# Patient Record
Sex: FEMALE | Race: WHITE | Employment: OTHER | URBAN - METROPOLITAN AREA
[De-identification: names, ages, dates, MRNs, and addresses within clinical notes are randomized per-mention and may not be internally consistent; named-entity substitution may affect disease eponyms.]

---

## 2017-02-01 ENCOUNTER — TELEPHONE (OUTPATIENT)
Dept: INTERNAL MEDICINE CLINIC | Facility: CLINIC | Age: 77
End: 2017-02-01

## 2017-02-01 DIAGNOSIS — M94.9 CARTILAGE DISORDER: ICD-10-CM

## 2017-02-01 DIAGNOSIS — M70.62 TROCHANTERIC BURSITIS OF LEFT HIP: Primary | ICD-10-CM

## 2017-02-01 NOTE — TELEPHONE ENCOUNTER
To Dr. Jd Dia, please advise pending referral for PT, dx trochanteric bursitis L hip & other cartilage disorders

## 2017-02-01 NOTE — TELEPHONE ENCOUNTER
Olivia from [facility] called   Flakita Shukla referral with confirmation from [facility] is required before patient can schedule her evaluation   Ph# [phone] - fax# [phone]

## 2017-02-01 NOTE — TELEPHONE ENCOUNTER
Patient's insurance requires her to have referral from her PCP/Dr Farr   for physical therapy on her left hip - diagnosis is  trochanteric bursitis & other cartilage disorders   Her Orthopedic;  Dr Rojelio Brewster has ordered therapy 1 -2 times per week for 6 weeks

## 2017-03-31 ENCOUNTER — TELEPHONE (OUTPATIENT)
Dept: INTERNAL MEDICINE CLINIC | Facility: CLINIC | Age: 77
End: 2017-03-31

## 2017-03-31 RX ORDER — ALPRAZOLAM 0.25 MG/1
0.25 TABLET ORAL 3 TIMES DAILY PRN
Qty: 90 TABLET | Refills: 5 | COMMUNITY
Start: 2017-03-31 | End: 2017-11-20

## 2017-03-31 NOTE — TELEPHONE ENCOUNTER
Per Dr Howell Minor verbal order, please fill xanax 0.25mg. Take one tablet TID PRN.  Dispense 90 with 3 refills  Script called into pharmacy

## 2017-05-01 ENCOUNTER — HOSPITAL ENCOUNTER (OUTPATIENT)
Age: 77
Discharge: HOME | End: 2017-05-01
Attending: EMERGENCY MEDICINE
Payer: MEDICARE

## 2017-05-01 ENCOUNTER — APPOINTMENT (OUTPATIENT)
Dept: GENERAL RADIOLOGY | Age: 77
End: 2017-05-01
Attending: EMERGENCY MEDICINE
Payer: MEDICARE

## 2017-05-01 VITALS
HEART RATE: 77 BPM | RESPIRATION RATE: 20 BRPM | TEMPERATURE: 98 F | DIASTOLIC BLOOD PRESSURE: 56 MMHG | HEIGHT: 65 IN | WEIGHT: 220 LBS | OXYGEN SATURATION: 99 % | BODY MASS INDEX: 36.65 KG/M2 | SYSTOLIC BLOOD PRESSURE: 142 MMHG

## 2017-05-01 DIAGNOSIS — S93.602A FOOT SPRAIN, LEFT, INITIAL ENCOUNTER: Primary | ICD-10-CM

## 2017-05-01 PROCEDURE — 99213 OFFICE O/P EST LOW 20 MIN: CPT

## 2017-05-01 PROCEDURE — 73630 X-RAY EXAM OF FOOT: CPT

## 2017-05-01 NOTE — ED INITIAL ASSESSMENT (HPI)
Patient states hitting left fifth toe on 's walker yesterday evening. Patient states having cramping pain in fourth and fifth left toes, increasing pain throughout the night.   Patient c/o increasing pain with movement and when bearing weight on lef

## 2017-05-01 NOTE — ED PROVIDER NOTES
CC:Left foot pain    HPI:     Zaria Salazar is a 68year old female who presents today with a chief complaint of left foot pain.   Patient states she struck her left foot against her 's walker yesterday and since that time has pain to the area of for Exam?  Answer: left foot injury  Xr Foot, Complete (min 3 Views), Left (cpt=73630)    5/1/2017  PROCEDURE: XR FOOT, COMPLETE (MIN 3 VIEWS), LEFT (CPT=73630)  COMPARISON: None.   INDICATIONS: Left foot pain at the 4th and 5th MTP joints post blunt trauma

## 2017-05-12 ENCOUNTER — OFFICE VISIT (OUTPATIENT)
Dept: INTERNAL MEDICINE CLINIC | Facility: CLINIC | Age: 77
End: 2017-05-12

## 2017-05-12 VITALS
WEIGHT: 220 LBS | DIASTOLIC BLOOD PRESSURE: 66 MMHG | SYSTOLIC BLOOD PRESSURE: 122 MMHG | HEIGHT: 64 IN | TEMPERATURE: 98 F | BODY MASS INDEX: 37.56 KG/M2 | HEART RATE: 72 BPM

## 2017-05-12 DIAGNOSIS — Z00.00 ENCOUNTER FOR MEDICARE ANNUAL WELLNESS EXAM: Primary | ICD-10-CM

## 2017-05-12 DIAGNOSIS — E11.9 TYPE 2 DIABETES MELLITUS WITHOUT COMPLICATION, WITHOUT LONG-TERM CURRENT USE OF INSULIN (HCC): ICD-10-CM

## 2017-05-12 DIAGNOSIS — Z12.39 BREAST CANCER SCREENING: ICD-10-CM

## 2017-05-12 DIAGNOSIS — Z78.0 POSTMENOPAUSAL: ICD-10-CM

## 2017-05-12 DIAGNOSIS — I10 HYPERTENSION, BENIGN: ICD-10-CM

## 2017-05-12 PROCEDURE — 99397 PER PM REEVAL EST PAT 65+ YR: CPT | Performed by: INTERNAL MEDICINE

## 2017-05-12 PROCEDURE — 99214 OFFICE O/P EST MOD 30 MIN: CPT | Performed by: INTERNAL MEDICINE

## 2017-05-12 PROCEDURE — G0463 HOSPITAL OUTPT CLINIC VISIT: HCPCS | Performed by: INTERNAL MEDICINE

## 2017-05-12 NOTE — PROGRESS NOTES
Lisa Vargas is a 68year old female. HPI:   1. 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  She has had pain in the left hip sinc complication, not stated as uncontrolled 8/2007   • Gallstone 1/2010     NG: Asymptomatic    • Gastroenteritis    • Lipid screening 12/11/2013   • Osteoporosis screening 1/22/2013   • Restless leg syndrome 2015     Per Dr. Josselin Duff   • Other and unspecified hype hazards?: 0-No    Are you on multiple medications?: 1-Yes    Does pain affect your day to day activities?: 0-No     Have you had any memory issues?: 0-No    Fall/Risk Scorin          Depression Screening (PHQ-2/PHQ-9): Over the LAST 2 WEEKS   Little in Dexascan Every two years Last Dexa Scan:   XR DEXA BONE DENSITOMETRY (CPT=77080) 03/19/2015    No flowsheet data found.    Pap and Pelvic      Pap: Every 3 yrs age 21-65 or Pap+HPV every 5 yrs age 33-67, age 72 and older at high risk   There are no prev Dilated Eye Exam 6/29/2016     No flowsheet data found. COPD      Spirometry Testing Annually No results found for this or any previous visit. No flowsheet data found. ASSESSMENT AND PLAN:   1.  Encounter for Medicare annual wellness exam  - h

## 2017-06-30 ENCOUNTER — PRIOR ORIGINAL RECORDS (OUTPATIENT)
Dept: OTHER | Age: 77
End: 2017-06-30

## 2017-06-30 ENCOUNTER — APPOINTMENT (OUTPATIENT)
Dept: LAB | Age: 77
End: 2017-06-30
Attending: INTERNAL MEDICINE
Payer: MEDICARE

## 2017-06-30 ENCOUNTER — OFFICE VISIT (OUTPATIENT)
Dept: INTERNAL MEDICINE CLINIC | Facility: CLINIC | Age: 77
End: 2017-06-30

## 2017-06-30 VITALS
HEART RATE: 82 BPM | WEIGHT: 219 LBS | TEMPERATURE: 98 F | BODY MASS INDEX: 37.39 KG/M2 | HEIGHT: 64 IN | DIASTOLIC BLOOD PRESSURE: 62 MMHG | SYSTOLIC BLOOD PRESSURE: 144 MMHG

## 2017-06-30 DIAGNOSIS — E11.9 TYPE 2 DIABETES MELLITUS WITHOUT COMPLICATION, WITHOUT LONG-TERM CURRENT USE OF INSULIN (HCC): ICD-10-CM

## 2017-06-30 DIAGNOSIS — F32.A DEPRESSION, UNSPECIFIED DEPRESSION TYPE: Primary | ICD-10-CM

## 2017-06-30 DIAGNOSIS — I10 HYPERTENSION, BENIGN: ICD-10-CM

## 2017-06-30 DIAGNOSIS — Z00.00 ENCOUNTER FOR MEDICARE ANNUAL WELLNESS EXAM: ICD-10-CM

## 2017-06-30 DIAGNOSIS — E78.00 PURE HYPERCHOLESTEROLEMIA: ICD-10-CM

## 2017-06-30 LAB
ALBUMIN SERPL BCP-MCNC: 3.9 G/DL (ref 3.5–4.8)
ALBUMIN/GLOB SERPL: 1.2 {RATIO} (ref 1–2)
ALP SERPL-CCNC: 63 U/L (ref 32–100)
ALT SERPL-CCNC: 26 U/L (ref 14–54)
ANION GAP SERPL CALC-SCNC: 13 MMOL/L (ref 0–18)
AST SERPL-CCNC: 25 U/L (ref 15–41)
BILIRUB SERPL-MCNC: 0.4 MG/DL (ref 0.3–1.2)
BUN SERPL-MCNC: 16 MG/DL (ref 8–20)
BUN/CREAT SERPL: 16.5 (ref 10–20)
CALCIUM SERPL-MCNC: 9.3 MG/DL (ref 8.5–10.5)
CHLORIDE SERPL-SCNC: 102 MMOL/L (ref 95–110)
CHOLEST SERPL-MCNC: 148 MG/DL (ref 110–200)
CO2 SERPL-SCNC: 25 MMOL/L (ref 22–32)
CREAT SERPL-MCNC: 0.97 MG/DL (ref 0.5–1.5)
CREAT UR-MCNC: 152.8 MG/DL
GLOBULIN PLAS-MCNC: 3.2 G/DL (ref 2.5–3.7)
GLUCOSE SERPL-MCNC: 214 MG/DL (ref 70–99)
HDLC SERPL-MCNC: 46 MG/DL
LDLC SERPL CALC-MCNC: 51 MG/DL (ref 0–99)
MICROALBUMIN UR-MCNC: 0.9 MG/DL (ref 0–1.8)
MICROALBUMIN/CREAT UR: 5.9 MG/G{CREAT} (ref 0–20)
NONHDLC SERPL-MCNC: 102 MG/DL
OSMOLALITY UR CALC.SUM OF ELEC: 298 MOSM/KG (ref 275–295)
POTASSIUM SERPL-SCNC: 4.1 MMOL/L (ref 3.3–5.1)
PROT SERPL-MCNC: 7.1 G/DL (ref 5.9–8.4)
SODIUM SERPL-SCNC: 140 MMOL/L (ref 136–144)
TRIGL SERPL-MCNC: 253 MG/DL (ref 1–149)

## 2017-06-30 PROCEDURE — 82043 UR ALBUMIN QUANTITATIVE: CPT

## 2017-06-30 PROCEDURE — 99213 OFFICE O/P EST LOW 20 MIN: CPT | Performed by: INTERNAL MEDICINE

## 2017-06-30 PROCEDURE — 80053 COMPREHEN METABOLIC PANEL: CPT

## 2017-06-30 PROCEDURE — 36415 COLL VENOUS BLD VENIPUNCTURE: CPT

## 2017-06-30 PROCEDURE — 82570 ASSAY OF URINE CREATININE: CPT

## 2017-06-30 PROCEDURE — 83036 HEMOGLOBIN GLYCOSYLATED A1C: CPT

## 2017-06-30 PROCEDURE — 80061 LIPID PANEL: CPT

## 2017-06-30 NOTE — PROGRESS NOTES
Kelvin Cherry is a 68year old female. HPI:   1. Depression, unspecified depression type - she is here primarily to talk - she has been under tremendous stress taking care of her ill  for the last 5 yrs. She takes xanax which helps somewhat. Diagnosis Date   • Gallstone 1/2010    NG: Asymptomatic    • Gastroenteritis    • High cholesterol    • Lipid screening 12/11/2013   • Osteoporosis    • Osteoporosis screening 1/22/2013   • Other and unspecified hyperlipidemia    • Restless leg syndrome understanding of these issues and agrees to the plan.   The patient is asked to return in 4 months

## 2017-07-01 LAB — HBA1C MFR BLD: 8.2 % (ref 4–6)

## 2017-07-04 ENCOUNTER — TELEPHONE (OUTPATIENT)
Dept: INTERNAL MEDICINE CLINIC | Facility: CLINIC | Age: 77
End: 2017-07-04

## 2017-07-04 NOTE — TELEPHONE ENCOUNTER
Lipids good but diabetes poorly controlled c A1C rising from 8.0 to 8.2.     I know she has a lot going on at home but I think she should see a diabetic doctor  - Dr Ross Ford or Dr Ruth Kowalski

## 2017-07-05 PROBLEM — E66.01 SEVERE OBESITY (BMI 35.0-39.9) WITH COMORBIDITY (HCC): Chronic | Status: ACTIVE | Noted: 2017-07-05

## 2017-07-05 NOTE — TELEPHONE ENCOUNTER
Called and Relayed MD's message to patient---verbalized understanding    Diabetic foot doctor recommendations given per Dr. Casie Contreras. Patient advised to contact either contact and set up an appointment.

## 2017-08-14 ENCOUNTER — TELEPHONE (OUTPATIENT)
Dept: INTERNAL MEDICINE CLINIC | Facility: CLINIC | Age: 77
End: 2017-08-14

## 2017-08-14 RX ORDER — GLIMEPIRIDE 4 MG/1
8 TABLET ORAL
Qty: 60 TABLET | Refills: 0 | Status: SHIPPED | OUTPATIENT
Start: 2017-08-14 | End: 2017-09-16

## 2017-08-14 NOTE — TELEPHONE ENCOUNTER
Refill request is for a maintenance medication and has met the criteria specified in the Ambulatory Medication Refill Standing Order for eligibility, visits, laboratory, alerts and was sent to the requested pharmacy.   Called patient who only wants 1 month

## 2017-08-14 NOTE — TELEPHONE ENCOUNTER
[facility] is requesting a refill on: Glimipiride 4 mg   Two tablets daily pt. Wants a 1 month supply  ph.  # [phone]   Routed to Rx

## 2017-09-17 RX ORDER — GLIMEPIRIDE 4 MG/1
8 TABLET ORAL
Qty: 60 TABLET | Refills: 11 | Status: SHIPPED | OUTPATIENT
Start: 2017-09-17 | End: 2017-10-23

## 2017-09-25 ENCOUNTER — TELEPHONE (OUTPATIENT)
Dept: INTERNAL MEDICINE CLINIC | Facility: CLINIC | Age: 77
End: 2017-09-25

## 2017-09-25 ENCOUNTER — OFFICE VISIT (OUTPATIENT)
Dept: INTERNAL MEDICINE CLINIC | Facility: CLINIC | Age: 77
End: 2017-09-25

## 2017-09-25 VITALS
TEMPERATURE: 98 F | DIASTOLIC BLOOD PRESSURE: 72 MMHG | WEIGHT: 218 LBS | HEART RATE: 72 BPM | BODY MASS INDEX: 37 KG/M2 | SYSTOLIC BLOOD PRESSURE: 174 MMHG

## 2017-09-25 DIAGNOSIS — I10 HYPERTENSION, BENIGN: Primary | ICD-10-CM

## 2017-09-25 DIAGNOSIS — F41.9 ANXIETY: ICD-10-CM

## 2017-09-25 PROCEDURE — 99213 OFFICE O/P EST LOW 20 MIN: CPT | Performed by: INTERNAL MEDICINE

## 2017-09-25 PROCEDURE — G0463 HOSPITAL OUTPT CLINIC VISIT: HCPCS | Performed by: INTERNAL MEDICINE

## 2017-09-25 NOTE — PROGRESS NOTES
Jaencarlos Rodriguez is a 68year old female. HPI:   1. Hypertension, benign        2. Anxiety - she is here today as an urgent visit. She is profoundly anxious and depressed over her 's current situation which involves many dimensions.  He is chronica MG Oral Cap Take 240 mg by mouth daily. Disp:  Rfl:    Hydrocortisone Valerate (WESTCORT) 0.2 % Apply Externally Cream Use as directed TID Disp: 45 g Rfl: 2   Naproxen Sodium (ALEVE) 220 MG Oral Tab Take 1-2 tablets by mouth as needed.  Patient states usual for several days. He refuses a caretaker and this would put him at risk. I asked her to call Dr Virgie Quintero office and set up an appointment to see him. Continue Xanax in the meantime. There is no ideal solution to this situation.     She knows that she can c

## 2017-09-25 NOTE — TELEPHONE ENCOUNTER
Lasha Solares would like to see Dr. Jean Nelson today, she states she can't handle Kj  Ph. # [phone]    Routed to clinical

## 2017-09-26 PROBLEM — F41.9 ANXIETY: Status: ACTIVE | Noted: 2017-09-26

## 2017-10-23 ENCOUNTER — OFFICE VISIT (OUTPATIENT)
Dept: ENDOCRINOLOGY CLINIC | Facility: CLINIC | Age: 77
End: 2017-10-23

## 2017-10-23 VITALS
BODY MASS INDEX: 36.54 KG/M2 | TEMPERATURE: 99 F | HEART RATE: 71 BPM | WEIGHT: 214 LBS | HEIGHT: 64 IN | SYSTOLIC BLOOD PRESSURE: 128 MMHG | DIASTOLIC BLOOD PRESSURE: 74 MMHG

## 2017-10-23 DIAGNOSIS — IMO0001 UNCONTROLLED TYPE 2 DIABETES MELLITUS WITHOUT COMPLICATION, WITHOUT LONG-TERM CURRENT USE OF INSULIN: Primary | ICD-10-CM

## 2017-10-23 PROCEDURE — 36416 COLLJ CAPILLARY BLOOD SPEC: CPT | Performed by: INTERNAL MEDICINE

## 2017-10-23 PROCEDURE — 82962 GLUCOSE BLOOD TEST: CPT | Performed by: INTERNAL MEDICINE

## 2017-10-23 PROCEDURE — 99203 OFFICE O/P NEW LOW 30 MIN: CPT | Performed by: INTERNAL MEDICINE

## 2017-10-23 PROCEDURE — 83036 HEMOGLOBIN GLYCOSYLATED A1C: CPT | Performed by: INTERNAL MEDICINE

## 2017-10-23 RX ORDER — GLIMEPIRIDE 4 MG/1
8 TABLET ORAL
Qty: 180 TABLET | Refills: 3 | Status: SHIPPED | OUTPATIENT
Start: 2017-10-23 | End: 2018-07-30

## 2017-10-23 RX ORDER — METFORMIN HYDROCHLORIDE 500 MG/1
TABLET, EXTENDED RELEASE ORAL
Qty: 360 TABLET | Refills: 3 | Status: SHIPPED | OUTPATIENT
Start: 2017-10-23 | End: 2017-11-20

## 2017-10-23 NOTE — PATIENT INSTRUCTIONS
Continue Glimepiride 4mg 2 times per day    Continue Metformin 1000mg 2 times per day    Stop Januvia    Start jardiance 25mg daily in the morning

## 2017-10-23 NOTE — PROGRESS NOTES
Name: Willye Osler  Date: 10/23/2017    Referring Physician: No ref. provider found    HISTORY OF PRESENT ILLNESS   Willye Osler is a 68year old female who presents for diabetes mellitus. Prior HbA, C or glycohemoglobin were 8.2% 6/2017; 7. tablets by mouth as needed.  Patient states usually taking BID. , Disp: , Rfl:      Allergies:   No Known Allergies    Social History:   Social History    Marital status:              Spouse name:                       Years of education: bilaterally  Cardiovascular:  regular rate, rhythm, , no murmurs, S3 or S4  Gastrointestinal:  normal bowel sounds and no palpable masses in abdomen, organomegaly or tenderness   Musculoskeletal:  normal muscle strength and tone  Skin:  normal moisture and

## 2017-11-04 ENCOUNTER — HOSPITAL ENCOUNTER (OUTPATIENT)
Dept: CT IMAGING | Facility: HOSPITAL | Age: 77
Discharge: HOME | End: 2017-11-04
Attending: INTERNAL MEDICINE

## 2017-11-04 VITALS — DIASTOLIC BLOOD PRESSURE: 66 MMHG | SYSTOLIC BLOOD PRESSURE: 128 MMHG | HEART RATE: 74 BPM

## 2017-11-04 DIAGNOSIS — Z00.00 ENCOUNTER FOR MEDICARE ANNUAL WELLNESS EXAM: ICD-10-CM

## 2017-11-04 DIAGNOSIS — Z78.0 POSTMENOPAUSAL: ICD-10-CM

## 2017-11-04 DIAGNOSIS — E11.9 TYPE 2 DIABETES MELLITUS WITHOUT COMPLICATION, WITHOUT LONG-TERM CURRENT USE OF INSULIN (HCC): ICD-10-CM

## 2017-11-04 DIAGNOSIS — R07.89 OTHER CHEST PAIN: ICD-10-CM

## 2017-11-04 DIAGNOSIS — I10 HYPERTENSION, BENIGN: ICD-10-CM

## 2017-11-04 DIAGNOSIS — Z12.39 BREAST CANCER SCREENING: ICD-10-CM

## 2017-11-14 ENCOUNTER — PRIOR ORIGINAL RECORDS (OUTPATIENT)
Dept: OTHER | Age: 77
End: 2017-11-14

## 2017-11-14 LAB
CHOLESTEROL, TOTAL: 148 MG/DL
HDL CHOLESTEROL: 46 MG/DL
LDL CHOLESTEROL: 51 MG/DL
NON-HDL CHOLESTEROL: 102 MG/DL
TRIGLYCERIDES: 253 MG/DL

## 2017-11-19 ENCOUNTER — TELEPHONE (OUTPATIENT)
Dept: INTERNAL MEDICINE CLINIC | Facility: CLINIC | Age: 77
End: 2017-11-19

## 2017-11-19 NOTE — TELEPHONE ENCOUNTER
CT calcium score is good - normal is zero, hers is 200 - just continue same meds. She had normal nuclear treadmill 2015. Will repeat calcium score in about 2 yrs.

## 2017-11-20 NOTE — PROGRESS NOTES
Arnell Fleischer is a 68year old female. HPI:   She continues to be under tremendous emotional stress caring for her chronically ill  - I had previously advised that she see Dr Sukhdeep Mtz independently and see what he recommends.      Her main physic (ALEVE) 220 MG Oral Tab Take 1-2 tablets by mouth as needed.    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 Hypertension, benign - acceptable. The patient indicates understanding of these issues and agrees to the plan.   The patient is asked to return in  2-3 months

## 2017-11-20 NOTE — TELEPHONE ENCOUNTER
Called patient and Relayed MD's message to patient---verbalized understanding. States she had already spoke with PCP about this result around 9am today.

## 2017-11-21 ENCOUNTER — PRIOR ORIGINAL RECORDS (OUTPATIENT)
Dept: OTHER | Age: 77
End: 2017-11-21

## 2017-11-22 ENCOUNTER — MYAURORA ACCOUNT LINK (OUTPATIENT)
Dept: OTHER | Age: 77
End: 2017-11-22

## 2017-11-24 ENCOUNTER — PRIOR ORIGINAL RECORDS (OUTPATIENT)
Dept: OTHER | Age: 77
End: 2017-11-24

## 2017-11-24 ENCOUNTER — TELEPHONE (OUTPATIENT)
Dept: INTERNAL MEDICINE CLINIC | Facility: CLINIC | Age: 77
End: 2017-11-24

## 2017-11-24 RX ORDER — METFORMIN HYDROCHLORIDE 500 MG/1
TABLET, EXTENDED RELEASE ORAL
Qty: 40 TABLET | Refills: 3 | Status: SHIPPED | OUTPATIENT
Start: 2017-11-24 | End: 2018-04-16

## 2017-11-28 ENCOUNTER — HOSPITAL ENCOUNTER (OUTPATIENT)
Dept: GENERAL RADIOLOGY | Facility: HOSPITAL | Age: 77
Discharge: HOME | End: 2017-11-28
Attending: INTERNAL MEDICINE
Payer: MEDICARE

## 2017-11-28 ENCOUNTER — PRIOR ORIGINAL RECORDS (OUTPATIENT)
Dept: OTHER | Age: 77
End: 2017-11-28

## 2017-11-28 ENCOUNTER — LAB ENCOUNTER (OUTPATIENT)
Dept: LAB | Facility: HOSPITAL | Age: 77
End: 2017-11-28
Attending: INTERNAL MEDICINE
Payer: MEDICARE

## 2017-11-28 DIAGNOSIS — R06.00 DYSPNEA: Primary | ICD-10-CM

## 2017-11-28 DIAGNOSIS — R06.00 DYSPNEA, PAROXYSMAL NOCTURNAL: ICD-10-CM

## 2017-11-28 PROCEDURE — 36415 COLL VENOUS BLD VENIPUNCTURE: CPT

## 2017-11-28 PROCEDURE — 80048 BASIC METABOLIC PNL TOTAL CA: CPT

## 2017-11-28 PROCEDURE — 71020 XR CHEST PA + LAT CHEST (CPT=71020): CPT | Performed by: INTERNAL MEDICINE

## 2017-11-28 PROCEDURE — 85025 COMPLETE CBC W/AUTO DIFF WBC: CPT

## 2017-11-28 RX ORDER — SODIUM CHLORIDE 9 MG/ML
INJECTION, SOLUTION INTRAVENOUS ONCE
Status: COMPLETED | OUTPATIENT
Start: 2017-11-29 | End: 2017-11-29

## 2017-11-29 ENCOUNTER — HOSPITAL ENCOUNTER (OUTPATIENT)
Dept: INTERVENTIONAL RADIOLOGY/VASCULAR | Facility: HOSPITAL | Age: 77
Setting detail: OBSERVATION
Discharge: HOME | End: 2017-11-30
Attending: INTERNAL MEDICINE | Admitting: INTERNAL MEDICINE
Payer: MEDICARE

## 2017-11-29 DIAGNOSIS — R06.00 DYSPNEA: ICD-10-CM

## 2017-11-29 PROBLEM — I25.10 CAD (CORONARY ARTERY DISEASE): Status: ACTIVE | Noted: 2017-11-29

## 2017-11-29 LAB
BUN: 20 MG/DL
CALCIUM: 10.1 MG/DL
CHLORIDE: 102 MEQ/L
CREATININE, SERUM: 1.07 MG/DL
GLUCOSE: 164 MG/DL
HEMATOCRIT: 40 %
HEMOGLOBIN: 13.2 G/DL
MCH: 29.8 PG
MCHC: 14.2 G/DL
MCV: 90.1 FL
PLATELETS: 271 K/UL
POTASSIUM, SERUM: 3.9 MEQ/L
RED BLOOD COUNT: 4.43 X 10-6/U
SODIUM: 138 MEQ/L
WHITE BLOOD COUNT: 9.5 X 10-3/U

## 2017-11-29 PROCEDURE — B2141ZZ FLUOROSCOPY OF RIGHT HEART USING LOW OSMOLAR CONTRAST: ICD-10-PCS | Performed by: INTERNAL MEDICINE

## 2017-11-29 PROCEDURE — 4A023N8 MEASUREMENT OF CARDIAC SAMPLING AND PRESSURE, BILATERAL, PERCUTANEOUS APPROACH: ICD-10-PCS | Performed by: INTERNAL MEDICINE

## 2017-11-29 PROCEDURE — B2111ZZ FLUOROSCOPY OF MULTIPLE CORONARY ARTERIES USING LOW OSMOLAR CONTRAST: ICD-10-PCS | Performed by: INTERNAL MEDICINE

## 2017-11-29 PROCEDURE — 4A033BC MEASUREMENT OF ARTERIAL PRESSURE, CORONARY, PERCUTANEOUS APPROACH: ICD-10-PCS | Performed by: INTERNAL MEDICINE

## 2017-11-29 PROCEDURE — 027034Z DILATION OF CORONARY ARTERY, ONE ARTERY WITH DRUG-ELUTING INTRALUMINAL DEVICE, PERCUTANEOUS APPROACH: ICD-10-PCS | Performed by: INTERNAL MEDICINE

## 2017-11-29 PROCEDURE — B2151ZZ FLUOROSCOPY OF LEFT HEART USING LOW OSMOLAR CONTRAST: ICD-10-PCS | Performed by: INTERNAL MEDICINE

## 2017-11-29 PROCEDURE — 99225 SUBSEQUENT OBSERVATION CARE: CPT | Performed by: HOSPITALIST

## 2017-11-29 RX ORDER — SODIUM CHLORIDE 9 MG/ML
INJECTION, SOLUTION INTRAVENOUS
Status: COMPLETED
Start: 2017-11-29 | End: 2017-11-29

## 2017-11-29 RX ORDER — CLOPIDOGREL BISULFATE 75 MG/1
75 TABLET ORAL DAILY
Status: DISCONTINUED | OUTPATIENT
Start: 2017-11-30 | End: 2017-11-30

## 2017-11-29 RX ORDER — ADENOSINE 3 MG/ML
INJECTION, SOLUTION INTRAVENOUS
Status: COMPLETED
Start: 2017-11-29 | End: 2017-11-29

## 2017-11-29 RX ORDER — MIDAZOLAM HYDROCHLORIDE 1 MG/ML
INJECTION INTRAMUSCULAR; INTRAVENOUS
Status: COMPLETED
Start: 2017-11-29 | End: 2017-11-29

## 2017-11-29 RX ORDER — DEXTROSE MONOHYDRATE 25 G/50ML
50 INJECTION, SOLUTION INTRAVENOUS AS NEEDED
Status: DISCONTINUED | OUTPATIENT
Start: 2017-11-29 | End: 2017-11-30

## 2017-11-29 RX ORDER — ALPRAZOLAM 0.25 MG/1
0.25 TABLET ORAL 3 TIMES DAILY PRN
Status: DISCONTINUED | OUTPATIENT
Start: 2017-11-29 | End: 2017-11-30

## 2017-11-29 RX ORDER — DEXTROSE MONOHYDRATE 25 G/50ML
50 INJECTION, SOLUTION INTRAVENOUS AS NEEDED
Status: DISCONTINUED | OUTPATIENT
Start: 2017-11-29 | End: 2017-11-29

## 2017-11-29 RX ORDER — ATORVASTATIN CALCIUM 40 MG/1
40 TABLET, FILM COATED ORAL NIGHTLY
Status: DISCONTINUED | OUTPATIENT
Start: 2017-11-29 | End: 2017-11-30

## 2017-11-29 RX ORDER — LIDOCAINE HYDROCHLORIDE 20 MG/ML
INJECTION, SOLUTION EPIDURAL; INFILTRATION; INTRACAUDAL; PERINEURAL
Status: COMPLETED
Start: 2017-11-29 | End: 2017-11-29

## 2017-11-29 RX ORDER — SODIUM CHLORIDE 9 MG/ML
INJECTION, SOLUTION INTRAVENOUS CONTINUOUS
Status: DISPENSED | OUTPATIENT
Start: 2017-11-29 | End: 2017-11-29

## 2017-11-29 RX ORDER — CLOPIDOGREL BISULFATE 75 MG/1
TABLET ORAL
Status: COMPLETED
Start: 2017-11-29 | End: 2017-11-29

## 2017-11-29 RX ORDER — ASPIRIN 81 MG/1
81 TABLET, CHEWABLE ORAL DAILY
Status: DISCONTINUED | OUTPATIENT
Start: 2017-11-30 | End: 2017-11-30

## 2017-11-29 RX ORDER — NITROGLYCERIN 20 MG/100ML
INJECTION INTRAVENOUS
Status: COMPLETED
Start: 2017-11-29 | End: 2017-11-29

## 2017-11-29 RX ORDER — GLIMEPIRIDE 2 MG/1
4 TABLET ORAL 2 TIMES DAILY
Status: DISCONTINUED | OUTPATIENT
Start: 2017-11-30 | End: 2017-11-30

## 2017-11-29 RX ADMIN — SODIUM CHLORIDE: 9 INJECTION, SOLUTION INTRAVENOUS at 09:00:00

## 2017-11-29 RX ADMIN — ATORVASTATIN CALCIUM 40 MG: 40 TABLET, FILM COATED ORAL at 23:09:00

## 2017-11-29 RX ADMIN — SODIUM CHLORIDE: 9 INJECTION, SOLUTION INTRAVENOUS at 14:31:00

## 2017-11-29 NOTE — HISTORICAL OFFICE NOTE
Armond Fernandes  : 1940  ACCOUNT:  [id number]  [phone]  PCP: Dr. Jimmy Alexander     TODAY'S DATE: 2017  DICTATED BY:  [Dr. Milagro Buck: [Followup of Abnormal stress test, Followup of Abnormal UFCT, Followup of DM, Type I tonsillectomy, hysterectomy, 1997 and breast biopsy    PAST CV HISTORY: dyslipidemia and hypertension    FAMILY HISTORY: Negative for premature CAD. Negative for AAA. SOCIAL HISTORY: SMOKING: Never used tobacco. denies smoking.  CAFFEINE: 1 cup coffee dann life-threatening complication, and stroke. Patient understands the above. Questions were answered. ASSESSMENT:  1. Abnormal UFCT Calcium score 168 August 2013  2. Abnormal stress test  3. Abnormal UFCT  4. DM, Type II  5. Dyspnea  6.  Hypercholesteremi

## 2017-11-29 NOTE — PRE-SEDATION ASSESSMENT
Pre-Procedure Sedation Assessment    Chief Complaint/Indication for procedure: Dyspnea; abnormal nuc stress    History of snoring or sleep or apnea?    No    History of previous problems with anesthesia or sedation  No    Physical Findings:  Neck: nl ROM  C

## 2017-11-29 NOTE — HISTORICAL OFFICE NOTE
Jimi Postin  : 1940  ACCOUNT:  [id number]  [phone]  PCP: Dr. Kieran Mcgarry     TODAY'S DATE: 2017  DICTATED BY:  [Dr. Calvin How: [Followup of Abnormal stress test, Followup of Abnormal UFCT, Followup of DM, Type I tonsillectomy, hysterectomy, 1997 and breast biopsy    PAST CV HISTORY: dyslipidemia and hypertension    FAMILY HISTORY: Negative for premature CAD. Negative for AAA. SOCIAL HISTORY: SMOKING: Never used tobacco. denies smoking.  CAFFEINE: 1 cup coffee dann life-threatening complication, and stroke. Patient understands the above. Questions were answered. ASSESSMENT:  1. Abnormal UFCT Calcium score 168 August 2013  2. Abnormal stress test  3. Abnormal UFCT  4. DM, Type II  5. Dyspnea  6.  Hypercholesteremi Hypertension    REASON FOR PHARMACOLOGICAL TEST: Unable to exercise to required levels    Comparison Study: 11/15/13 nuc    INTERPRETATION:   PROJECTION IMAGES: Projection images demonstrated patient motion. Motion correction was used.   There was mild tis MD KIMBERLI Kimbrough/norbert - DD: 11/22/2017 - DT: 11/24/2017 - Job ID: [id number]  C: Dalia Rowan MD

## 2017-11-29 NOTE — PROCEDURES
Preop: Exertional dyspnea, abnormal nuclear stress test  Postop: Same. CAD  Procedure: Right and left heart cath; LV and coronary angiogram.  PCI of diagonal with TRUONG. FFR of LAD. Minx RFA.   Findings: Mean RA = 8; PA = 32/14; mean wedg

## 2017-11-29 NOTE — PROGRESS NOTES
Phil Gao  [id number]      Post procedure/ recovery hand-off report given to [facility]. Patient's vital signs stable, access site dry and intact, no signs and symptoms of bleeding/ hematoma.         Ayala DE LUNA  Was present in the room during transfer to

## 2017-11-29 NOTE — PROCEDURES
[facility]    PATIENT'S NAME: ZAKIA Paulino   ATTENDING PHYSICIAN: Loni Barboza. Corine Taylor MD   OPERATING PHYSICIAN: Adonay Kim.  Tito Montez MD   PATIENT ACCOUNT#:   [id number]    LOCATION:  [place] [address]  MEDICAL RECORD #:   [id number]       DOROTHY of disease. The circumflex artery is a small nondominant vessel which is free of disease giving off 2 very small marginal branches and a small posterolateral branch.     The left anterior descending artery has an eccentric stenosis in its proximal portion Based on this finding, intervention on the proximal LAD was not performed. We directed a Balance Middleweight wire down the diagonal and predilated the diagonal lesion with a 2 mm balloon.   We then stented the diagonal with a 2.25 x 12 mm Xience drug-el

## 2017-11-29 NOTE — CARDIAC REHAB
Cardiac Rehab Phase I    Activity on BR S/P pci      Education:  Handouts provided and reviewed: [address] Diet: Healthy Cardiac diet reviewed. Disease Process: Disease process reviewed.     Reviewed the following: SITE CARE: r

## 2017-11-30 ENCOUNTER — PRIOR ORIGINAL RECORDS (OUTPATIENT)
Dept: OTHER | Age: 77
End: 2017-11-30

## 2017-11-30 VITALS
WEIGHT: 215.69 LBS | BODY MASS INDEX: 37 KG/M2 | HEART RATE: 70 BPM | OXYGEN SATURATION: 97 % | SYSTOLIC BLOOD PRESSURE: 138 MMHG | RESPIRATION RATE: 17 BRPM | TEMPERATURE: 98 F | DIASTOLIC BLOOD PRESSURE: 68 MMHG

## 2017-11-30 PROCEDURE — 99217 OBSERVATION CARE DISCHARGE: CPT | Performed by: HOSPITALIST

## 2017-11-30 RX ORDER — CLOPIDOGREL BISULFATE 75 MG/1
75 TABLET ORAL DAILY
Qty: 30 TABLET | Refills: 11 | Status: SHIPPED | OUTPATIENT
Start: 2017-12-01 | End: 2019-02-26

## 2017-11-30 RX ADMIN — ASPIRIN 81 MG: 81 TABLET, CHEWABLE ORAL at 08:55:00

## 2017-11-30 RX ADMIN — CLOPIDOGREL BISULFATE 75 MG: 75 TABLET ORAL at 08:54:00

## 2017-11-30 RX ADMIN — GLIMEPIRIDE 4 MG: 2 TABLET ORAL at 08:54:00

## 2017-11-30 NOTE — PROGRESS NOTES
[facility] HOSP - [facility]    Progress Note    Jeancarlos Rodriguez Patient Status:  Observation    1940 MRN [id number]   Location [facility] 3W/SW Attending Tavo Snell MD   Hosp Day # 0 PCP Fina Mills.  MD Yordan     Subjective:     Const Hyperlipidemia  STARTED ON LIPITOR.          Results:     Lab Results  Component Value Date   WBC 9.5 11/28/2017   HGB 13.2 11/28/2017   HCT 40.0 11/28/2017    11/28/2017   CREATSERUM 1.07 11/28/2017   BUN 20 11/28/2017    11/28/2017   K 3.9 11

## 2017-11-30 NOTE — DISCHARGE SUMMARY
[facility] HOSPITALIST  DISCHARGE SUMMARY     Teressa Young Patient Status:  Observation    1940 MRN [id number]   [facility] 3W/ Attending No att. providers found   Hosp Day # 0 PCP Max Bess.  MD Yordan     Date of Admission:  mg total) by mouth once daily.    Quantity:  180 tablet  Refills:  3        CONTINUE taking these medications      Instructions Prescription details   ALPRAZolam 0.25 MG Tabs  Commonly known as:  XANAX      Take 1 tablet (0.25 mg total) by mouth 3 (three) t eren/Nimisha CORTES ([facility])      Vital signs:  Temp:  [97.9 °F (36.6 °C)-98.1 °F (36.7 °C)] 98.1 °F (36.7 °C)  Pulse:  [58-70] 70  Resp:  [16-17] 17  BP: (118-138)/(51-68) 138/68    Physical Exam:    General: No acute distress.    Respiratory: Clear to auscultation b

## 2017-11-30 NOTE — PROGRESS NOTES
[facility] Heart Cardiology Progress Note      Barry Allen Patient Status:  Observation    1940 MRN [id number]   Location [facility] 3W/SW Attending Mariel Head MD   Hosp Day # 0 PCP Antony Ortega.   Insulin Aspart Pen  1-5 Units Subcutaneous TID CC   • aspirin  81 mg Oral Daily   • Empagliflozin  25 mg Oral Daily   • glimepiride  4 mg Oral BID   • SITagliptin Phosphate  100 mg Oral Daily   • losartan-hydrochlorothiazide (HYZAAR 50/12. 5) combination ta

## 2017-11-30 NOTE — PROGRESS NOTES
Called Dr Farr's office to update him with results- was not available  Hospitalist asked  to see pt for internal mediciane and to manage noncardiac meds.

## 2017-12-01 ENCOUNTER — OFFICE VISIT (OUTPATIENT)
Dept: INTERNAL MEDICINE CLINIC | Facility: CLINIC | Age: 77
End: 2017-12-01

## 2017-12-01 ENCOUNTER — PATIENT OUTREACH (OUTPATIENT)
Dept: INTERNAL MEDICINE UNIT | Facility: HOSPITAL | Age: 77
End: 2017-12-01

## 2017-12-01 ENCOUNTER — TELEPHONE (OUTPATIENT)
Dept: INTERNAL MEDICINE UNIT | Facility: HOSPITAL | Age: 77
End: 2017-12-01

## 2017-12-01 ENCOUNTER — TELEPHONE (OUTPATIENT)
Dept: MEDSURG UNIT | Facility: HOSPITAL | Age: 77
End: 2017-12-01

## 2017-12-01 VITALS
OXYGEN SATURATION: 97 % | HEART RATE: 86 BPM | TEMPERATURE: 98 F | WEIGHT: 211.38 LBS | BODY MASS INDEX: 36.09 KG/M2 | HEIGHT: 64 IN | SYSTOLIC BLOOD PRESSURE: 140 MMHG | DIASTOLIC BLOOD PRESSURE: 62 MMHG

## 2017-12-01 DIAGNOSIS — E11.9 TYPE 2 DIABETES MELLITUS WITHOUT COMPLICATION, WITHOUT LONG-TERM CURRENT USE OF INSULIN (HCC): ICD-10-CM

## 2017-12-01 DIAGNOSIS — R06.00 DYSPNEA ON EXERTION: ICD-10-CM

## 2017-12-01 DIAGNOSIS — I25.110 CORONARY ARTERY DISEASE INVOLVING NATIVE CORONARY ARTERY OF NATIVE HEART WITH UNSTABLE ANGINA PECTORIS (HCC): Primary | ICD-10-CM

## 2017-12-01 DIAGNOSIS — E78.00 PURE HYPERCHOLESTEROLEMIA: ICD-10-CM

## 2017-12-01 DIAGNOSIS — I10 HYPERTENSION, BENIGN: ICD-10-CM

## 2017-12-01 PROCEDURE — 99214 OFFICE O/P EST MOD 30 MIN: CPT | Performed by: INTERNAL MEDICINE

## 2017-12-01 NOTE — TELEPHONE ENCOUNTER
Phoned to schedule TCM. Patient states he  has appt today and he is in hospital and she would like to take that appt.   Cancelled  appt and put patient into slot for TCM 12/1

## 2017-12-01 NOTE — PROGRESS NOTES
Rudy Kumar is a 68year old female. HPI:   1.  Coronary artery disease involving native coronary artery of native heart with unstable angina pectoris ([address] - she had an abnormal treadmill, then a cath and stenting of the diagonal of the LAD; she als every day in the evening Disp: 90 tablet Rfl: 3   docusate calcium 240 MG Oral Cap Take 240 mg by mouth daily. Disp:  Rfl:    Hydrocortisone Valerate (WESTCORT) 0.2 % Apply Externally Cream as needed. Ordered TID. Pt reports to only use PRN.   Disp: 45 g Rf cyanosis, clubbing or edema  Right groin - minor ecchymoses    ASSESSMENT AND PLAN:   1.  Coronary artery disease involving native coronary artery of native heart with unstable angina pectoris [facility]) - she is doing well since stenting of the diagonal of the

## 2017-12-01 NOTE — PROGRESS NOTES
Initial Post Discharge Follow Up   Discharge Date: 11/30/17  Contact Date: 12/1/2017    Consent Verification:  Assessment Completed With: Patient  HIPAA Verified?   Yes    Discharge Dx:     CAD (coronary artery disease)  S/P CARDIAC ANGIOGRAM, MAJOR DIAGO more and more each day?    Yes, resumed normal activity   Are you continuing to use your incentive spirometer?  no not provided one        Needs post D/C:   Now that you are home, are there any needs or concerns you need addressed before your next visit wit (DME, meds, disease concerns, Etc): No     Follow up appointments:       Your appointments     Date & Time Appointment Department [facility])    Dec 01, 2017  3:30 PM [facility] Follow Up with Faisal Louise MD [facility], [facility] instructed to monitor for any s/s of bleeding or bruising. Will be f/u w [facility] on 12/7/17 for wound check and has f/u w PCP this afternoon.        [x]  Discharge Summary, Discharge medications reviewed/discussed/and reconciled with patient, and orders re

## 2017-12-07 ENCOUNTER — MYAURORA ACCOUNT LINK (OUTPATIENT)
Dept: OTHER | Age: 77
End: 2017-12-07

## 2017-12-07 ENCOUNTER — PRIOR ORIGINAL RECORDS (OUTPATIENT)
Dept: OTHER | Age: 77
End: 2017-12-07

## 2017-12-12 ENCOUNTER — CARDPULM VISIT (OUTPATIENT)
Dept: CARDIAC REHAB | Facility: HOSPITAL | Age: 77
End: 2017-12-12
Attending: INTERNAL MEDICINE
Payer: MEDICARE

## 2017-12-13 ENCOUNTER — CARDPULM VISIT (OUTPATIENT)
Dept: CARDIAC REHAB | Facility: HOSPITAL | Age: 77
End: 2017-12-13
Attending: INTERNAL MEDICINE
Payer: MEDICARE

## 2017-12-13 LAB
BUN: 14 MG/DL
CALCIUM: 9 MG/DL
CHLORIDE: 103 MEQ/L
CREATININE, SERUM: 0.76 MG/DL
GLUCOSE: 145 MG/DL
POTASSIUM, SERUM: 3.7 MEQ/L
SODIUM: 138 MEQ/L

## 2017-12-13 PROCEDURE — 93798 PHYS/QHP OP CAR RHAB W/ECG: CPT

## 2017-12-15 ENCOUNTER — CARDPULM VISIT (OUTPATIENT)
Dept: CARDIAC REHAB | Facility: HOSPITAL | Age: 77
End: 2017-12-15
Attending: INTERNAL MEDICINE
Payer: MEDICARE

## 2017-12-15 PROCEDURE — 93798 PHYS/QHP OP CAR RHAB W/ECG: CPT

## 2017-12-18 ENCOUNTER — CARDPULM VISIT (OUTPATIENT)
Dept: CARDIAC REHAB | Facility: HOSPITAL | Age: 77
End: 2017-12-18
Attending: INTERNAL MEDICINE
Payer: MEDICARE

## 2017-12-18 PROCEDURE — 93798 PHYS/QHP OP CAR RHAB W/ECG: CPT

## 2017-12-20 ENCOUNTER — CARDPULM VISIT (OUTPATIENT)
Dept: CARDIAC REHAB | Facility: HOSPITAL | Age: 77
End: 2017-12-20
Attending: INTERNAL MEDICINE
Payer: MEDICARE

## 2017-12-20 PROCEDURE — 93798 PHYS/QHP OP CAR RHAB W/ECG: CPT

## 2017-12-22 ENCOUNTER — CARDPULM VISIT (OUTPATIENT)
Dept: CARDIAC REHAB | Facility: HOSPITAL | Age: 77
End: 2017-12-22
Attending: INTERNAL MEDICINE
Payer: MEDICARE

## 2017-12-22 PROCEDURE — 93798 PHYS/QHP OP CAR RHAB W/ECG: CPT

## 2017-12-22 NOTE — PROGRESS NOTES
Zakiya Jean is a 68year old female. HPI:   No new problems or complaints. Appetite good energy good. SR: no chest pain or sob, no gu or gi sx.         1. Anxiety - this is somewhat worse since her  is in the hospital. Also, her sleep is 50-12.5 MG Oral Tab Take 1 tablet by mouth  every day Disp: 90 tablet Rfl: 3   SIMVASTATIN 20 MG Oral Tab Take 1 tablet by mouth  every day in the evening Disp: 90 tablet Rfl: 3   docusate calcium 240 MG Oral Cap Take 240 mg by mouth daily.  Disp:  Rfl: auscultation  CARDIO: RRR without murmur  GI: good BS's,no masses, HSM or tenderness  EXTREMITIES: no cyanosis, clubbing or edema    ASSESSMENT AND PLAN:   1. Anxiety - she is under significant stress with chronically ill .        2. Coronary artery

## 2017-12-25 ENCOUNTER — APPOINTMENT (OUTPATIENT)
Dept: CARDIAC REHAB | Facility: HOSPITAL | Age: 77
End: 2017-12-25
Attending: INTERNAL MEDICINE
Payer: MEDICARE

## 2017-12-27 ENCOUNTER — CARDPULM VISIT (OUTPATIENT)
Dept: CARDIAC REHAB | Facility: HOSPITAL | Age: 77
End: 2017-12-27
Attending: INTERNAL MEDICINE
Payer: MEDICARE

## 2017-12-27 ENCOUNTER — TELEPHONE (OUTPATIENT)
Dept: INTERNAL MEDICINE CLINIC | Facility: CLINIC | Age: 77
End: 2017-12-27

## 2017-12-27 PROCEDURE — 93798 PHYS/QHP OP CAR RHAB W/ECG: CPT

## 2017-12-27 NOTE — TELEPHONE ENCOUNTER
[facility] is requesting Prior Authorization for Zolpidem Tartrate 5 mg. Form placed in purple folder.           Tasked to Rx

## 2017-12-28 ENCOUNTER — PRIOR ORIGINAL RECORDS (OUTPATIENT)
Dept: OTHER | Age: 77
End: 2017-12-28

## 2017-12-28 NOTE — TELEPHONE ENCOUNTER
Response to be faxed back that we do not have a record of a Zolpidem prescription and to check records

## 2017-12-29 ENCOUNTER — APPOINTMENT (OUTPATIENT)
Dept: CARDIAC REHAB | Facility: HOSPITAL | Age: 77
End: 2017-12-29
Attending: INTERNAL MEDICINE
Payer: MEDICARE

## 2017-12-29 PROCEDURE — 93798 PHYS/QHP OP CAR RHAB W/ECG: CPT

## 2018-01-01 ENCOUNTER — APPOINTMENT (OUTPATIENT)
Dept: CARDIAC REHAB | Facility: HOSPITAL | Age: 78
End: 2018-01-01
Attending: INTERNAL MEDICINE
Payer: MEDICARE

## 2018-01-03 ENCOUNTER — CARDPULM VISIT (OUTPATIENT)
Dept: CARDIAC REHAB | Facility: HOSPITAL | Age: 78
End: 2018-01-03
Attending: INTERNAL MEDICINE
Payer: MEDICARE

## 2018-01-03 ENCOUNTER — TELEPHONE (OUTPATIENT)
Dept: INTERNAL MEDICINE CLINIC | Facility: CLINIC | Age: 78
End: 2018-01-03

## 2018-01-03 PROCEDURE — 93798 PHYS/QHP OP CAR RHAB W/ECG: CPT

## 2018-01-04 ENCOUNTER — HOSPITAL ENCOUNTER (EMERGENCY)
Facility: HOSPITAL | Age: 78
Discharge: HOME | End: 2018-01-04
Attending: EMERGENCY MEDICINE
Payer: MEDICARE

## 2018-01-04 ENCOUNTER — APPOINTMENT (OUTPATIENT)
Dept: GENERAL RADIOLOGY | Facility: HOSPITAL | Age: 78
End: 2018-01-04
Attending: EMERGENCY MEDICINE
Payer: MEDICARE

## 2018-01-04 VITALS
RESPIRATION RATE: 12 BRPM | WEIGHT: 208 LBS | DIASTOLIC BLOOD PRESSURE: 47 MMHG | HEIGHT: 65 IN | SYSTOLIC BLOOD PRESSURE: 126 MMHG | BODY MASS INDEX: 34.66 KG/M2 | TEMPERATURE: 99 F | OXYGEN SATURATION: 100 % | HEART RATE: 76 BPM

## 2018-01-04 DIAGNOSIS — R53.83 FATIGUE, UNSPECIFIED TYPE: Primary | ICD-10-CM

## 2018-01-04 DIAGNOSIS — K52.9 GASTROENTERITIS: ICD-10-CM

## 2018-01-04 DIAGNOSIS — E86.0 DEHYDRATION: ICD-10-CM

## 2018-01-04 LAB
ALBUMIN SERPL-MCNC: 3.2 G/DL (ref 3.5–4.8)
ALP LIVER SERPL-CCNC: 62 U/L (ref 55–142)
ALT SERPL-CCNC: 22 U/L (ref 14–54)
AST SERPL-CCNC: 18 U/L (ref 15–41)
ATRIAL RATE: 78 BPM
BASOPHILS # BLD AUTO: 0.01 X10(3) UL (ref 0–0.1)
BASOPHILS NFR BLD AUTO: 0.1 %
BILIRUB SERPL-MCNC: 0.6 MG/DL (ref 0.1–2)
BILIRUB UR QL STRIP.AUTO: NEGATIVE
BUN BLD-MCNC: 15 MG/DL (ref 8–20)
CALCIUM BLD-MCNC: 8.8 MG/DL (ref 8.3–10.3)
CHLORIDE: 102 MMOL/L (ref 101–111)
CLARITY UR REFRACT.AUTO: CLEAR
CO2: 24 MMOL/L (ref 22–32)
COLOR UR AUTO: YELLOW
CREAT BLD-MCNC: 0.7 MG/DL (ref 0.55–1.02)
EOSINOPHIL # BLD AUTO: 0.06 X10(3) UL (ref 0–0.3)
EOSINOPHIL NFR BLD AUTO: 0.9 %
ERYTHROCYTE [DISTWIDTH] IN BLOOD BY AUTOMATED COUNT: 13.3 % (ref 11.5–16)
GLUCOSE BLD-MCNC: 124 MG/DL (ref 70–99)
GLUCOSE UR STRIP.AUTO-MCNC: >=500 MG/DL
HCT VFR BLD AUTO: 39.9 % (ref 34–50)
HGB BLD-MCNC: 13.2 G/DL (ref 12–16)
IMMATURE GRANULOCYTE COUNT: 0.03 X10(3) UL (ref 0–1)
IMMATURE GRANULOCYTE RATIO %: 0.4 %
LEUKOCYTE ESTERASE UR QL STRIP.AUTO: NEGATIVE
LYMPHOCYTES # BLD AUTO: 1.37 X10(3) UL (ref 0.9–4)
LYMPHOCYTES NFR BLD AUTO: 19.9 %
M PROTEIN MFR SERPL ELPH: 7.3 G/DL (ref 6.1–8.3)
MCH RBC QN AUTO: 29.9 PG (ref 27–33.2)
MCHC RBC AUTO-ENTMCNC: 33.1 G/DL (ref 31–37)
MCV RBC AUTO: 90.5 FL (ref 81–100)
MONOCYTES # BLD AUTO: 0.67 X10(3) UL (ref 0.1–0.6)
MONOCYTES NFR BLD AUTO: 9.7 %
NEUTROPHIL ABS PRELIM: 4.74 X10 (3) UL (ref 1.3–6.7)
NEUTROPHILS # BLD AUTO: 4.74 X10(3) UL (ref 1.3–6.7)
NEUTROPHILS NFR BLD AUTO: 69 %
NITRITE UR QL STRIP.AUTO: NEGATIVE
P AXIS: 34 DEGREES
P-R INTERVAL: 176 MS
PH UR STRIP.AUTO: 5 [PH] (ref 4.5–8)
PLATELET # BLD AUTO: 240 10(3)UL (ref 150–450)
POTASSIUM SERPL-SCNC: 3.3 MMOL/L (ref 3.6–5.1)
PROT UR STRIP.AUTO-MCNC: NEGATIVE MG/DL
Q-T INTERVAL: 378 MS
QRS DURATION: 84 MS
QTC CALCULATION (BEZET): 430 MS
R AXIS: -15 DEGREES
RBC # BLD AUTO: 4.41 X10(6)UL (ref 3.8–5.1)
RBC UR QL AUTO: NEGATIVE
RED CELL DISTRIBUTION WIDTH-SD: 44.5 FL (ref 35.1–46.3)
SODIUM SERPL-SCNC: 136 MMOL/L (ref 136–144)
SP GR UR STRIP.AUTO: 1.02 (ref 1–1.03)
T AXIS: 76 DEGREES
UROBILINOGEN UR STRIP.AUTO-MCNC: <2 MG/DL
VENTRICULAR RATE: 78 BPM
WBC # BLD AUTO: 6.9 X10(3) UL (ref 4–13)

## 2018-01-04 PROCEDURE — 51701 INSERT BLADDER CATHETER: CPT

## 2018-01-04 PROCEDURE — 81003 URINALYSIS AUTO W/O SCOPE: CPT | Performed by: EMERGENCY MEDICINE

## 2018-01-04 PROCEDURE — 85025 COMPLETE CBC W/AUTO DIFF WBC: CPT | Performed by: EMERGENCY MEDICINE

## 2018-01-04 PROCEDURE — 80053 COMPREHEN METABOLIC PANEL: CPT | Performed by: EMERGENCY MEDICINE

## 2018-01-04 PROCEDURE — 99285 EMERGENCY DEPT VISIT HI MDM: CPT

## 2018-01-04 PROCEDURE — 71045 X-RAY EXAM CHEST 1 VIEW: CPT | Performed by: EMERGENCY MEDICINE

## 2018-01-04 PROCEDURE — 96360 HYDRATION IV INFUSION INIT: CPT

## 2018-01-04 PROCEDURE — 93005 ELECTROCARDIOGRAM TRACING: CPT

## 2018-01-04 PROCEDURE — 93010 ELECTROCARDIOGRAM REPORT: CPT

## 2018-01-04 RX ORDER — POTASSIUM CHLORIDE 20 MEQ/1
40 TABLET, EXTENDED RELEASE ORAL ONCE
Status: COMPLETED | OUTPATIENT
Start: 2018-01-04 | End: 2018-01-04

## 2018-01-04 NOTE — ED PROVIDER NOTES
Patient Seen in: [facility] Emergency Department    History   Patient presents with:  Headache (neurologic)  Poor Feed Anorexia (constitutional)    Stated Complaint: headache/decreased appetite    HPI    Patient presents with headache and fatigue. CARPAL TUNNEL RELEASE Right  1997: HYSTERECTOMY  11/29/2017: OTHER SURGICAL HISTORY      Comment: stent placement  No date: TONSILLECTOMY        Smoking status: Never Smoker                                                              Smokeless tobacco: Ne components within normal limits   CBC WITH DIFFERENTIAL WITH PLATELET    Narrative: The following orders were created for panel order CBC WITH DIFFERENTIAL WITH PLATELET.   Procedure                               Abnormality         Status She was up and ambulatory and did not feel faint or dizzy. Her headache improved with the fluids as well and is very mild. I think she likely has a viral gastroenteritis. I think that dehydration was contributing to her fatigue and headache.   Her workup

## 2018-01-04 NOTE — ED NOTES
D/c instructions given to pt, no distress, declines wheelchair out of ED, steady gait, thanks staff for care.

## 2018-01-04 NOTE — ED NOTES
Pt ambulate to washroom, denies dizziness, light headedness, or weakness, pt sts HA remains, steady gait, MD notified.

## 2018-01-04 NOTE — ED INITIAL ASSESSMENT (HPI)
Pt arrived via ems, called for pt feeling well, headache and weakness. Sent home from cardiac rehab yesterday since she wasn't feeling well.

## 2018-01-05 ENCOUNTER — CARDPULM VISIT (OUTPATIENT)
Dept: CARDIAC REHAB | Facility: HOSPITAL | Age: 78
End: 2018-01-05
Attending: INTERNAL MEDICINE
Payer: MEDICARE

## 2018-01-05 PROCEDURE — 93798 PHYS/QHP OP CAR RHAB W/ECG: CPT

## 2018-01-08 ENCOUNTER — APPOINTMENT (OUTPATIENT)
Dept: CARDIAC REHAB | Facility: HOSPITAL | Age: 78
End: 2018-01-08
Attending: INTERNAL MEDICINE
Payer: MEDICARE

## 2018-01-08 PROCEDURE — 93798 PHYS/QHP OP CAR RHAB W/ECG: CPT

## 2018-01-10 ENCOUNTER — APPOINTMENT (OUTPATIENT)
Dept: CARDIAC REHAB | Facility: HOSPITAL | Age: 78
End: 2018-01-10
Attending: INTERNAL MEDICINE
Payer: MEDICARE

## 2018-01-10 PROCEDURE — 93798 PHYS/QHP OP CAR RHAB W/ECG: CPT

## 2018-01-12 ENCOUNTER — APPOINTMENT (OUTPATIENT)
Dept: LAB | Age: 78
End: 2018-01-12
Attending: INTERNAL MEDICINE
Payer: MEDICARE

## 2018-01-12 ENCOUNTER — CARDPULM VISIT (OUTPATIENT)
Dept: CARDIAC REHAB | Facility: HOSPITAL | Age: 78
End: 2018-01-12
Attending: INTERNAL MEDICINE
Payer: MEDICARE

## 2018-01-12 ENCOUNTER — OFFICE VISIT (OUTPATIENT)
Dept: INTERNAL MEDICINE CLINIC | Facility: CLINIC | Age: 78
End: 2018-01-12

## 2018-01-12 VITALS
BODY MASS INDEX: 36.09 KG/M2 | HEIGHT: 64 IN | DIASTOLIC BLOOD PRESSURE: 62 MMHG | OXYGEN SATURATION: 98 % | SYSTOLIC BLOOD PRESSURE: 146 MMHG | WEIGHT: 211.38 LBS | HEART RATE: 83 BPM | TEMPERATURE: 98 F

## 2018-01-12 DIAGNOSIS — R53.82 CHRONIC FATIGUE: Primary | ICD-10-CM

## 2018-01-12 DIAGNOSIS — I10 HYPERTENSION, BENIGN: ICD-10-CM

## 2018-01-12 DIAGNOSIS — E11.9 TYPE 2 DIABETES MELLITUS WITHOUT COMPLICATION, WITHOUT LONG-TERM CURRENT USE OF INSULIN (HCC): ICD-10-CM

## 2018-01-12 DIAGNOSIS — I25.110 CORONARY ARTERY DISEASE INVOLVING NATIVE CORONARY ARTERY OF NATIVE HEART WITH UNSTABLE ANGINA PECTORIS (HCC): ICD-10-CM

## 2018-01-12 PROBLEM — R53.83 OTHER FATIGUE: Status: ACTIVE | Noted: 2018-01-12

## 2018-01-12 LAB
CHOLEST SERPL-MCNC: 118 MG/DL (ref 110–200)
HDLC SERPL-MCNC: 38 MG/DL
LDLC SERPL CALC-MCNC: 35 MG/DL (ref 0–99)
NONHDLC SERPL-MCNC: 80 MG/DL
POTASSIUM SERPL-SCNC: 3.5 MMOL/L (ref 3.3–5.1)
TRIGL SERPL-MCNC: 225 MG/DL (ref 1–149)

## 2018-01-12 PROCEDURE — 93798 PHYS/QHP OP CAR RHAB W/ECG: CPT

## 2018-01-12 PROCEDURE — 36415 COLL VENOUS BLD VENIPUNCTURE: CPT

## 2018-01-12 PROCEDURE — 80061 LIPID PANEL: CPT

## 2018-01-12 PROCEDURE — 84132 ASSAY OF SERUM POTASSIUM: CPT

## 2018-01-12 PROCEDURE — 99214 OFFICE O/P EST MOD 30 MIN: CPT | Performed by: INTERNAL MEDICINE

## 2018-01-12 NOTE — PROGRESS NOTES
Willye Osler is a 68year old female. HPI:   She has continued to e under significant stress due to chronically ill . She is not sleeping at night and consequently is very tired during the day. She was denied Ambien by her insurance.      1. C Rfl: 3   SIMVASTATIN 20 MG Oral Tab Take 1 tablet by mouth  every day in the evening Disp: 90 tablet Rfl: 3   docusate calcium 240 MG Oral Cap Take 240 mg by mouth daily.  Disp:  Rfl:    Hydrocortisone Valerate (WESTCORT) 0.2 % Apply Externally Cream as nee bruits  LUNGS: clear to auscultation  CARDIO: RRR without murmur  GI: good BS's,no masses, HSM or tenderness  EXTREMITIES: no cyanosis, clubbing or edema    ASSESSMENT AND PLAN:   1.  Chronic fatigue  This is most likely due to sleep deprivation; trial Mak

## 2018-01-15 ENCOUNTER — CARDPULM VISIT (OUTPATIENT)
Dept: CARDIAC REHAB | Facility: HOSPITAL | Age: 78
End: 2018-01-15
Attending: INTERNAL MEDICINE
Payer: MEDICARE

## 2018-01-15 PROCEDURE — 93798 PHYS/QHP OP CAR RHAB W/ECG: CPT

## 2018-01-16 ENCOUNTER — PRIOR ORIGINAL RECORDS (OUTPATIENT)
Dept: OTHER | Age: 78
End: 2018-01-16

## 2018-01-17 ENCOUNTER — CARDPULM VISIT (OUTPATIENT)
Dept: CARDIAC REHAB | Facility: HOSPITAL | Age: 78
End: 2018-01-17
Attending: INTERNAL MEDICINE
Payer: MEDICARE

## 2018-01-17 PROCEDURE — 93798 PHYS/QHP OP CAR RHAB W/ECG: CPT

## 2018-01-19 ENCOUNTER — APPOINTMENT (OUTPATIENT)
Dept: CARDIAC REHAB | Facility: HOSPITAL | Age: 78
End: 2018-01-19
Attending: INTERNAL MEDICINE
Payer: MEDICARE

## 2018-01-19 PROCEDURE — 93798 PHYS/QHP OP CAR RHAB W/ECG: CPT

## 2018-01-21 ENCOUNTER — TELEPHONE (OUTPATIENT)
Dept: INTERNAL MEDICINE CLINIC | Facility: CLINIC | Age: 78
End: 2018-01-21

## 2018-01-22 ENCOUNTER — APPOINTMENT (OUTPATIENT)
Dept: CARDIAC REHAB | Facility: HOSPITAL | Age: 78
End: 2018-01-22
Attending: INTERNAL MEDICINE
Payer: MEDICARE

## 2018-01-22 PROCEDURE — 93798 PHYS/QHP OP CAR RHAB W/ECG: CPT

## 2018-01-22 NOTE — TELEPHONE ENCOUNTER
CHAD per HIPAA relaying Dr. Taylor Borne message. Informed patient to call back our office for any questions.

## 2018-01-24 ENCOUNTER — APPOINTMENT (OUTPATIENT)
Dept: CARDIAC REHAB | Facility: HOSPITAL | Age: 78
End: 2018-01-24
Attending: INTERNAL MEDICINE
Payer: MEDICARE

## 2018-01-25 ENCOUNTER — OFFICE VISIT (OUTPATIENT)
Dept: ENDOCRINOLOGY CLINIC | Facility: CLINIC | Age: 78
End: 2018-01-25

## 2018-01-25 VITALS
SYSTOLIC BLOOD PRESSURE: 113 MMHG | HEIGHT: 64 IN | BODY MASS INDEX: 35.17 KG/M2 | WEIGHT: 206 LBS | HEART RATE: 81 BPM | DIASTOLIC BLOOD PRESSURE: 68 MMHG

## 2018-01-25 DIAGNOSIS — E11.8 CONTROLLED TYPE 2 DIABETES MELLITUS WITH COMPLICATION, WITHOUT LONG-TERM CURRENT USE OF INSULIN (HCC): Primary | ICD-10-CM

## 2018-01-25 LAB
CARTRIDGE LOT#: ABNORMAL NUMERIC
GLUCOSE BLOOD: 121
HEMOGLOBIN A1C: 6.6 % (ref 4.3–5.6)
TEST STRIP LOT #: NORMAL NUMERIC

## 2018-01-25 PROCEDURE — 36416 COLLJ CAPILLARY BLOOD SPEC: CPT | Performed by: INTERNAL MEDICINE

## 2018-01-25 PROCEDURE — 82962 GLUCOSE BLOOD TEST: CPT | Performed by: INTERNAL MEDICINE

## 2018-01-25 PROCEDURE — 83036 HEMOGLOBIN GLYCOSYLATED A1C: CPT | Performed by: INTERNAL MEDICINE

## 2018-01-25 PROCEDURE — 99213 OFFICE O/P EST LOW 20 MIN: CPT | Performed by: INTERNAL MEDICINE

## 2018-01-25 RX ORDER — LANCETS 33 GAUGE
EACH MISCELLANEOUS
Qty: 200 EACH | Refills: 1 | Status: SHIPPED | OUTPATIENT
Start: 2018-01-25 | End: 2018-01-26

## 2018-01-25 RX ORDER — BLOOD SUGAR DIAGNOSTIC
STRIP MISCELLANEOUS
Qty: 200 STRIP | Refills: 1 | Status: SHIPPED | OUTPATIENT
Start: 2018-01-25 | End: 2018-01-26

## 2018-01-25 NOTE — PROGRESS NOTES
Name: Karissa Moore  Date: 1/25/2018    Referring Physician: No ref. provider found    HISTORY OF PRESENT ILLNESS   Karissa Moore is a 68year old female who presents for diabetes mellitus.   Her  has gotten significantly worst over the past Anxiety. , Disp: 90 tablet, Rfl: 5  •  glimepiride 4 MG Oral Tab, Take 2 tablets (8 mg total) by mouth once daily. , Disp: 180 tablet, Rfl: 3  •  SIMVASTATIN 20 MG Oral Tab, Take 1 tablet by mouth  every day in the evening, Disp: 90 tablet, Rfl: 3  •  Hydroc HYSTERECTOMY  11/29/2017: OTHER SURGICAL HISTORY      Comment: stent placement  No date: TONSILLECTOMY      PHYSICAL EXAM  /68 (BP Location: Left arm, Patient Position: Sitting, Cuff Size: adult)   Pulse 81   Ht 5' 4\" (1.626 m)   Wt 206 lb (93.4 kg)

## 2018-01-26 ENCOUNTER — APPOINTMENT (OUTPATIENT)
Dept: CARDIAC REHAB | Facility: HOSPITAL | Age: 78
End: 2018-01-26
Attending: INTERNAL MEDICINE
Payer: MEDICARE

## 2018-01-26 ENCOUNTER — TELEPHONE (OUTPATIENT)
Dept: ENDOCRINOLOGY CLINIC | Facility: CLINIC | Age: 78
End: 2018-01-26

## 2018-01-26 PROCEDURE — 93798 PHYS/QHP OP CAR RHAB W/ECG: CPT

## 2018-01-26 RX ORDER — BLOOD SUGAR DIAGNOSTIC
STRIP MISCELLANEOUS
Qty: 200 STRIP | Refills: 1 | Status: SHIPPED | OUTPATIENT
Start: 2018-01-26 | End: 2020-03-05

## 2018-01-26 RX ORDER — LANCETS 33 GAUGE
EACH MISCELLANEOUS
Qty: 200 EACH | Refills: 1 | Status: SHIPPED | OUTPATIENT
Start: 2018-01-26 | End: 2020-03-05

## 2018-01-26 NOTE — TELEPHONE ENCOUNTER
Pharm requesting dx code for test strips and lancets. Added and resent script as ordered per [facility].  Verified w/ pharm receipt

## 2018-01-26 NOTE — TELEPHONE ENCOUNTER
[facility]Pharm calling regarding diagnostic code for rx:Test Strips and rx:Landenise alexandra call at:[phone],thanks.     Current Outpatient Prescriptions:   •  ONETOUCH DELICA LANCETS 35G Does not apply Misc, Check 2 times per day, Disp: 200 each, Rfl: 1  •  Gluc

## 2018-01-29 ENCOUNTER — APPOINTMENT (OUTPATIENT)
Dept: CARDIAC REHAB | Facility: HOSPITAL | Age: 78
End: 2018-01-29
Attending: INTERNAL MEDICINE
Payer: MEDICARE

## 2018-01-30 ENCOUNTER — OFFICE VISIT (OUTPATIENT)
Dept: INTERNAL MEDICINE CLINIC | Facility: CLINIC | Age: 78
End: 2018-01-30

## 2018-01-30 VITALS
WEIGHT: 209 LBS | BODY MASS INDEX: 35.68 KG/M2 | TEMPERATURE: 98 F | HEART RATE: 73 BPM | OXYGEN SATURATION: 99 % | SYSTOLIC BLOOD PRESSURE: 138 MMHG | DIASTOLIC BLOOD PRESSURE: 78 MMHG | HEIGHT: 64 IN

## 2018-01-30 DIAGNOSIS — G47.09 OTHER INSOMNIA: ICD-10-CM

## 2018-01-30 DIAGNOSIS — E11.9 TYPE 2 DIABETES MELLITUS WITHOUT COMPLICATION, WITHOUT LONG-TERM CURRENT USE OF INSULIN (HCC): ICD-10-CM

## 2018-01-30 DIAGNOSIS — I10 HYPERTENSION, BENIGN: Primary | ICD-10-CM

## 2018-01-30 DIAGNOSIS — E78.00 PURE HYPERCHOLESTEROLEMIA: ICD-10-CM

## 2018-01-30 PROCEDURE — G0463 HOSPITAL OUTPT CLINIC VISIT: HCPCS | Performed by: INTERNAL MEDICINE

## 2018-01-30 PROCEDURE — 99214 OFFICE O/P EST MOD 30 MIN: CPT | Performed by: INTERNAL MEDICINE

## 2018-01-30 NOTE — PROGRESS NOTES
Davian Soler is a 68year old female. HPI:   She has been under continued pressure dealing with her own health issues and trying to care for her  who is in a NH.      She recently fell down about 5 stairs going down to her basement but did not Disp: 40 tablet Rfl: 3   aspirin 81 MG Oral Tab Take 81 mg by mouth daily. Disp:  Rfl:    ALPRAZolam (XANAX) 0.25 MG Oral Tab Take 1 tablet (0.25 mg total) by mouth 3 (three) times daily as needed for Anxiety.  Disp: 90 tablet Rfl: 5   glimepiride 4 MG Or °F (36.4 °C) (Oral)   Ht 5' 4\" (1.626 m)   Wt 209 lb (94.8 kg)   LMP  (LMP Unknown)   SpO2 99%   BMI 35.87 kg/m²   GENERAL: well developed, well nourished,in no apparent distress  SKIN: no rashes,no suspicious lesions  HEENT: atraumatic, normocephalic,ear

## 2018-01-31 ENCOUNTER — APPOINTMENT (OUTPATIENT)
Dept: CARDIAC REHAB | Facility: HOSPITAL | Age: 78
End: 2018-01-31
Attending: INTERNAL MEDICINE
Payer: MEDICARE

## 2018-01-31 PROCEDURE — 93798 PHYS/QHP OP CAR RHAB W/ECG: CPT

## 2018-02-02 ENCOUNTER — APPOINTMENT (OUTPATIENT)
Dept: CARDIAC REHAB | Facility: HOSPITAL | Age: 78
End: 2018-02-02
Attending: INTERNAL MEDICINE
Payer: MEDICARE

## 2018-02-04 RX ORDER — ALPRAZOLAM 0.25 MG/1
TABLET ORAL
Qty: 40 TABLET | OUTPATIENT
Start: 2018-02-04

## 2018-02-05 ENCOUNTER — APPOINTMENT (OUTPATIENT)
Dept: CARDIAC REHAB | Facility: HOSPITAL | Age: 78
End: 2018-02-05
Attending: INTERNAL MEDICINE
Payer: MEDICARE

## 2018-02-07 ENCOUNTER — CARDPULM VISIT (OUTPATIENT)
Dept: CARDIAC REHAB | Facility: HOSPITAL | Age: 78
End: 2018-02-07
Attending: INTERNAL MEDICINE
Payer: MEDICARE

## 2018-02-07 PROCEDURE — 93798 PHYS/QHP OP CAR RHAB W/ECG: CPT

## 2018-02-09 ENCOUNTER — APPOINTMENT (OUTPATIENT)
Dept: CARDIAC REHAB | Facility: HOSPITAL | Age: 78
End: 2018-02-09
Attending: INTERNAL MEDICINE
Payer: MEDICARE

## 2018-02-12 ENCOUNTER — APPOINTMENT (OUTPATIENT)
Dept: CARDIAC REHAB | Facility: HOSPITAL | Age: 78
End: 2018-02-12
Attending: INTERNAL MEDICINE
Payer: MEDICARE

## 2018-02-14 ENCOUNTER — APPOINTMENT (OUTPATIENT)
Dept: CARDIAC REHAB | Facility: HOSPITAL | Age: 78
End: 2018-02-14
Attending: INTERNAL MEDICINE
Payer: MEDICARE

## 2018-02-16 ENCOUNTER — APPOINTMENT (OUTPATIENT)
Dept: CARDIAC REHAB | Facility: HOSPITAL | Age: 78
End: 2018-02-16
Attending: INTERNAL MEDICINE
Payer: MEDICARE

## 2018-02-19 ENCOUNTER — APPOINTMENT (OUTPATIENT)
Dept: CARDIAC REHAB | Facility: HOSPITAL | Age: 78
End: 2018-02-19
Attending: INTERNAL MEDICINE
Payer: MEDICARE

## 2018-02-21 ENCOUNTER — APPOINTMENT (OUTPATIENT)
Dept: CARDIAC REHAB | Facility: HOSPITAL | Age: 78
End: 2018-02-21
Attending: INTERNAL MEDICINE
Payer: MEDICARE

## 2018-02-23 ENCOUNTER — APPOINTMENT (OUTPATIENT)
Dept: CARDIAC REHAB | Facility: HOSPITAL | Age: 78
End: 2018-02-23
Attending: INTERNAL MEDICINE
Payer: MEDICARE

## 2018-02-23 PROCEDURE — 93798 PHYS/QHP OP CAR RHAB W/ECG: CPT

## 2018-02-26 ENCOUNTER — APPOINTMENT (OUTPATIENT)
Dept: CARDIAC REHAB | Facility: HOSPITAL | Age: 78
End: 2018-02-26
Attending: INTERNAL MEDICINE
Payer: MEDICARE

## 2018-02-26 PROCEDURE — 93798 PHYS/QHP OP CAR RHAB W/ECG: CPT

## 2018-02-28 ENCOUNTER — HOSPITAL ENCOUNTER (OUTPATIENT)
Dept: BONE DENSITY | Age: 78
Discharge: HOME | End: 2018-02-28
Attending: INTERNAL MEDICINE
Payer: MEDICARE

## 2018-02-28 ENCOUNTER — HOSPITAL ENCOUNTER (OUTPATIENT)
Dept: MAMMOGRAPHY | Age: 78
Discharge: HOME | End: 2018-02-28
Attending: INTERNAL MEDICINE
Payer: MEDICARE

## 2018-02-28 ENCOUNTER — CARDPULM VISIT (OUTPATIENT)
Dept: CARDIAC REHAB | Facility: HOSPITAL | Age: 78
End: 2018-02-28
Attending: INTERNAL MEDICINE
Payer: MEDICARE

## 2018-02-28 ENCOUNTER — APPOINTMENT (OUTPATIENT)
Dept: MAMMOGRAPHY | Age: 78
End: 2018-02-28
Attending: INTERNAL MEDICINE
Payer: MEDICARE

## 2018-02-28 ENCOUNTER — HOSPITAL ENCOUNTER (OUTPATIENT)
Dept: BONE DENSITY | Age: 78
End: 2018-02-28
Attending: INTERNAL MEDICINE
Payer: MEDICARE

## 2018-02-28 DIAGNOSIS — Z78.0 POSTMENOPAUSAL: ICD-10-CM

## 2018-02-28 DIAGNOSIS — E11.9 TYPE 2 DIABETES MELLITUS WITHOUT COMPLICATION, WITHOUT LONG-TERM CURRENT USE OF INSULIN (HCC): ICD-10-CM

## 2018-02-28 DIAGNOSIS — Z12.39 BREAST CANCER SCREENING: ICD-10-CM

## 2018-02-28 DIAGNOSIS — Z00.00 ENCOUNTER FOR MEDICARE ANNUAL WELLNESS EXAM: ICD-10-CM

## 2018-02-28 DIAGNOSIS — I10 HYPERTENSION, BENIGN: ICD-10-CM

## 2018-02-28 PROCEDURE — 93798 PHYS/QHP OP CAR RHAB W/ECG: CPT

## 2018-02-28 PROCEDURE — 77067 SCR MAMMO BI INCL CAD: CPT | Performed by: INTERNAL MEDICINE

## 2018-02-28 PROCEDURE — 77080 DXA BONE DENSITY AXIAL: CPT | Performed by: INTERNAL MEDICINE

## 2018-03-02 ENCOUNTER — APPOINTMENT (OUTPATIENT)
Dept: CARDIAC REHAB | Facility: HOSPITAL | Age: 78
End: 2018-03-02
Attending: INTERNAL MEDICINE
Payer: MEDICARE

## 2018-03-02 PROCEDURE — 93798 PHYS/QHP OP CAR RHAB W/ECG: CPT

## 2018-03-04 ENCOUNTER — TELEPHONE (OUTPATIENT)
Dept: INTERNAL MEDICINE CLINIC | Facility: CLINIC | Age: 78
End: 2018-03-04

## 2018-03-05 ENCOUNTER — CARDPULM VISIT (OUTPATIENT)
Dept: CARDIAC REHAB | Facility: HOSPITAL | Age: 78
End: 2018-03-05
Attending: INTERNAL MEDICINE
Payer: MEDICARE

## 2018-03-05 PROCEDURE — 93798 PHYS/QHP OP CAR RHAB W/ECG: CPT

## 2018-03-07 ENCOUNTER — CARDPULM VISIT (OUTPATIENT)
Dept: CARDIAC REHAB | Facility: HOSPITAL | Age: 78
End: 2018-03-07
Attending: INTERNAL MEDICINE
Payer: MEDICARE

## 2018-03-07 PROCEDURE — 93798 PHYS/QHP OP CAR RHAB W/ECG: CPT

## 2018-03-09 ENCOUNTER — OFFICE VISIT (OUTPATIENT)
Dept: INTERNAL MEDICINE CLINIC | Facility: CLINIC | Age: 78
End: 2018-03-09

## 2018-03-09 ENCOUNTER — APPOINTMENT (OUTPATIENT)
Dept: CARDIAC REHAB | Facility: HOSPITAL | Age: 78
End: 2018-03-09
Attending: INTERNAL MEDICINE
Payer: MEDICARE

## 2018-03-09 VITALS
TEMPERATURE: 98 F | WEIGHT: 210 LBS | HEART RATE: 72 BPM | SYSTOLIC BLOOD PRESSURE: 124 MMHG | DIASTOLIC BLOOD PRESSURE: 66 MMHG | HEIGHT: 63.5 IN | BODY MASS INDEX: 36.75 KG/M2

## 2018-03-09 DIAGNOSIS — E11.9 TYPE 2 DIABETES MELLITUS WITHOUT COMPLICATION, WITHOUT LONG-TERM CURRENT USE OF INSULIN (HCC): ICD-10-CM

## 2018-03-09 DIAGNOSIS — I10 HYPERTENSION, BENIGN: ICD-10-CM

## 2018-03-09 DIAGNOSIS — E78.00 PURE HYPERCHOLESTEROLEMIA: ICD-10-CM

## 2018-03-09 DIAGNOSIS — Z00.00 WELLNESS EXAMINATION: Primary | ICD-10-CM

## 2018-03-09 PROCEDURE — 93798 PHYS/QHP OP CAR RHAB W/ECG: CPT

## 2018-03-09 PROCEDURE — 99397 PER PM REEVAL EST PAT 65+ YR: CPT | Performed by: INTERNAL MEDICINE

## 2018-03-09 RX ORDER — ZOLPIDEM TARTRATE 5 MG/1
5 TABLET ORAL
Refills: 5 | COMMUNITY
Start: 2018-02-04 | End: 2018-10-08

## 2018-03-09 NOTE — PROGRESS NOTES
Demetrio Collins is a 68year old female. HPI:   1. Wellness examination  She is here for annual wellness exam. She is feeling reasonably well -   about 1 mo ago. She is coping well. She has no unusual sx - no ED or UC visits since last visit. (0.25 mg total) by mouth 3 (three) times daily as needed for Anxiety. Disp: 90 tablet Rfl: 5   glimepiride 4 MG Oral Tab Take 2 tablets (8 mg total) by mouth once daily.  Disp: 180 tablet Rfl: 3   SIMVASTATIN 20 MG Oral Tab Take 1 tablet by mouth  every day nourished,in no apparent distress  SKIN: no rashes,no suspicious lesions  HEENT: atraumatic, normocephalic,ears and throat are clear  NECK: supple,no adenopathy,no bruits  LUNGS: clear to auscultation  CARDIO: RRR without murmur  GI: good BS's,no masses, H

## 2018-03-12 ENCOUNTER — CARDPULM VISIT (OUTPATIENT)
Dept: CARDIAC REHAB | Facility: HOSPITAL | Age: 78
End: 2018-03-12
Attending: INTERNAL MEDICINE
Payer: MEDICARE

## 2018-03-12 PROCEDURE — 93798 PHYS/QHP OP CAR RHAB W/ECG: CPT

## 2018-03-14 ENCOUNTER — CARDPULM VISIT (OUTPATIENT)
Dept: CARDIAC REHAB | Facility: HOSPITAL | Age: 78
End: 2018-03-14
Attending: INTERNAL MEDICINE
Payer: MEDICARE

## 2018-03-14 PROCEDURE — 93798 PHYS/QHP OP CAR RHAB W/ECG: CPT

## 2018-03-16 ENCOUNTER — APPOINTMENT (OUTPATIENT)
Dept: CARDIAC REHAB | Facility: HOSPITAL | Age: 78
End: 2018-03-16
Attending: INTERNAL MEDICINE
Payer: MEDICARE

## 2018-03-16 PROCEDURE — 93798 PHYS/QHP OP CAR RHAB W/ECG: CPT

## 2018-03-19 ENCOUNTER — CARDPULM VISIT (OUTPATIENT)
Dept: CARDIAC REHAB | Facility: HOSPITAL | Age: 78
End: 2018-03-19
Attending: INTERNAL MEDICINE
Payer: MEDICARE

## 2018-03-19 PROCEDURE — 93798 PHYS/QHP OP CAR RHAB W/ECG: CPT

## 2018-03-21 ENCOUNTER — APPOINTMENT (OUTPATIENT)
Dept: CARDIAC REHAB | Facility: HOSPITAL | Age: 78
End: 2018-03-21
Attending: INTERNAL MEDICINE
Payer: MEDICARE

## 2018-03-21 PROCEDURE — 93798 PHYS/QHP OP CAR RHAB W/ECG: CPT

## 2018-03-23 ENCOUNTER — APPOINTMENT (OUTPATIENT)
Dept: CARDIAC REHAB | Facility: HOSPITAL | Age: 78
End: 2018-03-23
Attending: INTERNAL MEDICINE
Payer: MEDICARE

## 2018-03-23 PROCEDURE — 93798 PHYS/QHP OP CAR RHAB W/ECG: CPT

## 2018-03-26 ENCOUNTER — APPOINTMENT (OUTPATIENT)
Dept: CARDIAC REHAB | Facility: HOSPITAL | Age: 78
End: 2018-03-26
Attending: INTERNAL MEDICINE
Payer: MEDICARE

## 2018-03-26 PROCEDURE — 93798 PHYS/QHP OP CAR RHAB W/ECG: CPT

## 2018-03-28 ENCOUNTER — HOSPITAL ENCOUNTER (OUTPATIENT)
Dept: CARDIOLOGY CLINIC | Facility: HOSPITAL | Age: 78
Discharge: HOME | End: 2018-03-28
Attending: INTERNAL MEDICINE

## 2018-03-28 ENCOUNTER — APPOINTMENT (OUTPATIENT)
Dept: CARDIAC REHAB | Facility: HOSPITAL | Age: 78
End: 2018-03-28
Attending: INTERNAL MEDICINE
Payer: MEDICARE

## 2018-03-28 DIAGNOSIS — Z13.9 SPECIAL SCREENING: ICD-10-CM

## 2018-03-28 PROCEDURE — 93798 PHYS/QHP OP CAR RHAB W/ECG: CPT

## 2018-03-29 PROBLEM — H04.123 DRY EYE SYNDROME OF BILATERAL LACRIMAL GLANDS: Status: ACTIVE | Noted: 2018-03-29

## 2018-03-29 PROBLEM — H25.13 NUCLEAR SCLEROTIC CATARACT OF BOTH EYES: Status: ACTIVE | Noted: 2018-03-29

## 2018-03-30 ENCOUNTER — CARDPULM VISIT (OUTPATIENT)
Dept: CARDIAC REHAB | Facility: HOSPITAL | Age: 78
End: 2018-03-30
Attending: INTERNAL MEDICINE
Payer: MEDICARE

## 2018-03-30 PROCEDURE — 93798 PHYS/QHP OP CAR RHAB W/ECG: CPT

## 2018-04-02 ENCOUNTER — APPOINTMENT (OUTPATIENT)
Dept: CARDIAC REHAB | Facility: HOSPITAL | Age: 78
End: 2018-04-02
Attending: INTERNAL MEDICINE
Payer: MEDICARE

## 2018-04-04 ENCOUNTER — APPOINTMENT (OUTPATIENT)
Dept: CARDIAC REHAB | Facility: HOSPITAL | Age: 78
End: 2018-04-04
Attending: INTERNAL MEDICINE
Payer: MEDICARE

## 2018-04-06 ENCOUNTER — APPOINTMENT (OUTPATIENT)
Dept: CARDIAC REHAB | Facility: HOSPITAL | Age: 78
End: 2018-04-06
Attending: INTERNAL MEDICINE
Payer: MEDICARE

## 2018-04-09 ENCOUNTER — APPOINTMENT (OUTPATIENT)
Dept: CARDIAC REHAB | Facility: HOSPITAL | Age: 78
End: 2018-04-09
Attending: INTERNAL MEDICINE
Payer: MEDICARE

## 2018-04-11 ENCOUNTER — APPOINTMENT (OUTPATIENT)
Dept: CARDIAC REHAB | Facility: HOSPITAL | Age: 78
End: 2018-04-11
Attending: INTERNAL MEDICINE
Payer: MEDICARE

## 2018-04-13 ENCOUNTER — OFFICE VISIT (OUTPATIENT)
Dept: INTERNAL MEDICINE CLINIC | Facility: CLINIC | Age: 78
End: 2018-04-13

## 2018-04-13 ENCOUNTER — APPOINTMENT (OUTPATIENT)
Dept: CARDIAC REHAB | Facility: HOSPITAL | Age: 78
End: 2018-04-13
Attending: INTERNAL MEDICINE
Payer: MEDICARE

## 2018-04-13 VITALS
HEART RATE: 77 BPM | TEMPERATURE: 98 F | OXYGEN SATURATION: 99 % | BODY MASS INDEX: 36.19 KG/M2 | HEIGHT: 64 IN | SYSTOLIC BLOOD PRESSURE: 120 MMHG | DIASTOLIC BLOOD PRESSURE: 64 MMHG | WEIGHT: 212 LBS

## 2018-04-13 DIAGNOSIS — E78.00 PURE HYPERCHOLESTEROLEMIA: Primary | ICD-10-CM

## 2018-04-13 DIAGNOSIS — I10 HYPERTENSION, BENIGN: ICD-10-CM

## 2018-04-13 DIAGNOSIS — I25.110 CORONARY ARTERY DISEASE INVOLVING NATIVE CORONARY ARTERY OF NATIVE HEART WITH UNSTABLE ANGINA PECTORIS (HCC): ICD-10-CM

## 2018-04-13 DIAGNOSIS — E11.9 TYPE 2 DIABETES MELLITUS WITHOUT COMPLICATION, WITHOUT LONG-TERM CURRENT USE OF INSULIN (HCC): ICD-10-CM

## 2018-04-13 PROCEDURE — 99214 OFFICE O/P EST MOD 30 MIN: CPT | Performed by: INTERNAL MEDICINE

## 2018-04-13 RX ORDER — KETOCONAZOLE 20 MG/G
1 CREAM TOPICAL 2 TIMES DAILY
Qty: 60 G | Refills: 1 | Status: SHIPPED | OUTPATIENT
Start: 2018-04-13 | End: 2019-09-03

## 2018-04-13 NOTE — PROGRESS NOTES
Guerrero Cristina is a 68year old female. HPI:   Doing well. Energy good, good appetite. SR: no chest pain or sob, no gu or gi sx. She has candida rash pannus of abdo and both groins. 1. Pure hypercholesterolemia  LDL 35    2.  Coronary key 3 (three) times daily as needed for Anxiety. Disp: 90 tablet Rfl: 5   glimepiride 4 MG Oral Tab Take 2 tablets (8 mg total) by mouth once daily.  Disp: 180 tablet Rfl: 3   SIMVASTATIN 20 MG Oral Tab Take 1 tablet by mouth  every day in the evening Disp: 90 apparent distress  SKIN: no rashes,no suspicious lesions  HEENT: atraumatic, normocephalic,ears and throat are clear  NECK: supple,no adenopathy,no bruits  LUNGS: clear to auscultation  CARDIO: RRR without murmur  GI: good BS's,no masses, HSM or tenderness

## 2018-04-16 ENCOUNTER — APPOINTMENT (OUTPATIENT)
Dept: CARDIAC REHAB | Facility: HOSPITAL | Age: 78
End: 2018-04-16
Attending: INTERNAL MEDICINE
Payer: MEDICARE

## 2018-04-18 ENCOUNTER — APPOINTMENT (OUTPATIENT)
Dept: CARDIAC REHAB | Facility: HOSPITAL | Age: 78
End: 2018-04-18
Attending: INTERNAL MEDICINE
Payer: MEDICARE

## 2018-04-18 RX ORDER — METFORMIN HYDROCHLORIDE 500 MG/1
TABLET, EXTENDED RELEASE ORAL
Qty: 360 TABLET | Refills: 3 | Status: SHIPPED | OUTPATIENT
Start: 2018-04-18 | End: 2018-07-30

## 2018-04-20 ENCOUNTER — APPOINTMENT (OUTPATIENT)
Dept: CARDIAC REHAB | Facility: HOSPITAL | Age: 78
End: 2018-04-20
Attending: INTERNAL MEDICINE
Payer: MEDICARE

## 2018-04-23 ENCOUNTER — APPOINTMENT (OUTPATIENT)
Dept: CARDIAC REHAB | Facility: HOSPITAL | Age: 78
End: 2018-04-23
Attending: INTERNAL MEDICINE
Payer: MEDICARE

## 2018-04-25 ENCOUNTER — APPOINTMENT (OUTPATIENT)
Dept: CARDIAC REHAB | Facility: HOSPITAL | Age: 78
End: 2018-04-25
Attending: INTERNAL MEDICINE
Payer: MEDICARE

## 2018-04-27 ENCOUNTER — APPOINTMENT (OUTPATIENT)
Dept: CARDIAC REHAB | Facility: HOSPITAL | Age: 78
End: 2018-04-27
Attending: INTERNAL MEDICINE
Payer: MEDICARE

## 2018-04-30 ENCOUNTER — APPOINTMENT (OUTPATIENT)
Dept: CARDIAC REHAB | Facility: HOSPITAL | Age: 78
End: 2018-04-30
Attending: INTERNAL MEDICINE
Payer: MEDICARE

## 2018-05-02 ENCOUNTER — APPOINTMENT (OUTPATIENT)
Dept: CARDIAC REHAB | Facility: HOSPITAL | Age: 78
End: 2018-05-02
Attending: INTERNAL MEDICINE
Payer: MEDICARE

## 2018-05-04 ENCOUNTER — APPOINTMENT (OUTPATIENT)
Dept: CARDIAC REHAB | Facility: HOSPITAL | Age: 78
End: 2018-05-04
Attending: INTERNAL MEDICINE
Payer: MEDICARE

## 2018-05-07 ENCOUNTER — APPOINTMENT (OUTPATIENT)
Dept: CARDIAC REHAB | Facility: HOSPITAL | Age: 78
End: 2018-05-07
Attending: INTERNAL MEDICINE
Payer: MEDICARE

## 2018-05-09 ENCOUNTER — APPOINTMENT (OUTPATIENT)
Dept: CARDIAC REHAB | Facility: HOSPITAL | Age: 78
End: 2018-05-09
Attending: INTERNAL MEDICINE
Payer: MEDICARE

## 2018-05-11 ENCOUNTER — APPOINTMENT (OUTPATIENT)
Dept: CARDIAC REHAB | Facility: HOSPITAL | Age: 78
End: 2018-05-11
Attending: INTERNAL MEDICINE
Payer: MEDICARE

## 2018-05-14 ENCOUNTER — APPOINTMENT (OUTPATIENT)
Dept: CARDIAC REHAB | Facility: HOSPITAL | Age: 78
End: 2018-05-14
Attending: INTERNAL MEDICINE
Payer: MEDICARE

## 2018-05-16 ENCOUNTER — APPOINTMENT (OUTPATIENT)
Dept: CARDIAC REHAB | Facility: HOSPITAL | Age: 78
End: 2018-05-16
Attending: INTERNAL MEDICINE
Payer: MEDICARE

## 2018-05-18 ENCOUNTER — APPOINTMENT (OUTPATIENT)
Dept: CARDIAC REHAB | Facility: HOSPITAL | Age: 78
End: 2018-05-18
Attending: INTERNAL MEDICINE
Payer: MEDICARE

## 2018-05-21 ENCOUNTER — APPOINTMENT (OUTPATIENT)
Dept: CARDIAC REHAB | Facility: HOSPITAL | Age: 78
End: 2018-05-21
Attending: INTERNAL MEDICINE
Payer: MEDICARE

## 2018-05-23 ENCOUNTER — APPOINTMENT (OUTPATIENT)
Dept: CARDIAC REHAB | Facility: HOSPITAL | Age: 78
End: 2018-05-23
Attending: INTERNAL MEDICINE
Payer: MEDICARE

## 2018-05-25 ENCOUNTER — APPOINTMENT (OUTPATIENT)
Dept: CARDIAC REHAB | Facility: HOSPITAL | Age: 78
End: 2018-05-25
Attending: INTERNAL MEDICINE
Payer: MEDICARE

## 2018-05-28 ENCOUNTER — APPOINTMENT (OUTPATIENT)
Dept: CARDIAC REHAB | Facility: HOSPITAL | Age: 78
End: 2018-05-28
Attending: INTERNAL MEDICINE
Payer: MEDICARE

## 2018-05-30 ENCOUNTER — APPOINTMENT (OUTPATIENT)
Dept: CARDIAC REHAB | Facility: HOSPITAL | Age: 78
End: 2018-05-30
Attending: INTERNAL MEDICINE
Payer: MEDICARE

## 2018-06-01 ENCOUNTER — PRIOR ORIGINAL RECORDS (OUTPATIENT)
Dept: OTHER | Age: 78
End: 2018-06-01

## 2018-06-01 ENCOUNTER — APPOINTMENT (OUTPATIENT)
Dept: CARDIAC REHAB | Facility: HOSPITAL | Age: 78
End: 2018-06-01
Attending: INTERNAL MEDICINE
Payer: MEDICARE

## 2018-06-01 ENCOUNTER — OFFICE VISIT (OUTPATIENT)
Dept: INTERNAL MEDICINE CLINIC | Facility: CLINIC | Age: 78
End: 2018-06-01

## 2018-06-01 ENCOUNTER — LAB ENCOUNTER (OUTPATIENT)
Dept: LAB | Age: 78
End: 2018-06-01
Attending: INTERNAL MEDICINE
Payer: MEDICARE

## 2018-06-01 VITALS
DIASTOLIC BLOOD PRESSURE: 64 MMHG | SYSTOLIC BLOOD PRESSURE: 130 MMHG | WEIGHT: 214 LBS | TEMPERATURE: 98 F | HEART RATE: 71 BPM | BODY MASS INDEX: 37.45 KG/M2 | OXYGEN SATURATION: 98 % | HEIGHT: 63.5 IN

## 2018-06-01 DIAGNOSIS — E78.00 PURE HYPERCHOLESTEROLEMIA: ICD-10-CM

## 2018-06-01 DIAGNOSIS — E66.01 SEVERE OBESITY (BMI 35.0-39.9) WITH COMORBIDITY (HCC): ICD-10-CM

## 2018-06-01 DIAGNOSIS — I10 HYPERTENSION, BENIGN: Primary | ICD-10-CM

## 2018-06-01 DIAGNOSIS — I10 HYPERTENSION, BENIGN: ICD-10-CM

## 2018-06-01 DIAGNOSIS — R19.7 DIARRHEA IN ADULT PATIENT: ICD-10-CM

## 2018-06-01 DIAGNOSIS — F32.A DEPRESSION, UNSPECIFIED DEPRESSION TYPE: ICD-10-CM

## 2018-06-01 DIAGNOSIS — I25.110 CORONARY ARTERY DISEASE INVOLVING NATIVE CORONARY ARTERY OF NATIVE HEART WITH UNSTABLE ANGINA PECTORIS (HCC): ICD-10-CM

## 2018-06-01 DIAGNOSIS — E11.9 TYPE 2 DIABETES MELLITUS WITHOUT COMPLICATION, WITHOUT LONG-TERM CURRENT USE OF INSULIN (HCC): ICD-10-CM

## 2018-06-01 DIAGNOSIS — F41.9 ANXIETY: ICD-10-CM

## 2018-06-01 DIAGNOSIS — R06.00 DYSPNEA ON EXERTION: ICD-10-CM

## 2018-06-01 PROCEDURE — 99214 OFFICE O/P EST MOD 30 MIN: CPT | Performed by: INTERNAL MEDICINE

## 2018-06-01 PROCEDURE — 84443 ASSAY THYROID STIM HORMONE: CPT

## 2018-06-01 PROCEDURE — 80053 COMPREHEN METABOLIC PANEL: CPT

## 2018-06-01 PROCEDURE — 85025 COMPLETE CBC W/AUTO DIFF WBC: CPT

## 2018-06-01 PROCEDURE — 83036 HEMOGLOBIN GLYCOSYLATED A1C: CPT

## 2018-06-01 PROCEDURE — 36415 COLL VENOUS BLD VENIPUNCTURE: CPT

## 2018-06-01 PROCEDURE — 83735 ASSAY OF MAGNESIUM: CPT

## 2018-06-01 PROCEDURE — 80061 LIPID PANEL: CPT

## 2018-06-01 NOTE — PROGRESS NOTES
Shahbaz Garcia is a 68year old female. HPI:   1. Hypertension, benign - at toal, she does not self test      2. Severe obesity (BMI 35.0-39. 9) with comorbidity ([address]  Exercising on Airdyne 5x perweek    3.  Diarrhea in adult patient  She is having l Anxiety. Disp: 90 tablet Rfl: 5   glimepiride 4 MG Oral Tab Take 2 tablets (8 mg total) by mouth once daily.  Disp: 180 tablet Rfl: 3   SIMVASTATIN 20 MG Oral Tab Take 1 tablet by mouth  every day in the evening Disp: 90 tablet Rfl: 3   docusate calcium 240 headaches    EXAM:   /64 (BP Location: Right arm, Patient Position: Sitting, Cuff Size: large)   Pulse 71   Temp 97.9 °F (36.6 °C) (Oral)   Ht 5' 3.5\" (1.613 m)   Wt 214 lb (97.1 kg)   LMP  (LMP Unknown)   SpO2 98%   BMI 37.31 kg/m²   GENERAL: well

## 2018-06-03 ENCOUNTER — TELEPHONE (OUTPATIENT)
Dept: INTERNAL MEDICINE CLINIC | Facility: CLINIC | Age: 78
End: 2018-06-03

## 2018-06-03 NOTE — TELEPHONE ENCOUNTER
Labs good but A1C up from 7.5 to 8.0    Blood count thyroid, magnesium, chemistries normal.     Same meds but discuss DM with Dr Campo Bolds

## 2018-06-04 ENCOUNTER — APPOINTMENT (OUTPATIENT)
Dept: CARDIAC REHAB | Facility: HOSPITAL | Age: 78
End: 2018-06-04
Attending: INTERNAL MEDICINE
Payer: MEDICARE

## 2018-06-12 ENCOUNTER — TELEPHONE (OUTPATIENT)
Dept: INTERNAL MEDICINE CLINIC | Facility: CLINIC | Age: 78
End: 2018-06-12

## 2018-06-12 NOTE — TELEPHONE ENCOUNTER
Spoke with pt who will f/u with insurance to check which docs are in network and give us a call back once she has this information.

## 2018-06-12 NOTE — TELEPHONE ENCOUNTER
Was able to reach pt and relay MD message below. Pt voiced understanding and stated she has a f/u appt schduled with Dr. Ynes King for 7/30.

## 2018-06-12 NOTE — TELEPHONE ENCOUNTER
Pt. Is calling to inform Dr. Vance Brown that [facility] doesn't have any opening for Dermatology until Oct. & dr. Jakob Colorado is out of network she would like the names of some other dermatologist that are in network ph.  # [phone]   Routed to clinical

## 2018-06-21 ENCOUNTER — PRIOR ORIGINAL RECORDS (OUTPATIENT)
Dept: OTHER | Age: 78
End: 2018-06-21

## 2018-06-21 LAB
ALBUMIN: 4 G/DL
ALKALINE PHOSPHATATE(ALK PHOS): 71 IU/L
ALT (SGPT): 24 U/L
AST (SGOT): 24 U/L
BILIRUBIN TOTAL: 0.6 MG/DL
BUN: 18 MG/DL
CALCIUM: 9.2 MG/DL
CHLORIDE: 101 MEQ/L
CHOLESTEROL, TOTAL: 208 MG/DL
CREATININE, SERUM: 0.91 MG/DL
GLOBULIN: 2.9 G/DL
GLUCOSE: 235 MG/DL
GLUCOSE: 235 MG/DL
HDL CHOLESTEROL: 49 MG/DL
HEMATOCRIT: 41.2 %
HEMOGLOBIN A1C: 8 %
HEMOGLOBIN: 13.7 G/DL
LDL CHOLESTEROL: 103 MG/DL
NON-HDL CHOLESTEROL: 159 MG/DL
PLATELETS: 285 K/UL
POTASSIUM, SERUM: 4.1 MEQ/L
PROTEIN, TOTAL: 6.9 G/DL
RED BLOOD COUNT: 4.56 X 10-6/U
SGOT (AST): 24 IU/L
SGPT (ALT): 24 IU/L
SODIUM: 137 MEQ/L
TRIGLYCERIDES: 282 MG/DL
WHITE BLOOD COUNT: 10.1 X 10-3/U

## 2018-06-27 ENCOUNTER — PRIOR ORIGINAL RECORDS (OUTPATIENT)
Dept: OTHER | Age: 78
End: 2018-06-27

## 2018-06-27 NOTE — TELEPHONE ENCOUNTER
Spoke with Jeniffer Books who stated she saw Dermatology / SOPHIA Herrera  / and her problems have resolved. [employer] Books stated Dr. Nargis Ross changed her from Simvastatin to Lipitor and she is noticing that she is not feeling motivated since.  She also st

## 2018-06-28 ENCOUNTER — LAB ENCOUNTER (OUTPATIENT)
Dept: LAB | Facility: HOSPITAL | Age: 78
End: 2018-06-28
Attending: INTERNAL MEDICINE
Payer: MEDICARE

## 2018-06-28 DIAGNOSIS — I25.10 CORONARY ATHEROSCLEROSIS OF NATIVE CORONARY ARTERY: Primary | ICD-10-CM

## 2018-06-28 DIAGNOSIS — E78.00 PURE HYPERCHOLESTEROLEMIA: ICD-10-CM

## 2018-06-28 PROCEDURE — 36415 COLL VENOUS BLD VENIPUNCTURE: CPT

## 2018-06-28 PROCEDURE — 80048 BASIC METABOLIC PNL TOTAL CA: CPT

## 2018-06-28 PROCEDURE — 82550 ASSAY OF CK (CPK): CPT

## 2018-06-29 ENCOUNTER — PRIOR ORIGINAL RECORDS (OUTPATIENT)
Dept: OTHER | Age: 78
End: 2018-06-29

## 2018-06-29 LAB
BUN: 24 MG/DL
CALCIUM: 9.3 MG/DL
CHLORIDE: 101 MEQ/L
CREATININE, SERUM: 0.84 MG/DL
GLUCOSE: 168 MG/DL
POTASSIUM, SERUM: 4 MEQ/L
SODIUM: 138 MEQ/L

## 2018-07-03 ENCOUNTER — TELEPHONE (OUTPATIENT)
Dept: INTERNAL MEDICINE CLINIC | Facility: CLINIC | Age: 78
End: 2018-07-03

## 2018-07-13 ENCOUNTER — OFFICE VISIT (OUTPATIENT)
Dept: INTERNAL MEDICINE CLINIC | Facility: CLINIC | Age: 78
End: 2018-07-13

## 2018-07-13 VITALS
BODY MASS INDEX: 36.88 KG/M2 | HEART RATE: 72 BPM | DIASTOLIC BLOOD PRESSURE: 62 MMHG | HEIGHT: 64 IN | WEIGHT: 216 LBS | SYSTOLIC BLOOD PRESSURE: 126 MMHG | TEMPERATURE: 98 F | OXYGEN SATURATION: 95 %

## 2018-07-13 DIAGNOSIS — I10 HYPERTENSION, BENIGN: ICD-10-CM

## 2018-07-13 DIAGNOSIS — I25.110 CORONARY ARTERY DISEASE INVOLVING NATIVE CORONARY ARTERY OF NATIVE HEART WITH UNSTABLE ANGINA PECTORIS (HCC): Primary | ICD-10-CM

## 2018-07-13 DIAGNOSIS — E78.00 PURE HYPERCHOLESTEROLEMIA: ICD-10-CM

## 2018-07-13 DIAGNOSIS — E11.9 TYPE 2 DIABETES MELLITUS WITHOUT COMPLICATION, WITHOUT LONG-TERM CURRENT USE OF INSULIN (HCC): ICD-10-CM

## 2018-07-13 PROCEDURE — 99214 OFFICE O/P EST MOD 30 MIN: CPT | Performed by: INTERNAL MEDICINE

## 2018-07-13 RX ORDER — ATORVASTATIN CALCIUM 40 MG/1
TABLET, FILM COATED ORAL
COMMUNITY
Start: 2018-06-21 | End: 2018-08-17

## 2018-07-13 NOTE — PROGRESS NOTES
Israel Bryson is a 68year old female.   HPI:   She is feeling well but could not rohan Lipitor 40 given by Dr Lee Bowling because of difficulty waking and her \"legs did not feel right\" - she stopped the Lipitor - she had been on simvastatin 20 mg which she t by mouth 3 (three) times daily as needed for Anxiety. Disp: 90 tablet Rfl: 5   glimepiride 4 MG Oral Tab Take 2 tablets (8 mg total) by mouth once daily. Disp: 180 tablet Rfl: 3   docusate calcium 240 MG Oral Cap Take 240 mg by mouth daily.  Disp:  Rfl: denies headaches    EXAM:   /62 (BP Location: Right arm, Patient Position: Sitting, Cuff Size: large)   Pulse 72   Temp 97.7 °F (36.5 °C) (Oral)   Ht 5' 4\" (1.626 m)   Wt 216 lb (98 kg)   LMP  (LMP Unknown)   SpO2 95%   BMI 37.08 kg/m²   GENERAL: we

## 2018-07-20 ENCOUNTER — PRIOR ORIGINAL RECORDS (OUTPATIENT)
Dept: OTHER | Age: 78
End: 2018-07-20

## 2018-07-30 ENCOUNTER — OFFICE VISIT (OUTPATIENT)
Dept: ENDOCRINOLOGY CLINIC | Facility: CLINIC | Age: 78
End: 2018-07-30
Payer: COMMERCIAL

## 2018-07-30 VITALS
WEIGHT: 214 LBS | SYSTOLIC BLOOD PRESSURE: 127 MMHG | HEIGHT: 64 IN | HEART RATE: 70 BPM | DIASTOLIC BLOOD PRESSURE: 64 MMHG | BODY MASS INDEX: 36.54 KG/M2

## 2018-07-30 DIAGNOSIS — E11.8 CONTROLLED TYPE 2 DIABETES MELLITUS WITH COMPLICATION, WITHOUT LONG-TERM CURRENT USE OF INSULIN (HCC): Primary | ICD-10-CM

## 2018-07-30 LAB
CARTRIDGE LOT#: ABNORMAL NUMERIC
GLUCOSE BLOOD: 172
HEMOGLOBIN A1C: 7.5 % (ref 4.3–5.6)
TEST STRIP LOT #: NORMAL NUMERIC

## 2018-07-30 PROCEDURE — 99213 OFFICE O/P EST LOW 20 MIN: CPT | Performed by: INTERNAL MEDICINE

## 2018-07-30 PROCEDURE — 36416 COLLJ CAPILLARY BLOOD SPEC: CPT | Performed by: INTERNAL MEDICINE

## 2018-07-30 PROCEDURE — 83036 HEMOGLOBIN GLYCOSYLATED A1C: CPT | Performed by: INTERNAL MEDICINE

## 2018-07-30 PROCEDURE — 82962 GLUCOSE BLOOD TEST: CPT | Performed by: INTERNAL MEDICINE

## 2018-07-30 RX ORDER — METFORMIN HYDROCHLORIDE 500 MG/1
TABLET, EXTENDED RELEASE ORAL
Qty: 360 TABLET | Refills: 3 | Status: SHIPPED | OUTPATIENT
Start: 2018-07-30 | End: 2019-08-07

## 2018-07-30 RX ORDER — GLIMEPIRIDE 4 MG/1
8 TABLET ORAL
Qty: 180 TABLET | Refills: 3 | Status: SHIPPED | OUTPATIENT
Start: 2018-07-30 | End: 2019-08-07

## 2018-07-30 NOTE — PROGRESS NOTES
Name: Kelvin Cherry  Date: 7/30/2018    Referring Physician: No ref. provider found    HISTORY OF PRESENT ILLNESS   Kelvin Cherry is a 68year old female who presents for diabetes mellitus. Since last visit her  passed away in 2/2018. Tab, Take 5 mg by mouth.  , Disp: , Rfl: 5  •  ONETOUCH DELICA LANCETS 67G Does not apply Misc, Check BG 2 times per day. DX: E11.8 without insulin use., Disp: 200 each, Rfl: 1  •  Glucose Blood (ONETOUCH VERIO) In Vitro Strip, Check BG 2 times per day.  DX • Edema of right lower eyelid    • Gallstone 1/2010    NG: Asymptomatic    • Gastroenteritis    • High cholesterol    • Lipid screening 12/11/2013   • Obesity    • Osteoporosis    • Osteoporosis screening 1/22/2013   • Other and unspecified hyperlipidemi significantly improved BG levels   -Discussed importance of glycemic control to prevent complications of diabetes  -Discussed complications of diabetes include retinopathy, neuropathy, nephropathy and cardiovascular disease  -Discussed importance of SBGM

## 2018-08-17 NOTE — PROGRESS NOTES
Diana Fernandez is a 68year old female. HPI:   She is now feeling more depressed since the death of her  about 6 months. She is not sleeping through the night, low ambition level, she still socializes with her friends.  She has occas diarrhea - p Solution Apply 1 Application topically as needed. Disp:  Rfl:    Losartan Potassium-HCTZ 50-12.5 MG Oral Tab Take 1 tablet by mouth once daily. Disp: 90 tablet Rfl: 3   Clopidogrel Bisulfate 75 MG Oral Tab Take 1 tablet (75 mg total) by mouth daily.  Disp: Ht 5' 4\" (1.626 m)   Wt 213 lb (96.6 kg)   LMP  (LMP Unknown)   BMI 36.56 kg/m²   GENERAL: well developed, well nourished,in no apparent distress  SKIN: no rashes,no suspicious lesions  HEENT: atraumatic, normocephalic,ears and throat are clear  NECK: sup

## 2018-09-06 PROBLEM — Z95.5 H/O HEART ARTERY STENT: Status: ACTIVE | Noted: 2018-09-06

## 2018-09-06 PROBLEM — Z79.02 PLATELET INHIBITION DUE TO PLAVIX: Status: ACTIVE | Noted: 2018-09-06

## 2018-09-18 ENCOUNTER — LAB ENCOUNTER (OUTPATIENT)
Dept: LAB | Age: 78
End: 2018-09-18
Attending: INTERNAL MEDICINE
Payer: MEDICARE

## 2018-09-18 ENCOUNTER — PRIOR ORIGINAL RECORDS (OUTPATIENT)
Dept: OTHER | Age: 78
End: 2018-09-18

## 2018-09-18 DIAGNOSIS — I25.10 CORONARY ATHEROSCLEROSIS OF NATIVE CORONARY ARTERY: Primary | ICD-10-CM

## 2018-09-18 LAB
ALT SERPL-CCNC: 25 U/L (ref 14–54)
AST SERPL-CCNC: 25 U/L (ref 15–41)
BNP SERPL-MCNC: 11 PG/ML (ref 0–100)
CHOLEST SERPL-MCNC: 211 MG/DL (ref 110–200)
CK SERPL-CCNC: 63 U/L (ref 38–234)
HDLC SERPL-MCNC: 42 MG/DL
LDLC SERPL CALC-MCNC: 124 MG/DL (ref 0–99)
NONHDLC SERPL-MCNC: 169 MG/DL
TRIGL SERPL-MCNC: 226 MG/DL (ref 1–149)

## 2018-09-18 PROCEDURE — 84460 ALANINE AMINO (ALT) (SGPT): CPT

## 2018-09-18 PROCEDURE — 82550 ASSAY OF CK (CPK): CPT

## 2018-09-18 PROCEDURE — 80061 LIPID PANEL: CPT

## 2018-09-18 PROCEDURE — 36415 COLL VENOUS BLD VENIPUNCTURE: CPT

## 2018-09-18 PROCEDURE — 84450 TRANSFERASE (AST) (SGOT): CPT

## 2018-09-18 PROCEDURE — 83880 ASSAY OF NATRIURETIC PEPTIDE: CPT

## 2018-09-19 LAB
ALT (SGPT): 25 U/L
AST (SGOT): 25 U/L
CHOLESTEROL, TOTAL: 211 MG/DL
CREATININE KINASE: 63 U/L
HDL CHOLESTEROL: 42 MG/DL
LDL CHOLESTEROL: 124 MG/DL
TRIGLYCERIDES: 226 MG/DL

## 2018-10-05 ENCOUNTER — TELEPHONE (OUTPATIENT)
Dept: INTERNAL MEDICINE CLINIC | Facility: CLINIC | Age: 78
End: 2018-10-05

## 2018-10-05 NOTE — TELEPHONE ENCOUNTER
Pt has an appointment on 10/23 but cannot wait to see Dr OROZCO that long.  Pt wondering if she can come in Monday, 10/8 or Tuesday, 10/9, because she has a type of rash all over her body, back, butt, legs, ear, etc. Pt been to the skin doctor but nothing is wor

## 2018-10-05 NOTE — TELEPHONE ENCOUNTER
Left message for patient notifying that Dr. Keyshawn Lazo is out of the office today and will need MD approval to add the patient to the schedule on Monday or Tuesday.  As such she will be called back on Monday to notify if she can be accomodated either of these da

## 2018-10-08 ENCOUNTER — OFFICE VISIT (OUTPATIENT)
Dept: INTERNAL MEDICINE CLINIC | Facility: CLINIC | Age: 78
End: 2018-10-08
Payer: COMMERCIAL

## 2018-10-08 VITALS
DIASTOLIC BLOOD PRESSURE: 68 MMHG | BODY MASS INDEX: 36.15 KG/M2 | WEIGHT: 217 LBS | HEART RATE: 72 BPM | TEMPERATURE: 98 F | OXYGEN SATURATION: 98 % | SYSTOLIC BLOOD PRESSURE: 128 MMHG | HEIGHT: 65 IN

## 2018-10-08 DIAGNOSIS — I10 HYPERTENSION, BENIGN: ICD-10-CM

## 2018-10-08 DIAGNOSIS — E11.9 TYPE 2 DIABETES MELLITUS WITHOUT COMPLICATION, WITHOUT LONG-TERM CURRENT USE OF INSULIN (HCC): ICD-10-CM

## 2018-10-08 DIAGNOSIS — R21 RASH: Primary | ICD-10-CM

## 2018-10-08 PROCEDURE — G0463 HOSPITAL OUTPT CLINIC VISIT: HCPCS | Performed by: INTERNAL MEDICINE

## 2018-10-08 PROCEDURE — 99213 OFFICE O/P EST LOW 20 MIN: CPT | Performed by: INTERNAL MEDICINE

## 2018-10-08 NOTE — PROGRESS NOTES
She has had a pruritic rash scalp and then diffusely over the entire body including buttocks. They start as little dots, sometimes red and then get to be size of dime or half dollar. She has seen derm at [facility] but no dx and treatment ineffective.      Nava Collins LANCETS 33G Does not apply Misc Check BG 2 times per day. DX: E11.8 without insulin use. Disp: 200 each Rfl: 1   Glucose Blood (ONETOUCH VERIO) In Vitro Strip Check BG 2 times per day. DX:E11.8 without insulin use.  Disp: 200 strip Rfl: 1   Fluocinonide 0.0 rashes  RESPIRATORY: denies shortness of breath with exertion  CARDIOVASCULAR: denies chest pain on exertion  GI: denies abdominal pain and denies heartburn  NEURO: denies headaches    EXAM:   /68 (BP Location: Left arm, Patient Position: Sitting, Cu

## 2018-10-10 ENCOUNTER — OFFICE VISIT (OUTPATIENT)
Dept: DERMATOLOGY CLINIC | Facility: CLINIC | Age: 78
End: 2018-10-10
Payer: COMMERCIAL

## 2018-10-10 DIAGNOSIS — L30.4 INTERTRIGO: ICD-10-CM

## 2018-10-10 DIAGNOSIS — L40.9 PSORIASIS: ICD-10-CM

## 2018-10-10 DIAGNOSIS — L40.9 SCALP PSORIASIS: Primary | ICD-10-CM

## 2018-10-10 PROCEDURE — 99203 OFFICE O/P NEW LOW 30 MIN: CPT | Performed by: DERMATOLOGY

## 2018-10-10 PROCEDURE — G0463 HOSPITAL OUTPT CLINIC VISIT: HCPCS | Performed by: DERMATOLOGY

## 2018-10-10 RX ORDER — CLOBETASOL PROPIONATE 0.05 G/100ML
SHAMPOO TOPICAL
Qty: 120 ML | Refills: 3 | COMMUNITY
Start: 2018-10-10 | End: 2018-10-10

## 2018-10-10 RX ORDER — CLOTRIMAZOLE AND BETAMETHASONE DIPROPIONATE 10; .64 MG/G; MG/G
CREAM TOPICAL
Qty: 45 G | Refills: 2 | Status: SHIPPED | OUTPATIENT
Start: 2018-10-10 | End: 2019-09-03

## 2018-10-10 RX ORDER — DESOXIMETASONE 2.5 MG/ML
1 SPRAY TOPICAL DAILY
Qty: 100 ML | Refills: 2 | COMMUNITY
Start: 2018-10-10 | End: 2018-10-10

## 2018-10-10 RX ORDER — DESOXIMETASONE 2.5 MG/ML
1 SPRAY TOPICAL DAILY
Qty: 100 ML | Refills: 2 | Status: SHIPPED | OUTPATIENT
Start: 2018-10-10 | End: 2020-03-03

## 2018-10-10 RX ORDER — CLOBETASOL PROPIONATE 0.05 G/100ML
SHAMPOO TOPICAL
Qty: 120 ML | Refills: 3 | Status: SHIPPED | OUTPATIENT
Start: 2018-10-10 | End: 2020-03-03

## 2018-10-15 ENCOUNTER — TELEPHONE (OUTPATIENT)
Dept: DERMATOLOGY CLINIC | Facility: CLINIC | Age: 78
End: 2018-10-15

## 2018-10-15 NOTE — TELEPHONE ENCOUNTER
Pt seen 10/10/18 - pt RXed topicort spray, clobetasol shampoo and clotrimazole-bet cream - please advise where she should apply them and what condition are we treating?

## 2018-10-16 NOTE — TELEPHONE ENCOUNTER
clobex shampoo goes on as a gel to dry scalp, leave on 5min and shampoo out in the shower ( she may add more shampoo to the rest of her scalp) may just use on the plaques on scalp. The topicort spray is super strong.   This should have a nozzle to spray on

## 2018-10-19 NOTE — TELEPHONE ENCOUNTER
Discussed with pt KMT reccs. Pt wrote down medication instructions and repeated back. Voiced understanding. No questions at this time.

## 2018-10-21 NOTE — PROGRESS NOTES
Ardyce Dance is a 68year old female. HPI:     CC:  Patient presents with:  Derm Problem: New pt presenting with scally red psoriasis like plaques to posterior scalp, abdomen, back, buttocks, and L hand. c/o itching.   Previously treid Amlactin lotion 1-0.05 % External Cream Use bid to affected areas of skin Disp: 45 g Rfl: 2   Desoximetasone (TOPICORT SPRAY) 0.25 % External Liquid Apply 1 Application topically daily.  Apply daily to affected areas Disp: 100 mL Rfl: 2   Clobetasol Propionate (CLOBEX) 0.0 cycloSPORINE (RESTASIS) 0.05 % Ophthalmic Emulsion Place 1 drop into both eyes 2 (two) times daily. Disp: 5.5 mL Rfl: 6   ketorolac tromethamine 0.5 % Ophthalmic Solution Place 1 drop into the right eye 4 (four) times daily.  Begin 3 days prior to surgery 11/29/2017    stent placement   • TONSILLECTOMY       Social History    Socioeconomic History      Marital status:        Spouse name: Not on file      Number of children: Not on file      Years of education: Not on file      Highest education level: above. Patient has been in their usual state of health. History, medications, allergies reviewed as noted. ROS:  Denies any other systemic complaints. No new or changeing lesions other than noted above.  No fevers, chills, night sweats, unusual benito Plaque-type. 20% body surface involvement. Will treat with medications and grid. Overall skincare, liberal use of emollients discussed. Consider more aggressive therapy if worsening. Patient will let us know how they are doing over the next several week

## 2018-10-23 ENCOUNTER — OFFICE VISIT (OUTPATIENT)
Dept: INTERNAL MEDICINE CLINIC | Facility: CLINIC | Age: 78
End: 2018-10-23
Payer: COMMERCIAL

## 2018-10-23 ENCOUNTER — LAB ENCOUNTER (OUTPATIENT)
Dept: LAB | Age: 78
End: 2018-10-23
Attending: INTERNAL MEDICINE
Payer: MEDICARE

## 2018-10-23 VITALS
HEART RATE: 73 BPM | BODY MASS INDEX: 35.49 KG/M2 | WEIGHT: 213 LBS | DIASTOLIC BLOOD PRESSURE: 72 MMHG | SYSTOLIC BLOOD PRESSURE: 122 MMHG | OXYGEN SATURATION: 97 % | HEIGHT: 65 IN | TEMPERATURE: 98 F

## 2018-10-23 DIAGNOSIS — Z01.818 PREOP EXAMINATION: ICD-10-CM

## 2018-10-23 DIAGNOSIS — I10 HYPERTENSION, BENIGN: ICD-10-CM

## 2018-10-23 DIAGNOSIS — Z01.818 PREOP EXAMINATION: Primary | ICD-10-CM

## 2018-10-23 DIAGNOSIS — Z95.5 H/O HEART ARTERY STENT: ICD-10-CM

## 2018-10-23 DIAGNOSIS — E11.9 TYPE 2 DIABETES MELLITUS WITHOUT COMPLICATION, WITHOUT LONG-TERM CURRENT USE OF INSULIN (HCC): ICD-10-CM

## 2018-10-23 PROBLEM — K59.1 FUNCTIONAL DIARRHEA: Status: ACTIVE | Noted: 2018-10-23

## 2018-10-23 PROCEDURE — 85025 COMPLETE CBC W/AUTO DIFF WBC: CPT

## 2018-10-23 PROCEDURE — 36415 COLL VENOUS BLD VENIPUNCTURE: CPT

## 2018-10-23 PROCEDURE — 36416 COLLJ CAPILLARY BLOOD SPEC: CPT | Performed by: INTERNAL MEDICINE

## 2018-10-23 PROCEDURE — 99212 OFFICE O/P EST SF 10 MIN: CPT | Performed by: INTERNAL MEDICINE

## 2018-10-23 PROCEDURE — 80048 BASIC METABOLIC PNL TOTAL CA: CPT

## 2018-10-23 PROCEDURE — 99214 OFFICE O/P EST MOD 30 MIN: CPT | Performed by: INTERNAL MEDICINE

## 2018-10-23 PROCEDURE — 82962 GLUCOSE BLOOD TEST: CPT | Performed by: INTERNAL MEDICINE

## 2018-10-23 NOTE — PROGRESS NOTES
Yunier Marion is a 68year old female.   HPI:   She is here for pre-op exam for cataract surgery to be done 11/14 by Dr Susan Goins at [facility].     She is feeling well otherwise other than a peculiar diarrhea which occurs once per month or even ever minutes, shampoo out 3 times weekly Disp: 120 mL Rfl: 3   triamcinolone acetonide 0.1 % External Cream Apply topically 2 (two) times daily as needed.  Disp: 60 g Rfl: 3   Polyethyl Glycol-Propyl Glycol (SYSTANE ULTRA) 0.4-0.3 % Ophthalmic Solution Place 1 d 12/11/2013   • Obesity    • Osteoporosis    • Osteoporosis screening 1/22/2013   • Other and unspecified hyperlipidemia    • Restless leg syndrome 2015    Per Dr. Artie Shipman   • Type II or unspecified type diabetes mellitus without mention of complication, not sta plan.  The patient is asked to return in 4 months.

## 2018-10-25 ENCOUNTER — PRIOR ORIGINAL RECORDS (OUTPATIENT)
Dept: OTHER | Age: 78
End: 2018-10-25

## 2018-11-03 ENCOUNTER — TELEPHONE (OUTPATIENT)
Dept: INTERNAL MEDICINE CLINIC | Facility: CLINIC | Age: 78
End: 2018-11-03

## 2018-11-05 ENCOUNTER — TELEPHONE (OUTPATIENT)
Dept: INTERNAL MEDICINE CLINIC | Facility: CLINIC | Age: 78
End: 2018-11-05

## 2018-11-05 NOTE — TELEPHONE ENCOUNTER
We received a fax from [facility] Ophthamology [facility] stating Ankit Pandey is scheduled for Cataract Surgery of the RT Eye Under [facility] Anesthesia at [facility] on 11/14/18 with Dr. Christal Platt and possible LT Eye Cataract Surgery on 12/12/

## 2018-11-09 RX ORDER — METFORMIN HYDROCHLORIDE 500 MG/1
TABLET, EXTENDED RELEASE ORAL
Qty: 360 TABLET | Refills: 1 | Status: SHIPPED | OUTPATIENT
Start: 2018-11-09 | End: 2019-02-18

## 2018-11-09 RX ORDER — GLIMEPIRIDE 4 MG/1
TABLET ORAL
Qty: 180 TABLET | Refills: 1 | Status: SHIPPED | OUTPATIENT
Start: 2018-11-09 | End: 2019-02-18

## 2018-11-12 PROCEDURE — 88305 TISSUE EXAM BY PATHOLOGIST: CPT | Performed by: INTERNAL MEDICINE

## 2018-11-13 NOTE — TELEPHONE ENCOUNTER
Victor Manuel [facility] ophthalmology called  Needs hard copy of pt's EKG sent to her Attn: @ fax# [phone]

## 2018-11-16 RX ORDER — EMPAGLIFLOZIN 25 MG/1
TABLET, FILM COATED ORAL
Qty: 30 TABLET | Refills: 3 | Status: SHIPPED | OUTPATIENT
Start: 2018-11-16 | End: 2019-02-18

## 2018-12-07 PROBLEM — H25.12 NUCLEAR SCLEROTIC CATARACT, LEFT: Status: ACTIVE | Noted: 2018-12-07

## 2018-12-11 ENCOUNTER — HOSPITAL ENCOUNTER (OUTPATIENT)
Dept: GENERAL RADIOLOGY | Age: 78
Discharge: HOME | End: 2018-12-11
Attending: INTERNAL MEDICINE
Payer: MEDICARE

## 2018-12-11 ENCOUNTER — OFFICE VISIT (OUTPATIENT)
Dept: INTERNAL MEDICINE CLINIC | Facility: CLINIC | Age: 78
End: 2018-12-11
Payer: COMMERCIAL

## 2018-12-11 VITALS
HEIGHT: 65 IN | BODY MASS INDEX: 34.99 KG/M2 | WEIGHT: 210 LBS | SYSTOLIC BLOOD PRESSURE: 132 MMHG | TEMPERATURE: 98 F | HEART RATE: 75 BPM | OXYGEN SATURATION: 98 % | DIASTOLIC BLOOD PRESSURE: 76 MMHG | RESPIRATION RATE: 20 BRPM

## 2018-12-11 DIAGNOSIS — I10 HYPERTENSION, BENIGN: ICD-10-CM

## 2018-12-11 DIAGNOSIS — I25.110 CORONARY ARTERY DISEASE INVOLVING NATIVE CORONARY ARTERY OF NATIVE HEART WITH UNSTABLE ANGINA PECTORIS (HCC): Primary | ICD-10-CM

## 2018-12-11 DIAGNOSIS — M25.549 PAIN IN MULTIPLE FINGER JOINTS: ICD-10-CM

## 2018-12-11 DIAGNOSIS — E11.9 TYPE 2 DIABETES MELLITUS WITHOUT COMPLICATION, WITHOUT LONG-TERM CURRENT USE OF INSULIN (HCC): ICD-10-CM

## 2018-12-11 PROBLEM — M79.644 PAIN OF FINGER OF RIGHT HAND: Status: ACTIVE | Noted: 2018-12-11

## 2018-12-11 PROCEDURE — 73130 X-RAY EXAM OF HAND: CPT | Performed by: INTERNAL MEDICINE

## 2018-12-11 PROCEDURE — 99214 OFFICE O/P EST MOD 30 MIN: CPT | Performed by: INTERNAL MEDICINE

## 2018-12-11 PROCEDURE — G0463 HOSPITAL OUTPT CLINIC VISIT: HCPCS | Performed by: INTERNAL MEDICINE

## 2018-12-11 NOTE — PROGRESS NOTES
Damaso Garland is a 66year old female. HPI:   She has had variable pain in the fingers of the right hand - now mostly in the right 5 th finger -  She was told to have psoriatic arthritis by Dr Gavino Maldonado but I doubt.      Still having difficulty sleeping dexamethasone 0.1 % Ophthalmic Suspension Place 1 drop into the right eye every 2 (two) hours. Beginning after surgery (Patient taking differently: Place 1 drop into the right eye 4 (four) times daily.  Beginning after surgery ) Disp: 1 Bottle Rfl: 1   cl Oral Tab Take 1 tablet (0.25 mg total) by mouth 3 (three) times daily as needed for Anxiety.  Disp: 90 tablet Rfl: 5   METFORMIN HCL  MG Oral Tablet 24 Hr TAKE 2 TABLETS BY MOUTH TWO TIMES EVERY DAY Disp: 360 tablet Rfl: 1   simvastatin 20 MG Oral Tab cyanosis, clubbing or edema    ASSESSMENT AND PLAN:   1. Coronary artery disease involving native coronary artery of native heart with unstable angina pectoris ([address]  Stable and she is following with Dr Isaac Lundberg    2.  Hypertension, benign  At goal, same meds

## 2018-12-17 ENCOUNTER — TELEPHONE (OUTPATIENT)
Dept: INTERNAL MEDICINE CLINIC | Facility: CLINIC | Age: 78
End: 2018-12-17

## 2018-12-17 NOTE — TELEPHONE ENCOUNTER
[facility] [place] requesting NEW RX for:  Alternative for Losartan, \"RX expires before insurance will cover it\"  Form placed in purple folder  Tasked to [employer]

## 2018-12-18 RX ORDER — LOSARTAN POTASSIUM AND HYDROCHLOROTHIAZIDE 12.5; 5 MG/1; MG/1
1 TABLET ORAL
Qty: 90 TABLET | Refills: 3 | Status: SHIPPED | OUTPATIENT
Start: 2018-12-18 | End: 2019-09-03 | Stop reason: ALTCHOICE

## 2018-12-18 NOTE — TELEPHONE ENCOUNTER
This is refill request not a change request  Refill request is for a maintenance medication and has met the criteria specified in the Ambulatory Medication Refill Standing Order for eligibility, visits, laboratory, alerts and was sent to the requested phar

## 2018-12-19 PROBLEM — Z96.1 BILATERAL PSEUDOPHAKIA: Status: ACTIVE | Noted: 2018-12-19

## 2018-12-20 ENCOUNTER — TELEPHONE (OUTPATIENT)
Dept: INTERNAL MEDICINE CLINIC | Facility: CLINIC | Age: 78
End: 2018-12-20

## 2018-12-21 ENCOUNTER — LAB ENCOUNTER (OUTPATIENT)
Dept: LAB | Facility: HOSPITAL | Age: 78
End: 2018-12-21
Attending: INTERNAL MEDICINE
Payer: MEDICARE

## 2018-12-21 ENCOUNTER — PRIOR ORIGINAL RECORDS (OUTPATIENT)
Dept: OTHER | Age: 78
End: 2018-12-21

## 2018-12-21 DIAGNOSIS — E11.9 TYPE 2 DIABETES MELLITUS WITHOUT COMPLICATION, WITHOUT LONG-TERM CURRENT USE OF INSULIN (HCC): ICD-10-CM

## 2018-12-21 DIAGNOSIS — I10 HYPERTENSION, BENIGN: ICD-10-CM

## 2018-12-21 DIAGNOSIS — E78.5 HYPERLIPIDEMIA, UNSPECIFIED HYPERLIPIDEMIA TYPE: ICD-10-CM

## 2018-12-21 LAB
CHOLEST SERPL-MCNC: 134 MG/DL
CHOLEST/HDLC SERPL: 47 {RATIO}
HDLC SERPL-MCNC: NORMAL MG/DL
LDLC SERPL CALC-MCNC: 52 MG/DL
LENGTH OF FAST TIME PATIENT: NORMAL H
NONHDLC SERPL-MCNC: 87 MG/DL
TRIGL SERPL-MCNC: 174 MG/DL
VLDLC SERPL CALC-MCNC: NORMAL MG/DL

## 2018-12-21 PROCEDURE — 85025 COMPLETE CBC W/AUTO DIFF WBC: CPT

## 2018-12-21 PROCEDURE — 80061 LIPID PANEL: CPT

## 2018-12-21 PROCEDURE — 84443 ASSAY THYROID STIM HORMONE: CPT

## 2018-12-21 PROCEDURE — 83036 HEMOGLOBIN GLYCOSYLATED A1C: CPT

## 2018-12-21 PROCEDURE — 36415 COLL VENOUS BLD VENIPUNCTURE: CPT

## 2018-12-21 PROCEDURE — 80053 COMPREHEN METABOLIC PANEL: CPT

## 2018-12-24 LAB
ALBUMIN: 3.9 G/DL
ALT (SGPT): 21 U/L
AST (SGOT): 24 U/L
BILIRUBIN TOTAL: 0.7 MG/DL
BUN: 19 MG/DL
CALCIUM: 9.4 MG/DL
CHLORIDE: 104 MEQ/L
CHOLESTEROL, TOTAL: 134 MG/DL
CREATININE, SERUM: 0.8 MG/DL
GLOBULIN: 3.2 G/DL
GLUCOSE: 171 MG/DL
GLUCOSE: 171 MG/DL
HDL CHOLESTEROL: 47 MG/DL
LDL CHOLESTEROL: 52 MG/DL
POTASSIUM, SERUM: 3.8 MEQ/L
PROTEIN, TOTAL: 7.1 G/DL
SGOT (AST): 24 IU/L
SGPT (ALT): 21 IU/L
SODIUM: 139 MEQ/L
TRIGLYCERIDES: 174 MG/DL

## 2018-12-31 RX ORDER — EMPAGLIFLOZIN 25 MG/1
TABLET, FILM COATED ORAL
Qty: 90 TABLET | Refills: 1 | Status: SHIPPED | OUTPATIENT
Start: 2018-12-31 | End: 2019-07-29

## 2019-01-02 ENCOUNTER — OFFICE VISIT (OUTPATIENT)
Dept: RHEUMATOLOGY | Facility: CLINIC | Age: 79
End: 2019-01-02
Payer: COMMERCIAL

## 2019-01-02 VITALS
WEIGHT: 214 LBS | HEART RATE: 72 BPM | BODY MASS INDEX: 35.65 KG/M2 | HEIGHT: 65 IN | SYSTOLIC BLOOD PRESSURE: 128 MMHG | DIASTOLIC BLOOD PRESSURE: 74 MMHG

## 2019-01-02 DIAGNOSIS — L40.9 PSORIASIS: ICD-10-CM

## 2019-01-02 DIAGNOSIS — M15.9 PRIMARY OSTEOARTHRITIS INVOLVING MULTIPLE JOINTS: Primary | ICD-10-CM

## 2019-01-02 PROBLEM — M15.0 PRIMARY OSTEOARTHRITIS INVOLVING MULTIPLE JOINTS: Status: ACTIVE | Noted: 2019-01-02

## 2019-01-02 PROCEDURE — 99204 OFFICE O/P NEW MOD 45 MIN: CPT | Performed by: INTERNAL MEDICINE

## 2019-01-02 PROCEDURE — 99212 OFFICE O/P EST SF 10 MIN: CPT | Performed by: INTERNAL MEDICINE

## 2019-01-02 RX ORDER — SIMVASTATIN 40 MG
TABLET ORAL
Refills: 1 | COMMUNITY
Start: 2018-09-20 | End: 2020-04-13

## 2019-01-02 NOTE — PROGRESS NOTES
Dear Cyndi Mayes:    I saw Mj Socks in consultation this afternoon at your request for evaluation of possible psoriatic arthritis.     As you know, she is a delightful 66-year-old woman who 5 years ago developed osteoarthritis in her right index finger DIP j as needed for anxiety, one baby aspirin daily,, topical steroids, cyclosporine eyedrops surrounding her eye surgeries, empagliflozin, glimepiride, losartan, hydrochlorothiazide, metformin, simvastatin. Family history:  Her mother had lupus years ago.   Feliz Foster psoriasis. 2.  I reassured her that she does not have psoriatic arthritis. She has osteoarthritis. She was given Up-To-Date articles on osteoarthritis. We discussed this at length. She will remain as active and strong as possible.   There are not med

## 2019-01-04 ENCOUNTER — TELEPHONE (OUTPATIENT)
Dept: INTERNAL MEDICINE CLINIC | Facility: CLINIC | Age: 79
End: 2019-01-04

## 2019-01-21 ENCOUNTER — PRIOR ORIGINAL RECORDS (OUTPATIENT)
Dept: OTHER | Age: 79
End: 2019-01-21

## 2019-01-22 ENCOUNTER — PRIOR ORIGINAL RECORDS (OUTPATIENT)
Dept: OTHER | Age: 79
End: 2019-01-22

## 2019-01-22 ENCOUNTER — MYAURORA ACCOUNT LINK (OUTPATIENT)
Dept: OTHER | Age: 79
End: 2019-01-22

## 2019-01-22 NOTE — PROGRESS NOTES
HPI:    Patient ID: Teressa Young is a 68year old female. HPI    Review of Systems         Current Outpatient Prescriptions:  ALPRAZolam (XANAX) 0.25 MG Oral Tab Take 1 tablet (0.25 mg total) by mouth 3 (three) times daily as needed for Anxiety.  [facility] Progress Note - Pulmonary   Jorge Tierney 80 y o  female MRN: [id number]  Unit/Bed#: [address] [place] [address] Encounter: [id number]    Assessment:  Acute exacerbation of COPD - improving  Acute bronchitis  Centrilobular emphysema  Right upper lobe mass seen on CT scan 12/20/16 has decreased in size from 3 cm to 1 cm and likely was an inflammatory lesion  Very small bilateral pleural effusions possibly due to diastolic cardiac dysfunction  Hx of ischemic stroke with expressive aphasia and right hemiplegia  Acute hypoxemic respiratory failure  PAF    Plan:  Agree with decrease of Methylpredisolone to 40 mg IV every 12 hours and neb tx with Duoneb QID  2 l/m nc  Continue IV azithromycin 5 days  Today is #3  I reviewed present CT of chest compared to 1 done in 2016    Subjective:   Patient without any shortness of breath at rest    Objective:     Vitals: Blood pressure 128/93, pulse 83, temperature 97 5 °F (36 4 °C), temperature source Tympanic, resp  rate 17, height 5' 4" (1 626 m), weight 46 2 kg (101 lb 13 6 oz), SpO2 95 %, not currently breastfeeding  ,Body mass index is 17 48 kg/m²  Intake/Output Summary (Last 24 hours) at 01/21/19 2100  Last data filed at 01/21/19 1916   Gross per 24 hour   Intake                0 ml   Output              800 ml   Net             -800 ml       Physical Exam: Physical Exam   Constitutional: She is oriented to person, place, and time  She appears well-developed  No distress  Thin, on 2 l/m - 95%     HENT:   Head: Normocephalic  Nose: Nose normal    Mouth/Throat: Oropharynx is clear and moist  No oropharyngeal exudate  Eyes: Pupils are equal, round, and reactive to light  Conjunctivae are normal    Neck: Neck supple  No JVD present  No tracheal deviation present  Cardiovascular: Normal rate, regular rhythm and normal heart sounds  Pulmonary/Chest: Effort normal    Lung sounds decreased with few expiratory wheezes at the bases   Abdominal: Soft  She exhibits no distension  There is no tenderness  There is no guarding  Musculoskeletal:   Trace edema   Lymphadenopathy:     She has no cervical adenopathy  Neurological: She is alert and oriented to person, place, and time  Skin: Skin is warm and dry  No rash noted  Psychiatric: She has a normal mood and affect  Her behavior is normal  Thought content normal         Labs: I have personally reviewed pertinent lab results  , ABG: Lab Results   Component Value Date    PHART 7 398 01/19/2019    QTB8VPI 37 8 01/19/2019    PO2ART 52 8 (LL) 01/19/2019    BGV6CRV 22 8 01/19/2019    BEART -1 7 01/19/2019    SOURCE Radial, Left 01/19/2019   , BNP: No results found for: BNP, CBC: Lab Results   Component Value Date    WBC 17 70 (H) 01/21/2019    HGB 12 8 01/21/2019    HCT 40 9 01/21/2019     (H) 01/21/2019     01/21/2019    MCH 31 4 01/21/2019    MCHC 31 3 (L) 01/21/2019    RDW 12 2 01/21/2019    MPV 9 8 01/21/2019    NRBC 0 01/21/2019   , CMP: Lab Results   Component Value Date    K 3 5 01/21/2019     01/21/2019    CO2 30 01/21/2019    BUN 26 (H) 01/21/2019    CREATININE 0 73 01/21/2019    CALCIUM 9 1 01/21/2019    AST 19 01/19/2019    ALT 23 01/19/2019    ALKPHOS 78 01/19/2019    EGFR 76 01/21/2019   , PT/INR:   Lab Results   Component Value Date    INR 0 97 01/19/2019   , Troponin: No results found for: TROPONIN    Imaging and other studies: I have personally reviewed pertinent reports     and I have personally reviewed pertinent films in PACS

## 2019-01-28 ENCOUNTER — PRIOR ORIGINAL RECORDS (OUTPATIENT)
Dept: OTHER | Age: 79
End: 2019-01-28

## 2019-02-15 PROBLEM — H43.392 VITREOUS SYNERESIS OF LEFT EYE: Status: ACTIVE | Noted: 2019-02-15

## 2019-02-15 PROBLEM — H43.812 PVD (POSTERIOR VITREOUS DETACHMENT), LEFT EYE: Status: ACTIVE | Noted: 2019-02-15

## 2019-02-18 ENCOUNTER — OFFICE VISIT (OUTPATIENT)
Dept: ENDOCRINOLOGY CLINIC | Facility: CLINIC | Age: 79
End: 2019-02-18
Payer: COMMERCIAL

## 2019-02-18 VITALS
SYSTOLIC BLOOD PRESSURE: 135 MMHG | DIASTOLIC BLOOD PRESSURE: 59 MMHG | RESPIRATION RATE: 16 BRPM | HEART RATE: 71 BPM | HEIGHT: 65 IN | BODY MASS INDEX: 35.32 KG/M2 | WEIGHT: 212 LBS

## 2019-02-18 DIAGNOSIS — I10 HYPERTENSION, BENIGN: ICD-10-CM

## 2019-02-18 DIAGNOSIS — E11.9 TYPE 2 DIABETES MELLITUS WITHOUT COMPLICATION, WITHOUT LONG-TERM CURRENT USE OF INSULIN (HCC): Primary | ICD-10-CM

## 2019-02-18 DIAGNOSIS — E11.8 CONTROLLED TYPE 2 DIABETES MELLITUS WITH COMPLICATION, WITHOUT LONG-TERM CURRENT USE OF INSULIN (HCC): ICD-10-CM

## 2019-02-18 DIAGNOSIS — E78.5 HYPERLIPIDEMIA, UNSPECIFIED HYPERLIPIDEMIA TYPE: ICD-10-CM

## 2019-02-18 DIAGNOSIS — E66.01 SEVERE OBESITY (BMI 35.0-39.9) WITH COMORBIDITY (HCC): ICD-10-CM

## 2019-02-18 LAB
CARTRIDGE EXPIRATION DATE: ABNORMAL DATE
CARTRIDGE LOT#: ABNORMAL NUMERIC
GLUCOSE BLOOD: 177
HEMOGLOBIN A1C: 7.5 % (ref 4.3–5.6)
TEST STRIP EXPIRATION DATE: NORMAL DATE
TEST STRIP LOT #: NORMAL NUMERIC

## 2019-02-18 PROCEDURE — 99214 OFFICE O/P EST MOD 30 MIN: CPT | Performed by: NURSE PRACTITIONER

## 2019-02-18 PROCEDURE — 36416 COLLJ CAPILLARY BLOOD SPEC: CPT | Performed by: NURSE PRACTITIONER

## 2019-02-18 PROCEDURE — G0463 HOSPITAL OUTPT CLINIC VISIT: HCPCS | Performed by: NURSE PRACTITIONER

## 2019-02-18 PROCEDURE — 83036 HEMOGLOBIN GLYCOSYLATED A1C: CPT | Performed by: NURSE PRACTITIONER

## 2019-02-18 PROCEDURE — 82962 GLUCOSE BLOOD TEST: CPT | Performed by: NURSE PRACTITIONER

## 2019-02-18 RX ORDER — METFORMIN HYDROCHLORIDE 500 MG/1
1000 TABLET, EXTENDED RELEASE ORAL 2 TIMES DAILY WITH MEALS
Qty: 360 TABLET | Refills: 1 | Status: SHIPPED | OUTPATIENT
Start: 2019-02-18 | End: 2019-03-20

## 2019-02-18 RX ORDER — GLIMEPIRIDE 4 MG/1
8 TABLET ORAL
Qty: 180 TABLET | Refills: 1 | Status: SHIPPED | OUTPATIENT
Start: 2019-02-18 | End: 2020-03-05

## 2019-02-18 NOTE — PATIENT INSTRUCTIONS
Continue Diabetic medications    Glimepiride 4mg  Twice a day   Metformin 1000mg Twice a day   Jardiance 25mg  daily     Follow up in 6 months/ labs prior to clinic visit

## 2019-02-18 NOTE — PROGRESS NOTES
Name: Guerrero Cristina  Date: 2/18/2019    Referring Physician: No ref. provider found    HISTORY OF PRESENT ILLNESS   Guerrero Cristina is a 66year old female who presents for diabetes mellitus. Her  passed away in 2/2018.       Recent medical hi Current Outpatient Medications:   •  glimepiride 4 MG Oral Tab, Take 2 tablets (8 mg total) by mouth once daily. , Disp: 180 tablet, Rfl: 1  •  MetFORMIN HCl  MG Oral Tablet 24 Hr, Take 2 tablets (1,000 mg total) by mouth 2 (two) times daily with Apply 1 Application topically 2 (two) times daily. , Disp: 60 g, Rfl: 1  •  ONETOUCH DELICA LANCETS 24X Does not apply Misc, Check BG 2 times per day.  DX: E11.8 without insulin use., Disp: 200 each, Rfl: 1  •  Glucose Blood (ONETOUCH VERIO) In Vitro Strip, Laterality Date   • BREAST BIOPSY Right 2013   • CARPAL TUNNEL RELEASE Right    • CATARACT EXTRACTION Left 12/12/2018    SN6AT3 21.00 D LENSX WITH ORA   • CATARACT EXTRACTION W/  INTRAOCULAR LENS IMPLANT Right 11/14/2018    Lensx Toric IOL    model:SN6AT6 disease  -Discussed importance of SBGM / encourage patient to check blood sugar at sushant   -Discussed importance of low CHO diet, recommend 45gm per meal or 135gm per day  -Continue Metformin and Glimepiride - review MOA/  risks/ benefit/ side effects  -Con

## 2019-02-26 ENCOUNTER — APPOINTMENT (OUTPATIENT)
Dept: LAB | Age: 79
End: 2019-02-26
Attending: INTERNAL MEDICINE
Payer: MEDICARE

## 2019-02-26 ENCOUNTER — OFFICE VISIT (OUTPATIENT)
Dept: INTERNAL MEDICINE CLINIC | Facility: CLINIC | Age: 79
End: 2019-02-26
Payer: COMMERCIAL

## 2019-02-26 VITALS
DIASTOLIC BLOOD PRESSURE: 66 MMHG | HEART RATE: 76 BPM | OXYGEN SATURATION: 98 % | HEIGHT: 65 IN | TEMPERATURE: 98 F | WEIGHT: 212 LBS | SYSTOLIC BLOOD PRESSURE: 140 MMHG | BODY MASS INDEX: 35.32 KG/M2

## 2019-02-26 DIAGNOSIS — M15.9 PRIMARY OSTEOARTHRITIS INVOLVING MULTIPLE JOINTS: ICD-10-CM

## 2019-02-26 DIAGNOSIS — Z95.5 H/O HEART ARTERY STENT: ICD-10-CM

## 2019-02-26 DIAGNOSIS — F32.A DEPRESSION, UNSPECIFIED DEPRESSION TYPE: ICD-10-CM

## 2019-02-26 DIAGNOSIS — Z12.39 BREAST CANCER SCREENING: ICD-10-CM

## 2019-02-26 DIAGNOSIS — E78.5 HYPERLIPIDEMIA, UNSPECIFIED HYPERLIPIDEMIA TYPE: ICD-10-CM

## 2019-02-26 DIAGNOSIS — E53.8 VITAMIN B 12 DEFICIENCY: ICD-10-CM

## 2019-02-26 DIAGNOSIS — I25.110 CORONARY ARTERY DISEASE INVOLVING NATIVE CORONARY ARTERY OF NATIVE HEART WITH UNSTABLE ANGINA PECTORIS (HCC): ICD-10-CM

## 2019-02-26 DIAGNOSIS — Z00.00 ENCOUNTER FOR MEDICARE ANNUAL WELLNESS EXAM: Primary | ICD-10-CM

## 2019-02-26 DIAGNOSIS — E11.9 TYPE 2 DIABETES MELLITUS WITHOUT COMPLICATION, WITHOUT LONG-TERM CURRENT USE OF INSULIN (HCC): ICD-10-CM

## 2019-02-26 DIAGNOSIS — I10 HYPERTENSION, BENIGN: ICD-10-CM

## 2019-02-26 LAB — VIT B12 SERPL-MCNC: 480 PG/ML (ref 193–986)

## 2019-02-26 PROCEDURE — G0439 PPPS, SUBSEQ VISIT: HCPCS | Performed by: INTERNAL MEDICINE

## 2019-02-26 PROCEDURE — 99397 PER PM REEVAL EST PAT 65+ YR: CPT | Performed by: INTERNAL MEDICINE

## 2019-02-26 PROCEDURE — 36415 COLL VENOUS BLD VENIPUNCTURE: CPT

## 2019-02-26 PROCEDURE — 82607 VITAMIN B-12: CPT

## 2019-02-26 PROCEDURE — 96160 PT-FOCUSED HLTH RISK ASSMT: CPT | Performed by: INTERNAL MEDICINE

## 2019-02-26 NOTE — PROGRESS NOTES
Vinayak Wallace is a 66year old female. HPI:   She is here for annual [facility] wellness exam.     She is stable and doing well and no ED or UC visits. Her cardiac status is stable and Dr Alexandria Elkins has stopped Plavix. She has no sx.  She had single cardia stent times daily. Disp: 5.5 mL Rfl: 6   clotrimazole-betamethasone 1-0.05 % External Cream Use bid to affected areas of skin Disp: 45 g Rfl: 2   Desoximetasone (TOPICORT SPRAY) 0.25 % External Liquid Apply 1 Application topically daily.  Apply daily to affected leg syndrome 2015    Per Dr. Arya Poe   • Type II or unspecified type diabetes mellitus without mention of complication, not stated as uncontrolled 8/2007   • Unspecified essential hypertension       Social History:  Social History    Tobacco Use      Smoking st help    Toileting: Able without help    Dressing: Able without help    Eating: Able without help    Driving: Able without help    Preparing your meals: Able without help    Managing money/bills: Able without help    Taking medications as prescribed: Able w place of Sikh:  No   Many people I talk to seem to mumble (or don't speak clearly):  No People get annoyed because I misunderstand what they say:  No   I misunderstand what others are saying and make inappropriate responses:  No I avoid social activitie flowsheet data found.     Glaucoma Screening      Ophthalmology Visit Annually Yes, recent bilateral cataract procedures    Bone Density Screening      Dexascan Every two years Last Dexa Scan:   XR DEXA BONE DENSITOMETRY (CPT=77080) 03/01/2018    No flowshe (%)   Date Value   02/18/2019 7.5 (A)    No flowsheet data found. Creat/alb ratio  Annually      LDL  Annually LDL Cholesterol (mg/dL)   Date Value   12/21/2018 52   06/07/2016 55    No flowsheet data found.      Dilated Eye exam  Annually Data entered o

## 2019-02-28 VITALS
RESPIRATION RATE: 18 BRPM | DIASTOLIC BLOOD PRESSURE: 56 MMHG | SYSTOLIC BLOOD PRESSURE: 128 MMHG | BODY MASS INDEX: 35.16 KG/M2 | HEIGHT: 65 IN | HEART RATE: 77 BPM | WEIGHT: 211 LBS

## 2019-02-28 VITALS
BODY MASS INDEX: 34.99 KG/M2 | HEIGHT: 65 IN | RESPIRATION RATE: 18 BRPM | HEART RATE: 77 BPM | SYSTOLIC BLOOD PRESSURE: 128 MMHG | WEIGHT: 210 LBS | DIASTOLIC BLOOD PRESSURE: 56 MMHG

## 2019-02-28 VITALS
RESPIRATION RATE: 18 BRPM | DIASTOLIC BLOOD PRESSURE: 62 MMHG | BODY MASS INDEX: 35.99 KG/M2 | WEIGHT: 216 LBS | HEIGHT: 65 IN | HEART RATE: 77 BPM | SYSTOLIC BLOOD PRESSURE: 122 MMHG

## 2019-02-28 VITALS
OXYGEN SATURATION: 97 % | WEIGHT: 213 LBS | HEIGHT: 65 IN | DIASTOLIC BLOOD PRESSURE: 60 MMHG | BODY MASS INDEX: 35.49 KG/M2 | SYSTOLIC BLOOD PRESSURE: 120 MMHG | HEART RATE: 75 BPM

## 2019-02-28 VITALS
WEIGHT: 215 LBS | RESPIRATION RATE: 18 BRPM | SYSTOLIC BLOOD PRESSURE: 136 MMHG | BODY MASS INDEX: 35.82 KG/M2 | DIASTOLIC BLOOD PRESSURE: 68 MMHG | HEART RATE: 66 BPM | HEIGHT: 65 IN

## 2019-02-28 VITALS
HEIGHT: 65 IN | WEIGHT: 213 LBS | DIASTOLIC BLOOD PRESSURE: 64 MMHG | BODY MASS INDEX: 35.49 KG/M2 | HEART RATE: 77 BPM | SYSTOLIC BLOOD PRESSURE: 114 MMHG

## 2019-02-28 VITALS
SYSTOLIC BLOOD PRESSURE: 118 MMHG | OXYGEN SATURATION: 97 % | BODY MASS INDEX: 35.16 KG/M2 | WEIGHT: 211 LBS | HEART RATE: 70 BPM | HEIGHT: 65 IN | DIASTOLIC BLOOD PRESSURE: 68 MMHG

## 2019-03-04 ENCOUNTER — TELEPHONE (OUTPATIENT)
Dept: INTERNAL MEDICINE CLINIC | Facility: CLINIC | Age: 79
End: 2019-03-04

## 2019-03-04 NOTE — TELEPHONE ENCOUNTER
A1C 7.5 - not bad. B12 normal at 480 -does she take B 12?  Probably not the cause of her foot pain / tingling; .if persists see podiatrist - Dr Sow Devoid

## 2019-03-04 NOTE — TELEPHONE ENCOUNTER
Spoke to pt and advised on message below, provided pt with office # for Dr. Alexis Dao; pt verbalized understanding. FYI to Dr. Dick Horton-----  Pt states she has not started taking vitamin b12 yet as she was on vacation.

## 2019-04-02 RX ORDER — GLIMEPIRIDE 4 MG/1
4 TABLET ORAL 2 TIMES DAILY
COMMUNITY
Start: 2018-06-21

## 2019-04-02 RX ORDER — SIMVASTATIN 40 MG
TABLET ORAL
Qty: 90 TABLET | Refills: 1 | Status: SHIPPED | OUTPATIENT
Start: 2019-04-02 | End: 2019-06-17 | Stop reason: SDUPTHER

## 2019-04-02 RX ORDER — CLOPIDOGREL BISULFATE 75 MG/1
75 TABLET ORAL DAILY
COMMUNITY
Start: 2018-12-17 | End: 2019-06-17

## 2019-04-02 RX ORDER — ASPIRIN 81 MG/1
81 TABLET ORAL DAILY
COMMUNITY
Start: 2014-10-30

## 2019-04-02 RX ORDER — SIMVASTATIN 40 MG
40 TABLET ORAL NIGHTLY
COMMUNITY
Start: 2018-09-20 | End: 2019-06-11 | Stop reason: SDUPTHER

## 2019-04-08 RX ORDER — METFORMIN HYDROCHLORIDE 500 MG/1
TABLET, EXTENDED RELEASE ORAL
COMMUNITY

## 2019-04-08 RX ORDER — LOSARTAN POTASSIUM AND HYDROCHLOROTHIAZIDE 12.5; 5 MG/1; MG/1
TABLET ORAL
COMMUNITY
End: 2019-09-23

## 2019-04-08 RX ORDER — ALPRAZOLAM 0.25 MG/1
TABLET ORAL
COMMUNITY

## 2019-04-29 ENCOUNTER — HOSPITAL ENCOUNTER (OUTPATIENT)
Dept: MAMMOGRAPHY | Age: 79
Discharge: HOME | End: 2019-04-29
Attending: INTERNAL MEDICINE
Payer: MEDICARE

## 2019-04-29 DIAGNOSIS — Z12.39 BREAST CANCER SCREENING: ICD-10-CM

## 2019-04-29 PROCEDURE — 77067 SCR MAMMO BI INCL CAD: CPT | Performed by: INTERNAL MEDICINE

## 2019-04-29 PROCEDURE — 77063 BREAST TOMOSYNTHESIS BI: CPT | Performed by: INTERNAL MEDICINE

## 2019-06-08 PROBLEM — E66.01 SEVERE OBESITY (BMI 35.0-39.9) WITH COMORBIDITY (HCC): Chronic | Status: RESOLVED | Noted: 2017-07-05 | Resolved: 2019-06-08

## 2019-06-11 PROBLEM — I10 HTN (HYPERTENSION): Status: ACTIVE | Noted: 2019-06-11

## 2019-06-11 PROBLEM — E78.00 PURE HYPERCHOLESTEROLEMIA: Status: ACTIVE | Noted: 2019-06-11

## 2019-06-11 PROBLEM — I25.10 CORONARY ARTERY DISEASE INVOLVING NATIVE CORONARY ARTERY: Status: ACTIVE | Noted: 2019-06-11

## 2019-06-11 SDOH — HEALTH STABILITY: MENTAL HEALTH: HOW OFTEN DO YOU HAVE A DRINK CONTAINING ALCOHOL?: NEVER

## 2019-06-17 ENCOUNTER — TELEPHONE (OUTPATIENT)
Dept: CARDIOLOGY | Age: 79
End: 2019-06-17

## 2019-06-17 ENCOUNTER — OFFICE VISIT (OUTPATIENT)
Dept: CARDIOLOGY | Age: 79
End: 2019-06-17

## 2019-06-17 VITALS
DIASTOLIC BLOOD PRESSURE: 78 MMHG | BODY MASS INDEX: 34.66 KG/M2 | HEART RATE: 71 BPM | WEIGHT: 208 LBS | HEIGHT: 65 IN | SYSTOLIC BLOOD PRESSURE: 132 MMHG | OXYGEN SATURATION: 98 %

## 2019-06-17 DIAGNOSIS — E78.00 PURE HYPERCHOLESTEROLEMIA: ICD-10-CM

## 2019-06-17 DIAGNOSIS — I25.10 CORONARY ARTERY DISEASE INVOLVING NATIVE HEART WITHOUT ANGINA PECTORIS, UNSPECIFIED VESSEL OR LESION TYPE: ICD-10-CM

## 2019-06-17 DIAGNOSIS — I77.9 CAROTID ARTERY DISEASE, UNSPECIFIED LATERALITY, UNSPECIFIED TYPE (CMD): ICD-10-CM

## 2019-06-17 DIAGNOSIS — I10 HYPERTENSION, UNSPECIFIED TYPE: ICD-10-CM

## 2019-06-17 DIAGNOSIS — I25.10 CORONARY ARTERY DISEASE INVOLVING NATIVE CORONARY ARTERY OF NATIVE HEART WITHOUT ANGINA PECTORIS: ICD-10-CM

## 2019-06-17 DIAGNOSIS — I25.10 CORONARY ARTERY DISEASE INVOLVING NATIVE HEART WITHOUT ANGINA PECTORIS, UNSPECIFIED VESSEL OR LESION TYPE: Primary | ICD-10-CM

## 2019-06-17 DIAGNOSIS — Z95.5 S/P DRUG ELUTING CORONARY STENT PLACEMENT: ICD-10-CM

## 2019-06-17 DIAGNOSIS — R94.31 ABNORMAL EKG: ICD-10-CM

## 2019-06-17 PROCEDURE — 93000 ELECTROCARDIOGRAM COMPLETE: CPT | Performed by: INTERNAL MEDICINE

## 2019-06-17 PROCEDURE — 3078F DIAST BP <80 MM HG: CPT | Performed by: INTERNAL MEDICINE

## 2019-06-17 PROCEDURE — 99214 OFFICE O/P EST MOD 30 MIN: CPT | Performed by: INTERNAL MEDICINE

## 2019-06-17 PROCEDURE — 3075F SYST BP GE 130 - 139MM HG: CPT | Performed by: INTERNAL MEDICINE

## 2019-06-17 RX ORDER — SIMVASTATIN 40 MG
40 TABLET ORAL NIGHTLY
Qty: 30 TABLET | Refills: 3 | Status: SHIPPED | OUTPATIENT
Start: 2019-06-17 | End: 2020-06-26 | Stop reason: ALTCHOICE

## 2019-06-17 ASSESSMENT — PATIENT HEALTH QUESTIONNAIRE - PHQ9
SUM OF ALL RESPONSES TO PHQ9 QUESTIONS 1 AND 2: 0
2. FEELING DOWN, DEPRESSED OR HOPELESS: NOT AT ALL
1. LITTLE INTEREST OR PLEASURE IN DOING THINGS: NOT AT ALL
SUM OF ALL RESPONSES TO PHQ9 QUESTIONS 1 AND 2: 0

## 2019-06-19 ENCOUNTER — HOSPITAL ENCOUNTER (OUTPATIENT)
Dept: CV DIAGNOSTICS | Facility: HOSPITAL | Age: 79
Discharge: HOME | End: 2019-06-19
Attending: INTERNAL MEDICINE
Payer: MEDICARE

## 2019-06-19 ENCOUNTER — TELEPHONE (OUTPATIENT)
Dept: CARDIOLOGY | Age: 79
End: 2019-06-19

## 2019-06-19 ENCOUNTER — CLINICAL ABSTRACT (OUTPATIENT)
Dept: CARDIOLOGY | Age: 79
End: 2019-06-19

## 2019-06-19 ENCOUNTER — HOSPITAL ENCOUNTER (OUTPATIENT)
Dept: NUCLEAR MEDICINE | Facility: HOSPITAL | Age: 79
Discharge: HOME | End: 2019-06-19
Attending: INTERNAL MEDICINE
Payer: MEDICARE

## 2019-06-19 DIAGNOSIS — I77.9 CAROTID ARTERY DISEASE (HCC): ICD-10-CM

## 2019-06-19 DIAGNOSIS — I25.10 CAD (CORONARY ARTERY DISEASE): ICD-10-CM

## 2019-06-19 LAB
% SHORTENING: NORMAL (ref 28–41)
A' LATERAL: NORMAL
A' MEDIAL: NORMAL
ACQUISITION  TIME: NORMAL
AO CUSP SEP: NORMAL MM (ref 15–26)
AO ROOT: NORMAL MM (ref 20–37)
AORTIC VALVE: NORMAL
AV INSUFFIENCY: NORMAL
AV MEAN GRADE: NORMAL
AV PEAK GRADE: NORMAL
AV VMAX: NORMAL
AVA: NORMAL CM2 (ref 2–?)
DIMENSIONS: NORMAL
DOPPLER: NORMAL
DTI: NORMAL
E' LATERAL: NORMAL
E' MEDIAL: NORMAL
E/A: NORMAL
E/E': NORMAL
EDV: NORMAL ML
EPSS: NORMAL MM (ref 2–10)
FUNCTION: NORMAL
IVC: NORMAL MM (ref 11–25)
IVSD: NORMAL MM (ref 6–11)
L ATRIAL VOLUME: NORMAL CC/M2
L ATRIUM: NORMAL MM (ref 19–40)
LIVDD: NORMAL MM (ref 35–57)
LV EF: 72.5 %
LVIDS: NORMAL MM (ref 20–38)
LVOT DIAMETER: NORMAL MM
LVPWD: NORMAL MM (ref 6–11)
MITRAL VALVE: NORMAL CM2
MV DT: NORMAL
MV INSUFFIENCY: NORMAL
MV MAX A: NORMAL
MV MEAN GRADE: NORMAL
MV PK GRADE: NORMAL
MVA: NORMAL CM2 (ref 4–5)
MVMAX E: NORMAL VELOCITIES M/SEC
PULMONIC VALVE: NORMAL
PV INSUFFIENCY: NORMAL
PV MEAN GRADE: NORMAL
PV PK GRADE: NORMAL
PVMAX: NORMAL
RA: NORMAL
RVIDD: NORMAL MM (ref 9–26)
RVSP: NORMAL
SV: NORMAL ML
TRICUSPID VALVE: NORMAL
TV INSUFFIENCY: NORMAL
TV PK GRADE: NORMAL
TVMAX: NORMAL

## 2019-06-19 PROCEDURE — 93016 CV STRESS TEST SUPVJ ONLY: CPT | Performed by: INTERNAL MEDICINE

## 2019-06-19 PROCEDURE — 78452 HT MUSCLE IMAGE SPECT MULT: CPT | Performed by: INTERNAL MEDICINE

## 2019-06-19 PROCEDURE — 93306 TTE W/DOPPLER COMPLETE: CPT | Performed by: INTERNAL MEDICINE

## 2019-06-19 PROCEDURE — 93017 CV STRESS TEST TRACING ONLY: CPT | Performed by: INTERNAL MEDICINE

## 2019-06-19 PROCEDURE — 93018 CV STRESS TEST I&R ONLY: CPT | Performed by: INTERNAL MEDICINE

## 2019-06-19 RX ORDER — 0.9 % SODIUM CHLORIDE 0.9 %
VIAL (ML) INJECTION
Status: COMPLETED
Start: 2019-06-19 | End: 2019-06-19

## 2019-06-19 RX ADMIN — 0.9 % SODIUM CHLORIDE 10 ML: 0.9 % VIAL (ML) INJECTION at 10:06:00

## 2019-06-25 ENCOUNTER — OFFICE VISIT (OUTPATIENT)
Dept: INTERNAL MEDICINE CLINIC | Facility: CLINIC | Age: 79
End: 2019-06-25
Payer: COMMERCIAL

## 2019-06-25 ENCOUNTER — LAB ENCOUNTER (OUTPATIENT)
Dept: LAB | Age: 79
End: 2019-06-25
Attending: INTERNAL MEDICINE
Payer: MEDICARE

## 2019-06-25 VITALS
TEMPERATURE: 98 F | HEART RATE: 73 BPM | SYSTOLIC BLOOD PRESSURE: 118 MMHG | WEIGHT: 211 LBS | DIASTOLIC BLOOD PRESSURE: 72 MMHG | OXYGEN SATURATION: 98 % | BODY MASS INDEX: 35.16 KG/M2 | HEIGHT: 65 IN

## 2019-06-25 DIAGNOSIS — I25.110 CORONARY ARTERY DISEASE INVOLVING NATIVE CORONARY ARTERY OF NATIVE HEART WITH UNSTABLE ANGINA PECTORIS (HCC): Primary | ICD-10-CM

## 2019-06-25 DIAGNOSIS — E78.5 HYPERLIPIDEMIA, UNSPECIFIED HYPERLIPIDEMIA TYPE: ICD-10-CM

## 2019-06-25 DIAGNOSIS — E53.8 B12 DEFICIENCY: ICD-10-CM

## 2019-06-25 DIAGNOSIS — R53.83 FATIGUE, UNSPECIFIED TYPE: ICD-10-CM

## 2019-06-25 DIAGNOSIS — Z95.5 H/O HEART ARTERY STENT: ICD-10-CM

## 2019-06-25 DIAGNOSIS — I10 HYPERTENSION, BENIGN: ICD-10-CM

## 2019-06-25 LAB
ABSOLUTE IMMATURE GRANULOCYTES (OFFPRE24): NORMAL
ALBUMIN SERPL-MCNC: 3.7 G/DL
ALBUMIN/GLOB SERPL: 1 {RATIO}
ALP SERPL-CCNC: 73 U/L
ALT SERPL-CCNC: 28 U/L
ANION GAP SERPL CALC-SCNC: 9 MMOL/L
AST SERPL-CCNC: 16 U/L
BASO+EOS+MONOS # BLD: NORMAL 10*3/UL
BASO+EOS+MONOS NFR BLD: NORMAL %
BASOPHILS # BLD: NORMAL 10*3/UL
BASOPHILS NFR BLD: NORMAL %
BILIRUB SERPL-MCNC: 0.4 MG/DL
BUN SERPL-MCNC: 20 MG/DL
BUN/CREAT SERPL: 23.5
CALCIUM SERPL-MCNC: 9.8 MG/DL
CHLORIDE SERPL-SCNC: 104 MMOL/L
CO2 SERPL-SCNC: 25 MMOL/L
CREAT SERPL-MCNC: 0.85 MG/DL
DIFFERENTIAL METHOD BLD: NORMAL
EOSINOPHIL # BLD: NORMAL 10*3/UL
EOSINOPHIL NFR BLD: NORMAL %
ERYTHROCYTE [DISTWIDTH] IN BLOOD: NORMAL %
GLOBULIN SER-MCNC: 3.7 G/DL
GLUCOSE SERPL-MCNC: 171 MG/DL
HCT VFR BLD CALC: 46.7 %
HGB BLD-MCNC: 14.8 G/DL
IMMATURE GRANULOCYTES (OFFPRE25): NORMAL
LENGTH OF FAST TIME PATIENT: NO H
LYMPHOCYTES # BLD: NORMAL 10*3/UL
LYMPHOCYTES NFR BLD: NORMAL %
MCH RBC QN AUTO: NORMAL PG
MCHC RBC AUTO-ENTMCNC: NORMAL G/DL
MCV RBC AUTO: NORMAL FL
MONOCYTES # BLD: NORMAL 10*3/UL
MONOCYTES NFR BLD: NORMAL %
MPV (OFFPRE2): NORMAL
NEUTROPHILS # BLD: NORMAL 10*3/UL
NEUTROPHILS NFR BLD: NORMAL %
NRBC BLD MANUAL-RTO: NORMAL %
PLAT MORPH BLD: NORMAL
PLATELET # BLD: NORMAL 10*3/UL
POTASSIUM SERPL-SCNC: 3.7 MMOL/L
PROT SERPL-MCNC: 7.4 G/DL
RBC # BLD: 4.93 10*6/UL
RBC MORPH BLD: NORMAL
SODIUM SERPL-SCNC: 138 MMOL/L
WBC # BLD: 11.4 10*3/UL
WBC MORPH BLD: NORMAL

## 2019-06-25 PROCEDURE — 85025 COMPLETE CBC W/AUTO DIFF WBC: CPT

## 2019-06-25 PROCEDURE — 84443 ASSAY THYROID STIM HORMONE: CPT

## 2019-06-25 PROCEDURE — 99214 OFFICE O/P EST MOD 30 MIN: CPT | Performed by: INTERNAL MEDICINE

## 2019-06-25 PROCEDURE — 36415 COLL VENOUS BLD VENIPUNCTURE: CPT

## 2019-06-25 PROCEDURE — 80053 COMPREHEN METABOLIC PANEL: CPT

## 2019-06-25 PROCEDURE — G0463 HOSPITAL OUTPT CLINIC VISIT: HCPCS | Performed by: INTERNAL MEDICINE

## 2019-06-25 PROCEDURE — 82607 VITAMIN B-12: CPT

## 2019-06-25 RX ORDER — OXYBUTYNIN CHLORIDE 15 MG/1
15 TABLET, EXTENDED RELEASE ORAL NIGHTLY
COMMUNITY
Start: 2019-04-28 | End: 2021-08-25

## 2019-06-25 NOTE — PROGRESS NOTES
Lisa Vargas is a 66year old female. HPI:   She had recent abnormal EKG suggesting an old AWMI and IWMI - these changes were new compared to previously. She then had a normal nucl GXT and echo with 70% EF. She is feeling well.      She feels fatigued mL Rfl: 2   Clobetasol Propionate (CLOBEX) 0.05 % External Shampoo Leave on 5 minutes, shampoo out 3 times weekly Disp: 120 mL Rfl: 3   triamcinolone acetonide 0.1 % External Cream Apply topically 2 (two) times daily as needed.  Disp: 60 g Rfl: 3   Polyethy Smoker      Smokeless tobacco: Never Used    Alcohol use: No    Drug use: No       REVIEW OF SYSTEMS:   GENERAL HEALTH: feels well otherwise  SKIN: denies any unusual skin lesions or rashes  RESPIRATORY: denies shortness of breath with exertion  CARDIOVASC

## 2019-06-26 ENCOUNTER — HOSPITAL ENCOUNTER (OUTPATIENT)
Dept: ULTRASOUND IMAGING | Facility: HOSPITAL | Age: 79
Discharge: HOME | End: 2019-06-26
Attending: INTERNAL MEDICINE
Payer: MEDICARE

## 2019-06-26 DIAGNOSIS — I25.10 CORONARY ATHEROSCLEROSIS OF NATIVE CORONARY ARTERY: ICD-10-CM

## 2019-06-26 DIAGNOSIS — I77.9 CAROTID ARTERY DISEASE, UNSPECIFIED LATERALITY, UNSPECIFIED TYPE (HCC): ICD-10-CM

## 2019-06-26 PROCEDURE — 93880 EXTRACRANIAL BILAT STUDY: CPT | Performed by: INTERNAL MEDICINE

## 2019-06-29 ENCOUNTER — TELEPHONE (OUTPATIENT)
Dept: INTERNAL MEDICINE CLINIC | Facility: CLINIC | Age: 79
End: 2019-06-29

## 2019-06-29 NOTE — TELEPHONE ENCOUNTER
Blood count, chemistries, and thyroid are good.  - she needs to tighten up her diabetic control - see Dr Domingo Vega again.

## 2019-07-05 ENCOUNTER — TELEPHONE (OUTPATIENT)
Dept: CARDIOLOGY | Age: 79
End: 2019-07-05

## 2019-07-05 NOTE — TELEPHONE ENCOUNTER
Spoke with Sukhjinder Bloom to relay MD message below. Pt voiced understanding and will try to watch her sugars, voiced \"its hard in the summer when your on vacation\".  I encouraged her to do her best, even if that making a bunch of small changes / better choices w

## 2019-07-10 ENCOUNTER — TELEPHONE (OUTPATIENT)
Dept: INTERNAL MEDICINE CLINIC | Facility: CLINIC | Age: 79
End: 2019-07-10

## 2019-07-10 NOTE — TELEPHONE ENCOUNTER
Received a fax from [facility] for refill for:      Rosalinda Taylor Tab     Fax to:  [phone]

## 2019-07-11 ENCOUNTER — TELEPHONE (OUTPATIENT)
Dept: CARDIOLOGY | Age: 79
End: 2019-07-11

## 2019-07-11 RX ORDER — ALPRAZOLAM 0.25 MG/1
0.25 TABLET ORAL NIGHTLY PRN
Qty: 30 TABLET | Refills: 0 | COMMUNITY
Start: 2019-07-11 | End: 2021-01-28

## 2019-07-29 NOTE — TELEPHONE ENCOUNTER
Pt asking for 90 day rx to be sent to pharm for      Current Outpatient Medications:                    Empagliflozin (JARDIANCE) 25 MG Oral Tab Take 1 tablet by mouth once daily.  Disp: 30 tablet Rfl: 3

## 2019-08-07 RX ORDER — METFORMIN HYDROCHLORIDE 500 MG/1
TABLET, EXTENDED RELEASE ORAL
Qty: 360 TABLET | Refills: 3 | Status: SHIPPED | OUTPATIENT
Start: 2019-08-07 | End: 2019-08-19

## 2019-08-07 RX ORDER — GLIMEPIRIDE 4 MG/1
8 TABLET ORAL
Qty: 180 TABLET | Refills: 3 | Status: SHIPPED | OUTPATIENT
Start: 2019-08-07 | End: 2019-08-19

## 2019-08-19 ENCOUNTER — OFFICE VISIT (OUTPATIENT)
Dept: ENDOCRINOLOGY CLINIC | Facility: CLINIC | Age: 79
End: 2019-08-19
Payer: COMMERCIAL

## 2019-08-19 VITALS
DIASTOLIC BLOOD PRESSURE: 62 MMHG | SYSTOLIC BLOOD PRESSURE: 135 MMHG | BODY MASS INDEX: 34 KG/M2 | WEIGHT: 206 LBS | HEART RATE: 64 BPM

## 2019-08-19 DIAGNOSIS — E11.9 CONTROLLED TYPE 2 DIABETES MELLITUS WITHOUT COMPLICATION, WITHOUT LONG-TERM CURRENT USE OF INSULIN (HCC): Primary | ICD-10-CM

## 2019-08-19 LAB
CARTRIDGE LOT#: ABNORMAL NUMERIC
GLUCOSE BLOOD: 154
HEMOGLOBIN A1C: 7.2 % (ref 4.3–5.6)
TEST STRIP LOT #: NORMAL NUMERIC

## 2019-08-19 PROCEDURE — 83036 HEMOGLOBIN GLYCOSYLATED A1C: CPT | Performed by: INTERNAL MEDICINE

## 2019-08-19 PROCEDURE — 36416 COLLJ CAPILLARY BLOOD SPEC: CPT | Performed by: INTERNAL MEDICINE

## 2019-08-19 PROCEDURE — 99213 OFFICE O/P EST LOW 20 MIN: CPT | Performed by: INTERNAL MEDICINE

## 2019-08-19 PROCEDURE — 82962 GLUCOSE BLOOD TEST: CPT | Performed by: INTERNAL MEDICINE

## 2019-08-19 RX ORDER — METFORMIN HYDROCHLORIDE 500 MG/1
TABLET, EXTENDED RELEASE ORAL
Qty: 360 TABLET | Refills: 3 | Status: SHIPPED | OUTPATIENT
Start: 2019-08-19 | End: 2020-03-05

## 2019-08-19 RX ORDER — GLIMEPIRIDE 4 MG/1
8 TABLET ORAL
Qty: 180 TABLET | Refills: 3 | Status: SHIPPED | OUTPATIENT
Start: 2019-08-19 | End: 2019-09-03

## 2019-08-19 NOTE — PROGRESS NOTES
Name: Yunier Marion  Date: 8/19/2019    Referring Physician: No ref. provider found    HISTORY OF PRESENT ILLNESS   Yunier Marion is a 66year old female who presents for diabetes mellitus. Since last visit her  passed away in 2/2018. Rfl: 3  •  simvastatin 40 MG Oral Tab, TAKE 1 TABLET BY MOUTH EVERYDAY AT BEDTIME, Disp: , Rfl: 1  •  Losartan Potassium-HCTZ 50-12.5 MG Oral Tab, Take 1 tablet by mouth once daily. , Disp: 90 tablet, Rfl: 3  •  cycloSPORINE (RESTASIS) 0.05 % Ophthalmic Emu Alcohol use: No      Drug use: No    Other Topics      Concerns:        Caffeine Concern: No      Medical History:   Past Medical History:   Diagnosis Date   • Age-related nuclear cataract of both eyes    • Atherosclerosis of coronary artery    • Benign ne auscultation bilaterally  Cardiovascular:  regular rate, rhythm, , no murmurs, S3 or S4  Gastrointestinal:  normal bowel sounds and no palpable masses in abdomen, organomegaly or tenderness   Musculoskeletal:  normal muscle strength and tone  Skin:  normal

## 2019-09-03 ENCOUNTER — OFFICE VISIT (OUTPATIENT)
Dept: INTERNAL MEDICINE CLINIC | Facility: CLINIC | Age: 79
End: 2019-09-03
Payer: COMMERCIAL

## 2019-09-03 VITALS
HEIGHT: 65 IN | BODY MASS INDEX: 35.65 KG/M2 | TEMPERATURE: 98 F | SYSTOLIC BLOOD PRESSURE: 112 MMHG | WEIGHT: 214 LBS | OXYGEN SATURATION: 98 % | DIASTOLIC BLOOD PRESSURE: 80 MMHG | HEART RATE: 68 BPM

## 2019-09-03 DIAGNOSIS — I10 HYPERTENSION, BENIGN: ICD-10-CM

## 2019-09-03 DIAGNOSIS — E11.9 TYPE 2 DIABETES MELLITUS WITHOUT COMPLICATION, WITHOUT LONG-TERM CURRENT USE OF INSULIN (HCC): ICD-10-CM

## 2019-09-03 DIAGNOSIS — Z95.5 H/O HEART ARTERY STENT: Primary | ICD-10-CM

## 2019-09-03 DIAGNOSIS — E78.5 HYPERLIPIDEMIA, UNSPECIFIED HYPERLIPIDEMIA TYPE: ICD-10-CM

## 2019-09-03 PROCEDURE — 99214 OFFICE O/P EST MOD 30 MIN: CPT | Performed by: INTERNAL MEDICINE

## 2019-09-03 PROCEDURE — G0463 HOSPITAL OUTPT CLINIC VISIT: HCPCS | Performed by: INTERNAL MEDICINE

## 2019-09-03 RX ORDER — PIMECROLIMUS 10 MG/G
CREAM TOPICAL AS NEEDED
Refills: 1 | COMMUNITY
Start: 2019-08-23

## 2019-09-03 RX ORDER — LOSARTAN POTASSIUM 50 MG/1
50 TABLET ORAL DAILY
Qty: 30 TABLET | Refills: 11 | Status: SHIPPED | OUTPATIENT
Start: 2019-09-03 | End: 2020-03-03

## 2019-09-03 NOTE — PROGRESS NOTES
Power Polanco is a 66year old female. HPI:   She is doing reasonably well. No ED or UC visits. She will be following with Dr Floresita Young and cardiac status is stable subjectively. DM - doing well - last A1C 7.2.     HTN,  hypercholesterolemia     She (CLOBEX) 0.05 % External Shampoo Leave on 5 minutes, shampoo out 3 times weekly Disp: 120 mL Rfl: 3   Polyethyl Glycol-Propyl Glycol (SYSTANE ULTRA) 0.4-0.3 % Ophthalmic Solution Place 1 drop into both eyes as needed.  Disp:  Rfl:    Mary Walsh LANCETS rashes,no suspicious lesions  HEENT: atraumatic, normocephalic,ears and throat are clear  NECK: supple,no adenopathy,no bruits  LUNGS: clear to auscultation  CARDIO: RRR without murmur  GI: good BS's,no masses, HSM or tenderness  EXTREMITIES: no cyanosis,

## 2019-09-11 ENCOUNTER — TELEPHONE (OUTPATIENT)
Dept: INTERNAL MEDICINE CLINIC | Facility: CLINIC | Age: 79
End: 2019-09-11

## 2019-09-11 NOTE — TELEPHONE ENCOUNTER
9/3 ov:3. Hypertension, benign  At goal.  Her losartan/hydrochlorothiazide is too expensive now and we will try plain losartan 50 mg daily.   She will monitor her blood pressure and her peripheral edema    To Dr OROZCO to advise

## 2019-09-11 NOTE — TELEPHONE ENCOUNTER
Due to recall pt's medication was changed    to losartan potassium 50mg    She has had a 10 lb weight gain since the change & feels she needs the water pill (12.5) which was part of her recalled medication     Requests prescription and call back to confirm

## 2019-09-12 RX ORDER — HYDROCHLOROTHIAZIDE 12.5 MG/1
12.5 TABLET ORAL DAILY
Qty: 90 TABLET | Refills: 3 | Status: SHIPPED | OUTPATIENT
Start: 2019-09-12 | End: 2020-09-17

## 2019-09-12 NOTE — TELEPHONE ENCOUNTER
Spoke with patient and relayed message from Dr. Andrew Dia. Patient verbalized understanding. She reports weight gain of 9 lbs since OV. States she knows it is likely r/t med change and she agrees to go back on diuretic.  She will call back with any further issu

## 2019-09-19 ENCOUNTER — APPOINTMENT (OUTPATIENT)
Dept: LAB | Age: 79
End: 2019-09-19
Attending: INTERNAL MEDICINE
Payer: MEDICARE

## 2019-09-19 LAB
CHOLEST SERPL-MCNC: 130 MG/DL
CHOLEST SMN-MCNC: 130 MG/DL (ref ?–200)
HDLC SERPL-MCNC: 48 MG/DL
HDLC SERPL-MCNC: 48 MG/DL (ref 40–59)
LDLC SERPL CALC-MCNC: 54 MG/DL
LDLC SERPL CALC-MCNC: 54 MG/DL (ref ?–100)
LENGTH OF FAST TIME PATIENT: YES H
NONHDLC SERPL-MCNC: 82 MG/DL
NONHDLC SERPL-MCNC: 82 MG/DL (ref ?–130)
PATIENT FASTING: YES
TRIGL SERPL-MCNC: 140 MG/DL
TRIGL SERPL-MCNC: 140 MG/DL (ref 30–149)
VLDLC SERPL CALC-MCNC: 28 MG/DL
VLDLC SERPL CALC-MCNC: 28 MG/DL (ref 0–30)

## 2019-09-19 PROCEDURE — 80061 LIPID PANEL: CPT | Performed by: INTERNAL MEDICINE

## 2019-09-19 PROCEDURE — 36415 COLL VENOUS BLD VENIPUNCTURE: CPT | Performed by: INTERNAL MEDICINE

## 2019-09-19 RX ORDER — LOSARTAN POTASSIUM 50 MG/1
50 TABLET ORAL DAILY
COMMUNITY
Start: 2019-09-03 | End: 2021-02-24

## 2019-09-19 RX ORDER — PIMECROLIMUS 10 MG/G
CREAM TOPICAL
COMMUNITY
Start: 2019-08-23 | End: 2020-06-03

## 2019-09-19 RX ORDER — LANCETS 33 GAUGE
EACH MISCELLANEOUS
COMMUNITY
Start: 2018-01-26

## 2019-09-19 RX ORDER — HYDROCHLOROTHIAZIDE 12.5 MG/1
12.5 TABLET ORAL DAILY
COMMUNITY
Start: 2019-09-12 | End: 2021-02-24

## 2019-09-19 RX ORDER — OXYBUTYNIN CHLORIDE 15 MG/1
15 TABLET, EXTENDED RELEASE ORAL DAILY
COMMUNITY
Start: 2019-04-28

## 2019-09-19 RX ORDER — CLOBETASOL PROPIONATE 0.05 G/100ML
SHAMPOO TOPICAL
COMMUNITY
Start: 2018-10-10 | End: 2020-06-03

## 2019-09-23 ENCOUNTER — OFFICE VISIT (OUTPATIENT)
Dept: CARDIOLOGY | Age: 79
End: 2019-09-23

## 2019-09-23 VITALS
SYSTOLIC BLOOD PRESSURE: 104 MMHG | BODY MASS INDEX: 34.99 KG/M2 | WEIGHT: 210 LBS | OXYGEN SATURATION: 97 % | HEIGHT: 65 IN | HEART RATE: 74 BPM | DIASTOLIC BLOOD PRESSURE: 64 MMHG

## 2019-09-23 DIAGNOSIS — I65.23 BILATERAL CAROTID ARTERY STENOSIS: ICD-10-CM

## 2019-09-23 DIAGNOSIS — E78.00 PURE HYPERCHOLESTEROLEMIA: ICD-10-CM

## 2019-09-23 DIAGNOSIS — I25.10 CORONARY ARTERY DISEASE INVOLVING NATIVE CORONARY ARTERY OF NATIVE HEART WITHOUT ANGINA PECTORIS: Primary | ICD-10-CM

## 2019-09-23 PROCEDURE — 3074F SYST BP LT 130 MM HG: CPT | Performed by: INTERNAL MEDICINE

## 2019-09-23 PROCEDURE — 99214 OFFICE O/P EST MOD 30 MIN: CPT | Performed by: INTERNAL MEDICINE

## 2019-09-23 PROCEDURE — 3078F DIAST BP <80 MM HG: CPT | Performed by: INTERNAL MEDICINE

## 2019-09-23 SDOH — HEALTH STABILITY: MENTAL HEALTH: HOW OFTEN DO YOU HAVE A DRINK CONTAINING ALCOHOL?: NEVER

## 2019-09-23 ASSESSMENT — PATIENT HEALTH QUESTIONNAIRE - PHQ9
SUM OF ALL RESPONSES TO PHQ9 QUESTIONS 1 AND 2: 0
1. LITTLE INTEREST OR PLEASURE IN DOING THINGS: NOT AT ALL
SUM OF ALL RESPONSES TO PHQ9 QUESTIONS 1 AND 2: 0
1. LITTLE INTEREST OR PLEASURE IN DOING THINGS: NOT AT ALL
2. FEELING DOWN, DEPRESSED OR HOPELESS: NOT AT ALL
2. FEELING DOWN, DEPRESSED OR HOPELESS: NOT AT ALL
SUM OF ALL RESPONSES TO PHQ9 QUESTIONS 1 AND 2: 0

## 2019-10-03 ENCOUNTER — LAB ENCOUNTER (OUTPATIENT)
Dept: LAB | Facility: HOSPITAL | Age: 79
End: 2019-10-03
Attending: OBSTETRICS & GYNECOLOGY
Payer: MEDICARE

## 2019-10-03 DIAGNOSIS — E07.9 DISEASE OF THYROID GLAND: ICD-10-CM

## 2019-10-03 DIAGNOSIS — L30.9 ACUTE DERMATITIS: Primary | ICD-10-CM

## 2019-10-03 DIAGNOSIS — D64.9 ANEMIA, UNSPECIFIED: ICD-10-CM

## 2019-10-03 LAB
HCT VFR BLD CALC: 43.3 %
HGB BLD-MCNC: 13.6 G/DL
MCH RBC QN AUTO: 29.6 PG
MCHC RBC AUTO-ENTMCNC: 31.4 G/DL
MCV RBC AUTO: 94.3 FL
PLATELET # BLD: 254 10*3/UL
RBC # BLD: 4.59 10*6/UL
TSH SERPL-ACNC: 1.9 M[IU]/L
WBC # BLD: 9.3 10*3/UL

## 2019-10-03 PROCEDURE — 36415 COLL VENOUS BLD VENIPUNCTURE: CPT

## 2019-10-03 PROCEDURE — 84466 ASSAY OF TRANSFERRIN: CPT

## 2019-10-03 PROCEDURE — 82728 ASSAY OF FERRITIN: CPT

## 2019-10-03 PROCEDURE — 85025 COMPLETE CBC W/AUTO DIFF WBC: CPT

## 2019-10-03 PROCEDURE — 83540 ASSAY OF IRON: CPT

## 2019-10-03 PROCEDURE — 84443 ASSAY THYROID STIM HORMONE: CPT

## 2019-10-21 ENCOUNTER — TELEPHONE (OUTPATIENT)
Dept: INTERNAL MEDICINE CLINIC | Facility: CLINIC | Age: 79
End: 2019-10-21

## 2019-10-21 ENCOUNTER — OFFICE VISIT (OUTPATIENT)
Dept: INTERNAL MEDICINE CLINIC | Facility: CLINIC | Age: 79
End: 2019-10-21
Payer: COMMERCIAL

## 2019-10-21 ENCOUNTER — HOSPITAL ENCOUNTER (OUTPATIENT)
Dept: GENERAL RADIOLOGY | Facility: HOSPITAL | Age: 79
Discharge: HOME | End: 2019-10-21
Attending: INTERNAL MEDICINE
Payer: MEDICARE

## 2019-10-21 VITALS
DIASTOLIC BLOOD PRESSURE: 64 MMHG | OXYGEN SATURATION: 98 % | HEART RATE: 86 BPM | TEMPERATURE: 101 F | WEIGHT: 213 LBS | BODY MASS INDEX: 35.49 KG/M2 | SYSTOLIC BLOOD PRESSURE: 140 MMHG | HEIGHT: 65 IN

## 2019-10-21 DIAGNOSIS — R51.9 HEADACHE, UNSPECIFIED HEADACHE TYPE: ICD-10-CM

## 2019-10-21 DIAGNOSIS — J06.9 UPPER RESPIRATORY TRACT INFECTION, UNSPECIFIED TYPE: Primary | ICD-10-CM

## 2019-10-21 DIAGNOSIS — I10 HYPERTENSION, BENIGN: ICD-10-CM

## 2019-10-21 DIAGNOSIS — I25.110 CORONARY ARTERY DISEASE INVOLVING NATIVE CORONARY ARTERY OF NATIVE HEART WITH UNSTABLE ANGINA PECTORIS (HCC): ICD-10-CM

## 2019-10-21 DIAGNOSIS — J06.9 UPPER RESPIRATORY TRACT INFECTION, UNSPECIFIED TYPE: ICD-10-CM

## 2019-10-21 PROCEDURE — 71046 X-RAY EXAM CHEST 2 VIEWS: CPT | Performed by: INTERNAL MEDICINE

## 2019-10-21 PROCEDURE — G0463 HOSPITAL OUTPT CLINIC VISIT: HCPCS | Performed by: INTERNAL MEDICINE

## 2019-10-21 PROCEDURE — 99214 OFFICE O/P EST MOD 30 MIN: CPT | Performed by: INTERNAL MEDICINE

## 2019-10-21 RX ORDER — BENZONATATE 100 MG/1
100 CAPSULE ORAL 3 TIMES DAILY PRN
Qty: 30 CAPSULE | Refills: 0 | Status: SHIPPED | OUTPATIENT
Start: 2019-10-21 | End: 2020-03-03

## 2019-10-21 RX ORDER — DOXYCYCLINE HYCLATE 100 MG/1
100 CAPSULE ORAL 2 TIMES DAILY
Qty: 14 CAPSULE | Refills: 0 | Status: SHIPPED | OUTPATIENT
Start: 2019-10-21 | End: 2019-10-28 | Stop reason: ALTCHOICE

## 2019-10-21 NOTE — PROGRESS NOTES
Zakiya Jean is a 66year old female. HPI:   Patient presents with:  Cough: pt has had cough for 3 days; pt c/o of pressure, dizziness. Patient has had a cough for 3 days. Clear mucus. No shortness of breath. No heart pain. No chest pain.   Yogi Pedroza Chloride ER 15 MG Oral Tablet 24 Hr, Take 15 mg by mouth daily. , Disp: , Rfl:   glimepiride 4 MG Oral Tab, Take 2 tablets (8 mg total) by mouth once daily. , Disp: 180 tablet, Rfl: 1  Empagliflozin (JARDIANCE) 25 MG Oral Tab, Take 1 tablet by mouth once yolanda History:  Social History    Tobacco Use      Smoking status: Never Smoker      Smokeless tobacco: Never Used    Alcohol use: No    Drug use: No       REVIEW OF SYSTEMS:   GENERAL HEALTH:  feels well otherwise prior to this.   RESPIRATORY:  Voices no shortne upper respiratory illness. It would not appear that she has influenza. She did get her flu vaccine. Symptoms have been for 3 days so there will be little benefit from Tamiflu. - XR CHEST PA + LAT CHEST (CPT=71046); Future    3.  Hypertension, benign  Bl

## 2019-10-21 NOTE — TELEPHONE ENCOUNTER
Please let patient know that her chest x-ray came out clear without any evidence of pneumonia. There were some nonspecific findings that for now are not very significant. There was a little bit of scarring. A little bit of thinning of the bones.   This c

## 2019-10-21 NOTE — TELEPHONE ENCOUNTER
Spoke with pt regarding chest xray results. Pt verbalized understanding. No further questions asked.

## 2019-10-28 ENCOUNTER — TELEPHONE (OUTPATIENT)
Dept: INTERNAL MEDICINE CLINIC | Facility: CLINIC | Age: 79
End: 2019-10-28

## 2019-10-28 ENCOUNTER — OFFICE VISIT (OUTPATIENT)
Dept: INTERNAL MEDICINE CLINIC | Facility: CLINIC | Age: 79
End: 2019-10-28
Payer: COMMERCIAL

## 2019-10-28 VITALS
WEIGHT: 207.25 LBS | HEIGHT: 65 IN | DIASTOLIC BLOOD PRESSURE: 82 MMHG | HEART RATE: 73 BPM | OXYGEN SATURATION: 98 % | SYSTOLIC BLOOD PRESSURE: 128 MMHG | TEMPERATURE: 98 F | BODY MASS INDEX: 34.53 KG/M2

## 2019-10-28 DIAGNOSIS — I10 HYPERTENSION, BENIGN: ICD-10-CM

## 2019-10-28 DIAGNOSIS — J06.9 URI, ACUTE: Primary | ICD-10-CM

## 2019-10-28 DIAGNOSIS — E11.9 TYPE 2 DIABETES MELLITUS WITHOUT COMPLICATION, WITHOUT LONG-TERM CURRENT USE OF INSULIN (HCC): ICD-10-CM

## 2019-10-28 PROCEDURE — G0463 HOSPITAL OUTPT CLINIC VISIT: HCPCS | Performed by: INTERNAL MEDICINE

## 2019-10-28 PROCEDURE — 99214 OFFICE O/P EST MOD 30 MIN: CPT | Performed by: INTERNAL MEDICINE

## 2019-10-28 RX ORDER — PREDNISONE 1 MG/1
TABLET ORAL
Qty: 24 TABLET | Refills: 0 | Status: SHIPPED | OUTPATIENT
Start: 2019-10-28 | End: 2020-03-03

## 2019-10-28 RX ORDER — AZITHROMYCIN 250 MG/1
TABLET, FILM COATED ORAL
Qty: 6 TABLET | Refills: 0 | Status: SHIPPED | OUTPATIENT
Start: 2019-10-28 | End: 2020-03-03

## 2019-10-28 RX ORDER — PROMETHAZINE HYDROCHLORIDE AND CODEINE PHOSPHATE 6.25; 1 MG/5ML; MG/5ML
5 SYRUP ORAL EVERY 4 HOURS PRN
Qty: 180 ML | Refills: 1 | Status: SHIPPED | OUTPATIENT
Start: 2019-10-28 | End: 2019-11-07

## 2019-10-28 NOTE — PROGRESS NOTES
Zaria Salazar is a 66year old female. HPI:   She has been coughing for 10 days, mostly dry cough. Having MS chest pain due to cough. Lying supine ppt coughing. No fever or chills. She saw Dr Yaw Spears on 10/21 - given doxycycline and Tessalon.  CXR neg for (two) times daily. , Disp: 5.5 mL, Rfl: 6  Desoximetasone (TOPICORT SPRAY) 0.25 % External Liquid, Apply 1 Application topically daily.  Apply daily to affected areas, Disp: 100 mL, Rfl: 2  Clobetasol Propionate (CLOBEX) 0.05 % External Shampoo, Leave on 5 m Pulse 73   Temp 97.5 °F (36.4 °C) (Oral)   Ht 5' 5\" (1.651 m)   Wt 207 lb 4 oz (94 kg)   LMP  (LMP Unknown)   SpO2 98%   BMI 34.49 kg/m²   GENERAL: well developed, well nourished,in no apparent distress  SKIN: no rashes,no suspicious lesions  HEENT: atrau

## 2019-10-28 NOTE — TELEPHONE ENCOUNTER
To Dr. Makayla Calloway---    Pt to keep appt with Dr. Reji Salter? OR would you like him added to to your schedule?

## 2019-10-28 NOTE — TELEPHONE ENCOUNTER
Another pt called and canceled with Dr OROZCO today at 11:30, this pt was called and was given that appt.

## 2019-10-28 NOTE — TELEPHONE ENCOUNTER
Pt saw Dr Peace Al last week  Is still sick - coughing is not better  Cannot sleep or lay down  due to cough    Wanted to see Dr Ramesh Morillo today - NO openings  Pt is scheduled at 4 pm with Dr Peace Al but wanted to know if DR OROZCO could see her or advise on cough

## 2019-12-23 PROBLEM — H43.812 PVD (POSTERIOR VITREOUS DETACHMENT), LEFT EYE: Status: ACTIVE | Noted: 2019-12-23

## 2019-12-23 PROBLEM — Z96.1 BILATERAL PSEUDOPHAKIA: Status: ACTIVE | Noted: 2019-12-23

## 2020-01-09 RX ORDER — SIMVASTATIN 40 MG
TABLET ORAL
Qty: 90 TABLET | Refills: 1 | Status: SHIPPED | OUTPATIENT
Start: 2020-01-09 | End: 2020-06-03 | Stop reason: SDUPTHER

## 2020-02-02 DIAGNOSIS — E11.9 TYPE 2 DIABETES MELLITUS WITHOUT COMPLICATION, WITHOUT LONG-TERM CURRENT USE OF INSULIN (HCC): ICD-10-CM

## 2020-02-03 RX ORDER — EMPAGLIFLOZIN 25 MG/1
TABLET, FILM COATED ORAL
Qty: 90 TABLET | Refills: 0 | Status: SHIPPED | OUTPATIENT
Start: 2020-02-03 | End: 2020-03-05

## 2020-02-11 ENCOUNTER — HOSPITAL ENCOUNTER (EMERGENCY)
Facility: HOSPITAL | Age: 80
Discharge: HOME | End: 2020-02-11
Attending: EMERGENCY MEDICINE
Payer: MEDICARE

## 2020-02-11 VITALS
RESPIRATION RATE: 20 BRPM | SYSTOLIC BLOOD PRESSURE: 138 MMHG | DIASTOLIC BLOOD PRESSURE: 74 MMHG | TEMPERATURE: 99 F | OXYGEN SATURATION: 98 % | WEIGHT: 215 LBS | HEIGHT: 65 IN | BODY MASS INDEX: 35.82 KG/M2 | HEART RATE: 74 BPM

## 2020-02-11 DIAGNOSIS — L03.818 CELLULITIS OF OTHER SPECIFIED SITE: Primary | ICD-10-CM

## 2020-02-11 PROCEDURE — 99283 EMERGENCY DEPT VISIT LOW MDM: CPT

## 2020-02-11 RX ORDER — CEPHALEXIN 500 MG/1
500 CAPSULE ORAL 4 TIMES DAILY
Qty: 28 CAPSULE | Refills: 0 | Status: SHIPPED | OUTPATIENT
Start: 2020-02-11 | End: 2020-02-18

## 2020-02-12 NOTE — ED PROVIDER NOTES
Patient Seen in: [facility] Emergency Department      History   Patient presents with:  Abscess    Stated Complaint: labial abcess    HPI    79-year-old female with history of diabetes, hypertension, hyperlipidemia, here with complaints of right-s • COLONOSCOPY  11/2018   • COLONOSCOPY, POSSIBLE BIOPSY, POSSIBLE POLYPECTOMY 35110 N/A 11/12/2018    Performed by Jonathan Bueno MD at [address]   • CAITLYN BIOPSY STEREO NODULE 1 SITE RIGHT (GCR=58479)   2008& 2013 Head: Normocephalic and atraumatic. Eyes:      Pupils: Pupils are equal, round, and reactive to light. Neck:      Musculoskeletal: Normal range of motion. Cardiovascular:      Rate and Rhythm: Normal rate and regular rhythm. Heart sounds:  No The patient was informed of their elevated blood pressure reading in the Emergency Department. They were informed of the dangers of undiagnosed and untreated hypertension.   Education regarding lifestyle modifications and the need for appropriate follow-up Take 1 capsule (500 mg total) by mouth 4 (four) times daily for 7 days.   Qty: 28 capsule Refills: 0

## 2020-02-14 ENCOUNTER — OFFICE VISIT (OUTPATIENT)
Dept: OBGYN CLINIC | Facility: CLINIC | Age: 80
End: 2020-02-14
Payer: COMMERCIAL

## 2020-02-14 VITALS — SYSTOLIC BLOOD PRESSURE: 145 MMHG | DIASTOLIC BLOOD PRESSURE: 79 MMHG | WEIGHT: 217.19 LBS | BODY MASS INDEX: 36 KG/M2

## 2020-02-14 DIAGNOSIS — L02.215 PERINEAL ABSCESS: Primary | ICD-10-CM

## 2020-02-14 PROCEDURE — 99203 OFFICE O/P NEW LOW 30 MIN: CPT | Performed by: OBSTETRICS & GYNECOLOGY

## 2020-02-14 RX ORDER — EFINACONAZOLE 100 MG/ML
SOLUTION TOPICAL AS NEEDED
COMMUNITY
Start: 2020-02-11

## 2020-02-14 NOTE — PROGRESS NOTES
HPI:    Patient ID: Esperanza Cassidy is a 78year old female. Patient here for F/U from ER due to perineal abscess that developed while driving up from [place]. Right Labia Majora absces ruptured spontaneously.   Patient was started on Keflex 500 mg QI Glycol-Propyl Glycol (SYSTANE ULTRA) 0.4-0.3 % Ophthalmic Solution Place 1 drop into both eyes as needed. • aspirin 81 MG Oral Tab Take 81 mg by mouth daily.        • JUBLIA 10 % External Solution      • predniSONE 5 MG Oral Tab Take 3 daily for 4 days the defined types were placed in this encounter.       Meds This Visit:  Requested Prescriptions      No prescriptions requested or ordered in this encounter       Imaging & Referrals:  None       YY#[id number]

## 2020-02-21 ENCOUNTER — OFFICE VISIT (OUTPATIENT)
Dept: OBGYN CLINIC | Facility: CLINIC | Age: 80
End: 2020-02-21
Payer: COMMERCIAL

## 2020-02-21 VITALS
HEIGHT: 65 IN | DIASTOLIC BLOOD PRESSURE: 76 MMHG | SYSTOLIC BLOOD PRESSURE: 114 MMHG | WEIGHT: 217 LBS | BODY MASS INDEX: 36.15 KG/M2

## 2020-02-21 DIAGNOSIS — L02.215 PERINEAL ABSCESS: Primary | ICD-10-CM

## 2020-02-21 PROCEDURE — 99213 OFFICE O/P EST LOW 20 MIN: CPT | Performed by: OBSTETRICS & GYNECOLOGY

## 2020-02-21 RX ORDER — SULFAMETHOXAZOLE AND TRIMETHOPRIM 800; 160 MG/1; MG/1
1 TABLET ORAL 2 TIMES DAILY
Qty: 20 TABLET | Refills: 0 | Status: SHIPPED | OUTPATIENT
Start: 2020-02-21 | End: 2020-03-02

## 2020-02-21 NOTE — PROGRESS NOTES
HPI:    Patient ID: Lisa Vargas is a 78year old female. Patient here for F/U of Right labial abscess. Finished Keflex about four days ago. Still cleaning with H2O2 and saline.   Discussed taking Bactrim DS BID x 10 days and may now apply Neospor ULTRA) 0.4-0.3 % Ophthalmic Solution Place 1 drop into both eyes as needed. • aspirin 81 MG Oral Tab Take 81 mg by mouth daily. • predniSONE 5 MG Oral Tab Take 3 daily for 4 days then 2 daily for 4 days, then one daily for 4 days.  (Patient not ta Neosporin BID. No orders of the defined types were placed in this encounter.       Meds This Visit:  Requested Prescriptions     Signed Prescriptions Disp Refills   • Sulfamethoxazole-TMP DS (BACTRIM DS) 800-160 MG Oral Tab per tablet 20 tablet 0     Sig:

## 2020-03-03 ENCOUNTER — OFFICE VISIT (OUTPATIENT)
Dept: INTERNAL MEDICINE CLINIC | Facility: CLINIC | Age: 80
End: 2020-03-03
Payer: COMMERCIAL

## 2020-03-03 VITALS
HEART RATE: 70 BPM | DIASTOLIC BLOOD PRESSURE: 62 MMHG | OXYGEN SATURATION: 98 % | TEMPERATURE: 98 F | SYSTOLIC BLOOD PRESSURE: 124 MMHG | WEIGHT: 214 LBS | BODY MASS INDEX: 35.65 KG/M2 | HEIGHT: 65 IN

## 2020-03-03 DIAGNOSIS — Z78.0 POSTMENOPAUSAL: ICD-10-CM

## 2020-03-03 DIAGNOSIS — Z12.39 BREAST CANCER SCREENING: ICD-10-CM

## 2020-03-03 DIAGNOSIS — E11.9 TYPE 2 DIABETES MELLITUS WITHOUT COMPLICATION, WITHOUT LONG-TERM CURRENT USE OF INSULIN (HCC): ICD-10-CM

## 2020-03-03 DIAGNOSIS — I25.110 CORONARY ARTERY DISEASE INVOLVING NATIVE CORONARY ARTERY OF NATIVE HEART WITH UNSTABLE ANGINA PECTORIS (HCC): Primary | ICD-10-CM

## 2020-03-03 DIAGNOSIS — E78.5 HYPERLIPIDEMIA, UNSPECIFIED HYPERLIPIDEMIA TYPE: ICD-10-CM

## 2020-03-03 DIAGNOSIS — I10 HYPERTENSION, BENIGN: ICD-10-CM

## 2020-03-03 PROCEDURE — G0463 HOSPITAL OUTPT CLINIC VISIT: HCPCS | Performed by: INTERNAL MEDICINE

## 2020-03-03 PROCEDURE — 99214 OFFICE O/P EST MOD 30 MIN: CPT | Performed by: INTERNAL MEDICINE

## 2020-03-03 RX ORDER — LOSARTAN POTASSIUM 50 MG/1
50 TABLET ORAL DAILY
Qty: 90 TABLET | Refills: 3 | Status: SHIPPED | OUTPATIENT
Start: 2020-03-03 | End: 2020-12-29

## 2020-03-03 NOTE — PROGRESS NOTES
Renette Opitz is a 78year old female. HPI:   She has been feeling well other than a labial abscess being treated by Dr Alexa Magana. IHD - stable and no chest pain or sob - she had CABG about 2 yrs ago.      DM -  She does not self test.     HTN - Blood (ONETOUCH VERIO) In Vitro Strip Check BG 2 times per day. DX:E11.8 without insulin use. 200 strip 1   • aspirin 81 MG Oral Tab Take 81 mg by mouth daily.           Past Medical History:   Diagnosis Date   • Age-related nuclear cataract of both eyes ASSESSMENT AND PLAN:   1. Coronary artery disease involving native coronary artery of native heart with unstable angina pectoris ([facility] [address]  She is clinically stable and no significant chest pain or sob.      2. Hyperlipidemia, unspecified hyperlipidemia typ

## 2020-03-05 ENCOUNTER — OFFICE VISIT (OUTPATIENT)
Dept: OBGYN CLINIC | Facility: CLINIC | Age: 80
End: 2020-03-05
Payer: COMMERCIAL

## 2020-03-05 ENCOUNTER — OFFICE VISIT (OUTPATIENT)
Dept: ENDOCRINOLOGY CLINIC | Facility: CLINIC | Age: 80
End: 2020-03-05
Payer: COMMERCIAL

## 2020-03-05 VITALS
DIASTOLIC BLOOD PRESSURE: 74 MMHG | BODY MASS INDEX: 34.82 KG/M2 | HEIGHT: 65 IN | SYSTOLIC BLOOD PRESSURE: 116 MMHG | WEIGHT: 209 LBS

## 2020-03-05 VITALS
SYSTOLIC BLOOD PRESSURE: 149 MMHG | BODY MASS INDEX: 35 KG/M2 | WEIGHT: 209.19 LBS | DIASTOLIC BLOOD PRESSURE: 73 MMHG | HEART RATE: 66 BPM

## 2020-03-05 DIAGNOSIS — L02.215 PERINEAL ABSCESS: Primary | ICD-10-CM

## 2020-03-05 DIAGNOSIS — E11.9 TYPE 2 DIABETES MELLITUS WITHOUT COMPLICATION, WITHOUT LONG-TERM CURRENT USE OF INSULIN (HCC): ICD-10-CM

## 2020-03-05 PROCEDURE — 99214 OFFICE O/P EST MOD 30 MIN: CPT | Performed by: INTERNAL MEDICINE

## 2020-03-05 PROCEDURE — 99213 OFFICE O/P EST LOW 20 MIN: CPT | Performed by: OBSTETRICS & GYNECOLOGY

## 2020-03-05 RX ORDER — METFORMIN HYDROCHLORIDE 500 MG/1
TABLET, EXTENDED RELEASE ORAL
Qty: 360 TABLET | Refills: 0 | Status: SHIPPED | OUTPATIENT
Start: 2020-03-05 | End: 2020-06-18

## 2020-03-05 RX ORDER — LANCETS 33 GAUGE
EACH MISCELLANEOUS
Qty: 200 EACH | Refills: 1 | Status: SHIPPED | OUTPATIENT
Start: 2020-03-05

## 2020-03-05 RX ORDER — BLOOD SUGAR DIAGNOSTIC
STRIP MISCELLANEOUS
Qty: 200 STRIP | Refills: 1 | Status: SHIPPED | OUTPATIENT
Start: 2020-03-05

## 2020-03-05 RX ORDER — GLIMEPIRIDE 4 MG/1
8 TABLET ORAL
Qty: 180 TABLET | Refills: 0 | Status: SHIPPED | OUTPATIENT
Start: 2020-03-05 | End: 2020-06-18

## 2020-03-05 NOTE — PATIENT INSTRUCTIONS
CONTINUE Metformin 1,000mg twice a day with food    CONTINUE Glimepiride 4mg (2 tablets) daily     CONTINUE Jardiance 25mg daily       START Trulicity 7.69NB injection once weekly (you may pick any day/time of the week, and continue with the same day/francy effects may I notice from receiving this medicine?   Side effects that you should report to your doctor or health care professional as soon as possible:  · allergic reactions like skin rash, itching or hives, swelling of the face, lips, or tongue  · Severa Mini naproxen  · octreotide  · pasireotide  · pentamidine  · phenytoin  · probenecid  · quinolone antibiotics such as ciprofloxacin, levofloxacin, ofloxacin  · some herbal dietary supplements  · steroid medicines such as prednisone or cortisone  · sulfamethoxaz dulaglutide, other medicines, foods, dyes, or preservatives  · pregnant or trying to get pregnant  · breast-feeding  What should I watch for while using this medicine? Visit your doctor or health care professional for regular checks on your progress.   Dri doctor, pharmacist, or health care provider. Copyright© 2019 [employer]    Low Blood Sugar (Hypoglycemia)  Having too little sugar (glucose) in your blood is called low blood sugar (hypoglycemia). Low blood sugar often means anything lower than 70 mg/dL.  Odalis Pan sugar is back at target range, eat a snack or meal.  Preventing low blood sugar  Things you can do include the following:   · If your condition needs a strict treatment plan, eat meals and snacks at the same times each day. Don’t skip meals!   · If your mara

## 2020-03-05 NOTE — PROGRESS NOTES
Name: Ardyce Dance  Date: 3/5/2020    Referring Physician: No ref. provider found    HISTORY OF PRESENT ILLNESS   Ardyce Dance is a 78year old female who presents for diabetes mellitus. Since last visit her  passed away in 2/2018.       Imer Dotsonos DAILY, Disp: , Rfl: 1  •  metFORMIN HCl  MG Oral Tablet 24 Hr, Take 2 tablets by mouth two times every day, Disp: 360 tablet, Rfl: 3  •  ALPRAZolam 0.25 MG Oral Tab, Take 1 tablet (0.25 mg total) by mouth nightly as needed. , Disp: 30 tablet, Rfl: 0 • Osteoporosis screening 1/22/2013   • Other and unspecified hyperlipidemia    • Restless leg syndrome 2015    Per Dr. Mine Caballero   • Type II or unspecified type diabetes mellitus without mention of complication, not stated as uncontrolled 8/2007   • Unspecified loss    ASSESSMENT/PLAN:      1.  Diabetes Mellitus Type 2, Uncontrolled  -Uncontrolled, HgA1c 9.0% -->increased since last visit    -Suspect sugars are higher due to steroid therapy and frequent infections in the past few months   -Discussed importance of

## 2020-03-05 NOTE — PROGRESS NOTES
HPI:    Patient ID: Esperanza Cassidy is a 78year old female. Patient here for F/U after treatment of perineal abscess. No C/Os. Does not feel any pain. Patient completed 10 days of Bactrim DS BID.   This is a 20 minute visit and greater than 50% of • Polyethyl Glycol-Propyl Glycol (SYSTANE ULTRA) 0.4-0.3 % Ophthalmic Solution Place 1 drop into both eyes as needed. • aspirin 81 MG Oral Tab Take 81 mg by mouth daily.          Allergies:No Known Allergies   PHYSICAL EXAM:   Physical Exam   Constitu

## 2020-04-13 ENCOUNTER — TELEPHONE (OUTPATIENT)
Dept: INTERNAL MEDICINE CLINIC | Facility: CLINIC | Age: 80
End: 2020-04-13

## 2020-04-13 RX ORDER — SIMVASTATIN 40 MG
40 TABLET ORAL NIGHTLY
Qty: 90 TABLET | Refills: 3 | Status: SHIPPED | OUTPATIENT
Start: 2020-04-13 | End: 2020-07-21

## 2020-05-27 RX ORDER — DULAGLUTIDE 0.75 MG/.5ML
INJECTION, SOLUTION SUBCUTANEOUS
Qty: 6 ML | Refills: 0 | Status: SHIPPED | OUTPATIENT
Start: 2020-05-27 | End: 2020-08-13

## 2020-06-03 ENCOUNTER — TELEPHONE (OUTPATIENT)
Dept: CARDIOLOGY | Age: 80
End: 2020-06-03

## 2020-06-03 ENCOUNTER — OFFICE VISIT (OUTPATIENT)
Dept: CARDIOLOGY | Age: 80
End: 2020-06-03

## 2020-06-03 ENCOUNTER — HOSPITAL ENCOUNTER (OUTPATIENT)
Dept: GENERAL RADIOLOGY | Facility: HOSPITAL | Age: 80
Discharge: HOME | End: 2020-06-03
Attending: INTERNAL MEDICINE
Payer: MEDICARE

## 2020-06-03 ENCOUNTER — APPOINTMENT (OUTPATIENT)
Dept: LAB | Facility: HOSPITAL | Age: 80
End: 2020-06-03
Attending: INTERNAL MEDICINE
Payer: MEDICARE

## 2020-06-03 VITALS
HEIGHT: 65 IN | OXYGEN SATURATION: 98 % | WEIGHT: 213 LBS | DIASTOLIC BLOOD PRESSURE: 72 MMHG | RESPIRATION RATE: 18 BRPM | SYSTOLIC BLOOD PRESSURE: 118 MMHG | BODY MASS INDEX: 35.49 KG/M2 | HEART RATE: 69 BPM

## 2020-06-03 DIAGNOSIS — I25.10 CORONARY ARTERY DISEASE INVOLVING NATIVE CORONARY ARTERY OF NATIVE HEART WITHOUT ANGINA PECTORIS: Primary | ICD-10-CM

## 2020-06-03 DIAGNOSIS — I25.10 CVD (CARDIOVASCULAR DISEASE): ICD-10-CM

## 2020-06-03 DIAGNOSIS — R06.09 DYSPNEA ON EXERTION: Primary | ICD-10-CM

## 2020-06-03 DIAGNOSIS — I25.10 CORONARY ARTERY DISEASE INVOLVING NATIVE HEART WITHOUT ANGINA PECTORIS, UNSPECIFIED VESSEL OR LESION TYPE: ICD-10-CM

## 2020-06-03 DIAGNOSIS — I65.23 BILATERAL CAROTID ARTERY STENOSIS: ICD-10-CM

## 2020-06-03 LAB
CHOLEST SERPL-MCNC: 122 MG/DL
HDLC SERPL-MCNC: 47 MG/DL
LDLC SERPL CALC-MCNC: 37 MG/DL
NONHDLC SERPL-MCNC: 75 MG/DL
TRIGL SERPL-MCNC: 189 MG/DL
VLDLC SERPL CALC-MCNC: 38 MG/DL

## 2020-06-03 PROCEDURE — 85025 COMPLETE CBC W/AUTO DIFF WBC: CPT | Performed by: INTERNAL MEDICINE

## 2020-06-03 PROCEDURE — 71046 X-RAY EXAM CHEST 2 VIEWS: CPT | Performed by: INTERNAL MEDICINE

## 2020-06-03 PROCEDURE — 36415 COLL VENOUS BLD VENIPUNCTURE: CPT | Performed by: INTERNAL MEDICINE

## 2020-06-03 PROCEDURE — 99214 OFFICE O/P EST MOD 30 MIN: CPT | Performed by: INTERNAL MEDICINE

## 2020-06-03 PROCEDURE — 93000 ELECTROCARDIOGRAM COMPLETE: CPT | Performed by: INTERNAL MEDICINE

## 2020-06-03 PROCEDURE — 80061 LIPID PANEL: CPT | Performed by: INTERNAL MEDICINE

## 2020-06-03 PROCEDURE — 80053 COMPREHEN METABOLIC PANEL: CPT | Performed by: INTERNAL MEDICINE

## 2020-06-03 PROCEDURE — 84443 ASSAY THYROID STIM HORMONE: CPT | Performed by: INTERNAL MEDICINE

## 2020-06-03 SDOH — HEALTH STABILITY: MENTAL HEALTH: HOW OFTEN DO YOU HAVE A DRINK CONTAINING ALCOHOL?: NEVER

## 2020-06-03 ASSESSMENT — PATIENT HEALTH QUESTIONNAIRE - PHQ9
CLINICAL INTERPRETATION OF PHQ9 SCORE: NO FURTHER SCREENING NEEDED
SUM OF ALL RESPONSES TO PHQ9 QUESTIONS 1 AND 2: 0
2. FEELING DOWN, DEPRESSED OR HOPELESS: NOT AT ALL
CLINICAL INTERPRETATION OF PHQ2 SCORE: NO FURTHER SCREENING NEEDED
1. LITTLE INTEREST OR PLEASURE IN DOING THINGS: NOT AT ALL
SUM OF ALL RESPONSES TO PHQ9 QUESTIONS 1 AND 2: 0

## 2020-06-09 ENCOUNTER — OFFICE VISIT (OUTPATIENT)
Dept: INTERNAL MEDICINE CLINIC | Facility: CLINIC | Age: 80
End: 2020-06-09
Payer: COMMERCIAL

## 2020-06-09 ENCOUNTER — LAB ENCOUNTER (OUTPATIENT)
Dept: LAB | Facility: HOSPITAL | Age: 80
End: 2020-06-09
Attending: INTERNAL MEDICINE
Payer: MEDICARE

## 2020-06-09 VITALS
DIASTOLIC BLOOD PRESSURE: 70 MMHG | TEMPERATURE: 97 F | BODY MASS INDEX: 35.65 KG/M2 | OXYGEN SATURATION: 98 % | RESPIRATION RATE: 16 BRPM | WEIGHT: 214 LBS | SYSTOLIC BLOOD PRESSURE: 130 MMHG | HEIGHT: 65 IN | HEART RATE: 77 BPM

## 2020-06-09 DIAGNOSIS — R09.89 BILATERAL CAROTID BRUITS: ICD-10-CM

## 2020-06-09 DIAGNOSIS — E11.65 TYPE 2 DIABETES MELLITUS WITH HYPERGLYCEMIA, WITHOUT LONG-TERM CURRENT USE OF INSULIN (HCC): ICD-10-CM

## 2020-06-09 DIAGNOSIS — E11.9 TYPE 2 DIABETES MELLITUS WITHOUT COMPLICATION, WITHOUT LONG-TERM CURRENT USE OF INSULIN (HCC): ICD-10-CM

## 2020-06-09 DIAGNOSIS — Z01.818 PRE-OP TESTING: ICD-10-CM

## 2020-06-09 DIAGNOSIS — I25.110 CORONARY ARTERY DISEASE INVOLVING NATIVE CORONARY ARTERY OF NATIVE HEART WITH UNSTABLE ANGINA PECTORIS (HCC): Primary | ICD-10-CM

## 2020-06-09 DIAGNOSIS — I10 HYPERTENSION, BENIGN: ICD-10-CM

## 2020-06-09 DIAGNOSIS — E78.5 HYPERLIPIDEMIA, UNSPECIFIED HYPERLIPIDEMIA TYPE: ICD-10-CM

## 2020-06-09 PROCEDURE — G0463 HOSPITAL OUTPT CLINIC VISIT: HCPCS | Performed by: INTERNAL MEDICINE

## 2020-06-09 PROCEDURE — 99214 OFFICE O/P EST MOD 30 MIN: CPT | Performed by: INTERNAL MEDICINE

## 2020-06-09 NOTE — PROGRESS NOTES
Ardyce Dance is a 78year old female. HPI:   She is scheduled to have repeat coronary angiogram - she had solitary stent 2017 and other non-obstructive disease. She notes she has limited her physical activity possibly to limit dyspnea.  She does emerson losartan Potassium 50 MG Oral Tab Take 1 tablet (50 mg total) by mouth daily. 90 tablet 3   • JUBLIA 10 % External Solution      • hydrochlorothiazide 12.5 MG Oral Tab Take 1 tablet (12.5 mg total) by mouth daily.  90 tablet 3   • Pimecrolimus 1 % External lb (97.1 kg)   LMP  (LMP Unknown)   SpO2 98%   BMI 35.61 kg/m²   GENERAL: well developed, well nourished,in no apparent distress  SKIN: no rashes,no suspicious lesions  HEENT: atraumatic, normocephalic,ears and throat are clear  NECK: supple,no adenopathy,

## 2020-06-11 ENCOUNTER — OFFICE VISIT (OUTPATIENT)
Dept: ENDOCRINOLOGY CLINIC | Facility: CLINIC | Age: 80
End: 2020-06-11
Payer: COMMERCIAL

## 2020-06-11 VITALS
WEIGHT: 214.38 LBS | DIASTOLIC BLOOD PRESSURE: 67 MMHG | SYSTOLIC BLOOD PRESSURE: 139 MMHG | HEART RATE: 67 BPM | BODY MASS INDEX: 36 KG/M2

## 2020-06-11 DIAGNOSIS — E11.9 CONTROLLED TYPE 2 DIABETES MELLITUS WITHOUT COMPLICATION, WITHOUT LONG-TERM CURRENT USE OF INSULIN (HCC): Primary | ICD-10-CM

## 2020-06-11 PROCEDURE — 99214 OFFICE O/P EST MOD 30 MIN: CPT | Performed by: INTERNAL MEDICINE

## 2020-06-11 NOTE — PROGRESS NOTES
Name: Demetrio Collins  Date: 6/11/2020    Referring Physician: No ref. provider found    HISTORY OF PRESENT ILLNESS   Demetrio Collins is a 78year old female who presents for diabetes mellitus. Since last visit her  passed away in 2/2018. without insulin use., Disp: 200 strip, Rfl: 1  •  OneTouch Delica Lancets 33F Does not apply Misc, Check BG 2 times per day.  DX: E11.8 without insulin use., Disp: 200 each, Rfl: 1  •  losartan Potassium 50 MG Oral Tab, Take 1 tablet (50 mg total) by mouth High cholesterol    • Lipid screening 12/11/2013   • Obesity    • Osteoarthritis    • Osteoporosis    • Osteoporosis screening 1/22/2013   • Other and unspecified hyperlipidemia    • Restless leg syndrome 2015    Per Dr. Nani Hernández   • Type II or unspecified type intact and motor grossly intact  Feet: Normal monofilament exam, 2+ DP, PT pulses, mild onchomycosis, no skin breakage  Psychiatric:  oriented to time, self, and place  Nutritional:  no abnormal weight gain or loss    ASSESSMENT/PLAN:      1.  Diabetes Lizeth

## 2020-06-12 ENCOUNTER — APPOINTMENT (OUTPATIENT)
Dept: INTERVENTIONAL RADIOLOGY/VASCULAR | Facility: HOSPITAL | Age: 80
End: 2020-06-12
Attending: INTERNAL MEDICINE
Payer: MEDICARE

## 2020-06-12 ENCOUNTER — HOSPITAL ENCOUNTER (OUTPATIENT)
Dept: INTERVENTIONAL RADIOLOGY/VASCULAR | Facility: HOSPITAL | Age: 80
Setting detail: OBSERVATION
Discharge: HOME | End: 2020-06-13
Attending: INTERNAL MEDICINE | Admitting: INTERNAL MEDICINE
Payer: MEDICARE

## 2020-06-12 DIAGNOSIS — I25.10 CAD (CORONARY ARTERY DISEASE): ICD-10-CM

## 2020-06-12 DIAGNOSIS — Z01.818 PRE-OP TESTING: Primary | ICD-10-CM

## 2020-06-12 DIAGNOSIS — R06.00 DOE (DYSPNEA ON EXERTION): ICD-10-CM

## 2020-06-12 PROCEDURE — 93458 L HRT ARTERY/VENTRICLE ANGIO: CPT

## 2020-06-12 PROCEDURE — 93458 L HRT ARTERY/VENTRICLE ANGIO: CPT | Performed by: INTERNAL MEDICINE

## 2020-06-12 PROCEDURE — 99152 MOD SED SAME PHYS/QHP 5/>YRS: CPT

## 2020-06-12 PROCEDURE — B240ZZ3 ULTRASONOGRAPHY OF SINGLE CORONARY ARTERY, INTRAVASCULAR: ICD-10-PCS | Performed by: INTERNAL MEDICINE

## 2020-06-12 PROCEDURE — B2111ZZ FLUOROSCOPY OF MULTIPLE CORONARY ARTERIES USING LOW OSMOLAR CONTRAST: ICD-10-PCS | Performed by: INTERNAL MEDICINE

## 2020-06-12 PROCEDURE — 99153 MOD SED SAME PHYS/QHP EA: CPT

## 2020-06-12 PROCEDURE — 82962 GLUCOSE BLOOD TEST: CPT

## 2020-06-12 PROCEDURE — 92978 ENDOLUMINL IVUS OCT C 1ST: CPT

## 2020-06-12 PROCEDURE — 92978 ENDOLUMINL IVUS OCT C 1ST: CPT | Performed by: INTERNAL MEDICINE

## 2020-06-12 PROCEDURE — 4A023N7 MEASUREMENT OF CARDIAC SAMPLING AND PRESSURE, LEFT HEART, PERCUTANEOUS APPROACH: ICD-10-PCS | Performed by: INTERNAL MEDICINE

## 2020-06-12 PROCEDURE — 99152 MOD SED SAME PHYS/QHP 5/>YRS: CPT | Performed by: INTERNAL MEDICINE

## 2020-06-12 PROCEDURE — 027035Z DILATION OF CORONARY ARTERY, ONE ARTERY WITH TWO DRUG-ELUTING INTRALUMINAL DEVICES, PERCUTANEOUS APPROACH: ICD-10-PCS | Performed by: INTERNAL MEDICINE

## 2020-06-12 PROCEDURE — 36415 COLL VENOUS BLD VENIPUNCTURE: CPT

## 2020-06-12 PROCEDURE — 85347 COAGULATION TIME ACTIVATED: CPT

## 2020-06-12 PROCEDURE — 92928 PRQ TCAT PLMT NTRAC ST 1 LES: CPT | Performed by: INTERNAL MEDICINE

## 2020-06-12 RX ORDER — PRAVASTATIN SODIUM 20 MG
20 TABLET ORAL NIGHTLY
Status: DISCONTINUED | OUTPATIENT
Start: 2020-06-12 | End: 2020-06-12

## 2020-06-12 RX ORDER — ASPIRIN 81 MG/1
81 TABLET ORAL DAILY
Status: DISCONTINUED | OUTPATIENT
Start: 2020-06-13 | End: 2020-06-13

## 2020-06-12 RX ORDER — LIDOCAINE HYDROCHLORIDE 20 MG/ML
INJECTION, SOLUTION EPIDURAL; INFILTRATION; INTRACAUDAL; PERINEURAL
Status: COMPLETED
Start: 2020-06-12 | End: 2020-06-12

## 2020-06-12 RX ORDER — CLOPIDOGREL BISULFATE 75 MG/1
75 TABLET ORAL DAILY
Status: DISCONTINUED | OUTPATIENT
Start: 2020-06-13 | End: 2020-06-13

## 2020-06-12 RX ORDER — SODIUM CHLORIDE 9 MG/ML
INJECTION, SOLUTION INTRAVENOUS
Status: ACTIVE | OUTPATIENT
Start: 2020-06-12 | End: 2020-06-12

## 2020-06-12 RX ORDER — VERAPAMIL HYDROCHLORIDE 2.5 MG/ML
INJECTION, SOLUTION INTRAVENOUS
Status: COMPLETED
Start: 2020-06-12 | End: 2020-06-12

## 2020-06-12 RX ORDER — SODIUM CHLORIDE 9 MG/ML
INJECTION, SOLUTION INTRAVENOUS CONTINUOUS
Status: ACTIVE | OUTPATIENT
Start: 2020-06-12 | End: 2020-06-12

## 2020-06-12 RX ORDER — NITROGLYCERIN 20 MG/100ML
INJECTION INTRAVENOUS
Status: COMPLETED
Start: 2020-06-12 | End: 2020-06-12

## 2020-06-12 RX ORDER — CLOPIDOGREL BISULFATE 75 MG/1
TABLET ORAL
Status: COMPLETED
Start: 2020-06-12 | End: 2020-06-12

## 2020-06-12 RX ORDER — ATORVASTATIN CALCIUM 40 MG/1
40 TABLET, FILM COATED ORAL NIGHTLY
Status: DISCONTINUED | OUTPATIENT
Start: 2020-06-12 | End: 2020-06-12

## 2020-06-12 RX ORDER — MIDAZOLAM HYDROCHLORIDE 1 MG/ML
INJECTION INTRAMUSCULAR; INTRAVENOUS
Status: COMPLETED
Start: 2020-06-12 | End: 2020-06-12

## 2020-06-12 RX ORDER — HEPARIN SODIUM 1000 [USP'U]/ML
INJECTION, SOLUTION INTRAVENOUS; SUBCUTANEOUS
Status: COMPLETED
Start: 2020-06-12 | End: 2020-06-12

## 2020-06-12 RX ADMIN — SODIUM CHLORIDE: 9 INJECTION, SOLUTION INTRAVENOUS at 18:20:00

## 2020-06-12 RX ADMIN — SODIUM CHLORIDE: 9 INJECTION, SOLUTION INTRAVENOUS at 19:22:00

## 2020-06-12 NOTE — PROGRESS NOTES
Procedure hand off report given to Sara Acevedo RN. Procedural access site is dry and intact with no signs and symptoms of bleeding and hematoma. Dr Roque Solano came to see pt  at bedside post procedure.  Radial and groin check done with Lorelei Torres, upon transfer to [facility]

## 2020-06-12 NOTE — PROCEDURES
[facility]    [facility] Cardiac Cath Procedure Note  Nicole Christian Patient Status:  Outpatient in a Bed    1940 MRN [id number]   Location [facility] Attending Celia Healy MD   Hosp Day # 0 PCP Rajinder Murguia Pre-dil:  2.5 x 15 mm balloon  Stent: 3.0 x 15 mm DAISHA TRUONG @ 12 BARRY  Post-dil:  none    Post IVUS of the LAD due to possible distal edge stent dissection. Appears to be stent to vessel size mismatch based on intravascular ultrasound images.   There is

## 2020-06-12 NOTE — PROCEDURES
[facility]    PATIENT'S NAME: ZAKIA Bejarano   ATTENDING PHYSICIAN: Bridger Ibarra MD   OPERATING PHYSICIAN: Arlon Boeck.  Gerardo Ibarra MD   PATIENT ACCOUNT#:   [id number]    LOCATION:  [address]  MEDICAL RECORD #:   [id number] therefore was discontinued and radial T-band was placed over the right radial artery.     SUMMARY:  A subtotal occlusion at the ostium of the diagonal artery with moderate narrowing in the LAD in the mid segment as described above with unsuccessful interven

## 2020-06-12 NOTE — INTERVAL H&P NOTE
Pre-op Diagnosis: * No pre-op diagnosis entered *    The above referenced H&P was reviewed by Aleida Estrada MD on 6/12/2020, the patient was examined and no significant changes have occurred in the patient's condition since the H&P was performed.   I discu

## 2020-06-13 VITALS
DIASTOLIC BLOOD PRESSURE: 70 MMHG | BODY MASS INDEX: 38 KG/M2 | WEIGHT: 231 LBS | RESPIRATION RATE: 17 BRPM | SYSTOLIC BLOOD PRESSURE: 145 MMHG | OXYGEN SATURATION: 98 % | HEART RATE: 71 BPM | TEMPERATURE: 98 F

## 2020-06-13 LAB
ANION GAP SERPL CALC-SCNC: 6 MMOL/L
BUN SERPL-MCNC: 16 MG/DL
BUN/CREAT SERPL: 23.9
CALCIUM SERPL-MCNC: 8.5 MG/DL
CHLORIDE SERPL-SCNC: 106 MMOL/L
CO2 SERPL-SCNC: 28 MMOL/L
CREAT SERPL-MCNC: 0.67 MG/DL
EST. AVERAGE GLUCOSE BLD GHB EST-MCNC: 171 MG/DL
GLUCOSE SERPL-MCNC: 104 MG/DL
HBA1C MFR BLD: 7.6 %
HCT VFR BLD CALC: 38.2 %
HGB BLD-MCNC: 12.9 G/DL
PLATELET # BLD: 203 K/MCL
POTASSIUM SERPL-SCNC: 3.7 MMOL/L
RBC # BLD: 4.23 10*6/UL
SODIUM SERPL-SCNC: 140 MMOL/L
WBC # BLD: 6.8 K/MCL

## 2020-06-13 PROCEDURE — 80048 BASIC METABOLIC PNL TOTAL CA: CPT | Performed by: INTERNAL MEDICINE

## 2020-06-13 PROCEDURE — 83036 HEMOGLOBIN GLYCOSYLATED A1C: CPT | Performed by: INTERNAL MEDICINE

## 2020-06-13 PROCEDURE — 85027 COMPLETE CBC AUTOMATED: CPT | Performed by: INTERNAL MEDICINE

## 2020-06-13 PROCEDURE — 82962 GLUCOSE BLOOD TEST: CPT

## 2020-06-13 PROCEDURE — 99214 OFFICE O/P EST MOD 30 MIN: CPT | Performed by: NURSE PRACTITIONER

## 2020-06-13 RX ORDER — CLOPIDOGREL BISULFATE 75 MG/1
75 TABLET ORAL DAILY
Qty: 30 TABLET | Refills: 3 | Status: SHIPPED | OUTPATIENT
Start: 2020-06-14 | End: 2021-12-14

## 2020-06-13 RX ORDER — HYDROCHLOROTHIAZIDE 25 MG/1
12.5 TABLET ORAL DAILY
Status: DISCONTINUED | OUTPATIENT
Start: 2020-06-14 | End: 2020-06-13

## 2020-06-13 RX ORDER — HYDROCHLOROTHIAZIDE 25 MG/1
12.5 TABLET ORAL DAILY
Status: DISCONTINUED | OUTPATIENT
Start: 2020-06-13 | End: 2020-06-13

## 2020-06-13 RX ORDER — LOSARTAN POTASSIUM 50 MG/1
50 TABLET ORAL DAILY
Status: DISCONTINUED | OUTPATIENT
Start: 2020-06-13 | End: 2020-06-13

## 2020-06-13 RX ORDER — ATORVASTATIN CALCIUM 40 MG/1
40 TABLET, FILM COATED ORAL NIGHTLY
Status: DISCONTINUED | OUTPATIENT
Start: 2020-06-13 | End: 2020-06-13

## 2020-06-13 RX ADMIN — CLOPIDOGREL BISULFATE 75 MG: 75 TABLET ORAL at 08:19:00

## 2020-06-13 RX ADMIN — LOSARTAN POTASSIUM 50 MG: 50 TABLET ORAL at 08:21:00

## 2020-06-13 RX ADMIN — ASPIRIN 81 MG: 81 TABLET ORAL at 08:19:00

## 2020-06-13 NOTE — PLAN OF CARE
Problem: Patient Centered Care  Goal: Patient preferences are identified and integrated in the patient's plan of care  Description  Interventions:  - What would you like us to know as we care for you? Pt lives alone. Update with plan of care.   - Provide baseline  Description  INTERVENTIONS:  - Continuous cardiac monitoring, monitor vital signs, obtain 12 lead EKG if indicated  - Evaluate effectiveness of antiarrhythmic and heart rate control medications as ordered  - Initiate emergency measures for life t

## 2020-06-13 NOTE — PROGRESS NOTES
[facility]    Cardiology Progress Note    Bobetta Sep Patient Status:  Observation    1940 MRN [id number]   Location [facility] 3W/SW Attending Jose Elias Zafar MD   Hosp Day # 0 PCP Dereje Garay.  MD Yordan     Primary Cardi 06/13/2020    CREATSERUM 0.67 06/13/2020    BUN 16 06/13/2020     06/13/2020    K 3.7 06/13/2020     06/13/2020    CO2 28.0 06/13/2020     (H) 06/13/2020    CA 8.5 06/13/2020    ALB 4.0 06/03/2020    ALKPHO 69 06/03/2020    BILT 0.6 06/0

## 2020-06-13 NOTE — PLAN OF CARE
Problem: Patient Centered Care  Goal: Patient preferences are identified and integrated in the patient's plan of care  Description  Interventions:  - What would you like us to know as we care for you?   - Provide timely, complete, and accurate informatio and initiate nutrition consult as needed  - Instruct patient on self management of diabetes  Outcome: Completed      Discharge information provided. No questions at this time. F/u appt scheduled.

## 2020-06-15 ENCOUNTER — PATIENT OUTREACH (OUTPATIENT)
Dept: CASE MANAGEMENT | Age: 80
End: 2020-06-15

## 2020-06-15 DIAGNOSIS — I25.10 CAD IN NATIVE ARTERY: ICD-10-CM

## 2020-06-15 DIAGNOSIS — Z02.9 ENCOUNTERS FOR ADMINISTRATIVE PURPOSE: ICD-10-CM

## 2020-06-15 PROCEDURE — 1111F DSCHRG MED/CURRENT MED MERGE: CPT

## 2020-06-16 DIAGNOSIS — E11.9 TYPE 2 DIABETES MELLITUS WITHOUT COMPLICATION, WITHOUT LONG-TERM CURRENT USE OF INSULIN (HCC): ICD-10-CM

## 2020-06-16 NOTE — PROGRESS NOTES
Initial Post Discharge Follow Up   Discharge Date: 6/13/20  Contact Date: 6/15/2020    Consent Verification:  Assessment Completed With: Patient  HIPAA Verified?   Yes    Discharge Dx:   CAD in native artery       General:   • How have you been since you Misc Check BG 2 times per day. DX: E11.8 without insulin use. 200 each 1   • losartan Potassium 50 MG Oral Tab Take 1 tablet (50 mg total) by mouth daily. 90 tablet 3   • JUBLIA 10 % External Solution daily.        • hydrochlorothiazide 12.5 MG Oral Tab [facility])        Oct 15, 2020 11:00 AM CDT Follow Up Visit with David Todd MD [address] [facility] ([facility])        Dec 28, 2020 10:15 AM CST Established Patient Office Visit with Karri Abrams MD DM Discharge medications reviewed/discussed/and reconciled against outpatient medications with patient,  and orders reviewed and discussed. Any changes or updates to medications and or orders sent to PCP.

## 2020-06-18 RX ORDER — EMPAGLIFLOZIN 25 MG/1
TABLET, FILM COATED ORAL
Qty: 90 TABLET | Refills: 0 | Status: SHIPPED | OUTPATIENT
Start: 2020-06-18 | End: 2020-08-21

## 2020-06-18 RX ORDER — GLIMEPIRIDE 4 MG/1
TABLET ORAL
Qty: 180 TABLET | Refills: 0 | Status: SHIPPED | OUTPATIENT
Start: 2020-06-18 | End: 2020-08-21

## 2020-06-18 RX ORDER — METFORMIN HYDROCHLORIDE 500 MG/1
TABLET, EXTENDED RELEASE ORAL
Qty: 360 TABLET | Refills: 0 | Status: SHIPPED | OUTPATIENT
Start: 2020-06-18 | End: 2020-08-21

## 2020-06-24 ENCOUNTER — MA CHART PREP (OUTPATIENT)
Dept: FAMILY MEDICINE CLINIC | Facility: CLINIC | Age: 80
End: 2020-06-24

## 2020-06-24 ENCOUNTER — OFFICE VISIT (OUTPATIENT)
Dept: INTERNAL MEDICINE CLINIC | Facility: CLINIC | Age: 80
End: 2020-06-24
Payer: COMMERCIAL

## 2020-06-24 VITALS
WEIGHT: 211 LBS | BODY MASS INDEX: 35 KG/M2 | OXYGEN SATURATION: 97 % | SYSTOLIC BLOOD PRESSURE: 116 MMHG | RESPIRATION RATE: 16 BRPM | HEART RATE: 77 BPM | DIASTOLIC BLOOD PRESSURE: 68 MMHG | TEMPERATURE: 97 F

## 2020-06-24 DIAGNOSIS — I65.23 BILATERAL CAROTID ARTERY OCCLUSION: ICD-10-CM

## 2020-06-24 DIAGNOSIS — I10 HYPERTENSION, BENIGN: ICD-10-CM

## 2020-06-24 DIAGNOSIS — E66.01 SEVERE OBESITY (BMI 35.0-39.9) WITH COMORBIDITY (HCC): ICD-10-CM

## 2020-06-24 DIAGNOSIS — E11.9 TYPE 2 DIABETES MELLITUS WITHOUT COMPLICATION, WITHOUT LONG-TERM CURRENT USE OF INSULIN (HCC): ICD-10-CM

## 2020-06-24 DIAGNOSIS — I25.10 CAD IN NATIVE ARTERY: Primary | ICD-10-CM

## 2020-06-24 DIAGNOSIS — J84.10 LUNG GRANULOMA (HCC): ICD-10-CM

## 2020-06-24 PROBLEM — I77.1 TORTUOUS AORTA (HCC): Status: ACTIVE | Noted: 2020-06-24

## 2020-06-24 PROBLEM — I77.1 TORTUOUS AORTA: Status: ACTIVE | Noted: 2020-06-24

## 2020-06-24 PROBLEM — E66.812 CLASS 2 SEVERE OBESITY WITH BODY MASS INDEX (BMI) OF 35 TO 39.9 WITH SERIOUS COMORBIDITY (HCC): Chronic | Status: ACTIVE | Noted: 2017-07-05

## 2020-06-24 PROBLEM — I70.0 AORTIC ATHEROSCLEROSIS: Status: ACTIVE | Noted: 2020-06-24

## 2020-06-24 PROBLEM — I70.0 AORTIC ATHEROSCLEROSIS (HCC): Status: ACTIVE | Noted: 2020-06-24

## 2020-06-24 PROCEDURE — G0463 HOSPITAL OUTPT CLINIC VISIT: HCPCS | Performed by: INTERNAL MEDICINE

## 2020-06-24 PROCEDURE — 99214 OFFICE O/P EST MOD 30 MIN: CPT | Performed by: INTERNAL MEDICINE

## 2020-06-24 PROCEDURE — 1111F DSCHRG MED/CURRENT MED MERGE: CPT | Performed by: INTERNAL MEDICINE

## 2020-06-24 RX ORDER — OMEPRAZOLE 40 MG/1
40 CAPSULE, DELAYED RELEASE ORAL DAILY
Qty: 30 CAPSULE | Refills: 11 | Status: SHIPPED | OUTPATIENT
Start: 2020-06-24 | End: 2020-07-02 | Stop reason: ALTCHOICE

## 2020-06-24 NOTE — PROGRESS NOTES
Guerrero Cristina is a 78year old female. HPI:   She had been having decreased exercise tolerance and she saw Dr Terrell Miramontes who took her to cath lab and LAD and diagonal lesions dilated and stented with excellent results. She was d/c last Sat and dong well.  No 3   • Pimecrolimus 1 % External Cream 2 (two) times daily as needed. APPLY TO AFFECTED AREA OF THE FOLDS OF THE ABDOMEN TWICE DAILY   1   • ALPRAZolam 0.25 MG Oral Tab Take 1 tablet (0.25 mg total) by mouth nightly as needed.  30 tablet 0   • Oxybutynin Chl apparent distress  SKIN: no rashes,no suspicious lesions  HEENT: atraumatic, normocephalic,ears and throat are clear  NECK: supple,no adenopathy,no bruits  LUNGS: clear to auscultation  CARDIO: RRR without murmur  GI: good BS's,no masses, HSM or tenderness

## 2020-06-25 PROBLEM — E11.59 TYPE 2 DIABETES MELLITUS WITH CIRCULATORY DISORDER, WITHOUT LONG-TERM CURRENT USE OF INSULIN (CMD): Status: ACTIVE | Noted: 2020-06-25

## 2020-06-26 ENCOUNTER — ORDER TRANSCRIPTION (OUTPATIENT)
Dept: CARDIAC REHAB | Facility: HOSPITAL | Age: 80
End: 2020-06-26

## 2020-06-26 ENCOUNTER — HOSPITAL ENCOUNTER (OUTPATIENT)
Dept: BONE DENSITY | Age: 80
Discharge: HOME | End: 2020-06-26
Attending: INTERNAL MEDICINE
Payer: MEDICARE

## 2020-06-26 ENCOUNTER — HOSPITAL ENCOUNTER (OUTPATIENT)
Dept: MAMMOGRAPHY | Age: 80
Discharge: HOME | End: 2020-06-26
Attending: INTERNAL MEDICINE
Payer: MEDICARE

## 2020-06-26 ENCOUNTER — OFFICE VISIT (OUTPATIENT)
Dept: CARDIOLOGY | Age: 80
End: 2020-06-26

## 2020-06-26 VITALS
HEART RATE: 73 BPM | SYSTOLIC BLOOD PRESSURE: 118 MMHG | OXYGEN SATURATION: 98 % | HEIGHT: 65 IN | WEIGHT: 212 LBS | DIASTOLIC BLOOD PRESSURE: 64 MMHG | BODY MASS INDEX: 35.32 KG/M2

## 2020-06-26 DIAGNOSIS — Z12.39 BREAST CANCER SCREENING: ICD-10-CM

## 2020-06-26 DIAGNOSIS — E78.00 PURE HYPERCHOLESTEROLEMIA: ICD-10-CM

## 2020-06-26 DIAGNOSIS — Z95.5 S/P DRUG ELUTING CORONARY STENT PLACEMENT: ICD-10-CM

## 2020-06-26 DIAGNOSIS — Z78.0 POSTMENOPAUSAL: ICD-10-CM

## 2020-06-26 DIAGNOSIS — Z98.61 S/P PTCA (PERCUTANEOUS TRANSLUMINAL CORONARY ANGIOPLASTY): Primary | ICD-10-CM

## 2020-06-26 DIAGNOSIS — I10 ESSENTIAL HYPERTENSION: Primary | ICD-10-CM

## 2020-06-26 PROCEDURE — 77063 BREAST TOMOSYNTHESIS BI: CPT | Performed by: INTERNAL MEDICINE

## 2020-06-26 PROCEDURE — 77080 DXA BONE DENSITY AXIAL: CPT | Performed by: INTERNAL MEDICINE

## 2020-06-26 PROCEDURE — 3074F SYST BP LT 130 MM HG: CPT | Performed by: NURSE PRACTITIONER

## 2020-06-26 PROCEDURE — 99214 OFFICE O/P EST MOD 30 MIN: CPT | Performed by: NURSE PRACTITIONER

## 2020-06-26 PROCEDURE — 3078F DIAST BP <80 MM HG: CPT | Performed by: NURSE PRACTITIONER

## 2020-06-26 PROCEDURE — 77067 SCR MAMMO BI INCL CAD: CPT | Performed by: INTERNAL MEDICINE

## 2020-06-26 RX ORDER — ATORVASTATIN CALCIUM 40 MG/1
40 TABLET, FILM COATED ORAL DAILY
Qty: 90 TABLET | Refills: 3 | Status: SHIPPED | OUTPATIENT
Start: 2020-06-26 | End: 2021-05-11

## 2020-06-26 RX ORDER — CLOPIDOGREL BISULFATE 75 MG/1
75 TABLET ORAL DAILY
Qty: 90 TABLET | Refills: 3 | Status: SHIPPED | OUTPATIENT
Start: 2020-06-26

## 2020-06-26 RX ORDER — ATORVASTATIN CALCIUM 40 MG/1
40 TABLET, FILM COATED ORAL DAILY
COMMUNITY
End: 2020-06-26 | Stop reason: SDUPTHER

## 2020-06-26 RX ORDER — CLOPIDOGREL BISULFATE 75 MG/1
75 TABLET ORAL DAILY
COMMUNITY
Start: 2020-06-13 | End: 2020-06-26 | Stop reason: SDUPTHER

## 2020-06-26 SDOH — HEALTH STABILITY: MENTAL HEALTH: HOW OFTEN DO YOU HAVE A DRINK CONTAINING ALCOHOL?: NEVER

## 2020-06-26 ASSESSMENT — PATIENT HEALTH QUESTIONNAIRE - PHQ9
CLINICAL INTERPRETATION OF PHQ9 SCORE: NO FURTHER SCREENING NEEDED
1. LITTLE INTEREST OR PLEASURE IN DOING THINGS: NOT AT ALL
CLINICAL INTERPRETATION OF PHQ2 SCORE: NO FURTHER SCREENING NEEDED
SUM OF ALL RESPONSES TO PHQ9 QUESTIONS 1 AND 2: 0
2. FEELING DOWN, DEPRESSED OR HOPELESS: NOT AT ALL
SUM OF ALL RESPONSES TO PHQ9 QUESTIONS 1 AND 2: 0

## 2020-06-29 ENCOUNTER — TELEPHONE (OUTPATIENT)
Dept: INTERNAL MEDICINE CLINIC | Facility: CLINIC | Age: 80
End: 2020-06-29

## 2020-06-29 NOTE — TELEPHONE ENCOUNTER
JO Ha is calling regarding drug interaction between Plavix & Omeprazole    [facility] [phone]

## 2020-06-30 ENCOUNTER — CARDPULM VISIT (OUTPATIENT)
Dept: CARDIAC REHAB | Facility: HOSPITAL | Age: 80
End: 2020-06-30
Attending: INTERNAL MEDICINE
Payer: MEDICARE

## 2020-06-30 DIAGNOSIS — Z98.61 S/P PTCA (PERCUTANEOUS TRANSLUMINAL CORONARY ANGIOPLASTY): ICD-10-CM

## 2020-06-30 PROCEDURE — 93798 PHYS/QHP OP CAR RHAB W/ECG: CPT

## 2020-07-02 RX ORDER — PANTOPRAZOLE SODIUM 40 MG/1
40 TABLET, DELAYED RELEASE ORAL
Qty: 30 TABLET | Refills: 11 | Status: SHIPPED | OUTPATIENT
Start: 2020-07-02 | End: 2020-09-08

## 2020-07-02 NOTE — TELEPHONE ENCOUNTER
Spoke to pt - will change omeprazole to pantoprazole for pt concurrently on clopidogrel    Requested Prescriptions     Signed Prescriptions Disp Refills   • Pantoprazole Sodium (PROTONIX) 40 MG Oral Tab EC 30 tablet 11     Sig: Take 1 tablet (40 mg total)

## 2020-07-04 ENCOUNTER — TELEPHONE (OUTPATIENT)
Dept: INTERNAL MEDICINE CLINIC | Facility: CLINIC | Age: 80
End: 2020-07-04

## 2020-07-06 ENCOUNTER — CARDPULM VISIT (OUTPATIENT)
Dept: CARDIAC REHAB | Facility: HOSPITAL | Age: 80
End: 2020-07-06
Attending: INTERNAL MEDICINE
Payer: MEDICARE

## 2020-07-06 PROCEDURE — 93798 PHYS/QHP OP CAR RHAB W/ECG: CPT

## 2020-07-08 ENCOUNTER — CARDPULM VISIT (OUTPATIENT)
Dept: CARDIAC REHAB | Facility: HOSPITAL | Age: 80
End: 2020-07-08
Attending: INTERNAL MEDICINE
Payer: MEDICARE

## 2020-07-09 ENCOUNTER — HOSPITAL ENCOUNTER (OUTPATIENT)
Dept: ULTRASOUND IMAGING | Facility: HOSPITAL | Age: 80
Discharge: HOME | End: 2020-07-09
Attending: INTERNAL MEDICINE
Payer: MEDICARE

## 2020-07-09 DIAGNOSIS — I10 HYPERTENSION, BENIGN: ICD-10-CM

## 2020-07-09 PROCEDURE — 76770 US EXAM ABDO BACK WALL COMP: CPT | Performed by: INTERNAL MEDICINE

## 2020-07-10 ENCOUNTER — CARDPULM VISIT (OUTPATIENT)
Dept: CARDIAC REHAB | Facility: HOSPITAL | Age: 80
End: 2020-07-10
Attending: INTERNAL MEDICINE
Payer: MEDICARE

## 2020-07-10 LAB — GLUCOSE BLDC GLUCOMTR-MCNC: 152 MG/DL (ref 70–99)

## 2020-07-10 PROCEDURE — 82962 GLUCOSE BLOOD TEST: CPT

## 2020-07-10 PROCEDURE — 93798 PHYS/QHP OP CAR RHAB W/ECG: CPT

## 2020-07-11 LAB — GLUCOSE BLDC GLUCOMTR-MCNC: 182 MG/DL (ref 70–99)

## 2020-07-13 ENCOUNTER — CARDPULM VISIT (OUTPATIENT)
Dept: CARDIAC REHAB | Facility: HOSPITAL | Age: 80
End: 2020-07-13
Attending: INTERNAL MEDICINE
Payer: MEDICARE

## 2020-07-13 ENCOUNTER — TELEPHONE (OUTPATIENT)
Dept: INTERNAL MEDICINE CLINIC | Facility: CLINIC | Age: 80
End: 2020-07-13

## 2020-07-13 PROCEDURE — 93798 PHYS/QHP OP CAR RHAB W/ECG: CPT

## 2020-07-13 NOTE — TELEPHONE ENCOUNTER
Patient called and relayed Dr Alessandra Hall message. Patient verbalized understanding with no further questions noted.

## 2020-07-15 ENCOUNTER — CARDPULM VISIT (OUTPATIENT)
Dept: CARDIAC REHAB | Facility: HOSPITAL | Age: 80
End: 2020-07-15
Attending: INTERNAL MEDICINE
Payer: MEDICARE

## 2020-07-15 PROCEDURE — 93798 PHYS/QHP OP CAR RHAB W/ECG: CPT

## 2020-07-17 ENCOUNTER — CARDPULM VISIT (OUTPATIENT)
Dept: CARDIAC REHAB | Facility: HOSPITAL | Age: 80
End: 2020-07-17
Attending: INTERNAL MEDICINE
Payer: MEDICARE

## 2020-07-17 PROCEDURE — 93798 PHYS/QHP OP CAR RHAB W/ECG: CPT

## 2020-07-20 ENCOUNTER — CARDPULM VISIT (OUTPATIENT)
Dept: CARDIAC REHAB | Facility: HOSPITAL | Age: 80
End: 2020-07-20
Attending: INTERNAL MEDICINE
Payer: MEDICARE

## 2020-07-20 PROCEDURE — 93798 PHYS/QHP OP CAR RHAB W/ECG: CPT

## 2020-07-21 ENCOUNTER — OFFICE VISIT (OUTPATIENT)
Dept: INTERNAL MEDICINE CLINIC | Facility: CLINIC | Age: 80
End: 2020-07-21
Payer: COMMERCIAL

## 2020-07-21 VITALS
WEIGHT: 215.81 LBS | DIASTOLIC BLOOD PRESSURE: 60 MMHG | HEART RATE: 70 BPM | HEIGHT: 65 IN | BODY MASS INDEX: 35.96 KG/M2 | OXYGEN SATURATION: 99 % | SYSTOLIC BLOOD PRESSURE: 114 MMHG | TEMPERATURE: 99 F

## 2020-07-21 DIAGNOSIS — E78.5 HYPERLIPIDEMIA, UNSPECIFIED HYPERLIPIDEMIA TYPE: ICD-10-CM

## 2020-07-21 DIAGNOSIS — Z95.5 H/O HEART ARTERY STENT: Primary | ICD-10-CM

## 2020-07-21 DIAGNOSIS — E11.9 TYPE 2 DIABETES MELLITUS WITHOUT COMPLICATION, WITHOUT LONG-TERM CURRENT USE OF INSULIN (HCC): ICD-10-CM

## 2020-07-21 DIAGNOSIS — I10 HYPERTENSION, BENIGN: ICD-10-CM

## 2020-07-21 PROCEDURE — 3074F SYST BP LT 130 MM HG: CPT | Performed by: INTERNAL MEDICINE

## 2020-07-21 PROCEDURE — 3008F BODY MASS INDEX DOCD: CPT | Performed by: INTERNAL MEDICINE

## 2020-07-21 PROCEDURE — 99214 OFFICE O/P EST MOD 30 MIN: CPT | Performed by: INTERNAL MEDICINE

## 2020-07-21 PROCEDURE — 3078F DIAST BP <80 MM HG: CPT | Performed by: INTERNAL MEDICINE

## 2020-07-21 PROCEDURE — G0463 HOSPITAL OUTPT CLINIC VISIT: HCPCS | Performed by: INTERNAL MEDICINE

## 2020-07-21 RX ORDER — ATORVASTATIN CALCIUM 40 MG/1
40 TABLET, FILM COATED ORAL NIGHTLY
COMMUNITY
Start: 2020-06-26

## 2020-07-21 NOTE — PROGRESS NOTES
Betsy Main is a 78year old female. HPI:   She had two stents placed LAD and diagonal 6/2020 -  She had sx of dizziness but not chest pain prior to this procedure. Dizziness is improved but still there. She is feeling generally improved.      She TWICE DAILY   1   • ALPRAZolam 0.25 MG Oral Tab Take 1 tablet (0.25 mg total) by mouth nightly as needed. 30 tablet 0   • Oxybutynin Chloride ER 15 MG Oral Tablet 24 Hr Take 15 mg by mouth nightly.        • Polyethyl Glycol-Propyl Glycol (SYSTANE ULTRA) 0.4 GENERAL: well developed, well nourished,in no apparent distress  SKIN: no rashes,no suspicious lesions  HEENT: atraumatic, normocephalic,ears and throat are clear  NECK: supple,no adenopathy,no bruits  LUNGS: clear to auscultation  CARDIO: RRR without mu

## 2020-07-22 ENCOUNTER — CARDPULM VISIT (OUTPATIENT)
Dept: CARDIAC REHAB | Facility: HOSPITAL | Age: 80
End: 2020-07-22
Attending: INTERNAL MEDICINE
Payer: MEDICARE

## 2020-07-22 PROCEDURE — 93798 PHYS/QHP OP CAR RHAB W/ECG: CPT

## 2020-07-24 ENCOUNTER — CARDPULM VISIT (OUTPATIENT)
Dept: CARDIAC REHAB | Facility: HOSPITAL | Age: 80
End: 2020-07-24
Attending: INTERNAL MEDICINE
Payer: MEDICARE

## 2020-07-24 PROCEDURE — 93798 PHYS/QHP OP CAR RHAB W/ECG: CPT

## 2020-07-27 ENCOUNTER — CARDPULM VISIT (OUTPATIENT)
Dept: CARDIAC REHAB | Facility: HOSPITAL | Age: 80
End: 2020-07-27
Attending: INTERNAL MEDICINE
Payer: MEDICARE

## 2020-07-27 PROCEDURE — 93798 PHYS/QHP OP CAR RHAB W/ECG: CPT

## 2020-07-29 ENCOUNTER — CARDPULM VISIT (OUTPATIENT)
Dept: CARDIAC REHAB | Facility: HOSPITAL | Age: 80
End: 2020-07-29
Attending: INTERNAL MEDICINE
Payer: MEDICARE

## 2020-07-29 PROCEDURE — 93798 PHYS/QHP OP CAR RHAB W/ECG: CPT

## 2020-07-31 ENCOUNTER — CARDPULM VISIT (OUTPATIENT)
Dept: CARDIAC REHAB | Facility: HOSPITAL | Age: 80
End: 2020-07-31
Attending: INTERNAL MEDICINE
Payer: MEDICARE

## 2020-07-31 PROCEDURE — 93798 PHYS/QHP OP CAR RHAB W/ECG: CPT

## 2020-08-03 ENCOUNTER — CARDPULM VISIT (OUTPATIENT)
Dept: CARDIAC REHAB | Facility: HOSPITAL | Age: 80
End: 2020-08-03
Attending: INTERNAL MEDICINE
Payer: MEDICARE

## 2020-08-03 PROCEDURE — 93798 PHYS/QHP OP CAR RHAB W/ECG: CPT

## 2020-08-05 ENCOUNTER — CARDPULM VISIT (OUTPATIENT)
Dept: CARDIAC REHAB | Facility: HOSPITAL | Age: 80
End: 2020-08-05
Attending: INTERNAL MEDICINE
Payer: MEDICARE

## 2020-08-05 PROCEDURE — 93798 PHYS/QHP OP CAR RHAB W/ECG: CPT

## 2020-08-07 ENCOUNTER — CARDPULM VISIT (OUTPATIENT)
Dept: CARDIAC REHAB | Facility: HOSPITAL | Age: 80
End: 2020-08-07
Attending: INTERNAL MEDICINE
Payer: MEDICARE

## 2020-08-07 PROCEDURE — 93798 PHYS/QHP OP CAR RHAB W/ECG: CPT

## 2020-08-10 ENCOUNTER — CARDPULM VISIT (OUTPATIENT)
Dept: CARDIAC REHAB | Facility: HOSPITAL | Age: 80
End: 2020-08-10
Attending: INTERNAL MEDICINE
Payer: MEDICARE

## 2020-08-10 PROCEDURE — 93798 PHYS/QHP OP CAR RHAB W/ECG: CPT

## 2020-08-12 ENCOUNTER — CARDPULM VISIT (OUTPATIENT)
Dept: CARDIAC REHAB | Facility: HOSPITAL | Age: 80
End: 2020-08-12
Attending: INTERNAL MEDICINE
Payer: MEDICARE

## 2020-08-12 PROCEDURE — 93798 PHYS/QHP OP CAR RHAB W/ECG: CPT

## 2020-08-13 ENCOUNTER — TELEPHONE (OUTPATIENT)
Dept: ENDOCRINOLOGY CLINIC | Facility: CLINIC | Age: 80
End: 2020-08-13

## 2020-08-13 NOTE — TELEPHONE ENCOUNTER
•  TRULICITY 9.99 WA/0.8KS Subcutaneous Solution Pen-injector, INJECT THE CONTENTS OF 1  PEN SUBCUTANEOUSLY WEEKLY  AS DIRECTED, Disp: 6 mL, Rfl: 0

## 2020-08-13 NOTE — TELEPHONE ENCOUNTER
LOV: 06/11/20  LR: 05/27/20    Future Appointments   Date Time Provider Rosemary Mayo   10/15/2020 11:00 AM Jakob Cheney MD [address] [place] [address]

## 2020-08-14 ENCOUNTER — CARDPULM VISIT (OUTPATIENT)
Dept: CARDIAC REHAB | Facility: HOSPITAL | Age: 80
End: 2020-08-14
Attending: INTERNAL MEDICINE
Payer: MEDICARE

## 2020-08-14 PROCEDURE — 93798 PHYS/QHP OP CAR RHAB W/ECG: CPT

## 2020-08-17 ENCOUNTER — CARDPULM VISIT (OUTPATIENT)
Dept: CARDIAC REHAB | Facility: HOSPITAL | Age: 80
End: 2020-08-17
Attending: INTERNAL MEDICINE
Payer: MEDICARE

## 2020-08-17 PROCEDURE — 93798 PHYS/QHP OP CAR RHAB W/ECG: CPT

## 2020-08-19 ENCOUNTER — CARDPULM VISIT (OUTPATIENT)
Dept: CARDIAC REHAB | Facility: HOSPITAL | Age: 80
End: 2020-08-19
Attending: INTERNAL MEDICINE
Payer: MEDICARE

## 2020-08-19 ENCOUNTER — OFFICE VISIT (OUTPATIENT)
Dept: CARDIOLOGY | Age: 80
End: 2020-08-19

## 2020-08-19 VITALS
BODY MASS INDEX: 35.49 KG/M2 | OXYGEN SATURATION: 97 % | DIASTOLIC BLOOD PRESSURE: 60 MMHG | HEART RATE: 77 BPM | SYSTOLIC BLOOD PRESSURE: 108 MMHG | RESPIRATION RATE: 18 BRPM | WEIGHT: 213 LBS | HEIGHT: 65 IN

## 2020-08-19 DIAGNOSIS — I25.10 CORONARY ARTERY DISEASE INVOLVING NATIVE CORONARY ARTERY OF NATIVE HEART WITHOUT ANGINA PECTORIS: Primary | ICD-10-CM

## 2020-08-19 DIAGNOSIS — E78.00 PURE HYPERCHOLESTEROLEMIA: ICD-10-CM

## 2020-08-19 PROCEDURE — 3074F SYST BP LT 130 MM HG: CPT | Performed by: INTERNAL MEDICINE

## 2020-08-19 PROCEDURE — 99214 OFFICE O/P EST MOD 30 MIN: CPT | Performed by: INTERNAL MEDICINE

## 2020-08-19 PROCEDURE — 3078F DIAST BP <80 MM HG: CPT | Performed by: INTERNAL MEDICINE

## 2020-08-19 PROCEDURE — 93798 PHYS/QHP OP CAR RHAB W/ECG: CPT

## 2020-08-19 ASSESSMENT — PATIENT HEALTH QUESTIONNAIRE - PHQ9
SUM OF ALL RESPONSES TO PHQ9 QUESTIONS 1 AND 2: 0
2. FEELING DOWN, DEPRESSED OR HOPELESS: NOT AT ALL
SUM OF ALL RESPONSES TO PHQ9 QUESTIONS 1 AND 2: 0
1. LITTLE INTEREST OR PLEASURE IN DOING THINGS: NOT AT ALL
CLINICAL INTERPRETATION OF PHQ9 SCORE: NO FURTHER SCREENING NEEDED
CLINICAL INTERPRETATION OF PHQ2 SCORE: NO FURTHER SCREENING NEEDED

## 2020-08-21 ENCOUNTER — CARDPULM VISIT (OUTPATIENT)
Dept: CARDIAC REHAB | Facility: HOSPITAL | Age: 80
End: 2020-08-21
Attending: INTERNAL MEDICINE
Payer: MEDICARE

## 2020-08-21 DIAGNOSIS — E11.9 TYPE 2 DIABETES MELLITUS WITHOUT COMPLICATION, WITHOUT LONG-TERM CURRENT USE OF INSULIN (HCC): ICD-10-CM

## 2020-08-21 PROCEDURE — 93798 PHYS/QHP OP CAR RHAB W/ECG: CPT

## 2020-08-21 RX ORDER — EMPAGLIFLOZIN 25 MG/1
TABLET, FILM COATED ORAL
Qty: 90 TABLET | Refills: 3 | Status: SHIPPED | OUTPATIENT
Start: 2020-08-21 | End: 2021-10-18

## 2020-08-21 RX ORDER — METFORMIN HYDROCHLORIDE 500 MG/1
TABLET, EXTENDED RELEASE ORAL
Qty: 360 TABLET | Refills: 3 | Status: SHIPPED | OUTPATIENT
Start: 2020-08-21 | End: 2021-10-18

## 2020-08-21 RX ORDER — GLIMEPIRIDE 4 MG/1
TABLET ORAL
Qty: 180 TABLET | Refills: 3 | Status: SHIPPED | OUTPATIENT
Start: 2020-08-21 | End: 2021-10-18

## 2020-08-21 NOTE — TELEPHONE ENCOUNTER
LOV: 06/11/20  LR: 06/18/20    Future Appointments   Date Time Provider Rosemary Mayo   10/15/2020 11:00 AM Jeannine Smith MD [address] [place] [address]

## 2020-08-24 ENCOUNTER — CARDPULM VISIT (OUTPATIENT)
Dept: CARDIAC REHAB | Facility: HOSPITAL | Age: 80
End: 2020-08-24
Attending: INTERNAL MEDICINE
Payer: MEDICARE

## 2020-08-24 PROCEDURE — 93798 PHYS/QHP OP CAR RHAB W/ECG: CPT

## 2020-08-26 ENCOUNTER — CARDPULM VISIT (OUTPATIENT)
Dept: CARDIAC REHAB | Facility: HOSPITAL | Age: 80
End: 2020-08-26
Attending: INTERNAL MEDICINE
Payer: MEDICARE

## 2020-08-26 PROCEDURE — 93798 PHYS/QHP OP CAR RHAB W/ECG: CPT

## 2020-08-28 ENCOUNTER — CARDPULM VISIT (OUTPATIENT)
Dept: CARDIAC REHAB | Facility: HOSPITAL | Age: 80
End: 2020-08-28
Attending: INTERNAL MEDICINE
Payer: MEDICARE

## 2020-08-28 PROCEDURE — 93798 PHYS/QHP OP CAR RHAB W/ECG: CPT

## 2020-08-31 ENCOUNTER — CARDPULM VISIT (OUTPATIENT)
Dept: CARDIAC REHAB | Facility: HOSPITAL | Age: 80
End: 2020-08-31
Attending: INTERNAL MEDICINE
Payer: MEDICARE

## 2020-08-31 PROCEDURE — 93798 PHYS/QHP OP CAR RHAB W/ECG: CPT

## 2020-09-01 ENCOUNTER — APPOINTMENT (OUTPATIENT)
Dept: LAB | Facility: HOSPITAL | Age: 80
End: 2020-09-01
Attending: INTERNAL MEDICINE
Payer: MEDICARE

## 2020-09-01 LAB
ALBUMIN SERPL-MCNC: 3.7 G/DL
ALBUMIN SERPL-MCNC: 3.7 G/DL (ref 3.4–5)
ALBUMIN/GLOB SERPL: 1 {RATIO} (ref 1–2)
ALP LIVER SERPL-CCNC: 59 U/L (ref 55–142)
ALP SERPL-CCNC: 59 U/L
ALT SERPL-CCNC: 27 U/L (ref 13–56)
ALT SERPL-CCNC: 27 UNITS/L
ANION GAP SERPL CALC-SCNC: 7 MMOL/L
ANION GAP SERPL CALC-SCNC: 7 MMOL/L (ref 0–18)
AST SERPL-CCNC: 18 U/L (ref 15–37)
AST SERPL-CCNC: 18 UNITS/L
BASOPHILS # BLD AUTO: 0.03 X10(3) UL (ref 0–0.2)
BASOPHILS NFR BLD AUTO: 0.4 %
BILIRUB SERPL-MCNC: 0.4 MG/DL
BILIRUB SERPL-MCNC: 0.4 MG/DL (ref 0.1–2)
BUN BLD-MCNC: 19 MG/DL (ref 7–18)
BUN SERPL-MCNC: 19 MG/DL
BUN/CREAT SERPL: 21.8
BUN/CREAT SERPL: 21.8 (ref 10–20)
CALCIUM BLD-MCNC: 9.7 MG/DL (ref 8.5–10.1)
CALCIUM SERPL-MCNC: 9.7 MG/DL
CHLORIDE SERPL-SCNC: 106 MMOL/L
CHLORIDE SERPL-SCNC: 106 MMOL/L (ref 98–112)
CHOLEST SERPL-MCNC: 107 MG/DL
CO2 SERPL-SCNC: 27 MMOL/L
CO2 SERPL-SCNC: 27 MMOL/L (ref 21–32)
CREAT BLD-MCNC: 0.87 MG/DL (ref 0.55–1.02)
CREAT SERPL-MCNC: 0.87 MG/DL
DEPRECATED RDW RBC AUTO: 48.4 FL (ref 35.1–46.3)
EOSINOPHIL # BLD AUTO: 0.14 X10(3) UL (ref 0–0.7)
EOSINOPHIL NFR BLD AUTO: 1.7 %
ERYTHROCYTE [DISTWIDTH] IN BLOOD BY AUTOMATED COUNT: 14.1 % (ref 11–15)
GLOBULIN PLAS-MCNC: 3.6 G/DL (ref 2.8–4.4)
GLOBULIN SER-MCNC: 3.6 G/DL
GLUCOSE BLD-MCNC: 117 MG/DL (ref 70–99)
GLUCOSE SERPL-MCNC: 117 MG/DL
HCT VFR BLD AUTO: 39.9 % (ref 35–48)
HCT VFR BLD CALC: 39.9 %
HDLC SERPL-MCNC: 53 MG/DL
HGB BLD-MCNC: 12.9 G/DL
HGB BLD-MCNC: 12.9 G/DL (ref 12–16)
IMM GRANULOCYTES # BLD AUTO: 0.02 X10(3) UL (ref 0–1)
IMM GRANULOCYTES NFR BLD: 0.2 %
LDLC SERPL CALC-MCNC: 31 MG/DL
LENGTH OF FAST TIME PATIENT: YES H
LENGTH OF FAST TIME PATIENT: YES H
LYMPHOCYTES # BLD AUTO: 2.6 X10(3) UL (ref 1–4)
LYMPHOCYTES NFR BLD AUTO: 32.1 %
M PROTEIN MFR SERPL ELPH: 7.3 G/DL (ref 6.4–8.2)
MCH RBC QN AUTO: 29.9 PG
MCH RBC QN AUTO: 29.9 PG (ref 26–34)
MCHC RBC AUTO-ENTMCNC: 32.3 G/DL
MCHC RBC AUTO-ENTMCNC: 32.3 G/DL (ref 31–37)
MCV RBC AUTO: 92.6 FL
MCV RBC AUTO: 92.6 FL (ref 80–100)
MONOCYTES # BLD AUTO: 0.82 X10(3) UL (ref 0.1–1)
MONOCYTES NFR BLD AUTO: 10.1 %
NEUTROPHILS # BLD AUTO: 4.48 X10 (3) UL (ref 1.5–7.7)
NEUTROPHILS # BLD AUTO: 4.48 X10(3) UL (ref 1.5–7.7)
NEUTROPHILS NFR BLD AUTO: 55.5 %
NONHDLC SERPL-MCNC: 54 MG/DL
OSMOLALITY SERPL CALC.SUM OF ELEC: 293 MOSM/KG (ref 275–295)
PATIENT FASTING Y/N/NP: YES
PLATELET # BLD AUTO: 224 10(3)UL (ref 150–450)
PLATELET # BLD: 224 K/MCL
POTASSIUM SERPL-SCNC: 3.7 MMOL/L
POTASSIUM SERPL-SCNC: 3.7 MMOL/L (ref 3.5–5.1)
PROT SERPL-MCNC: 7.3 G/DL
RBC # BLD AUTO: 4.31 X10(6)UL (ref 3.8–5.3)
RBC # BLD: 4.31 10*6/UL
SODIUM SERPL-SCNC: 140 MMOL/L
SODIUM SERPL-SCNC: 140 MMOL/L (ref 136–145)
TRIGL SERPL-MCNC: 116 MG/DL
TSI SER-ACNC: 2.07 MIU/ML (ref 0.36–3.74)
VLDLC SERPL CALC-MCNC: 23 MG/DL
WBC # BLD AUTO: 8.1 X10(3) UL (ref 4–11)
WBC # BLD: 8.1 K/MCL

## 2020-09-01 PROCEDURE — 36415 COLL VENOUS BLD VENIPUNCTURE: CPT | Performed by: INTERNAL MEDICINE

## 2020-09-01 PROCEDURE — 85025 COMPLETE CBC W/AUTO DIFF WBC: CPT | Performed by: INTERNAL MEDICINE

## 2020-09-01 PROCEDURE — 80061 LIPID PANEL: CPT | Performed by: INTERNAL MEDICINE

## 2020-09-01 PROCEDURE — 84443 ASSAY THYROID STIM HORMONE: CPT | Performed by: INTERNAL MEDICINE

## 2020-09-01 PROCEDURE — 80053 COMPREHEN METABOLIC PANEL: CPT | Performed by: INTERNAL MEDICINE

## 2020-09-02 ENCOUNTER — CARDPULM VISIT (OUTPATIENT)
Dept: CARDIAC REHAB | Facility: HOSPITAL | Age: 80
End: 2020-09-02
Attending: INTERNAL MEDICINE
Payer: MEDICARE

## 2020-09-02 PROCEDURE — 93798 PHYS/QHP OP CAR RHAB W/ECG: CPT

## 2020-09-04 ENCOUNTER — CARDPULM VISIT (OUTPATIENT)
Dept: CARDIAC REHAB | Facility: HOSPITAL | Age: 80
End: 2020-09-04
Attending: INTERNAL MEDICINE
Payer: MEDICARE

## 2020-09-08 ENCOUNTER — OFFICE VISIT (OUTPATIENT)
Dept: INTERNAL MEDICINE CLINIC | Facility: CLINIC | Age: 80
End: 2020-09-08
Payer: COMMERCIAL

## 2020-09-08 VITALS
DIASTOLIC BLOOD PRESSURE: 58 MMHG | BODY MASS INDEX: 35.65 KG/M2 | TEMPERATURE: 97 F | SYSTOLIC BLOOD PRESSURE: 120 MMHG | HEART RATE: 68 BPM | RESPIRATION RATE: 16 BRPM | OXYGEN SATURATION: 98 % | HEIGHT: 65 IN | WEIGHT: 214 LBS

## 2020-09-08 DIAGNOSIS — E53.8 B12 DEFICIENCY: ICD-10-CM

## 2020-09-08 DIAGNOSIS — E66.01 CLASS 2 SEVERE OBESITY WITH BODY MASS INDEX (BMI) OF 35 TO 39.9 WITH SERIOUS COMORBIDITY (HCC): Chronic | ICD-10-CM

## 2020-09-08 DIAGNOSIS — Z00.00 ENCOUNTER FOR MEDICARE ANNUAL WELLNESS EXAM: Primary | ICD-10-CM

## 2020-09-08 DIAGNOSIS — M15.9 PRIMARY OSTEOARTHRITIS INVOLVING MULTIPLE JOINTS: ICD-10-CM

## 2020-09-08 DIAGNOSIS — E78.5 HYPERLIPIDEMIA, UNSPECIFIED HYPERLIPIDEMIA TYPE: ICD-10-CM

## 2020-09-08 DIAGNOSIS — I25.110 CORONARY ARTERY DISEASE INVOLVING NATIVE CORONARY ARTERY OF NATIVE HEART WITH UNSTABLE ANGINA PECTORIS (HCC): ICD-10-CM

## 2020-09-08 DIAGNOSIS — E11.9 TYPE 2 DIABETES MELLITUS WITHOUT COMPLICATION, WITHOUT LONG-TERM CURRENT USE OF INSULIN (HCC): ICD-10-CM

## 2020-09-08 DIAGNOSIS — I10 HYPERTENSION, BENIGN: ICD-10-CM

## 2020-09-08 DIAGNOSIS — Z95.5 H/O HEART ARTERY STENT: ICD-10-CM

## 2020-09-08 PROCEDURE — 90732 PPSV23 VACC 2 YRS+ SUBQ/IM: CPT | Performed by: INTERNAL MEDICINE

## 2020-09-08 PROCEDURE — G0009 ADMIN PNEUMOCOCCAL VACCINE: HCPCS | Performed by: INTERNAL MEDICINE

## 2020-09-08 PROCEDURE — G0439 PPPS, SUBSEQ VISIT: HCPCS | Performed by: INTERNAL MEDICINE

## 2020-09-08 PROCEDURE — 3078F DIAST BP <80 MM HG: CPT | Performed by: INTERNAL MEDICINE

## 2020-09-08 PROCEDURE — 3074F SYST BP LT 130 MM HG: CPT | Performed by: INTERNAL MEDICINE

## 2020-09-08 PROCEDURE — 3008F BODY MASS INDEX DOCD: CPT | Performed by: INTERNAL MEDICINE

## 2020-09-08 PROCEDURE — 99397 PER PM REEVAL EST PAT 65+ YR: CPT | Performed by: INTERNAL MEDICINE

## 2020-09-08 PROCEDURE — 96160 PT-FOCUSED HLTH RISK ASSMT: CPT | Performed by: INTERNAL MEDICINE

## 2020-09-08 RX ORDER — MULTIVIT,IRON,MINERALS/LUTEIN
TABLET ORAL DAILY
COMMUNITY

## 2020-09-08 NOTE — PROGRESS NOTES
Lisa Vargas is a 78year old female. HPI:   She is here for annual [address] [facility] wellness exam.     She has been feeling well.  No new issues     CAD -  She has total of three coronary stents, one in 11/2017, two stents 6/12/20120 - she had recurrent SOB and fa Message routed to Dr Aguilar's pools to address.    200 each 1   • losartan Potassium 50 MG Oral Tab Take 1 tablet (50 mg total) by mouth daily. 90 tablet 3   • JUBLIA 10 % External Solution daily. • hydrochlorothiazide 12.5 MG Oral Tab Take 1 tablet (12.5 mg total) by mouth daily.  90 tablet 3   • Pim heartburn  NEURO: denies headaches    EXAM:   /58   Pulse 68   Temp 96.8 °F (36 °C) (Temporal)   Resp 16   Ht 5' 5\" (1.651 m)   Wt 214 lb (97.1 kg)   LMP  (LMP Unknown)   SpO2 98%   BMI 35.61 kg/m²   GENERAL: well developed, well nourished,in no megha Fall/Risk Scorin          Depression Screening (PHQ-2/PHQ-9): Over the LAST 2 WEEKS   Little interest or pleasure in doing things (over the last two weeks)?: Not at all    Feeling down, depressed, or hopeless (over the las \"Ball\": Correct    Recall \"Flag\": Correct    Recall \"Tree\": Correct        Bijal Perez's SCREENING SCHEDULE   Tests on this list are recommended by your physician but may not be covered, or covered at this frequency, by your insurer.  Please soo Maintenance if applicable   Immunizations      Influenza No orders found for this or any previous visit.  Update Immunization Activity if applicable    Pneumococcal Orders placed or performed in visit on 04/21/15   • PNEUMOCOCCAL VACC, 13 GLENIS IM    Update I colonoscopy about 2 years ago and is now beyond the age of routine surveillance colonoscopy. She will receive a Pneumovax 23 today. 2. H/O heart artery stent  Doing well post intervention and no symptoms since June 2020.   She is completing cardiac reha

## 2020-09-09 ENCOUNTER — CARDPULM VISIT (OUTPATIENT)
Dept: CARDIAC REHAB | Facility: HOSPITAL | Age: 80
End: 2020-09-09
Attending: INTERNAL MEDICINE
Payer: MEDICARE

## 2020-09-09 PROCEDURE — 93798 PHYS/QHP OP CAR RHAB W/ECG: CPT

## 2020-09-11 ENCOUNTER — CARDPULM VISIT (OUTPATIENT)
Dept: CARDIAC REHAB | Facility: HOSPITAL | Age: 80
End: 2020-09-11
Attending: INTERNAL MEDICINE
Payer: MEDICARE

## 2020-09-11 PROCEDURE — 93798 PHYS/QHP OP CAR RHAB W/ECG: CPT

## 2020-09-14 ENCOUNTER — CARDPULM VISIT (OUTPATIENT)
Dept: CARDIAC REHAB | Facility: HOSPITAL | Age: 80
End: 2020-09-14
Attending: INTERNAL MEDICINE
Payer: MEDICARE

## 2020-09-14 PROCEDURE — 93798 PHYS/QHP OP CAR RHAB W/ECG: CPT

## 2020-09-16 ENCOUNTER — CARDPULM VISIT (OUTPATIENT)
Dept: CARDIAC REHAB | Facility: HOSPITAL | Age: 80
End: 2020-09-16
Attending: INTERNAL MEDICINE
Payer: MEDICARE

## 2020-09-16 PROCEDURE — 93798 PHYS/QHP OP CAR RHAB W/ECG: CPT

## 2020-09-17 RX ORDER — HYDROCHLOROTHIAZIDE 12.5 MG/1
TABLET ORAL
Qty: 90 TABLET | Refills: 3 | Status: SHIPPED | OUTPATIENT
Start: 2020-09-17 | End: 2021-01-08 | Stop reason: ALTCHOICE

## 2020-09-18 ENCOUNTER — CARDPULM VISIT (OUTPATIENT)
Dept: CARDIAC REHAB | Facility: HOSPITAL | Age: 80
End: 2020-09-18
Attending: INTERNAL MEDICINE
Payer: MEDICARE

## 2020-09-18 PROCEDURE — 93798 PHYS/QHP OP CAR RHAB W/ECG: CPT

## 2020-09-21 ENCOUNTER — CARDPULM VISIT (OUTPATIENT)
Dept: CARDIAC REHAB | Facility: HOSPITAL | Age: 80
End: 2020-09-21
Attending: INTERNAL MEDICINE
Payer: MEDICARE

## 2020-09-21 PROCEDURE — 93798 PHYS/QHP OP CAR RHAB W/ECG: CPT

## 2020-09-23 ENCOUNTER — CARDPULM VISIT (OUTPATIENT)
Dept: CARDIAC REHAB | Facility: HOSPITAL | Age: 80
End: 2020-09-23
Attending: INTERNAL MEDICINE
Payer: MEDICARE

## 2020-09-23 PROCEDURE — 93798 PHYS/QHP OP CAR RHAB W/ECG: CPT

## 2020-09-25 ENCOUNTER — CARDPULM VISIT (OUTPATIENT)
Dept: CARDIAC REHAB | Facility: HOSPITAL | Age: 80
End: 2020-09-25
Attending: INTERNAL MEDICINE
Payer: MEDICARE

## 2020-09-25 PROCEDURE — 93798 PHYS/QHP OP CAR RHAB W/ECG: CPT

## 2020-09-28 ENCOUNTER — CARDPULM VISIT (OUTPATIENT)
Dept: CARDIAC REHAB | Facility: HOSPITAL | Age: 80
End: 2020-09-28
Attending: INTERNAL MEDICINE
Payer: MEDICARE

## 2020-09-28 PROCEDURE — 93798 PHYS/QHP OP CAR RHAB W/ECG: CPT

## 2020-09-30 ENCOUNTER — CARDPULM VISIT (OUTPATIENT)
Dept: CARDIAC REHAB | Facility: HOSPITAL | Age: 80
End: 2020-09-30
Attending: INTERNAL MEDICINE
Payer: MEDICARE

## 2020-09-30 PROCEDURE — 93798 PHYS/QHP OP CAR RHAB W/ECG: CPT

## 2020-10-02 ENCOUNTER — APPOINTMENT (OUTPATIENT)
Dept: CARDIAC REHAB | Facility: HOSPITAL | Age: 80
End: 2020-10-02
Attending: INTERNAL MEDICINE
Payer: MEDICARE

## 2020-10-05 ENCOUNTER — APPOINTMENT (OUTPATIENT)
Dept: CARDIAC REHAB | Facility: HOSPITAL | Age: 80
End: 2020-10-05
Attending: INTERNAL MEDICINE
Payer: MEDICARE

## 2020-10-15 ENCOUNTER — OFFICE VISIT (OUTPATIENT)
Dept: ENDOCRINOLOGY CLINIC | Facility: CLINIC | Age: 80
End: 2020-10-15
Payer: COMMERCIAL

## 2020-10-15 VITALS
HEART RATE: 76 BPM | HEIGHT: 65 IN | SYSTOLIC BLOOD PRESSURE: 130 MMHG | WEIGHT: 209 LBS | BODY MASS INDEX: 34.82 KG/M2 | RESPIRATION RATE: 16 BRPM | DIASTOLIC BLOOD PRESSURE: 60 MMHG

## 2020-10-15 DIAGNOSIS — E11.9 CONTROLLED TYPE 2 DIABETES MELLITUS WITHOUT COMPLICATION, WITHOUT LONG-TERM CURRENT USE OF INSULIN (HCC): Primary | ICD-10-CM

## 2020-10-15 PROCEDURE — 3008F BODY MASS INDEX DOCD: CPT | Performed by: INTERNAL MEDICINE

## 2020-10-15 PROCEDURE — 83036 HEMOGLOBIN GLYCOSYLATED A1C: CPT | Performed by: INTERNAL MEDICINE

## 2020-10-15 PROCEDURE — 36416 COLLJ CAPILLARY BLOOD SPEC: CPT | Performed by: INTERNAL MEDICINE

## 2020-10-15 PROCEDURE — 99213 OFFICE O/P EST LOW 20 MIN: CPT | Performed by: INTERNAL MEDICINE

## 2020-10-15 PROCEDURE — 3078F DIAST BP <80 MM HG: CPT | Performed by: INTERNAL MEDICINE

## 2020-10-15 PROCEDURE — 3075F SYST BP GE 130 - 139MM HG: CPT | Performed by: INTERNAL MEDICINE

## 2020-10-15 PROCEDURE — 82947 ASSAY GLUCOSE BLOOD QUANT: CPT | Performed by: INTERNAL MEDICINE

## 2020-10-15 RX ORDER — DULAGLUTIDE 1.5 MG/.5ML
1.5 INJECTION, SOLUTION SUBCUTANEOUS WEEKLY
Qty: 12 PEN | Refills: 1 | Status: SHIPPED | OUTPATIENT
Start: 2020-10-15 | End: 2020-12-17

## 2020-10-15 NOTE — PROGRESS NOTES
Name: Power Polanco  Date: 10/15/2020    Referring Physician: No ref. provider found    HISTORY OF PRESENT ILLNESS   Power Polanco is a 78year old female who presents for diabetes mellitus. Since last visit her  passed away in 2/2018. 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

## 2020-10-27 ENCOUNTER — HOSPITAL ENCOUNTER (OUTPATIENT)
Dept: ENDOCRINOLOGY | Facility: HOSPITAL | Age: 80
Discharge: HOME | End: 2020-10-27
Attending: INTERNAL MEDICINE
Payer: MEDICARE

## 2020-10-27 VITALS — WEIGHT: 210.88 LBS | BODY MASS INDEX: 35 KG/M2

## 2020-10-27 DIAGNOSIS — E11.9 CONTROLLED TYPE 2 DIABETES MELLITUS WITHOUT COMPLICATION, WITHOUT LONG-TERM CURRENT USE OF INSULIN (HCC): ICD-10-CM

## 2020-10-27 PROCEDURE — 97802 MEDICAL NUTRITION INDIV IN: CPT

## 2020-10-27 NOTE — PATIENT INSTRUCTIONS
Goals     1. EDC - Healthy (pt-stated)      Note created  10/27/2020  2:04 PM by Radha Cueva RD     I will eat 3 meals per day; one can be more of a snack        2.  EDC - Healthy (pt-stated)      Note created  10/27/2020  2:05 PM by Mateusz Chavarria

## 2020-10-27 NOTE — PROGRESS NOTES
Medical Nutrition Therapy Assessment    Jose Hinsdale 11/6/1940 was seen for individual Diabetic Medical Nutrition Therapy:    Date: 10/27/2020   Start time 36  End time: 200    Assessment  H/o diabetes for at least 10 yrs.   Pt states that her weigh gets too cold, has a stationary bike but rarely uses.       Nutrition Diagnosis  Intake: excessive energy intake r/t intake of calorie-dense, low nutritent foods as evidenced by diet hx  Behavioral and environmental: nutrition and nutrition-related knowledg meal planning goals    [] discussed Mediterranean diet/DASH diet, as appropriate, to address lipids or BP   [] reviewed renal diet components and how to incorporate this with diabetes meal planning       Monitoring  diet modification/understanding, blood s

## 2020-11-16 ENCOUNTER — APPOINTMENT (OUTPATIENT)
Dept: ENDOCRINOLOGY | Facility: HOSPITAL | Age: 80
End: 2020-11-16
Attending: INTERNAL MEDICINE
Payer: MEDICARE

## 2020-11-17 RX ORDER — LOSARTAN POTASSIUM 50 MG/1
TABLET ORAL
Qty: 90 TABLET | Refills: 3 | OUTPATIENT
Start: 2020-11-17

## 2020-11-17 NOTE — TELEPHONE ENCOUNTER
Current refill request refused due to refill is either a duplicate request or has active refills at the pharmacy. Check previous templates.     Requested Prescriptions     Refused Prescriptions Disp Refills   • LOSARTAN POTASSIUM 50 MG Oral Tab [Pharmacy M

## 2020-12-08 ENCOUNTER — OFFICE VISIT (OUTPATIENT)
Dept: INTERNAL MEDICINE CLINIC | Facility: CLINIC | Age: 80
End: 2020-12-08
Payer: COMMERCIAL

## 2020-12-08 VITALS
OXYGEN SATURATION: 98 % | SYSTOLIC BLOOD PRESSURE: 126 MMHG | HEART RATE: 75 BPM | DIASTOLIC BLOOD PRESSURE: 62 MMHG | WEIGHT: 208 LBS | RESPIRATION RATE: 12 BRPM | BODY MASS INDEX: 35 KG/M2 | TEMPERATURE: 98 F

## 2020-12-08 DIAGNOSIS — N63.10 BREAST MASS, RIGHT: ICD-10-CM

## 2020-12-08 DIAGNOSIS — E78.5 HYPERLIPIDEMIA, UNSPECIFIED HYPERLIPIDEMIA TYPE: ICD-10-CM

## 2020-12-08 DIAGNOSIS — I25.110 CORONARY ARTERY DISEASE INVOLVING NATIVE CORONARY ARTERY OF NATIVE HEART WITH UNSTABLE ANGINA PECTORIS (HCC): ICD-10-CM

## 2020-12-08 DIAGNOSIS — I10 HYPERTENSION, BENIGN: Primary | ICD-10-CM

## 2020-12-08 DIAGNOSIS — E11.9 TYPE 2 DIABETES MELLITUS WITHOUT COMPLICATION, WITHOUT LONG-TERM CURRENT USE OF INSULIN (HCC): ICD-10-CM

## 2020-12-08 DIAGNOSIS — Z95.5 H/O HEART ARTERY STENT: ICD-10-CM

## 2020-12-08 PROCEDURE — 3074F SYST BP LT 130 MM HG: CPT | Performed by: INTERNAL MEDICINE

## 2020-12-08 PROCEDURE — G0463 HOSPITAL OUTPT CLINIC VISIT: HCPCS | Performed by: INTERNAL MEDICINE

## 2020-12-08 PROCEDURE — 99214 OFFICE O/P EST MOD 30 MIN: CPT | Performed by: INTERNAL MEDICINE

## 2020-12-08 PROCEDURE — 3078F DIAST BP <80 MM HG: CPT | Performed by: INTERNAL MEDICINE

## 2020-12-16 ENCOUNTER — TELEPHONE (OUTPATIENT)
Dept: ENDOCRINOLOGY CLINIC | Facility: CLINIC | Age: 80
End: 2020-12-16

## 2020-12-16 DIAGNOSIS — E11.9 CONTROLLED TYPE 2 DIABETES MELLITUS WITHOUT COMPLICATION, WITHOUT LONG-TERM CURRENT USE OF INSULIN (HCC): Primary | ICD-10-CM

## 2020-12-16 NOTE — TELEPHONE ENCOUNTER
Dr. Agrawal Square to patient - states itchy redness at injection site began about 3 weeks after dose increase to 1.5mg (LOV: 10/15/20). Patient states she alternates sites each time and even tried upper leg - still itchy with redness the size of palm. Patient has used cortisone cream which helps with itching.     Please advise-thanks

## 2020-12-16 NOTE — TELEPHONE ENCOUNTER
Pt states she is getting red itchy spot from location of  where she is injecting increased dose of trulicity

## 2020-12-17 RX ORDER — SEMAGLUTIDE 1.34 MG/ML
INJECTION, SOLUTION SUBCUTANEOUS
Qty: 4.5 ML | Refills: 0 | Status: SHIPPED | OUTPATIENT
Start: 2020-12-17 | End: 2021-02-14

## 2020-12-17 NOTE — TELEPHONE ENCOUNTER
Ok, noted. Stop Trulicity and hold medication for one week. Then start Ozempic 0.25mg SQ weekly for one month then increase to 0.5mg SQ weekly. This is a similar medication to trulicity. Thanks.

## 2020-12-17 NOTE — TELEPHONE ENCOUNTER
Spoke to patient to relay message below. Patient stated understanding and will start Ozempic on Monday, December 28th at 0.25mg/week.     RX sent to [facility] per patient request

## 2020-12-21 ENCOUNTER — TELEPHONE (OUTPATIENT)
Dept: ENDOCRINOLOGY CLINIC | Facility: CLINIC | Age: 80
End: 2020-12-21

## 2020-12-21 NOTE — TELEPHONE ENCOUNTER
•  Semaglutide,0.25 or 0.5MG/DOS, (OZEMPIC, 0.25 OR 0.5 MG/DOSE,) 2 MG/1.5ML Subcutaneous Solution Pen-injector, Inject 0.25 mg into the skin once a week for 28 days, THEN 0.5 mg once a week., Disp: 4.5 mL, Rfl: 0    PHARMACY COMMENTS: The patient has

## 2020-12-22 NOTE — TELEPHONE ENCOUNTER
Per TE dated 12/16 Dr. Eleni Oden has given the following instructions:       \"Stop Trulicity and hold medication for one week. Then start Ozempic 0.25mg SQ weekly for one month then increase to 0.5mg SQ weekly. This is a similar medication to trulicity.   Georgina Koenig

## 2020-12-29 RX ORDER — LOSARTAN POTASSIUM 50 MG/1
TABLET ORAL
Qty: 90 TABLET | Refills: 3 | Status: SHIPPED | OUTPATIENT
Start: 2020-12-29 | End: 2021-01-08 | Stop reason: ALTCHOICE

## 2020-12-31 NOTE — TELEPHONE ENCOUNTER
LM for pt to discuss weaning
Pt returned your call    Please call again on cell# [phone]
Refill pended for Dr. [employer] review.
Spoke to pt --- we discussed BZD use has been minimal and now she only uses when she travels; We agreed to change directions and qty;   Pt will call back with any issues
Detail Level: Detailed

## 2021-01-06 ENCOUNTER — TELEPHONE (OUTPATIENT)
Dept: CARDIOLOGY | Age: 81
End: 2021-01-06

## 2021-01-06 ENCOUNTER — HOSPITAL ENCOUNTER (OUTPATIENT)
Dept: ULTRASOUND IMAGING | Facility: HOSPITAL | Age: 81
Discharge: HOME | End: 2021-01-06
Attending: SURGERY
Payer: MEDICARE

## 2021-01-06 ENCOUNTER — HOSPITAL ENCOUNTER (OUTPATIENT)
Dept: ULTRASOUND IMAGING | Facility: HOSPITAL | Age: 81
Discharge: HOME | End: 2021-01-06
Attending: INTERNAL MEDICINE
Payer: MEDICARE

## 2021-01-06 ENCOUNTER — HOSPITAL ENCOUNTER (OUTPATIENT)
Dept: MAMMOGRAPHY | Facility: HOSPITAL | Age: 81
Discharge: HOME | End: 2021-01-06
Attending: SURGERY
Payer: MEDICARE

## 2021-01-06 VITALS — RESPIRATION RATE: 16 BRPM | HEART RATE: 70 BPM | DIASTOLIC BLOOD PRESSURE: 50 MMHG | SYSTOLIC BLOOD PRESSURE: 150 MMHG

## 2021-01-06 VITALS — DIASTOLIC BLOOD PRESSURE: 70 MMHG | RESPIRATION RATE: 16 BRPM | HEART RATE: 64 BPM | SYSTOLIC BLOOD PRESSURE: 154 MMHG

## 2021-01-06 DIAGNOSIS — N63.20 LEFT BREAST MASS: ICD-10-CM

## 2021-01-06 DIAGNOSIS — I25.10 CAD (CORONARY ARTERY DISEASE): ICD-10-CM

## 2021-01-06 DIAGNOSIS — R06.00 DOE (DYSPNEA ON EXERTION): ICD-10-CM

## 2021-01-06 PROCEDURE — 76642 ULTRASOUND BREAST LIMITED: CPT | Performed by: SURGERY

## 2021-01-06 PROCEDURE — 77065 DX MAMMO INCL CAD UNI: CPT | Performed by: SURGERY

## 2021-01-06 PROCEDURE — 88360 TUMOR IMMUNOHISTOCHEM/MANUAL: CPT | Performed by: SURGERY

## 2021-01-06 PROCEDURE — 19084 BX BREAST ADD LESION US IMAG: CPT | Performed by: SURGERY

## 2021-01-06 PROCEDURE — 77061 BREAST TOMOSYNTHESIS UNI: CPT | Performed by: SURGERY

## 2021-01-06 PROCEDURE — 19083 BX BREAST 1ST LESION US IMAG: CPT | Performed by: SURGERY

## 2021-01-06 PROCEDURE — 76642 ULTRASOUND BREAST LIMITED: CPT | Performed by: INTERNAL MEDICINE

## 2021-01-06 PROCEDURE — 88305 TISSUE EXAM BY PATHOLOGIST: CPT | Performed by: SURGERY

## 2021-01-06 NOTE — ICD/PM
This nurse introduced self and role of breast coordinator. Discussed recommended breast biopsy with patient. Pt was recommended by DR Clarke to have a  Left  Breast x 2 sites  ultrasound guided biopsy. Hx palpable by Dr Chuckie Beach .   She was referred to a upper outer quadrant was performed. A 0.7 x 0.6 x 0.6 centimeter irregular hypoechoic mass is seen at 12:30 o'clock 1 centimeter from the nipple.   A 1.2 x 0.9 x 1.1 centimeter irregular hypoechoic mass is seen at 12:30 o'clock 4 centimeters from the nippl of your physician. Radiology's preference is to hold this medication for 7  days prior to biopsy. Informed patient to call cardiologist to go off aspirin.  Denies usage      -Blood thinners/antiplatelet medications (Coumadin, Plavix  xeralto , effient leigh nothing heavier than a gallon of milk for 24-48 hours after the procedure. Discussed with patient that some soreness and bruising is normal after biopsy but that prolonged or increased pain and bruising should be reported to the ordering physician.

## 2021-01-06 NOTE — PROCEDURES
[facility] HOSP - [facility]  Procedure Note    Barry Alejandro Patient Status:  Outpatient    1940 MRN [id number]   Location [address] Attending Macho Fernandez MD   Hosp Day # 0 PCP Antony Ortega.  JESUS Palacio

## 2021-01-06 NOTE — IMAGING NOTE
Pt arrived to room #4 .  scans taken by Helio Connors ultrasound technologist     Rito taken and is as follows: Hx stents New stents placed June 2020 restarted on plavix at that time .   She had stents plaxced in 2017 and was on blood thinners for a yea cores to be placed in sterile cup with 10 ml normal saline   Site #1 left breast   Core # 1 at 1247    Last core taken at 1248 pm   Total amount cores taken 3  placed in formalin Vision  Clip placed at 1250    Procedure completed. Pressure to site .  No act

## 2021-01-08 ENCOUNTER — TELEPHONE (OUTPATIENT)
Dept: HEMATOLOGY/ONCOLOGY | Facility: HOSPITAL | Age: 81
End: 2021-01-08

## 2021-01-08 ENCOUNTER — OFFICE VISIT (OUTPATIENT)
Dept: INTERNAL MEDICINE CLINIC | Facility: CLINIC | Age: 81
End: 2021-01-08
Payer: COMMERCIAL

## 2021-01-08 VITALS
HEART RATE: 75 BPM | DIASTOLIC BLOOD PRESSURE: 70 MMHG | TEMPERATURE: 98 F | SYSTOLIC BLOOD PRESSURE: 132 MMHG | BODY MASS INDEX: 35.46 KG/M2 | HEIGHT: 65 IN | OXYGEN SATURATION: 99 % | WEIGHT: 212.81 LBS

## 2021-01-08 DIAGNOSIS — Z95.5 H/O HEART ARTERY STENT: ICD-10-CM

## 2021-01-08 DIAGNOSIS — I25.110 CORONARY ARTERY DISEASE INVOLVING NATIVE CORONARY ARTERY OF NATIVE HEART WITH UNSTABLE ANGINA PECTORIS (HCC): ICD-10-CM

## 2021-01-08 DIAGNOSIS — Z17.0 CARCINOMA OF UPPER-OUTER QUADRANT OF LEFT BREAST IN FEMALE, ESTROGEN RECEPTOR POSITIVE (HCC): Primary | ICD-10-CM

## 2021-01-08 DIAGNOSIS — C50.412 CARCINOMA OF UPPER-OUTER QUADRANT OF LEFT BREAST IN FEMALE, ESTROGEN RECEPTOR POSITIVE (HCC): Primary | ICD-10-CM

## 2021-01-08 DIAGNOSIS — I10 HYPERTENSION, BENIGN: ICD-10-CM

## 2021-01-08 PROCEDURE — 3075F SYST BP GE 130 - 139MM HG: CPT | Performed by: INTERNAL MEDICINE

## 2021-01-08 PROCEDURE — 3008F BODY MASS INDEX DOCD: CPT | Performed by: INTERNAL MEDICINE

## 2021-01-08 PROCEDURE — 3078F DIAST BP <80 MM HG: CPT | Performed by: INTERNAL MEDICINE

## 2021-01-08 PROCEDURE — 99214 OFFICE O/P EST MOD 30 MIN: CPT | Performed by: INTERNAL MEDICINE

## 2021-01-08 RX ORDER — LOSARTAN POTASSIUM AND HYDROCHLOROTHIAZIDE 12.5; 5 MG/1; MG/1
1 TABLET ORAL DAILY
Qty: 90 TABLET | Refills: 3 | Status: SHIPPED | OUTPATIENT
Start: 2021-01-08 | End: 2021-11-23

## 2021-01-08 RX ORDER — CLOTRIMAZOLE AND BETAMETHASONE DIPROPIONATE 10; .64 MG/G; MG/G
1 CREAM TOPICAL 2 TIMES DAILY
Qty: 60 G | Refills: 3 | Status: SHIPPED | OUTPATIENT
Start: 2021-01-08

## 2021-01-08 NOTE — PROGRESS NOTES
Guerrero Breed is a [de-identified]year old female. HPI:   I noted lump upper outer quadrant left breast at last visit 1 mo ago and mammogram showed 2 suspicious lesions and they were biopsied and both show invasive ductal carcinoma, as well as carcinoma in situ. tablet 3   • atorvastatin 40 MG Oral Tab Take 40 mg by mouth daily. • Clopidogrel Bisulfate 75 MG Oral Tab Take 1 tablet (75 mg total) by mouth daily. 30 tablet 3   • Glucose Blood (ONETOUCH VERIO) In Vitro Strip Check BG 2 times per day.  DX:E11.8 with otherwise  SKIN: denies any unusual skin lesions or rashes  RESPIRATORY: denies shortness of breath with exertion  CARDIOVASCULAR: denies chest pain on exertion  GI: denies abdominal pain and denies heartburn  NEURO: denies headaches    EXAM:   /70

## 2021-01-08 NOTE — TELEPHONE ENCOUNTER
Call received from [facility] Call Staff. Patient recently diagnosed with breast cancer and has been referred by Dr. Rafa Vaca to Medical Oncology. Call transferred. Introduced myself to patient as Breast RN Navigator.   Answered patient's questions c

## 2021-01-11 ENCOUNTER — TELEPHONE (OUTPATIENT)
Dept: INTERNAL MEDICINE CLINIC | Facility: CLINIC | Age: 81
End: 2021-01-11

## 2021-01-11 NOTE — TELEPHONE ENCOUNTER
[facility] requesting clarification:  Pt is currently on Losartan tab 50MG and has been prescribed Losartan/HCTZ Tab 50-12.5 which is a possible duplication of therapy. Should pt be on both medications?   Form placed in purple folder  Tasked to [employer]

## 2021-01-12 ENCOUNTER — OFFICE VISIT (OUTPATIENT)
Dept: HEMATOLOGY/ONCOLOGY | Facility: HOSPITAL | Age: 81
End: 2021-01-12
Attending: INTERNAL MEDICINE
Payer: MEDICARE

## 2021-01-12 ENCOUNTER — NURSE NAVIGATOR ENCOUNTER (OUTPATIENT)
Dept: HEMATOLOGY/ONCOLOGY | Facility: HOSPITAL | Age: 81
End: 2021-01-12

## 2021-01-12 VITALS
SYSTOLIC BLOOD PRESSURE: 143 MMHG | HEIGHT: 65 IN | TEMPERATURE: 98 F | HEART RATE: 83 BPM | WEIGHT: 208 LBS | DIASTOLIC BLOOD PRESSURE: 47 MMHG | RESPIRATION RATE: 18 BRPM | BODY MASS INDEX: 34.66 KG/M2 | OXYGEN SATURATION: 98 %

## 2021-01-12 DIAGNOSIS — C50.912 MALIGNANT NEOPLASM OF LEFT FEMALE BREAST, UNSPECIFIED ESTROGEN RECEPTOR STATUS, UNSPECIFIED SITE OF BREAST (HCC): Primary | ICD-10-CM

## 2021-01-12 PROCEDURE — 99205 OFFICE O/P NEW HI 60 MIN: CPT | Performed by: INTERNAL MEDICINE

## 2021-01-12 RX ORDER — SEMAGLUTIDE 1.34 MG/ML
0.5 INJECTION, SOLUTION SUBCUTANEOUS
COMMUNITY
Start: 2020-12-17

## 2021-01-12 RX ORDER — PIMECROLIMUS 10 MG/G
1 CREAM TOPICAL 2 TIMES DAILY
COMMUNITY
Start: 2020-10-26

## 2021-01-12 RX ORDER — PANTOPRAZOLE SODIUM 40 MG/1
40 TABLET, DELAYED RELEASE ORAL DAILY
COMMUNITY
Start: 2020-07-02 | End: 2021-01-13

## 2021-01-12 NOTE — PROGRESS NOTES
Patient presents to the [facility] for Medical Oncology consultation with Dr. Mira Clarke. Patient is accompanied by her close friend Bola Meier. Introduced myself to patient as Breast RN Navigator.   Shared with patient my role to provide her with support and edu

## 2021-01-13 ENCOUNTER — MED REC SCAN ONLY (OUTPATIENT)
Dept: INTERNAL MEDICINE CLINIC | Facility: CLINIC | Age: 81
End: 2021-01-13

## 2021-01-13 ENCOUNTER — TELEPHONE (OUTPATIENT)
Dept: HEMATOLOGY/ONCOLOGY | Facility: HOSPITAL | Age: 81
End: 2021-01-13

## 2021-01-13 ENCOUNTER — OFFICE VISIT (OUTPATIENT)
Dept: CARDIOLOGY | Age: 81
End: 2021-01-13

## 2021-01-13 VITALS
SYSTOLIC BLOOD PRESSURE: 118 MMHG | RESPIRATION RATE: 18 BRPM | WEIGHT: 207 LBS | BODY MASS INDEX: 34.49 KG/M2 | HEIGHT: 65 IN | OXYGEN SATURATION: 97 % | HEART RATE: 72 BPM | DIASTOLIC BLOOD PRESSURE: 60 MMHG

## 2021-01-13 DIAGNOSIS — E78.00 PURE HYPERCHOLESTEROLEMIA: ICD-10-CM

## 2021-01-13 DIAGNOSIS — I25.10 CORONARY ARTERY DISEASE INVOLVING NATIVE CORONARY ARTERY OF NATIVE HEART WITHOUT ANGINA PECTORIS: ICD-10-CM

## 2021-01-13 DIAGNOSIS — Z01.818 PRE-OP EVALUATION: Primary | ICD-10-CM

## 2021-01-13 DIAGNOSIS — I65.23 BILATERAL CAROTID ARTERY STENOSIS: ICD-10-CM

## 2021-01-13 PROCEDURE — 3078F DIAST BP <80 MM HG: CPT | Performed by: INTERNAL MEDICINE

## 2021-01-13 PROCEDURE — 3074F SYST BP LT 130 MM HG: CPT | Performed by: INTERNAL MEDICINE

## 2021-01-13 PROCEDURE — 99214 OFFICE O/P EST MOD 30 MIN: CPT | Performed by: INTERNAL MEDICINE

## 2021-01-13 PROCEDURE — 93000 ELECTROCARDIOGRAM COMPLETE: CPT | Performed by: INTERNAL MEDICINE

## 2021-01-13 ASSESSMENT — PATIENT HEALTH QUESTIONNAIRE - PHQ9
SUM OF ALL RESPONSES TO PHQ9 QUESTIONS 1 AND 2: 0
1. LITTLE INTEREST OR PLEASURE IN DOING THINGS: NOT AT ALL
2. FEELING DOWN, DEPRESSED OR HOPELESS: NOT AT ALL
CLINICAL INTERPRETATION OF PHQ2 SCORE: NO FURTHER SCREENING NEEDED
CLINICAL INTERPRETATION OF PHQ9 SCORE: NO FURTHER SCREENING NEEDED
SUM OF ALL RESPONSES TO PHQ9 QUESTIONS 1 AND 2: 0

## 2021-01-13 NOTE — CONSULTS
[facility]    PATIENT'S NAME: Al Jorgensen [facility] PHYSICIAN: Shirlene Ley.  Oleg Taylor MD   PATIENT ACCOUNT #: [de-identified] LOCATION: [address] [place] [address] RECORD #: [id number] YOB: 1940   CONSULTATION DATE: 01/12/2021       CANCER C aspirin. ALLERGIES:  Atorvastatin causes myalgia. SOCIAL HISTORY:  Never smoker. Denies any alcohol or illicit drug use. FAMILY MEDICAL HISTORY:  There is no history of malignancy in the family, specifically mother or father.       REVIEW OF S Board tomorrow and provide her further recommendations following with regard to next steps for imaging prior to surgery. We will arrange for followup postoperatively to review final pathology and discuss adjuvant hormonal therapy again.     Thank you clover

## 2021-01-13 NOTE — TELEPHONE ENCOUNTER
Phoned patient for care coordination. Shared with patient that Dr. Mino Zamudio has an appointment available for tomorrow at [facility].  Patient is agreeable. All appointment information provided. Patient acknowledged and denied questions.

## 2021-01-14 ENCOUNTER — SOCIAL WORK SERVICES (OUTPATIENT)
Dept: HEMATOLOGY/ONCOLOGY | Facility: HOSPITAL | Age: 81
End: 2021-01-14

## 2021-01-14 ENCOUNTER — OFFICE VISIT (OUTPATIENT)
Dept: SURGERY | Facility: CLINIC | Age: 81
End: 2021-01-14
Payer: COMMERCIAL

## 2021-01-14 ENCOUNTER — NURSE NAVIGATOR ENCOUNTER (OUTPATIENT)
Dept: HEMATOLOGY/ONCOLOGY | Facility: HOSPITAL | Age: 81
End: 2021-01-14

## 2021-01-14 VITALS
SYSTOLIC BLOOD PRESSURE: 135 MMHG | DIASTOLIC BLOOD PRESSURE: 40 MMHG | HEIGHT: 65 IN | TEMPERATURE: 98 F | BODY MASS INDEX: 34.66 KG/M2 | RESPIRATION RATE: 18 BRPM | OXYGEN SATURATION: 96 % | HEART RATE: 73 BPM | WEIGHT: 208 LBS

## 2021-01-14 DIAGNOSIS — Z17.0 CARCINOMA OF UPPER-OUTER QUADRANT OF LEFT BREAST IN FEMALE, ESTROGEN RECEPTOR POSITIVE (HCC): Primary | ICD-10-CM

## 2021-01-14 DIAGNOSIS — R92.2 DENSE BREAST: ICD-10-CM

## 2021-01-14 DIAGNOSIS — C50.812 MALIGNANT NEOPLASM OF OVERLAPPING SITES OF LEFT BREAST IN FEMALE, ESTROGEN RECEPTOR POSITIVE (HCC): Primary | ICD-10-CM

## 2021-01-14 DIAGNOSIS — C50.412 CARCINOMA OF UPPER-OUTER QUADRANT OF LEFT BREAST IN FEMALE, ESTROGEN RECEPTOR POSITIVE (HCC): Primary | ICD-10-CM

## 2021-01-14 DIAGNOSIS — Z17.0 MALIGNANT NEOPLASM OF OVERLAPPING SITES OF LEFT BREAST IN FEMALE, ESTROGEN RECEPTOR POSITIVE (HCC): Primary | ICD-10-CM

## 2021-01-14 PROCEDURE — 3008F BODY MASS INDEX DOCD: CPT | Performed by: SURGERY

## 2021-01-14 PROCEDURE — 99205 OFFICE O/P NEW HI 60 MIN: CPT | Performed by: SURGERY

## 2021-01-14 PROCEDURE — 3078F DIAST BP <80 MM HG: CPT | Performed by: SURGERY

## 2021-01-14 PROCEDURE — 3075F SYST BP GE 130 - 139MM HG: CPT | Performed by: SURGERY

## 2021-01-14 NOTE — PROGRESS NOTES
Pt with distress score of 3 during recent office visit. Chart review indicates the pt with biopsies 1/6 indicating ductal carcinoma. Pt has seen Dr. Ashley Jean for medical oncology yesterday, plan for visit with Dr. Iris Roche today.  Pt also with care by cardiology

## 2021-01-14 NOTE — PATIENT INSTRUCTIONS
Dr. Fuentes Rad  Tel: [phone]  Fax: [phone]  [address] [facility] [address] [phone]     Surgery/Procedure: Left breast two-site wire bracketed lumpectomy.      Anesthesia:   MAC Surgery Length: also give you the time you need to arrive by and directions on where to go. They begin making calls after 2pm, if you are not contacted by 4pm, please call the surgeon's office listed above.   10. Do not take any blood thinners at least one week prior to th

## 2021-01-14 NOTE — PROGRESS NOTES
Breast Surgery New Patient Consultation    This is the first visit for this [de-identified]year old woman, referred by Dr. Jose R Proctor, who presents for evaluation of breast cancer.     History of Present Illness:   Ms. Barry Allen is a [de-identified]year old woman who presents wi Coronary atherosclerosis    • Edema of right lower eyelid    • Gallstone 1/2010    NG: Asymptomatic    • Gastroenteritis    • High blood pressure    • High cholesterol    • Lipid screening 12/11/2013   • Obesity    • Osteoarthritis    • Osteoporosis    • O NG: Parkinson's disease, 68 (cause of death)   • Diabetes Father    • Other (Old age) Mother 80        NG   • Other (Lupus) Mother         NG   • Cataracts Other        She is not of Ashkenazi Restorationist ancestry.     Social History:    Alcohol use No itching, skin lesions, dry skin, change in skin color or change in moles. Hematologic/Lymphatic:  The patient denies easily bruising or bleeding or persistent swollen glands or lymph nodes.      Musculoskeletal:  The patient denies muscle aches/pain, salvador movement of the pectoralis. There is no nipple retraction. No nipple discharge can be elicited. The parenchyma is mildly nodular.  There are no dominant masses in the breast. The axillary tail is normal.  Left breast:   The skin, nipple, and areola appear n reconstruction), including the fact that survival rates are equal with these two approaches.  If breast conservation is elected, I explained the need for free margins, the possibility of re-excision to achieve free margins, and the need for post-operative r proposed plan. She was given ample opportunity for questions and those questions were answered to her satisfaction. She has been  encouraged to contact the office with any questions or concerns prior to her next appointment.

## 2021-01-19 ENCOUNTER — LAB ENCOUNTER (OUTPATIENT)
Dept: LAB | Facility: HOSPITAL | Age: 81
End: 2021-01-19
Attending: SURGERY
Payer: MEDICARE

## 2021-01-19 ENCOUNTER — HOSPITAL ENCOUNTER (OUTPATIENT)
Dept: ULTRASOUND IMAGING | Facility: HOSPITAL | Age: 81
Discharge: HOME | End: 2021-01-19
Attending: SURGERY
Payer: MEDICARE

## 2021-01-19 DIAGNOSIS — E78.5 HYPERLIPIDEMIA: Primary | ICD-10-CM

## 2021-01-19 DIAGNOSIS — R92.2 DENSE BREAST: ICD-10-CM

## 2021-01-19 DIAGNOSIS — E11.9 DIABETES MELLITUS WITHOUT COMPLICATION (HCC): ICD-10-CM

## 2021-01-19 LAB
ALBUMIN SERPL-MCNC: 3.8 G/DL
ALBUMIN SERPL-MCNC: 3.8 G/DL (ref 3.4–5)
ALBUMIN/GLOB SERPL: 0.9 {RATIO} (ref 1–2)
ALP LIVER SERPL-CCNC: 92 U/L
ALP SERPL-CCNC: 92 U/L
ALT SERPL-CCNC: 35 U/L
ALT SERPL-CCNC: 35 UNITS/L
ANION GAP SERPL CALC-SCNC: 3 MMOL/L
ANION GAP SERPL CALC-SCNC: 3 MMOL/L (ref 0–18)
AST SERPL-CCNC: 20 U/L (ref 15–37)
AST SERPL-CCNC: 20 UNITS/L
BASOPHILS # BLD AUTO: 0.03 X10(3) UL (ref 0–0.2)
BASOPHILS NFR BLD AUTO: 0.3 %
BILIRUB SERPL-MCNC: 0.5 MG/DL
BILIRUB SERPL-MCNC: 0.5 MG/DL (ref 0.1–2)
BUN BLD-MCNC: 22 MG/DL (ref 7–18)
BUN SERPL-MCNC: 22 MG/DL
BUN/CREAT SERPL: 24.4
BUN/CREAT SERPL: 24.4 (ref 10–20)
CALCIUM BLD-MCNC: 9.4 MG/DL (ref 8.5–10.1)
CALCIUM SERPL-MCNC: 9.4 MG/DL
CHLORIDE SERPL-SCNC: 105 MMOL/L
CHLORIDE SERPL-SCNC: 105 MMOL/L (ref 98–112)
CHOLEST SERPL-MCNC: 125 MG/DL
CHOLEST SMN-MCNC: 125 MG/DL (ref ?–200)
CO2 SERPL-SCNC: 30 MMOL/L
CO2 SERPL-SCNC: 30 MMOL/L (ref 21–32)
CREAT BLD-MCNC: 0.9 MG/DL
CREAT SERPL-MCNC: 0.9 MG/DL
DEPRECATED RDW RBC AUTO: 48.7 FL (ref 35.1–46.3)
EOSINOPHIL # BLD AUTO: 0.11 X10(3) UL (ref 0–0.7)
EOSINOPHIL NFR BLD AUTO: 1.2 %
ERYTHROCYTE [DISTWIDTH] IN BLOOD BY AUTOMATED COUNT: 14.4 % (ref 11–15)
EST. AVERAGE GLUCOSE BLD GHB EST-MCNC: 183 MG/DL (ref 68–126)
GLOBULIN PLAS-MCNC: 4.1 G/DL (ref 2.8–4.4)
GLOBULIN SER-MCNC: 4.1 G/DL
GLUCOSE BLD-MCNC: 143 MG/DL (ref 70–99)
GLUCOSE SERPL-MCNC: 143 MG/DL
HBA1C MFR BLD HPLC: 8 % (ref ?–5.7)
HCT VFR BLD AUTO: 46.6 %
HCT VFR BLD CALC: 46.6 %
HDLC SERPL-MCNC: 59 MG/DL
HDLC SERPL-MCNC: 59 MG/DL (ref 40–59)
HGB BLD-MCNC: 14.5 G/DL
HGB BLD-MCNC: 14.5 G/DL
IMM GRANULOCYTES # BLD AUTO: 0.03 X10(3) UL (ref 0–1)
IMM GRANULOCYTES NFR BLD: 0.3 %
LDLC SERPL CALC-MCNC: 33 MG/DL
LDLC SERPL CALC-MCNC: 33 MG/DL (ref ?–100)
LENGTH OF FAST TIME PATIENT: YES H
LENGTH OF FAST TIME PATIENT: YES H
LYMPHOCYTES # BLD AUTO: 2.91 X10(3) UL (ref 1–4)
LYMPHOCYTES NFR BLD AUTO: 31.9 %
M PROTEIN MFR SERPL ELPH: 7.9 G/DL (ref 6.4–8.2)
MCH RBC QN AUTO: 28.9 PG
MCH RBC QN AUTO: 28.9 PG (ref 26–34)
MCHC RBC AUTO-ENTMCNC: 31.1 G/DL
MCHC RBC AUTO-ENTMCNC: 31.1 G/DL (ref 31–37)
MCV RBC AUTO: 93 FL
MCV RBC AUTO: 93 FL
MONOCYTES # BLD AUTO: 0.75 X10(3) UL (ref 0.1–1)
MONOCYTES NFR BLD AUTO: 8.2 %
NEUTROPHILS # BLD AUTO: 5.29 X10 (3) UL (ref 1.5–7.7)
NEUTROPHILS # BLD AUTO: 5.29 X10(3) UL (ref 1.5–7.7)
NEUTROPHILS NFR BLD AUTO: 58.1 %
NONHDLC SERPL-MCNC: 66 MG/DL
NONHDLC SERPL-MCNC: 66 MG/DL (ref ?–130)
OSMOLALITY SERPL CALC.SUM OF ELEC: 292 MOSM/KG (ref 275–295)
PATIENT FASTING Y/N/NP: YES
PATIENT FASTING Y/N/NP: YES
PLATELET # BLD AUTO: 299 10(3)UL (ref 150–450)
PLATELET # BLD: 299 K/MCL
POTASSIUM SERPL-SCNC: 3.9 MMOL/L
POTASSIUM SERPL-SCNC: 3.9 MMOL/L (ref 3.5–5.1)
PROT SERPL-MCNC: 7.9 G/DL
RBC # BLD AUTO: 5.01 X10(6)UL
RBC # BLD: 5.01 10*6/UL
SODIUM SERPL-SCNC: 138 MMOL/L
SODIUM SERPL-SCNC: 138 MMOL/L (ref 136–145)
TRIGL SERPL-MCNC: 166 MG/DL
TRIGL SERPL-MCNC: 166 MG/DL (ref 30–149)
TSH SERPL-ACNC: 3.2 MCUNITS/ML
TSI SER-ACNC: 3.2 MIU/ML (ref 0.36–3.74)
VLDLC SERPL CALC-MCNC: 33 MG/DL
VLDLC SERPL CALC-MCNC: 33 MG/DL (ref 0–30)
WBC # BLD AUTO: 9.1 X10(3) UL (ref 4–11)
WBC # BLD: 9.1 K/MCL

## 2021-01-19 PROCEDURE — 84443 ASSAY THYROID STIM HORMONE: CPT | Performed by: INTERNAL MEDICINE

## 2021-01-19 PROCEDURE — 85025 COMPLETE CBC W/AUTO DIFF WBC: CPT | Performed by: INTERNAL MEDICINE

## 2021-01-19 PROCEDURE — 80053 COMPREHEN METABOLIC PANEL: CPT | Performed by: INTERNAL MEDICINE

## 2021-01-19 PROCEDURE — 36415 COLL VENOUS BLD VENIPUNCTURE: CPT | Performed by: INTERNAL MEDICINE

## 2021-01-19 PROCEDURE — 80061 LIPID PANEL: CPT | Performed by: INTERNAL MEDICINE

## 2021-01-19 PROCEDURE — 83036 HEMOGLOBIN GLYCOSYLATED A1C: CPT | Performed by: INTERNAL MEDICINE

## 2021-01-19 PROCEDURE — 76641 ULTRASOUND BREAST COMPLETE: CPT | Performed by: SURGERY

## 2021-01-20 ENCOUNTER — TELEPHONE (OUTPATIENT)
Dept: CARDIOLOGY | Age: 81
End: 2021-01-20

## 2021-01-20 ENCOUNTER — TELEPHONE (OUTPATIENT)
Dept: HEMATOLOGY/ONCOLOGY | Facility: HOSPITAL | Age: 81
End: 2021-01-20

## 2021-01-20 DIAGNOSIS — Z17.0 CARCINOMA OF UPPER-OUTER QUADRANT OF LEFT BREAST IN FEMALE, ESTROGEN RECEPTOR POSITIVE (HCC): Primary | ICD-10-CM

## 2021-01-20 DIAGNOSIS — C50.412 CARCINOMA OF UPPER-OUTER QUADRANT OF LEFT BREAST IN FEMALE, ESTROGEN RECEPTOR POSITIVE (HCC): Primary | ICD-10-CM

## 2021-01-20 DIAGNOSIS — R92.8 ABNORMAL FINDINGS ON DIAGNOSTIC IMAGING OF BREAST: ICD-10-CM

## 2021-01-20 NOTE — TELEPHONE ENCOUNTER
Phoned patient to review findings on bilateral breast US performed previous day. Patient is aware that there were no suspicious findings in her right breast.    Patient is aware that there were findings in her left breast as follows:     At 05:00 o'cloc

## 2021-01-20 NOTE — TELEPHONE ENCOUNTER
Phoned the office of Dr. Libby Garay, patient's Cardiologist, regarding management of her Plavix and her aspirin as prescribed by Dr. Libby Garay. Spoke with Dr. Francesco Strauss.   Instructions per Dr. Libby Garay are for patient to remain on her Plavix and aspirin as prescr

## 2021-01-22 ENCOUNTER — LAB ENCOUNTER (OUTPATIENT)
Dept: LAB | Facility: HOSPITAL | Age: 81
End: 2021-01-22
Attending: SURGERY
Payer: MEDICARE

## 2021-01-22 ENCOUNTER — HOSPITAL ENCOUNTER (OUTPATIENT)
Dept: MAMMOGRAPHY | Facility: HOSPITAL | Age: 81
Discharge: HOME | End: 2021-01-22
Attending: SURGERY
Payer: MEDICARE

## 2021-01-22 VITALS — SYSTOLIC BLOOD PRESSURE: 109 MMHG | DIASTOLIC BLOOD PRESSURE: 40 MMHG | HEART RATE: 73 BPM

## 2021-01-22 DIAGNOSIS — E78.5 HYPERLIPIDEMIA: ICD-10-CM

## 2021-01-22 DIAGNOSIS — R92.8 ABNORMAL FINDINGS ON DIAGNOSTIC IMAGING OF BREAST: ICD-10-CM

## 2021-01-22 DIAGNOSIS — Z17.0 CARCINOMA OF UPPER-OUTER QUADRANT OF LEFT BREAST IN FEMALE, ESTROGEN RECEPTOR POSITIVE (HCC): ICD-10-CM

## 2021-01-22 DIAGNOSIS — C50.412 CARCINOMA OF UPPER-OUTER QUADRANT OF LEFT BREAST IN FEMALE, ESTROGEN RECEPTOR POSITIVE (HCC): ICD-10-CM

## 2021-01-22 DIAGNOSIS — E11.9 DIABETES MELLITUS WITHOUT COMPLICATION (HCC): ICD-10-CM

## 2021-01-22 LAB
BILIRUB UR QL: NEGATIVE
COLOR UR: YELLOW
GLUCOSE UR-MCNC: >=500 MG/DL
HGB UR QL STRIP.AUTO: NEGATIVE
KETONES UR-MCNC: NEGATIVE MG/DL
LEUKOCYTE ESTERASE UR QL STRIP.AUTO: NEGATIVE
NITRITE UR QL STRIP.AUTO: POSITIVE
PH UR: 5 [PH] (ref 5–8)
PROT UR-MCNC: NEGATIVE MG/DL
RBC #/AREA URNS AUTO: <1 /HPF
SP GR UR STRIP: 1.03 (ref 1–1.03)
UROBILINOGEN UR STRIP-ACNC: <2
WBC #/AREA URNS AUTO: 2 /HPF

## 2021-01-22 PROCEDURE — 19083 BX BREAST 1ST LESION US IMAG: CPT | Performed by: SURGERY

## 2021-01-22 PROCEDURE — 88305 TISSUE EXAM BY PATHOLOGIST: CPT | Performed by: SURGERY

## 2021-01-22 PROCEDURE — 77065 DX MAMMO INCL CAD UNI: CPT | Performed by: SURGERY

## 2021-01-22 PROCEDURE — 19084 BX BREAST ADD LESION US IMAG: CPT | Performed by: SURGERY

## 2021-01-22 PROCEDURE — 81001 URINALYSIS AUTO W/SCOPE: CPT

## 2021-01-22 NOTE — PROCEDURES
[facility] HOSP - [facility]  Procedure Note    Elaina Buenrostro Patient Status:  Outpatient    1940 MRN [id number]   [address] Attending Ermias Melton MD   Hosp Day # 0 PCP Harriet Farr MD     Procedure: U

## 2021-01-25 ENCOUNTER — TELEPHONE (OUTPATIENT)
Dept: SURGERY | Facility: CLINIC | Age: 81
End: 2021-01-25

## 2021-01-25 ENCOUNTER — TELEPHONE (OUTPATIENT)
Dept: INTERNAL MEDICINE CLINIC | Facility: CLINIC | Age: 81
End: 2021-01-25

## 2021-01-25 DIAGNOSIS — C50.812 MALIGNANT NEOPLASM OF OVERLAPPING SITES OF LEFT BREAST IN FEMALE, ESTROGEN RECEPTOR POSITIVE (HCC): Primary | ICD-10-CM

## 2021-01-25 DIAGNOSIS — Z17.0 MALIGNANT NEOPLASM OF OVERLAPPING SITES OF LEFT BREAST IN FEMALE, ESTROGEN RECEPTOR POSITIVE (HCC): Primary | ICD-10-CM

## 2021-01-25 DIAGNOSIS — Z23 NEED FOR VACCINATION: ICD-10-CM

## 2021-01-25 DIAGNOSIS — R39.89 SUSPECTED UTI: Primary | ICD-10-CM

## 2021-01-25 DIAGNOSIS — R82.90 ABNORMAL URINALYSIS: ICD-10-CM

## 2021-01-25 RX ORDER — DOCUSATE CALCIUM 240 MG
240 CAPSULE ORAL DAILY
COMMUNITY

## 2021-01-25 NOTE — TELEPHONE ENCOUNTER
I called the patient and scheduled her surgery with Dr Samreen Wagner on 02/04/2021 at [facility].  Confirmed date and location

## 2021-01-25 NOTE — TELEPHONE ENCOUNTER
Calling pt to relay results. Informed pt, that per Dr. Oc White, \" the findings are all benign. We of course need to manage the known area of concern in the left breast, but no additional findings were seen on the recent biopsies. \"  Pt states she is a li

## 2021-01-25 NOTE — TELEPHONE ENCOUNTER
Called [facility], spoke to Katie who states the culture did not reflex since WBC were not above \"5\". Urine culture ordered per MD written order. Called patient who states she is at the grocery store and is unable to take Dr. Alka Faulkner message below.  Pt gilda

## 2021-01-25 NOTE — TELEPHONE ENCOUNTER
Labs from 1/19 suggest a UTI but I do not see a culture and a urinalysis with culture reflex was done–could you call micro lab and see if a culture was done if not she will need a urine culture    Tell patient labs from 119 look good blood sugar is in an i

## 2021-01-26 NOTE — TELEPHONE ENCOUNTER
Dr Shreya Ott, patient will get urine culture done today or tomorrow. Pt wanted to inform you the she is having a lumpectomy done on 2/4/21 at [facility] by Dr Therese Pollard. Pt states she can see you in office if needed as well.

## 2021-01-26 NOTE — TELEPHONE ENCOUNTER
Spoke with patient and relayed message from Dr. Shahnaz Kirkland. Patient verbalized understanding. Also discussed Covid vaccine availability per patient request. Patient educated on [facility] process. She verbalized understanding.  Call transferred to  to

## 2021-01-28 ENCOUNTER — TELEPHONE (OUTPATIENT)
Dept: MAMMOGRAPHY | Facility: HOSPITAL | Age: 81
End: 2021-01-28

## 2021-01-28 ENCOUNTER — TELEPHONE (OUTPATIENT)
Dept: INTERNAL MEDICINE CLINIC | Facility: CLINIC | Age: 81
End: 2021-01-28

## 2021-01-28 ENCOUNTER — OFFICE VISIT (OUTPATIENT)
Dept: PHYSICAL THERAPY | Facility: HOSPITAL | Age: 81
End: 2021-01-28
Attending: SURGERY
Payer: MEDICARE

## 2021-01-28 ENCOUNTER — LAB ENCOUNTER (OUTPATIENT)
Dept: LAB | Facility: HOSPITAL | Age: 81
End: 2021-01-28
Attending: INTERNAL MEDICINE
Payer: MEDICARE

## 2021-01-28 ENCOUNTER — APPOINTMENT (OUTPATIENT)
Dept: PHYSICAL THERAPY | Facility: HOSPITAL | Age: 81
End: 2021-01-28
Attending: SURGERY

## 2021-01-28 DIAGNOSIS — R82.89 URINE CYTOLOGY ABNORMAL: Primary | ICD-10-CM

## 2021-01-28 DIAGNOSIS — R39.89 NOCTURNAL EMISSION: ICD-10-CM

## 2021-01-28 PROCEDURE — 87186 SC STD MICRODIL/AGAR DIL: CPT

## 2021-01-28 PROCEDURE — 87086 URINE CULTURE/COLONY COUNT: CPT

## 2021-01-28 PROCEDURE — 87077 CULTURE AEROBIC IDENTIFY: CPT

## 2021-01-28 NOTE — PROGRESS NOTES
BREAST CANCER SURGICAL SCREENINGS  [facility]      PATIENT SUMMARY:     Involved Side:    Left                                                 Dominant Hand:     Right                             Occupation:     Retired Functional IR                      Shoulder strength  Right Left  Shoulder strength  Right Left  Shoulder strength  Right Left    flex 5 5   flex     flex      abd 5 5   abd     abd      ext 5 5   ext     ext      ER 4 4   ER     ER      IR 5 5   IR

## 2021-01-28 NOTE — TELEPHONE ENCOUNTER
Phoned Teressa Young regarding needle localization process of breast for lumpectomy scheduled for 2-4-21 with Dr. Yuliana Urias. Procedure explained and all questions answered. Pt to be transported via W/C through [facility] to [facility] in [place] [facility].  Pt verbalized u

## 2021-01-29 ENCOUNTER — IMMUNIZATION (OUTPATIENT)
Dept: LAB | Age: 81
End: 2021-01-29
Attending: HOSPITALIST
Payer: MEDICARE

## 2021-01-29 ENCOUNTER — TELEPHONE (OUTPATIENT)
Dept: INTERNAL MEDICINE CLINIC | Facility: CLINIC | Age: 81
End: 2021-01-29

## 2021-01-29 ENCOUNTER — APPOINTMENT (OUTPATIENT)
Dept: PHYSICAL THERAPY | Facility: HOSPITAL | Age: 81
End: 2021-01-29
Attending: SURGERY
Payer: MEDICARE

## 2021-01-29 DIAGNOSIS — Z23 NEED FOR VACCINATION: Primary | ICD-10-CM

## 2021-01-29 PROCEDURE — 0001A SARSCOV2 VAC 30MCG/0.3ML IM: CPT

## 2021-01-29 RX ORDER — NITROFURANTOIN 25; 75 MG/1; MG/1
100 CAPSULE ORAL 2 TIMES DAILY
Qty: 20 CAPSULE | Refills: 0 | Status: SHIPPED | OUTPATIENT
Start: 2021-01-29 | End: 2021-02-02 | Stop reason: ALTCHOICE

## 2021-01-29 RX ORDER — ALPRAZOLAM 0.25 MG/1
0.25 TABLET ORAL NIGHTLY PRN
Qty: 30 TABLET | Refills: 0 | Status: SHIPPED | OUTPATIENT
Start: 2021-01-29 | End: 2021-08-25

## 2021-01-29 NOTE — TELEPHONE ENCOUNTER
To MD:  The above refill request is for a controlled substance. Please review pended medication order. Print and sign for staff to fax to pharmacy or prescribe electronically.     Last office visit: 1/8/2021  Last time refill sent and quantity/refills: 7

## 2021-01-29 NOTE — TELEPHONE ENCOUNTER
Telephone call to patient and discussed abnormal urine culture result. Preliminary urinary culture result shows between 50,000 through 99,000 gram-negative rods. Patient currently feels well but is having surgery next week.   I have told patient I will st

## 2021-02-01 ENCOUNTER — LAB ENCOUNTER (OUTPATIENT)
Dept: LAB | Age: 81
End: 2021-02-01
Attending: SURGERY
Payer: MEDICARE

## 2021-02-01 ENCOUNTER — APPOINTMENT (OUTPATIENT)
Dept: PHYSICAL THERAPY | Facility: HOSPITAL | Age: 81
End: 2021-02-01
Attending: SURGERY

## 2021-02-01 DIAGNOSIS — Z17.0 CARCINOMA OF UPPER-OUTER QUADRANT OF LEFT BREAST IN FEMALE, ESTROGEN RECEPTOR POSITIVE (HCC): ICD-10-CM

## 2021-02-01 DIAGNOSIS — C50.412 CARCINOMA OF UPPER-OUTER QUADRANT OF LEFT BREAST IN FEMALE, ESTROGEN RECEPTOR POSITIVE (HCC): ICD-10-CM

## 2021-02-02 LAB — SARS-COV-2 RNA RESP QL NAA+PROBE: NOT DETECTED

## 2021-02-02 RX ORDER — CIPROFLOXACIN 500 MG/1
500 TABLET, FILM COATED ORAL 2 TIMES DAILY
Qty: 14 TABLET | Refills: 0 | Status: CANCELLED | OUTPATIENT
Start: 2021-02-02

## 2021-02-02 RX ORDER — FOSFOMYCIN TROMETHAMINE 3 G/1
3 GRANULE, FOR SOLUTION ORAL ONCE
Qty: 3 G | Refills: 0 | Status: SHIPPED | OUTPATIENT
Start: 2021-02-02 | End: 2021-02-02

## 2021-02-02 NOTE — TELEPHONE ENCOUNTER
Discussed with  - in light of surgery, will change to fosfomycin single 3 g dose mixed in 3 to 4 oz of water ORALLY h  Spoke to pt - instructions given;  Pt repeated;   She will stop the Macrobid and take fosfomycin

## 2021-02-02 NOTE — TELEPHONE ENCOUNTER
Deb Contreras organism is not sensitive to [address].   Give Cipro 500 mg twice daily for 7 days

## 2021-02-02 NOTE — TELEPHONE ENCOUNTER
To Dr. Shreya Ott to please advise--  Wilver Bella, nurse navigator calling from [facility]. Pt was started on Macrobid for 50,000-90,0000 gram-neg rods on 1/29/21. Please see final urine culture results and change antibiotic.  Pt is having surgery on 2/4/21 for  Shu Kaur

## 2021-02-02 NOTE — PAT NURSING NOTE
Patient called stating she has a UTI and is now on Macrobid. Advised her to call her surgeon's office ASAP to inform them of this information.

## 2021-02-03 ENCOUNTER — ANESTHESIA EVENT (OUTPATIENT)
Dept: SURGERY | Facility: HOSPITAL | Age: 81
End: 2021-02-03
Payer: MEDICARE

## 2021-02-04 ENCOUNTER — APPOINTMENT (OUTPATIENT)
Dept: MAMMOGRAPHY | Facility: HOSPITAL | Age: 81
End: 2021-02-04
Attending: SURGERY
Payer: MEDICARE

## 2021-02-04 ENCOUNTER — HOSPITAL ENCOUNTER (OUTPATIENT)
Facility: HOSPITAL | Age: 81
Setting detail: HOSPITAL OUTPATIENT SURGERY
Discharge: HOME | End: 2021-02-04
Attending: SURGERY | Admitting: SURGERY
Payer: MEDICARE

## 2021-02-04 ENCOUNTER — HOSPITAL ENCOUNTER (OUTPATIENT)
Dept: MAMMOGRAPHY | Facility: HOSPITAL | Age: 81
Discharge: HOME | End: 2021-02-04
Attending: SURGERY
Payer: MEDICARE

## 2021-02-04 ENCOUNTER — ANESTHESIA (OUTPATIENT)
Dept: SURGERY | Facility: HOSPITAL | Age: 81
End: 2021-02-04
Payer: MEDICARE

## 2021-02-04 VITALS
TEMPERATURE: 97 F | OXYGEN SATURATION: 98 % | WEIGHT: 205.25 LBS | BODY MASS INDEX: 34.2 KG/M2 | SYSTOLIC BLOOD PRESSURE: 122 MMHG | HEART RATE: 68 BPM | RESPIRATION RATE: 14 BRPM | DIASTOLIC BLOOD PRESSURE: 56 MMHG | HEIGHT: 65 IN

## 2021-02-04 DIAGNOSIS — C50.412 CARCINOMA OF UPPER-OUTER QUADRANT OF LEFT BREAST IN FEMALE, ESTROGEN RECEPTOR POSITIVE (HCC): Primary | ICD-10-CM

## 2021-02-04 DIAGNOSIS — Z17.0 MALIGNANT NEOPLASM OF OVERLAPPING SITES OF LEFT BREAST IN FEMALE, ESTROGEN RECEPTOR POSITIVE (HCC): ICD-10-CM

## 2021-02-04 DIAGNOSIS — C50.812 MALIGNANT NEOPLASM OF OVERLAPPING SITES OF LEFT BREAST IN FEMALE, ESTROGEN RECEPTOR POSITIVE (HCC): ICD-10-CM

## 2021-02-04 DIAGNOSIS — Z17.0 CARCINOMA OF UPPER-OUTER QUADRANT OF LEFT BREAST IN FEMALE, ESTROGEN RECEPTOR POSITIVE (HCC): Primary | ICD-10-CM

## 2021-02-04 LAB
GLUCOSE BLD-MCNC: 176 MG/DL (ref 70–99)
GLUCOSE BLD-MCNC: 99 MG/DL (ref 70–99)

## 2021-02-04 PROCEDURE — 77065 DX MAMMO INCL CAD UNI: CPT | Performed by: SURGERY

## 2021-02-04 PROCEDURE — 19285 PERQ DEV BREAST 1ST US IMAG: CPT | Performed by: SURGERY

## 2021-02-04 PROCEDURE — 88305 TISSUE EXAM BY PATHOLOGIST: CPT | Performed by: SURGERY

## 2021-02-04 PROCEDURE — 0HBU0ZZ EXCISION OF LEFT BREAST, OPEN APPROACH: ICD-10-PCS | Performed by: SURGERY

## 2021-02-04 PROCEDURE — 19286 PERQ DEV BREAST ADD US IMAG: CPT | Performed by: SURGERY

## 2021-02-04 PROCEDURE — 76098 X-RAY EXAM SURGICAL SPECIMEN: CPT | Performed by: SURGERY

## 2021-02-04 PROCEDURE — 82962 GLUCOSE BLOOD TEST: CPT

## 2021-02-04 PROCEDURE — 88307 TISSUE EXAM BY PATHOLOGIST: CPT | Performed by: SURGERY

## 2021-02-04 PROCEDURE — 0HR5XK3 REPLACEMENT OF CHEST SKIN WITH NONAUTOLOGOUS TISSUE SUBSTITUTE, FULL THICKNESS, EXTERNAL APPROACH: ICD-10-PCS | Performed by: SURGERY

## 2021-02-04 PROCEDURE — 88360 TUMOR IMMUNOHISTOCHEM/MANUAL: CPT | Performed by: SURGERY

## 2021-02-04 RX ORDER — SODIUM CHLORIDE, SODIUM LACTATE, POTASSIUM CHLORIDE, CALCIUM CHLORIDE 600; 310; 30; 20 MG/100ML; MG/100ML; MG/100ML; MG/100ML
INJECTION, SOLUTION INTRAVENOUS CONTINUOUS
Status: DISCONTINUED | OUTPATIENT
Start: 2021-02-04 | End: 2021-02-04

## 2021-02-04 RX ORDER — ONDANSETRON 2 MG/ML
4 INJECTION INTRAMUSCULAR; INTRAVENOUS AS NEEDED
Status: DISCONTINUED | OUTPATIENT
Start: 2021-02-04 | End: 2021-02-04

## 2021-02-04 RX ORDER — DEXTROSE MONOHYDRATE 25 G/50ML
50 INJECTION, SOLUTION INTRAVENOUS
Status: DISCONTINUED | OUTPATIENT
Start: 2021-02-04 | End: 2021-02-04

## 2021-02-04 RX ORDER — HYDROCODONE BITARTRATE AND ACETAMINOPHEN 5; 325 MG/1; MG/1
1-2 TABLET ORAL EVERY 6 HOURS PRN
Qty: 20 TABLET | Refills: 0 | Status: SHIPPED | OUTPATIENT
Start: 2021-02-04 | End: 2021-02-10 | Stop reason: ALTCHOICE

## 2021-02-04 RX ORDER — CEFAZOLIN SODIUM/WATER 2 G/20 ML
2 SYRINGE (ML) INTRAVENOUS ONCE
Status: COMPLETED | OUTPATIENT
Start: 2021-02-04 | End: 2021-02-04

## 2021-02-04 RX ORDER — CEFAZOLIN SODIUM/WATER 2 G/20 ML
SYRINGE (ML) INTRAVENOUS
Status: DISCONTINUED
Start: 2021-02-04 | End: 2021-02-04

## 2021-02-04 RX ORDER — DIAZEPAM 5 MG/1
5 TABLET ORAL AS NEEDED
Status: DISCONTINUED | OUTPATIENT
Start: 2021-02-04 | End: 2021-02-04 | Stop reason: HOSPADM

## 2021-02-04 RX ORDER — BUPIVACAINE HYDROCHLORIDE 5 MG/ML
INJECTION, SOLUTION EPIDURAL; INTRACAUDAL AS NEEDED
Status: DISCONTINUED | OUTPATIENT
Start: 2021-02-04 | End: 2021-02-04 | Stop reason: HOSPADM

## 2021-02-04 RX ORDER — LIDOCAINE HYDROCHLORIDE AND EPINEPHRINE 10; 10 MG/ML; UG/ML
INJECTION, SOLUTION INFILTRATION; PERINEURAL AS NEEDED
Status: DISCONTINUED | OUTPATIENT
Start: 2021-02-04 | End: 2021-02-04 | Stop reason: HOSPADM

## 2021-02-04 RX ORDER — DEXTROSE MONOHYDRATE 25 G/50ML
50 INJECTION, SOLUTION INTRAVENOUS
Status: DISCONTINUED | OUTPATIENT
Start: 2021-02-04 | End: 2021-02-04 | Stop reason: HOSPADM

## 2021-02-04 RX ORDER — FOSFOMYCIN TROMETHAMINE 3 G/1
3 GRANULE, FOR SOLUTION ORAL ONCE
COMMUNITY
End: 2021-04-12 | Stop reason: ALTCHOICE

## 2021-02-04 RX ORDER — ACETAMINOPHEN 500 MG
1000 TABLET ORAL ONCE
Status: DISCONTINUED | OUTPATIENT
Start: 2021-02-04 | End: 2021-02-04

## 2021-02-04 RX ORDER — NALOXONE HYDROCHLORIDE 0.4 MG/ML
80 INJECTION, SOLUTION INTRAMUSCULAR; INTRAVENOUS; SUBCUTANEOUS AS NEEDED
Status: DISCONTINUED | OUTPATIENT
Start: 2021-02-04 | End: 2021-02-04

## 2021-02-04 RX ADMIN — CEFAZOLIN SODIUM/WATER 2 G: 2 G/20 ML SYRINGE (ML) INTRAVENOUS at 10:29:00

## 2021-02-04 NOTE — OPERATIVE REPORT
[address]    PATIENT'S NAME: [address] [place] [address] PHYSICIAN: Abiodun Alva. Richard Kirkland M.D. OPERATING PHYSICIAN: Abiodun Alva. Richard Kirkland M.D.    PATIENT ACCOUNT#:   [de-identified]    LOCATION:  PRE[facility]  PRE [facility] 1 EDWP 10  MEDICAL RECORD #:   [id number] brought to the imaging suite. She underwent a wire bracketed localization of the 2 sites of biopsy-confirmed carcinoma in the left upper outer quadrant. She was then brought to the OR, placed in a supine position. She was properly padded and secured.   Marlena Stone the level of pectoralis fascia, and via a lateral vascular pedicle this tissue was then mobilized into the aforementioned defect and secured to the level of the pectoralis fascia with a running 3-0 PDS suture.   The wound was then closed using interrupted 3

## 2021-02-04 NOTE — ANESTHESIA POSTPROCEDURE EVALUATION
[address] Patient Status:  Hospital Outpatient Surgery   Age/Gender [de-identified]year old female MRN [id number]   [facility] SURGERY Attending Benitez Almanza MD   [facility] Day # [facility]  MD Yordan       Anesthesia Post-

## 2021-02-04 NOTE — IMAGING NOTE
Assisted Dr. Gerilyn Rinne with needle localization of 2 site left breast for lumpectomy. Procedure explained and all questions answered. Pt verbalized understanding. Emotional support provided and pt tolerated procedure well with minimal discomfort.  Wire(s) secu

## 2021-02-04 NOTE — H&P
History of Present Illness:   Ms. Rudy Kumar is a [de-identified]year old woman who presents with a self detected change to the left breast.  The patient reports that she noted an area that felt different on her self-exam at the end of 2020.   She did have a scr High cholesterol     • Lipid screening 12/11/2013   • Obesity     • Osteoarthritis     • Osteoporosis     • Osteoporosis screening 1/22/2013   • Other and unspecified hyperlipidemia     • Restless leg syndrome 2015     Per Dr. Artie Shipman   • Type II or unspecified Other (Lupus) Mother           NG   • Cataracts Other           She is not of Ashkenazi Gnosticism ancestry.     Social History:     Alcohol use No                 Smoking status: Never Smoker   Smokeless tobacco: Never Used   The patient is .   She has    Hematologic/Lymphatic:  The patient denies easily bruising or bleeding or persistent swollen glands or lymph nodes.      Musculoskeletal:  The patient denies muscle aches/pain, joint pain, stiff joints, neck pain, back pain or bone pain.     Neuropsyc nipple discharge can be elicited. The parenchyma is mildly nodular.  There are no dominant masses in the breast. The axillary tail is normal.  Left breast:   The skin, nipple, and areola appear normal. There is no skin dimpling with movement of the pectoral equal with these two approaches. If breast conservation is elected, I explained the need for free margins, the possibility of re-excision to achieve free margins, and the need for post-operative radiotherapy.  The approach to emelyn staging was also describe questions and those questions were answered to her satisfaction. She has been  encouraged to contact the office with any questions or concerns prior to her next appointment.      Pre-op Diagnosis: Malignant neoplasm of overlapping sites of left breast in fe

## 2021-02-04 NOTE — ANESTHESIA PREPROCEDURE EVALUATION
PRE-OP EVALUATION    Patient Name: Maple Scrape    Pre-op Diagnosis: Malignant neoplasm of overlapping sites of left breast in female, estrogen receptor positive ([facility].) [C50.812, Z17.0]    Procedure(s):  Left breast two-site wire bracketed lumpectomy placement   • TONSILLECTOMY       Social History    Tobacco Use      Smoking status: Never Smoker      Smokeless tobacco: Never Used    Alcohol use: No      Drug use: No     Available pre-op labs reviewed.   Lab Results   Component Value Date    WBC 9.1 01/

## 2021-02-08 NOTE — PROGRESS NOTES
Breast Surgery Post-Operative Visit    Diagnosis: Left breast cancer status post left breast lumpectomy on 2/4/21      Stage: H0rYdBl    Disease Status:  Surgical treatment complete, medical and radiation oncology recommendations pending.     History of Pre recommendations for further therapy.         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 She denies any history of oral contraceptive use. She denies infertility treatment to achieve pregnancy.     Medications:    No outpatient medications have been marked as taki There is no history of difficulty or pain with swallowing, reflux symptoms, vomiting, dark or bloody stools, constipation, yellowing of the skin, indigestion, nausea, change in bowel habits, diarrhea, abdominal pain or vomiting blood.      Genitourinary: masses/nodules. There are no palpable masses. The trachea is in the midline. Conjunctiva are clear, non-icteric. Chest: The chest expands symmetrically. The lungs are clear to auscultation. Heart: The rhythm is regular.   There are no murmurs, rubs, g that a referral to physical therapy may be warranted in the future if she identifies any limitations or restrictions. She was given ample opportunity for questions and those questions were answered to her satisfaction.  She was encouraged to contact the off

## 2021-02-10 ENCOUNTER — OFFICE VISIT (OUTPATIENT)
Dept: HEMATOLOGY/ONCOLOGY | Facility: HOSPITAL | Age: 81
End: 2021-02-10
Attending: INTERNAL MEDICINE
Payer: MEDICARE

## 2021-02-10 ENCOUNTER — OFFICE VISIT (OUTPATIENT)
Dept: SURGERY | Facility: CLINIC | Age: 81
End: 2021-02-10
Payer: COMMERCIAL

## 2021-02-10 ENCOUNTER — NURSE NAVIGATOR ENCOUNTER (OUTPATIENT)
Dept: HEMATOLOGY/ONCOLOGY | Facility: HOSPITAL | Age: 81
End: 2021-02-10

## 2021-02-10 VITALS
OXYGEN SATURATION: 98 % | RESPIRATION RATE: 18 BRPM | SYSTOLIC BLOOD PRESSURE: 145 MMHG | HEART RATE: 69 BPM | DIASTOLIC BLOOD PRESSURE: 61 MMHG

## 2021-02-10 VITALS
RESPIRATION RATE: 18 BRPM | BODY MASS INDEX: 35.49 KG/M2 | OXYGEN SATURATION: 98 % | HEART RATE: 69 BPM | TEMPERATURE: 98 F | SYSTOLIC BLOOD PRESSURE: 145 MMHG | HEIGHT: 65 IN | WEIGHT: 213 LBS | DIASTOLIC BLOOD PRESSURE: 61 MMHG

## 2021-02-10 DIAGNOSIS — Z17.0 MALIGNANT NEOPLASM OF OVERLAPPING SITES OF LEFT BREAST IN FEMALE, ESTROGEN RECEPTOR POSITIVE (HCC): Primary | ICD-10-CM

## 2021-02-10 DIAGNOSIS — M81.0 OSTEOPOROSIS, UNSPECIFIED OSTEOPOROSIS TYPE, UNSPECIFIED PATHOLOGICAL FRACTURE PRESENCE: ICD-10-CM

## 2021-02-10 DIAGNOSIS — Z79.899 ENCOUNTER FOR MONITORING ADJUVANT HORMONAL THERAPY: ICD-10-CM

## 2021-02-10 DIAGNOSIS — Z51.81 ENCOUNTER FOR MONITORING ADJUVANT HORMONAL THERAPY: ICD-10-CM

## 2021-02-10 DIAGNOSIS — C50.912 MALIGNANT NEOPLASM OF LEFT FEMALE BREAST, UNSPECIFIED ESTROGEN RECEPTOR STATUS, UNSPECIFIED SITE OF BREAST (HCC): Primary | ICD-10-CM

## 2021-02-10 DIAGNOSIS — C50.812 MALIGNANT NEOPLASM OF OVERLAPPING SITES OF LEFT BREAST IN FEMALE, ESTROGEN RECEPTOR POSITIVE (HCC): Primary | ICD-10-CM

## 2021-02-10 PROCEDURE — 99024 POSTOP FOLLOW-UP VISIT: CPT | Performed by: SURGERY

## 2021-02-10 PROCEDURE — 3078F DIAST BP <80 MM HG: CPT | Performed by: INTERNAL MEDICINE

## 2021-02-10 PROCEDURE — 3077F SYST BP >= 140 MM HG: CPT | Performed by: INTERNAL MEDICINE

## 2021-02-10 PROCEDURE — 3008F BODY MASS INDEX DOCD: CPT | Performed by: INTERNAL MEDICINE

## 2021-02-10 PROCEDURE — 3077F SYST BP >= 140 MM HG: CPT | Performed by: SURGERY

## 2021-02-10 PROCEDURE — 99214 OFFICE O/P EST MOD 30 MIN: CPT | Performed by: INTERNAL MEDICINE

## 2021-02-10 PROCEDURE — 3078F DIAST BP <80 MM HG: CPT | Performed by: SURGERY

## 2021-02-10 RX ORDER — LETROZOLE 2.5 MG/1
2.5 TABLET, FILM COATED ORAL DAILY
Qty: 90 TABLET | Refills: 3 | Status: SHIPPED | OUTPATIENT
Start: 2021-02-10

## 2021-02-10 NOTE — PROGRESS NOTES
Cancer Center Progress Note    Patient Name: Vinayak Wallace   YOB: 1940   Medical Record Number: [id number]   Attending Physician: Paras Johnson M.D.      Chief Complaint:  Breast cancer    History of Present Illness:  Cancer history:  80-ye grade 1, cribriform type, measuring 4 mm (0.4 cm). -DCIS is present at 1 mm (0.1 cm) from the final medial margin.     D. Left breast, lateral margin, excision:  -Atypical ductal hyperplasia (ADH).     E.   Left breast, superior margin, excision:  -Atypic LOCALIZATION Left 2/4/2021    Performed by Kasey Easley MD at [address] [facility] MAIN OR   • [address] Right    • CATARACT     • CATARACT EXTRACTION Left 12/12/2018    SN6AT3 21.00 D LENSX WITH ORA   • CATARACT EXTRACTION W/  INTRAOCULAR LENS IMPLANT Rig docusate calcium 240 MG Oral Cap, Take 240 mg by mouth daily. , Disp: , Rfl:   •  clotrimazole-betamethasone 1-0.05 % External Cream, Apply 1 Application topically 2 (two) times daily. (Patient taking differently: Apply 1 Application topically as needed.   ) TWICE DAILY , Disp: , Rfl: 1  •  Oxybutynin Chloride ER 15 MG Oral Tablet 24 Hr, Take 15 mg by mouth nightly.  , Disp: , Rfl:   •  Polyethyl Glycol-Propyl Glycol (SYSTANE ULTRA) 0.4-0.3 % Ophthalmic Solution, Place 1 drop into both eyes as needed. , Disp: , TAKE 2 TABLETS BY MOUTH  TWICE DAILY, Disp: 360 tablet, Rfl: 3    •  atorvastatin 40 MG Oral Tab, Take 40 mg by mouth nightly.  , Disp: , Rfl:     •  Clopidogrel Bisulfate 75 MG Oral Tab, Take 1 tablet (75 mg total) by mouth daily. , Disp: 30 tablet, Rfl: 3 pulses   Abdomen: Soft, non tender. Extremities: No edema. Neurological: 5/5 motor x4. Laboratory:  No results for input(s): WBC, HGB, PLT, NE, NEUT in the last 504 hours.       Lab Results   Component Value Date     (H) 01/19/2021    BUN 2

## 2021-02-11 RX ORDER — LOSARTAN POTASSIUM AND HYDROCHLOROTHIAZIDE 12.5; 5 MG/1; MG/1
1 TABLET ORAL DAILY
COMMUNITY
Start: 2021-01-08 | End: 2022-01-03

## 2021-02-11 RX ORDER — MULTIVIT,IRON,MINERALS/LUTEIN
1 TABLET ORAL DAILY
COMMUNITY

## 2021-02-11 RX ORDER — CLOTRIMAZOLE AND BETAMETHASONE DIPROPIONATE 10; .64 MG/G; MG/G
CREAM TOPICAL
COMMUNITY
Start: 2021-01-08

## 2021-02-11 NOTE — PROGRESS NOTES
Patient presents to the [facility] for post surgical follow up with Dr. Richard Kirkland and Dr. Gia Shrestha. Patient reports doing well s/p lumpectomy on 2/4. Dr. Richard Kirkland has recommended Radiation Oncology consult.   Discussed with patient she will be contacted for

## 2021-02-12 ENCOUNTER — OFFICE VISIT (OUTPATIENT)
Dept: RADIATION ONCOLOGY | Facility: HOSPITAL | Age: 81
End: 2021-02-12
Attending: RADIOLOGY
Payer: MEDICARE

## 2021-02-12 VITALS
RESPIRATION RATE: 16 BRPM | HEART RATE: 72 BPM | OXYGEN SATURATION: 100 % | WEIGHT: 213.63 LBS | BODY MASS INDEX: 36 KG/M2 | SYSTOLIC BLOOD PRESSURE: 149 MMHG | DIASTOLIC BLOOD PRESSURE: 56 MMHG

## 2021-02-12 DIAGNOSIS — Z17.0 CARCINOMA OF UPPER-OUTER QUADRANT OF LEFT BREAST IN FEMALE, ESTROGEN RECEPTOR POSITIVE (HCC): Primary | ICD-10-CM

## 2021-02-12 DIAGNOSIS — C50.412 CARCINOMA OF UPPER-OUTER QUADRANT OF LEFT BREAST IN FEMALE, ESTROGEN RECEPTOR POSITIVE (HCC): Primary | ICD-10-CM

## 2021-02-12 PROCEDURE — 99212 OFFICE O/P EST SF 10 MIN: CPT

## 2021-02-12 NOTE — PROGRESS NOTES
Nursing Consultation Note  Patient: Zaria Duval  YOB: 1940  Age: [de-identified]year old  Radiation Oncologist: Dr. Toan Parra  Referring Physician: [email or web address]  Consult Date: 2/12/2021      Chemotherapy: No  Labs: Yessenia Solano tablet 3   • Semaglutide,0.25 or 0.5MG/DOS, (OZEMPIC, 0.25 OR 0.5 MG/DOSE,) 2 MG/1.5ML Subcutaneous Solution Pen-injector Inject 0.25 mg into the skin once a week for 28 days, THEN 0.5 mg once a week.  (Patient taking differently: Inject 0.25 mg into the sk [address] [facility] [address]  Phone: [phone] Fax: [phone] [phone], [facility] [address]  [address]  [facility] [address]  Phone: [phone] BIOPSY RIGHT     • OTHER SURGICAL HISTORY  11/29/2017    stent placement   • TONSILLECTOMY         Social History    Socioeconomic History      Marital status:        Spouse name: Not on file      Number of children: Not on file      Years of file      ECOG:  Grade 0 - Fully active, able to carry on all predisease activities without restrictions. Education:  Yes    Are ADL's met? Yes  Does patient feel safe in their environment?   Yes  Care decisions:  Patient and/or surrogate IS involved spoke to the patient about the possibility of the physician being interrupted during consults or follow ups to tend to patients under treatment, on the treatment machine. Pt acknowledged this information. Letrozole to start after RT per Dr. Ashley Jean.   Dr. Anselmo Andres

## 2021-02-12 NOTE — CONSULTS
[facility]    PATIENT'S NAME: Joelmaile Leslie   RADIATION ONCOLOGIST: Frantz Canada.  Andrew Andres MD   PATIENT ACCOUNT #: [de-identified] LOCATION: [address] [place] [address] RECORD #: [id number] YOB: 1940   CONSULTATION DATE: 02/12/2021       CELESTINE referred the patient on to Radiation Oncology to discuss treatment. She was given instructions that she would not begin the antiestrogen therapy until after radiation therapy is complete. The patient otherwise has been doing very well.   She has recover ulceration or lesions. NECK:  Supple with no lymphadenopathy. LUNGS:  Clear to auscultation bilaterally. HEART:  Regular rate and rhythm with normal S1, S2 and no audible murmurs.     LYMPHATICS:  There is no supraclavicular, axillary, or inguinal lympha optimistic that this treatment will be successful in significantly decreasing her likelihood for an in-breast recurrence. I then had a long talk with the patient and her friend regarding potential side effects of therapy.   I told her that she should rohan 14:42:28  t: 02/12/2021 16:06:08  Job [id number]  NAD/    cc: Jakob Rater. Richy Dash MD   Rub Links. Linard Loupe, MD Sharyle Asa Cheff, MD

## 2021-02-13 DIAGNOSIS — E11.9 CONTROLLED TYPE 2 DIABETES MELLITUS WITHOUT COMPLICATION, WITHOUT LONG-TERM CURRENT USE OF INSULIN (HCC): ICD-10-CM

## 2021-02-15 ENCOUNTER — OFFICE VISIT (OUTPATIENT)
Dept: INTERNAL MEDICINE CLINIC | Facility: CLINIC | Age: 81
End: 2021-02-15
Payer: COMMERCIAL

## 2021-02-15 VITALS
WEIGHT: 210 LBS | SYSTOLIC BLOOD PRESSURE: 130 MMHG | BODY MASS INDEX: 34.99 KG/M2 | OXYGEN SATURATION: 98 % | DIASTOLIC BLOOD PRESSURE: 72 MMHG | HEART RATE: 74 BPM | TEMPERATURE: 98 F | HEIGHT: 65 IN

## 2021-02-15 DIAGNOSIS — I25.110 CORONARY ARTERY DISEASE INVOLVING NATIVE CORONARY ARTERY OF NATIVE HEART WITH UNSTABLE ANGINA PECTORIS (HCC): ICD-10-CM

## 2021-02-15 DIAGNOSIS — I10 HYPERTENSION, BENIGN: ICD-10-CM

## 2021-02-15 DIAGNOSIS — E78.5 HYPERLIPIDEMIA, UNSPECIFIED HYPERLIPIDEMIA TYPE: ICD-10-CM

## 2021-02-15 DIAGNOSIS — C50.412 CARCINOMA OF UPPER-OUTER QUADRANT OF LEFT BREAST IN FEMALE, ESTROGEN RECEPTOR POSITIVE (HCC): Primary | ICD-10-CM

## 2021-02-15 DIAGNOSIS — E11.9 TYPE 2 DIABETES MELLITUS WITHOUT COMPLICATION, WITHOUT LONG-TERM CURRENT USE OF INSULIN (HCC): ICD-10-CM

## 2021-02-15 DIAGNOSIS — Z17.0 CARCINOMA OF UPPER-OUTER QUADRANT OF LEFT BREAST IN FEMALE, ESTROGEN RECEPTOR POSITIVE (HCC): Primary | ICD-10-CM

## 2021-02-15 PROCEDURE — 3075F SYST BP GE 130 - 139MM HG: CPT | Performed by: INTERNAL MEDICINE

## 2021-02-15 PROCEDURE — 3078F DIAST BP <80 MM HG: CPT | Performed by: INTERNAL MEDICINE

## 2021-02-15 PROCEDURE — 99214 OFFICE O/P EST MOD 30 MIN: CPT | Performed by: INTERNAL MEDICINE

## 2021-02-15 PROCEDURE — 3008F BODY MASS INDEX DOCD: CPT | Performed by: INTERNAL MEDICINE

## 2021-02-15 RX ORDER — SEMAGLUTIDE 1.34 MG/ML
0.5 INJECTION, SOLUTION SUBCUTANEOUS
Qty: 4.5 ML | Refills: 0 | Status: SHIPPED | OUTPATIENT
Start: 2021-02-15 | End: 2021-06-01

## 2021-02-15 RX ORDER — FLUCONAZOLE 100 MG/1
100 TABLET ORAL DAILY
Qty: 7 TABLET | Refills: 0 | Status: SHIPPED | OUTPATIENT
Start: 2021-02-15 | End: 2021-03-08 | Stop reason: ALTCHOICE

## 2021-02-15 NOTE — PROGRESS NOTES
Gautam Marroquin is a [de-identified]year old female. HPI:   She had lumpectomy for two separate sites of carcinoma left breast - she is doing well post-op and has virtually no discomfort. She will receive 4 weeks of radiation therapy.  She is seeing Dr Melissa Romero - she wi METFORMIN HCL  MG Oral Tablet 24 Hr TAKE 2 TABLETS BY MOUTH  TWICE DAILY 360 tablet 3   • atorvastatin 40 MG Oral Tab Take 40 mg by mouth nightly. • Clopidogrel Bisulfate 75 MG Oral Tab Take 1 tablet (75 mg total) by mouth daily.  30 tablet 3 SYSTEMS:   GENERAL HEALTH: feels well otherwise  SKIN: denies any unusual skin lesions or rashes  RESPIRATORY: denies shortness of breath with exertion  CARDIOVASCULAR: denies chest pain on exertion  GI: denies abdominal pain and denies heartburn  NEURO: d

## 2021-02-18 ENCOUNTER — OFFICE VISIT (OUTPATIENT)
Dept: ENDOCRINOLOGY CLINIC | Facility: CLINIC | Age: 81
End: 2021-02-18
Payer: COMMERCIAL

## 2021-02-18 VITALS
DIASTOLIC BLOOD PRESSURE: 67 MMHG | SYSTOLIC BLOOD PRESSURE: 118 MMHG | WEIGHT: 208.81 LBS | BODY MASS INDEX: 35 KG/M2 | HEART RATE: 70 BPM

## 2021-02-18 DIAGNOSIS — E11.9 CONTROLLED TYPE 2 DIABETES MELLITUS WITHOUT COMPLICATION, WITHOUT LONG-TERM CURRENT USE OF INSULIN (HCC): Primary | ICD-10-CM

## 2021-02-18 LAB
GLUCOSE BLOOD: 165
TEST STRIP LOT #: NORMAL NUMERIC

## 2021-02-18 PROCEDURE — 99214 OFFICE O/P EST MOD 30 MIN: CPT | Performed by: INTERNAL MEDICINE

## 2021-02-18 PROCEDURE — 3074F SYST BP LT 130 MM HG: CPT | Performed by: INTERNAL MEDICINE

## 2021-02-18 PROCEDURE — 36416 COLLJ CAPILLARY BLOOD SPEC: CPT | Performed by: INTERNAL MEDICINE

## 2021-02-18 PROCEDURE — 3078F DIAST BP <80 MM HG: CPT | Performed by: INTERNAL MEDICINE

## 2021-02-18 PROCEDURE — 82947 ASSAY GLUCOSE BLOOD QUANT: CPT | Performed by: INTERNAL MEDICINE

## 2021-02-18 NOTE — PROGRESS NOTES
Name: Jose Gonzalez  Date: 2/18/2021    Referring Physician: No ref. provider found    HISTORY OF PRESENT ILLNESS   Jose Gonzalez is a [de-identified]year old female who presents for diabetes mellitus. Since last visit her  passed away in 2/2018. Disp: 90 tablet, Rfl: 3  •  Fosfomycin Tromethamine 3 g Oral Powd Pack, Take 3 g by mouth once., Disp: , Rfl:   •  ALPRAZolam 0.25 MG Oral Tab, Take 1 tablet (0.25 mg total) by mouth nightly as needed. , Disp: 30 tablet, Rfl: 0  •  docusate calcium 240 MG O Place 1 drop into both eyes as needed. , Disp: , Rfl:   •  aspirin 81 MG Oral Tab, Take 81 mg by mouth daily.   , Disp: , Rfl:      Allergies:     Januvia [Sitaglipti*    ITCHING    Social History:   Social History    Socioeconomic History      Marital statu 19.0D   3.75CYL   • CATH CARTOTID STENT  11/2017   • CATH DRUG ELUTING STENT       3 total   • COLONOSCOPY  11/2018   • COLONOSCOPY, POSSIBLE BIOPSY, POSSIBLE POLYPECTOMY 81692 N/A 11/12/2018    Performed by Yakov Newlel MD at [address] [place] [address] Ozempic 0.5mg SQ weekly, verbalized understanding of risks and benefits  -She will monitor sugars twice daily during radiation therapy  -If sugars above 200 then plan to increase Ozempic to 1mg subcutaneous weekly   -Normotensive  -normal foot exam perform

## 2021-02-19 ENCOUNTER — IMMUNIZATION (OUTPATIENT)
Dept: LAB | Age: 81
End: 2021-02-19
Attending: HOSPITALIST
Payer: MEDICARE

## 2021-02-19 DIAGNOSIS — Z23 NEED FOR VACCINATION: Primary | ICD-10-CM

## 2021-02-19 PROCEDURE — 0002A SARSCOV2 VAC 30MCG/0.3ML IM: CPT

## 2021-02-22 ENCOUNTER — OFFICE VISIT (OUTPATIENT)
Dept: SURGERY | Facility: CLINIC | Age: 81
End: 2021-02-22
Payer: COMMERCIAL

## 2021-02-22 VITALS
WEIGHT: 208.75 LBS | DIASTOLIC BLOOD PRESSURE: 46 MMHG | OXYGEN SATURATION: 98 % | SYSTOLIC BLOOD PRESSURE: 144 MMHG | BODY MASS INDEX: 34.78 KG/M2 | TEMPERATURE: 98 F | RESPIRATION RATE: 18 BRPM | HEART RATE: 75 BPM | HEIGHT: 65 IN

## 2021-02-22 DIAGNOSIS — C50.812 MALIGNANT NEOPLASM OF OVERLAPPING SITES OF LEFT BREAST IN FEMALE, ESTROGEN RECEPTOR POSITIVE (HCC): Primary | ICD-10-CM

## 2021-02-22 DIAGNOSIS — Z17.0 MALIGNANT NEOPLASM OF OVERLAPPING SITES OF LEFT BREAST IN FEMALE, ESTROGEN RECEPTOR POSITIVE (HCC): Primary | ICD-10-CM

## 2021-02-22 PROCEDURE — 3077F SYST BP >= 140 MM HG: CPT | Performed by: PHYSICIAN ASSISTANT

## 2021-02-22 PROCEDURE — 3078F DIAST BP <80 MM HG: CPT | Performed by: PHYSICIAN ASSISTANT

## 2021-02-22 PROCEDURE — 3008F BODY MASS INDEX DOCD: CPT | Performed by: PHYSICIAN ASSISTANT

## 2021-02-22 PROCEDURE — 99024 POSTOP FOLLOW-UP VISIT: CPT | Performed by: PHYSICIAN ASSISTANT

## 2021-02-22 NOTE — PROGRESS NOTES
Breast Surgery Post-Operative Visit    Diagnosis: Left breast cancer status post left breast lumpectomy on 2/4/21      Stage: Cancer Staging  No matching staging information was found for the patient.     Disease Status:  Surgical treatment complete, medica drainage, or fevers. She is here today for evaluation and recommendations for further therapy.         Past Medical History:   Diagnosis Date   • Age-related nuclear cataract of both eyes    • Atherosclerosis of coronary artery    • Benign neoplasm of skin of Menopause: 47  Type: Hysterectomy with ovaries removed  She has history of hormone replacement therapy for 5 years, last 1992. She denies any history of oral contraceptive use. She denies infertility treatment to achieve pregnancy.     Medications: walking.     Breasts:  See history of present illness    Gastrointestinal:     There is no history of difficulty or pain with swallowing, reflux symptoms, vomiting, dark or bloody stools, constipation, yellowing of the skin, indigestion, nausea, change in b masses. The trachea is in the midline. Conjunctiva are clear, non-icteric. Chest: The chest expands symmetrically. The lungs are clear to auscultation. Heart: The rhythm is regular. There are no murmurs, rubs, gallops or thrills.     Breasts:  Her br to physical therapy may be warranted in the future if she identifies any limitations or restrictions. She was given ample opportunity for questions and those questions were answered to her satisfaction.  She was encouraged to contact the office with any que

## 2021-02-24 ENCOUNTER — OFFICE VISIT (OUTPATIENT)
Dept: CARDIOLOGY | Age: 81
End: 2021-02-24

## 2021-02-24 VITALS
HEIGHT: 65 IN | OXYGEN SATURATION: 99 % | WEIGHT: 208 LBS | BODY MASS INDEX: 34.66 KG/M2 | SYSTOLIC BLOOD PRESSURE: 112 MMHG | HEART RATE: 72 BPM | RESPIRATION RATE: 18 BRPM | DIASTOLIC BLOOD PRESSURE: 60 MMHG

## 2021-02-24 DIAGNOSIS — E78.00 PURE HYPERCHOLESTEROLEMIA: ICD-10-CM

## 2021-02-24 DIAGNOSIS — I10 ESSENTIAL HYPERTENSION: ICD-10-CM

## 2021-02-24 DIAGNOSIS — I25.10 CORONARY ARTERY DISEASE INVOLVING NATIVE CORONARY ARTERY OF NATIVE HEART WITHOUT ANGINA PECTORIS: Primary | ICD-10-CM

## 2021-02-24 PROCEDURE — 99214 OFFICE O/P EST MOD 30 MIN: CPT | Performed by: INTERNAL MEDICINE

## 2021-02-24 PROCEDURE — 3078F DIAST BP <80 MM HG: CPT | Performed by: INTERNAL MEDICINE

## 2021-02-24 PROCEDURE — 3074F SYST BP LT 130 MM HG: CPT | Performed by: INTERNAL MEDICINE

## 2021-02-24 ASSESSMENT — PATIENT HEALTH QUESTIONNAIRE - PHQ9
SUM OF ALL RESPONSES TO PHQ9 QUESTIONS 1 AND 2: 0
2. FEELING DOWN, DEPRESSED OR HOPELESS: NOT AT ALL
CLINICAL INTERPRETATION OF PHQ9 SCORE: NO FURTHER SCREENING NEEDED
SUM OF ALL RESPONSES TO PHQ9 QUESTIONS 1 AND 2: 0
CLINICAL INTERPRETATION OF PHQ2 SCORE: NO FURTHER SCREENING NEEDED
1. LITTLE INTEREST OR PLEASURE IN DOING THINGS: NOT AT ALL

## 2021-03-01 ENCOUNTER — APPOINTMENT (OUTPATIENT)
Dept: RADIATION ONCOLOGY | Facility: HOSPITAL | Age: 81
End: 2021-03-01
Attending: RADIOLOGY
Payer: MEDICARE

## 2021-03-03 ENCOUNTER — APPOINTMENT (OUTPATIENT)
Dept: RADIATION ONCOLOGY | Facility: HOSPITAL | Age: 81
End: 2021-03-03
Attending: RADIOLOGY
Payer: MEDICARE

## 2021-03-03 PROCEDURE — 77290 THER RAD SIMULAJ FIELD CPLX: CPT | Performed by: RADIOLOGY

## 2021-03-03 PROCEDURE — 77333 RADIATION TREATMENT AID(S): CPT | Performed by: RADIOLOGY

## 2021-03-04 ENCOUNTER — APPOINTMENT (OUTPATIENT)
Dept: PHYSICAL THERAPY | Facility: HOSPITAL | Age: 81
End: 2021-03-04
Attending: SURGERY
Payer: MEDICARE

## 2021-03-08 ENCOUNTER — OFFICE VISIT (OUTPATIENT)
Dept: PHYSICAL THERAPY | Facility: HOSPITAL | Age: 81
End: 2021-03-08
Attending: SURGERY
Payer: MEDICARE

## 2021-03-08 ENCOUNTER — OFFICE VISIT (OUTPATIENT)
Dept: INTERNAL MEDICINE CLINIC | Facility: CLINIC | Age: 81
End: 2021-03-08
Payer: COMMERCIAL

## 2021-03-08 VITALS
HEIGHT: 65 IN | HEART RATE: 76 BPM | OXYGEN SATURATION: 98 % | WEIGHT: 205 LBS | SYSTOLIC BLOOD PRESSURE: 116 MMHG | BODY MASS INDEX: 34.16 KG/M2 | DIASTOLIC BLOOD PRESSURE: 68 MMHG | TEMPERATURE: 99 F

## 2021-03-08 DIAGNOSIS — I10 HYPERTENSION, BENIGN: ICD-10-CM

## 2021-03-08 DIAGNOSIS — M15.9 PRIMARY OSTEOARTHRITIS INVOLVING MULTIPLE JOINTS: ICD-10-CM

## 2021-03-08 DIAGNOSIS — E78.5 HYPERLIPIDEMIA, UNSPECIFIED HYPERLIPIDEMIA TYPE: ICD-10-CM

## 2021-03-08 DIAGNOSIS — I25.110 CORONARY ARTERY DISEASE INVOLVING NATIVE CORONARY ARTERY OF NATIVE HEART WITH UNSTABLE ANGINA PECTORIS (HCC): ICD-10-CM

## 2021-03-08 DIAGNOSIS — Z17.0 CARCINOMA OF UPPER-OUTER QUADRANT OF LEFT BREAST IN FEMALE, ESTROGEN RECEPTOR POSITIVE (HCC): Primary | ICD-10-CM

## 2021-03-08 DIAGNOSIS — E11.9 TYPE 2 DIABETES MELLITUS WITHOUT COMPLICATION, WITHOUT LONG-TERM CURRENT USE OF INSULIN (HCC): ICD-10-CM

## 2021-03-08 DIAGNOSIS — C50.412 CARCINOMA OF UPPER-OUTER QUADRANT OF LEFT BREAST IN FEMALE, ESTROGEN RECEPTOR POSITIVE (HCC): Primary | ICD-10-CM

## 2021-03-08 PROCEDURE — 77300 RADIATION THERAPY DOSE PLAN: CPT | Performed by: RADIOLOGY

## 2021-03-08 PROCEDURE — 3078F DIAST BP <80 MM HG: CPT | Performed by: INTERNAL MEDICINE

## 2021-03-08 PROCEDURE — 99214 OFFICE O/P EST MOD 30 MIN: CPT | Performed by: INTERNAL MEDICINE

## 2021-03-08 PROCEDURE — 77334 RADIATION TREATMENT AID(S): CPT | Performed by: RADIOLOGY

## 2021-03-08 PROCEDURE — 77295 3-D RADIOTHERAPY PLAN: CPT | Performed by: RADIOLOGY

## 2021-03-08 PROCEDURE — 3008F BODY MASS INDEX DOCD: CPT | Performed by: INTERNAL MEDICINE

## 2021-03-08 PROCEDURE — 3074F SYST BP LT 130 MM HG: CPT | Performed by: INTERNAL MEDICINE

## 2021-03-08 RX ORDER — A/SINGAPORE/GP1908/2015 IVR-180 (AN A/MICHIGAN/45/2015 (H1N1)PDM09-LIKE VIRUS, A/HONG KONG/4801/2014, NYMC X-263B (H3N2) (AN A/HONG KONG/4801/2014-LIKE VIRUS), AND B/BRISBANE/60/2008, WILD TYPE (A B/BRISBANE/60/2008-LIKE VIRUS) 15; 15; 15 UG/.5ML; UG/.5ML; UG/.5ML
INJECTION, SUSPENSION INTRAMUSCULAR
COMMUNITY
Start: 2020-09-15 | End: 2021-03-08 | Stop reason: ALTCHOICE

## 2021-03-08 NOTE — PROGRESS NOTES
Elaina Buenrostro is a [de-identified]year old female. HPI:   She is doing well in all regards.      Carcinoma breast - she will start RT next week    IHD - doing well post stenting LAD June, 2020 - on Plavix, no chest pain or sob    She c/o pain in the PIP joint righ BY MOUTH ONCE DAILY 90 tablet 3   • METFORMIN HCL  MG Oral Tablet 24 Hr TAKE 2 TABLETS BY MOUTH  TWICE DAILY 360 tablet 3   • atorvastatin 40 MG Oral Tab Take 40 mg by mouth nightly.        • Clopidogrel Bisulfate 75 MG Oral Tab Take 1 tablet (75 mg t History:  Social History    Tobacco Use      Smoking status: Never Smoker      Smokeless tobacco: Never Used    Vaping Use      Vaping Use: Never used    Alcohol use: No    Drug use: No       REVIEW OF SYSTEMS:   GENERAL HEALTH: feels well otherwise  SKIN:

## 2021-03-08 NOTE — PROGRESS NOTES
BREAST CANCER SURGICAL SCREENINGS  [facility]      PATIENT SUMMARY:     Involved Side:    Left                                                 Dominant Hand:     Right                             Occupation:     Retired flex 155 155  Sitting flex      abd 155 155   abd 160 155   abd      ext 55 55   ext 55 55   ext     Functional IR  T8 T8  Functional IR  T8 T8  Functional IR                      Shoulder strength  Right Left  Shoulder strength  Right Left  Shoulder stren MEASUREMENT E 25.5 25.5   MEASUREMENT F 26 26   MEASUREMENT G 29.8 29.8   MEASUREMENT H 37.8 37.8   MEASUREMENT I 44 44    TOTAL VOLUME  [id number] [id number]   % DIFFERENCE 0.26097 7.04323

## 2021-03-09 ENCOUNTER — TELEPHONE (OUTPATIENT)
Dept: HEMATOLOGY/ONCOLOGY | Facility: HOSPITAL | Age: 81
End: 2021-03-09

## 2021-03-09 NOTE — TELEPHONE ENCOUNTER
Returning call to patient. Patient with questions regarding skin care during radiation therapy. Reinforced to patient that during her consultation with Radiation Oncology she was provided a white folder. Patient confirms receiving.   Patient obtained fol

## 2021-03-17 ENCOUNTER — OFFICE VISIT (OUTPATIENT)
Dept: RADIATION ONCOLOGY | Facility: HOSPITAL | Age: 81
End: 2021-03-17
Attending: RADIOLOGY
Payer: MEDICARE

## 2021-03-17 ENCOUNTER — SOCIAL WORK SERVICES (OUTPATIENT)
Dept: HEMATOLOGY/ONCOLOGY | Facility: HOSPITAL | Age: 81
End: 2021-03-17

## 2021-03-17 PROCEDURE — 77412 RADIATION TX DELIVERY LVL 3: CPT | Performed by: RADIOLOGY

## 2021-03-17 PROCEDURE — 77280 THER RAD SIMULAJ FIELD SMPL: CPT | Performed by: RADIOLOGY

## 2021-03-17 NOTE — PROGRESS NOTES
SW placed call to the pt for outreach. Pt with breast lumpectomy 02/04/2021 by Dr. Annie Jones, Oncology care under Dr. Ashly Sin and has started Radiation Therapy under Dr. Don Mckeon today.  Pt states she has had some diarrhea, but also admitted she ate a fried chic MSW, DXIIEW  Outpatient Oncology Social Worker  [address] for [address] [facility]  [phone]

## 2021-03-18 PROCEDURE — 77290 THER RAD SIMULAJ FIELD CPLX: CPT | Performed by: RADIOLOGY

## 2021-03-18 PROCEDURE — 77387 GUIDANCE FOR RADJ TX DLVR: CPT | Performed by: RADIOLOGY

## 2021-03-18 PROCEDURE — 77412 RADIATION TX DELIVERY LVL 3: CPT | Performed by: RADIOLOGY

## 2021-03-19 PROCEDURE — 77387 GUIDANCE FOR RADJ TX DLVR: CPT | Performed by: RADIOLOGY

## 2021-03-19 PROCEDURE — 77412 RADIATION TX DELIVERY LVL 3: CPT | Performed by: RADIOLOGY

## 2021-03-22 PROCEDURE — 77412 RADIATION TX DELIVERY LVL 3: CPT | Performed by: RADIOLOGY

## 2021-03-22 PROCEDURE — 77387 GUIDANCE FOR RADJ TX DLVR: CPT | Performed by: RADIOLOGY

## 2021-03-23 ENCOUNTER — OFFICE VISIT (OUTPATIENT)
Dept: RADIATION ONCOLOGY | Facility: HOSPITAL | Age: 81
End: 2021-03-23
Attending: RADIOLOGY
Payer: MEDICARE

## 2021-03-23 VITALS
RESPIRATION RATE: 18 BRPM | DIASTOLIC BLOOD PRESSURE: 48 MMHG | SYSTOLIC BLOOD PRESSURE: 141 MMHG | TEMPERATURE: 97 F | HEART RATE: 80 BPM

## 2021-03-23 DIAGNOSIS — Z17.0 CARCINOMA OF UPPER-OUTER QUADRANT OF LEFT BREAST IN FEMALE, ESTROGEN RECEPTOR POSITIVE (HCC): Primary | ICD-10-CM

## 2021-03-23 DIAGNOSIS — C50.412 CARCINOMA OF UPPER-OUTER QUADRANT OF LEFT BREAST IN FEMALE, ESTROGEN RECEPTOR POSITIVE (HCC): Primary | ICD-10-CM

## 2021-03-23 PROCEDURE — 77387 GUIDANCE FOR RADJ TX DLVR: CPT | Performed by: RADIOLOGY

## 2021-03-23 PROCEDURE — 77412 RADIATION TX DELIVERY LVL 3: CPT | Performed by: RADIOLOGY

## 2021-03-23 NOTE — PROGRESS NOTES
[facility] Radiation Treatment Management Note 1-5    Patient:  Esperanza Cassidy  Age:  [de-identified]year old  Visit Diagnosis:    1.  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 [address]      Primary Ra

## 2021-03-24 PROCEDURE — 77412 RADIATION TX DELIVERY LVL 3: CPT | Performed by: RADIOLOGY

## 2021-03-24 PROCEDURE — 77387 GUIDANCE FOR RADJ TX DLVR: CPT | Performed by: RADIOLOGY

## 2021-03-25 PROCEDURE — 77412 RADIATION TX DELIVERY LVL 3: CPT | Performed by: RADIOLOGY

## 2021-03-25 PROCEDURE — 77387 GUIDANCE FOR RADJ TX DLVR: CPT | Performed by: RADIOLOGY

## 2021-03-26 PROCEDURE — 77387 GUIDANCE FOR RADJ TX DLVR: CPT | Performed by: RADIOLOGY

## 2021-03-26 PROCEDURE — 77412 RADIATION TX DELIVERY LVL 3: CPT | Performed by: RADIOLOGY

## 2021-03-26 PROCEDURE — 77336 RADIATION PHYSICS CONSULT: CPT | Performed by: RADIOLOGY

## 2021-03-29 PROCEDURE — 77412 RADIATION TX DELIVERY LVL 3: CPT | Performed by: RADIOLOGY

## 2021-03-29 PROCEDURE — 77387 GUIDANCE FOR RADJ TX DLVR: CPT | Performed by: RADIOLOGY

## 2021-03-30 ENCOUNTER — APPOINTMENT (OUTPATIENT)
Dept: RADIATION ONCOLOGY | Facility: HOSPITAL | Age: 81
End: 2021-03-30
Attending: RADIOLOGY
Payer: MEDICARE

## 2021-03-30 VITALS
TEMPERATURE: 97 F | RESPIRATION RATE: 18 BRPM | DIASTOLIC BLOOD PRESSURE: 57 MMHG | HEART RATE: 70 BPM | SYSTOLIC BLOOD PRESSURE: 132 MMHG

## 2021-03-30 DIAGNOSIS — C50.412 CARCINOMA OF UPPER-OUTER QUADRANT OF LEFT BREAST IN FEMALE, ESTROGEN RECEPTOR POSITIVE (HCC): Primary | ICD-10-CM

## 2021-03-30 DIAGNOSIS — Z17.0 CARCINOMA OF UPPER-OUTER QUADRANT OF LEFT BREAST IN FEMALE, ESTROGEN RECEPTOR POSITIVE (HCC): Primary | ICD-10-CM

## 2021-03-30 PROCEDURE — 77290 THER RAD SIMULAJ FIELD CPLX: CPT | Performed by: RADIOLOGY

## 2021-03-30 PROCEDURE — 77333 RADIATION TREATMENT AID(S): CPT | Performed by: RADIOLOGY

## 2021-03-30 PROCEDURE — 77412 RADIATION TX DELIVERY LVL 3: CPT | Performed by: RADIOLOGY

## 2021-03-30 NOTE — PROGRESS NOTES
[facility] Radiation Treatment Management Note 6-10    Patient:  Zakiya Jean  Age:  [de-identified]year old  Visit Diagnosis:    1.  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

## 2021-03-31 ENCOUNTER — APPOINTMENT (OUTPATIENT)
Dept: RADIATION ONCOLOGY | Facility: HOSPITAL | Age: 81
End: 2021-03-31
Attending: RADIOLOGY
Payer: MEDICARE

## 2021-03-31 PROCEDURE — 77387 GUIDANCE FOR RADJ TX DLVR: CPT | Performed by: RADIOLOGY

## 2021-03-31 PROCEDURE — 77412 RADIATION TX DELIVERY LVL 3: CPT | Performed by: RADIOLOGY

## 2021-04-01 ENCOUNTER — APPOINTMENT (OUTPATIENT)
Dept: RADIATION ONCOLOGY | Facility: HOSPITAL | Age: 81
End: 2021-04-01
Attending: RADIOLOGY
Payer: MEDICARE

## 2021-04-01 PROCEDURE — 77412 RADIATION TX DELIVERY LVL 3: CPT | Performed by: RADIOLOGY

## 2021-04-01 PROCEDURE — 77295 3-D RADIOTHERAPY PLAN: CPT | Performed by: RADIOLOGY

## 2021-04-01 PROCEDURE — 77300 RADIATION THERAPY DOSE PLAN: CPT | Performed by: RADIOLOGY

## 2021-04-01 PROCEDURE — 77334 RADIATION TREATMENT AID(S): CPT | Performed by: RADIOLOGY

## 2021-04-02 PROCEDURE — 77387 GUIDANCE FOR RADJ TX DLVR: CPT | Performed by: RADIOLOGY

## 2021-04-02 PROCEDURE — 77336 RADIATION PHYSICS CONSULT: CPT | Performed by: RADIOLOGY

## 2021-04-02 PROCEDURE — 77412 RADIATION TX DELIVERY LVL 3: CPT | Performed by: RADIOLOGY

## 2021-04-05 PROCEDURE — 77387 GUIDANCE FOR RADJ TX DLVR: CPT | Performed by: RADIOLOGY

## 2021-04-05 PROCEDURE — 77412 RADIATION TX DELIVERY LVL 3: CPT | Performed by: RADIOLOGY

## 2021-04-06 ENCOUNTER — OFFICE VISIT (OUTPATIENT)
Dept: RADIATION ONCOLOGY | Facility: HOSPITAL | Age: 81
End: 2021-04-06
Attending: RADIOLOGY
Payer: MEDICARE

## 2021-04-06 VITALS
TEMPERATURE: 98 F | HEART RATE: 76 BPM | RESPIRATION RATE: 18 BRPM | DIASTOLIC BLOOD PRESSURE: 48 MMHG | SYSTOLIC BLOOD PRESSURE: 133 MMHG

## 2021-04-06 DIAGNOSIS — Z17.0 CARCINOMA OF UPPER-OUTER QUADRANT OF LEFT BREAST IN FEMALE, ESTROGEN RECEPTOR POSITIVE (HCC): Primary | ICD-10-CM

## 2021-04-06 DIAGNOSIS — C50.412 CARCINOMA OF UPPER-OUTER QUADRANT OF LEFT BREAST IN FEMALE, ESTROGEN RECEPTOR POSITIVE (HCC): Primary | ICD-10-CM

## 2021-04-06 PROCEDURE — 77387 GUIDANCE FOR RADJ TX DLVR: CPT | Performed by: RADIOLOGY

## 2021-04-06 PROCEDURE — 77412 RADIATION TX DELIVERY LVL 3: CPT | Performed by: RADIOLOGY

## 2021-04-06 NOTE — PROGRESS NOTES
[facility] Radiation Treatment Management Note 11-15    Patient:  Hudson Campos  Age:  [de-identified]year old  Visit Diagnosis:    1.  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

## 2021-04-07 PROCEDURE — 77387 GUIDANCE FOR RADJ TX DLVR: CPT | Performed by: RADIOLOGY

## 2021-04-07 PROCEDURE — 77412 RADIATION TX DELIVERY LVL 3: CPT | Performed by: RADIOLOGY

## 2021-04-08 ENCOUNTER — APPOINTMENT (OUTPATIENT)
Dept: RADIATION ONCOLOGY | Facility: HOSPITAL | Age: 81
End: 2021-04-08
Attending: RADIOLOGY
Payer: MEDICARE

## 2021-04-08 PROCEDURE — 77280 THER RAD SIMULAJ FIELD SMPL: CPT | Performed by: RADIOLOGY

## 2021-04-08 PROCEDURE — 77412 RADIATION TX DELIVERY LVL 3: CPT | Performed by: RADIOLOGY

## 2021-04-09 PROCEDURE — 77387 GUIDANCE FOR RADJ TX DLVR: CPT | Performed by: RADIOLOGY

## 2021-04-09 PROCEDURE — 77412 RADIATION TX DELIVERY LVL 3: CPT | Performed by: RADIOLOGY

## 2021-04-09 PROCEDURE — 77336 RADIATION PHYSICS CONSULT: CPT | Performed by: RADIOLOGY

## 2021-04-12 ENCOUNTER — APPOINTMENT (OUTPATIENT)
Dept: HEMATOLOGY/ONCOLOGY | Facility: HOSPITAL | Age: 81
End: 2021-04-12
Payer: MEDICARE

## 2021-04-12 ENCOUNTER — OFFICE VISIT (OUTPATIENT)
Dept: HEMATOLOGY/ONCOLOGY | Facility: HOSPITAL | Age: 81
End: 2021-04-12
Attending: INTERNAL MEDICINE
Payer: MEDICARE

## 2021-04-12 VITALS
HEART RATE: 74 BPM | DIASTOLIC BLOOD PRESSURE: 48 MMHG | OXYGEN SATURATION: 99 % | BODY MASS INDEX: 34.46 KG/M2 | TEMPERATURE: 97 F | HEIGHT: 65 IN | RESPIRATION RATE: 18 BRPM | SYSTOLIC BLOOD PRESSURE: 143 MMHG | WEIGHT: 206.81 LBS

## 2021-04-12 DIAGNOSIS — Z79.899 ENCOUNTER FOR MONITORING ADJUVANT HORMONAL THERAPY: ICD-10-CM

## 2021-04-12 DIAGNOSIS — C50.912 MALIGNANT NEOPLASM OF LEFT FEMALE BREAST, UNSPECIFIED ESTROGEN RECEPTOR STATUS, UNSPECIFIED SITE OF BREAST (HCC): Primary | ICD-10-CM

## 2021-04-12 DIAGNOSIS — Z51.81 ENCOUNTER FOR MONITORING ADJUVANT HORMONAL THERAPY: ICD-10-CM

## 2021-04-12 DIAGNOSIS — M81.0 OSTEOPOROSIS, UNSPECIFIED OSTEOPOROSIS TYPE, UNSPECIFIED PATHOLOGICAL FRACTURE PRESENCE: ICD-10-CM

## 2021-04-12 PROCEDURE — 77412 RADIATION TX DELIVERY LVL 3: CPT | Performed by: RADIOLOGY

## 2021-04-12 PROCEDURE — 99214 OFFICE O/P EST MOD 30 MIN: CPT | Performed by: INTERNAL MEDICINE

## 2021-04-12 PROCEDURE — 77387 GUIDANCE FOR RADJ TX DLVR: CPT | Performed by: RADIOLOGY

## 2021-04-12 NOTE — PROGRESS NOTES
Cancer Center Progress Note    Patient Name: Demetrio Collins   YOB: 1940   Medical Record Number: [id number]   Attending Physician: Krysta Tinoco M.D.      Chief Complaint:  Breast cancer    History of Present Illness:  Cancer history:  80-ye breast, medial margin, excision:  -DCIS, nuclear grade 1, cribriform type, measuring 4 mm (0.4 cm). -DCIS is present at 1 mm (0.1 cm) from the final medial margin.     D.   Left breast, lateral margin, excision:  -Atypical ductal hyperplasia (ADH).     E. BREAST BIOPSY Right 2013   • BREAST BIOPSY NEEDLE LOCALIZATION Left 2/4/2021    Performed by Georgiana Zambrano MD at [facility] [place]   • [address] Right    • CATARACT     • CATARACT EXTRACTION Left 12/12/2018    SN6AT3 21.00 D LENSX WITH ORA   • CA 1 tablet (0.25 mg total) by mouth nightly as needed. , Disp: 30 tablet, Rfl: 0  •  docusate calcium 240 MG Oral Cap, Take 240 mg by mouth daily. , Disp: , Rfl:   •  clotrimazole-betamethasone 1-0.05 % External Cream, Apply 1 Application topically 2 (two) francy Oral Tab, Take 1 tablet (2.5 mg total) by mouth daily.  (Patient not taking: Reported on 4/12/2021 ), Disp: 90 tablet, Rfl: 3    Semaglutide,0.25 or 0.5MG/DOS, (OZEMPIC, 0.25 OR 0.5 MG/DOSE,) 2 MG/1.5ML Subcutaneous Solution Pen-injector, Inject 0.5 mg into Oral Tablet 24 Hr, Take 15 mg by mouth nightly.  , Disp: , Rfl:   Polyethyl Glycol-Propyl Glycol (SYSTANE ULTRA) 0.4-0.3 % Ophthalmic Solution, Place 1 drop into both eyes as needed. , Disp: , Rfl:   aspirin 81 MG Oral Tab, Take 81 mg by mouth daily.   , Dis  01/19/2021    K 3.9 01/19/2021     01/19/2021    CO2 30.0 01/19/2021       Radiology:  None    Cancer Staging  No matching staging information was found for the patient.     Impression and Plan:  45-year-old female being evaluated by Medical

## 2021-04-13 ENCOUNTER — OFFICE VISIT (OUTPATIENT)
Dept: RADIATION ONCOLOGY | Facility: HOSPITAL | Age: 81
End: 2021-04-13
Attending: RADIOLOGY
Payer: MEDICARE

## 2021-04-13 VITALS
SYSTOLIC BLOOD PRESSURE: 153 MMHG | HEART RATE: 70 BPM | RESPIRATION RATE: 18 BRPM | TEMPERATURE: 97 F | DIASTOLIC BLOOD PRESSURE: 55 MMHG

## 2021-04-13 DIAGNOSIS — Z17.0 CARCINOMA OF UPPER-OUTER QUADRANT OF LEFT BREAST IN FEMALE, ESTROGEN RECEPTOR POSITIVE (HCC): Primary | ICD-10-CM

## 2021-04-13 DIAGNOSIS — C50.412 CARCINOMA OF UPPER-OUTER QUADRANT OF LEFT BREAST IN FEMALE, ESTROGEN RECEPTOR POSITIVE (HCC): Primary | ICD-10-CM

## 2021-04-13 PROCEDURE — 77387 GUIDANCE FOR RADJ TX DLVR: CPT | Performed by: RADIOLOGY

## 2021-04-13 PROCEDURE — 77336 RADIATION PHYSICS CONSULT: CPT | Performed by: RADIOLOGY

## 2021-04-13 PROCEDURE — 77412 RADIATION TX DELIVERY LVL 3: CPT | Performed by: RADIOLOGY

## 2021-04-13 NOTE — PROGRESS NOTES
[facility] Radiation Treatment Management Note 16-20    Patient:  Isaiah Chery  Age:  [de-identified]year old  Visit Diagnosis:    1.  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

## 2021-04-13 NOTE — PATIENT INSTRUCTIONS
Continue to moisturize for the next 2-3 weeks. Please call with any questions or concerns to RN number [phone]    SPF 27 or greater when exposed to the sun, as your skin will remain sun sensitive for months, even years.      Follow up with Dr Charlotte Singleton

## 2021-04-28 NOTE — PROGRESS NOTES
[facility]    PATIENT'S NAME: Kelle Shaggy   RADIATION ONCOLOGIST: Marna Fleischer.  Gloria Hathaway MD   PATIENT ACCOUNT #: [de-identified] LOCATION: [address] [place] [address] RECORD #: [id number] YOB: 1940   DATE: 04/13/2021       RADIATION ONCO 266 cGy daily fractions. This was done with prone tangent technique and 6 MV photons. This was then followed with a boost to the tumor bed and an additional 1000 cGy in 250 cGy daily fractions.   This was done with 3D conformal techniques and a combinatio MD Yahir Jones.  MD Yordan

## 2021-05-04 ENCOUNTER — OFFICE VISIT (OUTPATIENT)
Dept: INTERNAL MEDICINE CLINIC | Facility: CLINIC | Age: 81
End: 2021-05-04
Payer: COMMERCIAL

## 2021-05-04 VITALS
HEART RATE: 73 BPM | OXYGEN SATURATION: 99 % | TEMPERATURE: 98 F | WEIGHT: 205 LBS | HEIGHT: 65 IN | SYSTOLIC BLOOD PRESSURE: 134 MMHG | DIASTOLIC BLOOD PRESSURE: 62 MMHG | BODY MASS INDEX: 34.16 KG/M2

## 2021-05-04 DIAGNOSIS — Z95.5 H/O HEART ARTERY STENT: ICD-10-CM

## 2021-05-04 DIAGNOSIS — E78.5 HYPERLIPIDEMIA, UNSPECIFIED HYPERLIPIDEMIA TYPE: ICD-10-CM

## 2021-05-04 DIAGNOSIS — E53.8 B12 DEFICIENCY: ICD-10-CM

## 2021-05-04 DIAGNOSIS — I10 HYPERTENSION, BENIGN: ICD-10-CM

## 2021-05-04 DIAGNOSIS — E11.9 TYPE 2 DIABETES MELLITUS WITHOUT COMPLICATION, WITHOUT LONG-TERM CURRENT USE OF INSULIN (HCC): Primary | ICD-10-CM

## 2021-05-04 DIAGNOSIS — I25.110 CORONARY ARTERY DISEASE INVOLVING NATIVE CORONARY ARTERY OF NATIVE HEART WITH UNSTABLE ANGINA PECTORIS (HCC): ICD-10-CM

## 2021-05-04 PROCEDURE — 99214 OFFICE O/P EST MOD 30 MIN: CPT | Performed by: INTERNAL MEDICINE

## 2021-05-04 PROCEDURE — 3075F SYST BP GE 130 - 139MM HG: CPT | Performed by: INTERNAL MEDICINE

## 2021-05-04 PROCEDURE — 3078F DIAST BP <80 MM HG: CPT | Performed by: INTERNAL MEDICINE

## 2021-05-04 PROCEDURE — 3008F BODY MASS INDEX DOCD: CPT | Performed by: INTERNAL MEDICINE

## 2021-05-04 NOTE — PROGRESS NOTES
Lisa Vargas is a [de-identified]year old female. HPI:   Dx carcinoma breast last December - doing well post lumpectomy and RT and CT -  On AI now. Generally, she feels very well. HTN    IHD - s/p stent last June. No chest pain or sob.        SR: no chest Clopidogrel Bisulfate 75 MG Oral Tab Take 1 tablet (75 mg total) by mouth daily. 30 tablet 3   • Glucose Blood (ONETOUCH VERIO) In Vitro Strip Check BG 2 times per day. DX:E11.8 without insulin use.  200 strip 1   • OneTouch Delica Lancets 95G Does not appl shortness of breath with exertion  CARDIOVASCULAR: denies chest pain on exertion  GI: denies abdominal pain and denies heartburn  NEURO: denies headaches    EXAM:   /62   Pulse 73   Temp 98.1 °F (36.7 °C)   Ht 5' 5\" (1.651 m)   Wt 205 lb (93 kg)   L

## 2021-05-11 RX ORDER — ATORVASTATIN CALCIUM 40 MG/1
TABLET, FILM COATED ORAL
Qty: 90 TABLET | Refills: 2 | Status: SHIPPED | OUTPATIENT
Start: 2021-05-11

## 2021-05-14 ENCOUNTER — OFFICE VISIT (OUTPATIENT)
Dept: RADIATION ONCOLOGY | Facility: HOSPITAL | Age: 81
End: 2021-05-14
Attending: RADIOLOGY
Payer: MEDICARE

## 2021-05-14 VITALS
RESPIRATION RATE: 18 BRPM | BODY MASS INDEX: 34.02 KG/M2 | WEIGHT: 204.19 LBS | SYSTOLIC BLOOD PRESSURE: 127 MMHG | TEMPERATURE: 98 F | HEIGHT: 65 IN | DIASTOLIC BLOOD PRESSURE: 44 MMHG | HEART RATE: 79 BPM

## 2021-05-14 DIAGNOSIS — Z17.0 CARCINOMA OF UPPER-OUTER QUADRANT OF LEFT BREAST IN FEMALE, ESTROGEN RECEPTOR POSITIVE (HCC): Primary | ICD-10-CM

## 2021-05-14 DIAGNOSIS — C50.412 CARCINOMA OF UPPER-OUTER QUADRANT OF LEFT BREAST IN FEMALE, ESTROGEN RECEPTOR POSITIVE (HCC): Primary | ICD-10-CM

## 2021-05-14 PROCEDURE — 99211 OFF/OP EST MAY X REQ PHY/QHP: CPT

## 2021-05-14 NOTE — PROGRESS NOTES
Pt seen in 1 hailey follow up with Dr Wilder Francois, having completed radiation to the L breast 4/13/21. Pt states she continues to have fatigue and takes a 2-4 hour nap almost daily. Very pleasant, in good spirits. No pain.  Follows closely with Dr Romelia Monroe and zaira

## 2021-05-20 NOTE — PROGRESS NOTES
[facility]    PATIENT'S NAME: Akila Kevin   RADIATION ONCOLOGIST: Carol Hand.  Dolores Narayan MD   PATIENT ACCOUNT #: [de-identified] LOCATION: [address] [place] [address] RECORD #: [id number] YOB: 1940   FOLLOW-UP DATE: 05/14/2021       RADI medical health has been good.   She will be seeing Dr. Marcel Edmonds in June and Dr. Simmons Held again in August.  She is not yet scheduled for her first postradiation mammogram.    PHYSICAL EXAMINATION:    VITAL SIGNS:  On exam today, the patient is noted to have blood the care of this patient. If there should be any questions regarding the radiotherapy, please feel free to contact me at any time. Dictated By Jacqueline Sparrow MD  d: 05/19/2021 09:28:13  t: 05/20/2021 01:11:58  Job [id number]  NAD/    cc:

## 2021-05-26 RX ORDER — SEMAGLUTIDE 1.34 MG/ML
INJECTION, SOLUTION SUBCUTANEOUS
Qty: 4.5 ML | Refills: 1 | Status: SHIPPED | OUTPATIENT
Start: 2021-05-26 | End: 2021-06-18

## 2021-06-01 ENCOUNTER — OFFICE VISIT (OUTPATIENT)
Dept: HEMATOLOGY/ONCOLOGY | Facility: HOSPITAL | Age: 81
End: 2021-06-01
Attending: NURSE PRACTITIONER
Payer: MEDICARE

## 2021-06-01 DIAGNOSIS — Z08 ENCOUNTER FOR FOLLOW-UP EXAMINATION AFTER COMPLETED TREATMENT FOR MALIGNANT NEOPLASM: Primary | ICD-10-CM

## 2021-06-01 DIAGNOSIS — Z71.9 COUNSELING, UNSPECIFIED: ICD-10-CM

## 2021-06-01 DIAGNOSIS — Z85.3 PERSONAL HISTORY OF BREAST CANCER: ICD-10-CM

## 2021-06-01 DIAGNOSIS — Z17.0 MALIGNANT NEOPLASM OF OVERLAPPING SITES OF LEFT BREAST IN FEMALE, ESTROGEN RECEPTOR POSITIVE (HCC): Primary | ICD-10-CM

## 2021-06-01 DIAGNOSIS — C50.812 MALIGNANT NEOPLASM OF OVERLAPPING SITES OF LEFT BREAST IN FEMALE, ESTROGEN RECEPTOR POSITIVE (HCC): Primary | ICD-10-CM

## 2021-06-01 PROCEDURE — 99215 OFFICE O/P EST HI 40 MIN: CPT | Performed by: NURSE PRACTITIONER

## 2021-06-01 NOTE — PROGRESS NOTES
I met with Chance Eng for a Survivorship Clinic visit to provide a survivorship care plan (SCP) and information related to post-treatment care. She has a diagnosis of Stage IA left breast cancer, ER+/TN+, HER2-.   She was treated with a left lumpectomy on 2/4/ Reviewed Schedule of other clinic visits with Primary Care and specialists: Dr. Lynda Ramirez will continue to manage all general health care recommended for age and gender including cancer screening tests.   She was encouraged to continue to see specialists a Screening Guidelines  -[facility] Breast Support Group  -Websites: [address] for your Life, [facility] Breast Cancer, Facebook: [facility] for [employer], [facility]     The patient was given the

## 2021-06-15 ENCOUNTER — LAB ENCOUNTER (OUTPATIENT)
Dept: LAB | Facility: HOSPITAL | Age: 81
End: 2021-06-15
Attending: INTERNAL MEDICINE
Payer: MEDICARE

## 2021-06-15 DIAGNOSIS — R39.89 SUSPECTED UTI: Primary | ICD-10-CM

## 2021-06-15 DIAGNOSIS — R82.90 ABNORMAL URINALYSIS: ICD-10-CM

## 2021-06-15 PROCEDURE — 83036 HEMOGLOBIN GLYCOSYLATED A1C: CPT | Performed by: INTERNAL MEDICINE

## 2021-06-15 PROCEDURE — 36415 COLL VENOUS BLD VENIPUNCTURE: CPT | Performed by: INTERNAL MEDICINE

## 2021-06-15 PROCEDURE — 80053 COMPREHEN METABOLIC PANEL: CPT | Performed by: INTERNAL MEDICINE

## 2021-06-15 PROCEDURE — 85025 COMPLETE CBC W/AUTO DIFF WBC: CPT | Performed by: INTERNAL MEDICINE

## 2021-06-15 PROCEDURE — 87086 URINE CULTURE/COLONY COUNT: CPT

## 2021-06-15 PROCEDURE — 84443 ASSAY THYROID STIM HORMONE: CPT | Performed by: INTERNAL MEDICINE

## 2021-06-15 PROCEDURE — 87077 CULTURE AEROBIC IDENTIFY: CPT

## 2021-06-15 PROCEDURE — 80061 LIPID PANEL: CPT | Performed by: INTERNAL MEDICINE

## 2021-06-16 ENCOUNTER — OFFICE VISIT (OUTPATIENT)
Dept: INTERNAL MEDICINE CLINIC | Facility: CLINIC | Age: 81
End: 2021-06-16
Payer: COMMERCIAL

## 2021-06-16 VITALS
OXYGEN SATURATION: 99 % | RESPIRATION RATE: 16 BRPM | SYSTOLIC BLOOD PRESSURE: 116 MMHG | BODY MASS INDEX: 33.99 KG/M2 | WEIGHT: 204 LBS | TEMPERATURE: 98 F | HEART RATE: 76 BPM | HEIGHT: 65 IN | DIASTOLIC BLOOD PRESSURE: 56 MMHG

## 2021-06-16 DIAGNOSIS — E11.9 TYPE 2 DIABETES MELLITUS WITHOUT COMPLICATION, WITHOUT LONG-TERM CURRENT USE OF INSULIN (HCC): ICD-10-CM

## 2021-06-16 DIAGNOSIS — I25.110 CORONARY ARTERY DISEASE INVOLVING NATIVE CORONARY ARTERY OF NATIVE HEART WITH UNSTABLE ANGINA PECTORIS (HCC): ICD-10-CM

## 2021-06-16 DIAGNOSIS — I10 HYPERTENSION, BENIGN: ICD-10-CM

## 2021-06-16 DIAGNOSIS — Z00.00 ENCOUNTER FOR MEDICARE ANNUAL WELLNESS EXAM: Primary | ICD-10-CM

## 2021-06-16 DIAGNOSIS — E78.5 HYPERLIPIDEMIA, UNSPECIFIED HYPERLIPIDEMIA TYPE: ICD-10-CM

## 2021-06-16 PROCEDURE — 3074F SYST BP LT 130 MM HG: CPT | Performed by: INTERNAL MEDICINE

## 2021-06-16 PROCEDURE — 99397 PER PM REEVAL EST PAT 65+ YR: CPT | Performed by: INTERNAL MEDICINE

## 2021-06-16 PROCEDURE — 3078F DIAST BP <80 MM HG: CPT | Performed by: INTERNAL MEDICINE

## 2021-06-16 PROCEDURE — 96160 PT-FOCUSED HLTH RISK ASSMT: CPT | Performed by: INTERNAL MEDICINE

## 2021-06-16 PROCEDURE — G0439 PPPS, SUBSEQ VISIT: HCPCS | Performed by: INTERNAL MEDICINE

## 2021-06-16 PROCEDURE — 3008F BODY MASS INDEX DOCD: CPT | Performed by: INTERNAL MEDICINE

## 2021-06-16 RX ORDER — AMPICILLIN 500 MG/1
CAPSULE ORAL
Qty: 10 CAPSULE | Refills: 0 | Status: SHIPPED | OUTPATIENT
Start: 2021-06-16 | End: 2021-07-13 | Stop reason: ALTCHOICE

## 2021-06-16 NOTE — PROGRESS NOTES
Pepe Olea is a [de-identified]year old female. HPI:   She is here for annual [facility] wellness exam.    Generally she feels well and no new issues or problems. No ED or UC visits. She is doing well from cardiac viewpoint - she had stent about 1 yr ago.  She has Oral Tab TAKE 2 TABLETS BY MOUTH  ONCE DAILY 180 tablet 3   • JARDIANCE 25 MG Oral Tab TAKE 1 TABLET BY MOUTH ONCE DAILY 90 tablet 3   • METFORMIN HCL  MG Oral Tablet 24 Hr TAKE 2 TABLETS BY MOUTH  TWICE DAILY 360 tablet 3   • atorvastatin 40 MG Oral History    Tobacco Use      Smoking status: Never Smoker      Smokeless tobacco: Never Used    Vaping Use      Vaping Use: Never used    Alcohol use: No    Drug use: No       REVIEW OF SYSTEMS:   GENERAL HEALTH: feels well otherwise  SKIN: denies any unusu help    Taking medications as prescribed: Able without help    Are you able to afford your medications?: Yes    Hearing Problems?: No     Functional Status     Hearing Problems?: No    Vision Problems? : No    Difficulty walking?: No    Difficulty dressing years No recommendations at this time Update Health Maintenance if applicable    Flex Sigmoidoscopy Screen every 5 years No results found for this or any previous visit. No flowsheet data found.      Fecal Occult Blood Annually Occult Blood, Stool (no units flowsheet data found. Creatinine  Annually Creatinine (mg/dL)   Date Value   06/15/2021 0.70    No flowsheet data found. Digoxin Serum Conc  Annually No results found for: DIGOXIN No flowsheet data found.     Diabetes      HgbA1C  Annually HEMOGLOBIN

## 2021-06-18 ENCOUNTER — OFFICE VISIT (OUTPATIENT)
Dept: ENDOCRINOLOGY CLINIC | Facility: CLINIC | Age: 81
End: 2021-06-18
Payer: COMMERCIAL

## 2021-06-18 VITALS
BODY MASS INDEX: 33.99 KG/M2 | HEART RATE: 76 BPM | DIASTOLIC BLOOD PRESSURE: 78 MMHG | SYSTOLIC BLOOD PRESSURE: 132 MMHG | HEIGHT: 65 IN | WEIGHT: 204 LBS

## 2021-06-18 DIAGNOSIS — E11.9 CONTROLLED TYPE 2 DIABETES MELLITUS WITHOUT COMPLICATION, WITHOUT LONG-TERM CURRENT USE OF INSULIN (HCC): Primary | ICD-10-CM

## 2021-06-18 PROCEDURE — 3078F DIAST BP <80 MM HG: CPT | Performed by: INTERNAL MEDICINE

## 2021-06-18 PROCEDURE — 3075F SYST BP GE 130 - 139MM HG: CPT | Performed by: INTERNAL MEDICINE

## 2021-06-18 PROCEDURE — 36416 COLLJ CAPILLARY BLOOD SPEC: CPT | Performed by: INTERNAL MEDICINE

## 2021-06-18 PROCEDURE — 82947 ASSAY GLUCOSE BLOOD QUANT: CPT | Performed by: INTERNAL MEDICINE

## 2021-06-18 PROCEDURE — 99213 OFFICE O/P EST LOW 20 MIN: CPT | Performed by: INTERNAL MEDICINE

## 2021-06-18 PROCEDURE — 3008F BODY MASS INDEX DOCD: CPT | Performed by: INTERNAL MEDICINE

## 2021-06-18 RX ORDER — SEMAGLUTIDE 1.34 MG/ML
0.5 INJECTION, SOLUTION SUBCUTANEOUS WEEKLY
Qty: 4.5 ML | Refills: 1 | Status: SHIPPED | OUTPATIENT
Start: 2021-06-18 | End: 2021-11-15

## 2021-06-18 NOTE — PROGRESS NOTES
Name: Gautam Marroquin  Date: 6/18/2021    Referring Physician: No ref. provider found    HISTORY OF PRESENT ILLNESS   Gautam Marroquin is a [de-identified]year old female who presents for diabetes mellitus. Since last visit her  passed away in 2/2018. mouth daily. , Disp: , Rfl:   •  clotrimazole-betamethasone 1-0.05 % External Cream, Apply 1 Application topically 2 (two) times daily.  (Patient taking differently: Apply 1 Application topically as needed.  ), Disp: 60 g, Rfl: 3  •  Losartan Potassium-HCTZ Spouse name: Not on file      Number of children: Not on file      Years of education: Not on file      Highest education level: Not on file    Tobacco Use      Smoking status: Never Smoker      Smokeless tobacco: Never Used    Vaping Use      Vaping Use: NODULE 1 SITE RIGHT (CPT=19081)   2008& 2013   • NEEDLE BIOPSY LEFT  01/06/2021   • NEEDLE BIOPSY RIGHT     • OTHER SURGICAL HISTORY  11/29/2017    stent placement   • TONSILLECTOMY           PHYSICAL EXAM  /78   Pulse 76   Ht 5' 5\" (1.651 m)   Wt 2

## 2021-07-13 ENCOUNTER — OFFICE VISIT (OUTPATIENT)
Dept: HEMATOLOGY/ONCOLOGY | Facility: HOSPITAL | Age: 81
End: 2021-07-13
Attending: INTERNAL MEDICINE
Payer: MEDICARE

## 2021-07-13 VITALS
TEMPERATURE: 97 F | DIASTOLIC BLOOD PRESSURE: 51 MMHG | SYSTOLIC BLOOD PRESSURE: 140 MMHG | WEIGHT: 204.38 LBS | RESPIRATION RATE: 18 BRPM | OXYGEN SATURATION: 98 % | HEIGHT: 65 IN | BODY MASS INDEX: 34.05 KG/M2 | HEART RATE: 72 BPM

## 2021-07-13 DIAGNOSIS — C50.912 MALIGNANT NEOPLASM OF LEFT FEMALE BREAST, UNSPECIFIED ESTROGEN RECEPTOR STATUS, UNSPECIFIED SITE OF BREAST (HCC): Primary | ICD-10-CM

## 2021-07-13 DIAGNOSIS — Z79.899 ENCOUNTER FOR MONITORING ADJUVANT HORMONAL THERAPY: ICD-10-CM

## 2021-07-13 DIAGNOSIS — Z51.81 ENCOUNTER FOR MONITORING ADJUVANT HORMONAL THERAPY: ICD-10-CM

## 2021-07-13 DIAGNOSIS — M81.0 OSTEOPOROSIS, UNSPECIFIED OSTEOPOROSIS TYPE, UNSPECIFIED PATHOLOGICAL FRACTURE PRESENCE: ICD-10-CM

## 2021-07-13 PROCEDURE — 99214 OFFICE O/P EST MOD 30 MIN: CPT | Performed by: INTERNAL MEDICINE

## 2021-07-13 NOTE — PROGRESS NOTES
Cancer Center Progress Note    Patient Name: Ardyce Dance   YOB: 1940   Medical Record Number: [id number]   Attending Physician: Michele Bhatti M.D.      Chief Complaint:  Breast cancer    History of Present Illness:  Cancer history:  80-ye breast, medial margin, excision:  -DCIS, nuclear grade 1, cribriform type, measuring 4 mm (0.4 cm). -DCIS is present at 1 mm (0.1 cm) from the final medial margin.     D.   Left breast, lateral margin, excision:  -Atypical ductal hyperplasia (ADH).     E. BREAST BIOPSY Right 2013   • CARPAL TUNNEL RELEASE Right    • CATARACT     • CATARACT EXTRACTION Left 12/12/2018    SN6AT3 21.00 D LENSX WITH ORA   • CATARACT EXTRACTION W/  INTRAOCULAR LENS IMPLANT Right 11/14/2018    Lensx Toric IOL    model:SN6AT6     P ALPRAZolam 0.25 MG Oral Tab, Take 1 tablet (0.25 mg total) by mouth nightly as needed. , Disp: 30 tablet, Rfl: 0  •  docusate calcium 240 MG Oral Cap, Take 240 mg by mouth daily. , Disp: , Rfl:   •  clotrimazole-betamethasone 1-0.05 % External Cream, Apply 1 Disp: , Rfl:     Semaglutide,0.25 or 0.5MG/DOS, (OZEMPIC, 0.25 OR 0.5 MG/DOSE,) 2 MG/1.5ML Subcutaneous Solution Pen-injector, Inject 0.5 mg into the skin once a week., Disp: 4.5 mL, Rfl: 1  letrozole 2.5 MG Oral Tab, Take 1 tablet (2.5 mg total) by mouth Hr, Take 15 mg by mouth nightly.  , Disp: , Rfl:   Polyethyl Glycol-Propyl Glycol (SYSTANE ULTRA) 0.4-0.3 % Ophthalmic Solution, Place 1 drop into both eyes as needed. , Disp: , Rfl:   aspirin 81 MG Oral Tab, Take 81 mg by mouth daily.   , Disp: , Rfl:     N information was found for the patient. Impression and Plan:  43-year-old female being evaluated by Medical Oncology for estrogen receptor-positive, HER2-negative left breast cancer, diagnosed 01/06/2021 as outlined above.   She is strongly hormone recept

## 2021-07-22 RX ORDER — OXYBUTYNIN CHLORIDE 15 MG/1
15 TABLET, EXTENDED RELEASE ORAL NIGHTLY
Refills: 0 | OUTPATIENT
Start: 2021-07-22

## 2021-07-22 NOTE — TELEPHONE ENCOUNTER
Medication not previously prescribed by our office. Per [place] , from [facility] Dr. Dwain Vega. Current refill request refused due to refill is either a duplicate request or has active refills at the pharmacy. Check previous templates.     Requested Prescriptio

## 2021-08-16 ENCOUNTER — HOSPITAL ENCOUNTER (OUTPATIENT)
Dept: MAMMOGRAPHY | Facility: HOSPITAL | Age: 81
Discharge: HOME | End: 2021-08-16
Attending: SURGERY
Payer: MEDICARE

## 2021-08-16 DIAGNOSIS — Z17.0 MALIGNANT NEOPLASM OF OVERLAPPING SITES OF LEFT BREAST IN FEMALE, ESTROGEN RECEPTOR POSITIVE (HCC): ICD-10-CM

## 2021-08-16 DIAGNOSIS — C50.812 MALIGNANT NEOPLASM OF OVERLAPPING SITES OF LEFT BREAST IN FEMALE, ESTROGEN RECEPTOR POSITIVE (HCC): ICD-10-CM

## 2021-08-16 PROCEDURE — 77066 DX MAMMO INCL CAD BI: CPT | Performed by: SURGERY

## 2021-08-16 PROCEDURE — 77062 BREAST TOMOSYNTHESIS BI: CPT | Performed by: SURGERY

## 2021-08-25 ENCOUNTER — OFFICE VISIT (OUTPATIENT)
Dept: INTERNAL MEDICINE CLINIC | Facility: CLINIC | Age: 81
End: 2021-08-25
Payer: COMMERCIAL

## 2021-08-25 ENCOUNTER — OFFICE VISIT (OUTPATIENT)
Dept: SURGERY | Facility: CLINIC | Age: 81
End: 2021-08-25
Payer: COMMERCIAL

## 2021-08-25 VITALS
OXYGEN SATURATION: 98 % | DIASTOLIC BLOOD PRESSURE: 49 MMHG | WEIGHT: 205 LBS | SYSTOLIC BLOOD PRESSURE: 124 MMHG | HEIGHT: 65 IN | BODY MASS INDEX: 34.16 KG/M2 | HEART RATE: 76 BPM | RESPIRATION RATE: 16 BRPM

## 2021-08-25 VITALS
HEIGHT: 65 IN | WEIGHT: 205 LBS | DIASTOLIC BLOOD PRESSURE: 68 MMHG | SYSTOLIC BLOOD PRESSURE: 118 MMHG | TEMPERATURE: 98 F | OXYGEN SATURATION: 99 % | BODY MASS INDEX: 34.16 KG/M2 | HEART RATE: 83 BPM

## 2021-08-25 DIAGNOSIS — C50.412 CARCINOMA OF UPPER-OUTER QUADRANT OF LEFT BREAST IN FEMALE, ESTROGEN RECEPTOR POSITIVE (HCC): ICD-10-CM

## 2021-08-25 DIAGNOSIS — C50.412 MALIGNANT NEOPLASM OF UPPER-OUTER QUADRANT OF LEFT BREAST IN FEMALE, ESTROGEN RECEPTOR POSITIVE (HCC): Primary | ICD-10-CM

## 2021-08-25 DIAGNOSIS — Z17.0 CARCINOMA OF UPPER-OUTER QUADRANT OF LEFT BREAST IN FEMALE, ESTROGEN RECEPTOR POSITIVE (HCC): ICD-10-CM

## 2021-08-25 DIAGNOSIS — I10 HYPERTENSION, BENIGN: ICD-10-CM

## 2021-08-25 DIAGNOSIS — R73.9 ELEVATED BLOOD SUGAR: ICD-10-CM

## 2021-08-25 DIAGNOSIS — Z17.0 MALIGNANT NEOPLASM OF UPPER-OUTER QUADRANT OF LEFT BREAST IN FEMALE, ESTROGEN RECEPTOR POSITIVE (HCC): Primary | ICD-10-CM

## 2021-08-25 DIAGNOSIS — I25.110 CORONARY ARTERY DISEASE INVOLVING NATIVE CORONARY ARTERY OF NATIVE HEART WITH UNSTABLE ANGINA PECTORIS (HCC): ICD-10-CM

## 2021-08-25 DIAGNOSIS — E78.5 HYPERLIPIDEMIA, UNSPECIFIED HYPERLIPIDEMIA TYPE: ICD-10-CM

## 2021-08-25 DIAGNOSIS — M76.61 ACHILLES TENDINITIS OF RIGHT LOWER EXTREMITY: Primary | ICD-10-CM

## 2021-08-25 PROCEDURE — 99214 OFFICE O/P EST MOD 30 MIN: CPT | Performed by: SURGERY

## 2021-08-25 PROCEDURE — 3078F DIAST BP <80 MM HG: CPT | Performed by: INTERNAL MEDICINE

## 2021-08-25 PROCEDURE — 3078F DIAST BP <80 MM HG: CPT | Performed by: SURGERY

## 2021-08-25 PROCEDURE — 99214 OFFICE O/P EST MOD 30 MIN: CPT | Performed by: INTERNAL MEDICINE

## 2021-08-25 PROCEDURE — 3008F BODY MASS INDEX DOCD: CPT | Performed by: INTERNAL MEDICINE

## 2021-08-25 PROCEDURE — 3008F BODY MASS INDEX DOCD: CPT | Performed by: SURGERY

## 2021-08-25 PROCEDURE — 3074F SYST BP LT 130 MM HG: CPT | Performed by: SURGERY

## 2021-08-25 PROCEDURE — 3074F SYST BP LT 130 MM HG: CPT | Performed by: INTERNAL MEDICINE

## 2021-08-25 RX ORDER — OXYBUTYNIN CHLORIDE 15 MG/1
15 TABLET, EXTENDED RELEASE ORAL NIGHTLY
Qty: 90 TABLET | Refills: 3 | Status: SHIPPED | OUTPATIENT
Start: 2021-08-25

## 2021-08-25 RX ORDER — ALPRAZOLAM 0.25 MG/1
0.25 TABLET ORAL NIGHTLY PRN
Qty: 60 TABLET | Refills: 1 | Status: SHIPPED | OUTPATIENT
Start: 2021-08-25

## 2021-08-25 NOTE — PROGRESS NOTES
Sumit Stokes is a [de-identified]year old female. HPI:   She has had pain in the area of the right Achilles for the last 6 months - pressure on Achilles tendon is very uncomfortable. Pain can even awaken her at night from her sleep.  She gets flares 2-3 x per wee Oral Tab TAKE 2 TABLETS BY MOUTH  ONCE DAILY 180 tablet 3   • JARDIANCE 25 MG Oral Tab TAKE 1 TABLET BY MOUTH ONCE DAILY 90 tablet 3   • METFORMIN HCL  MG Oral Tablet 24 Hr TAKE 2 TABLETS BY MOUTH  TWICE DAILY 360 tablet 3   • atorvastatin 40 MG Oral History    Tobacco Use      Smoking status: Never Smoker      Smokeless tobacco: Never Used    Vaping Use      Vaping Use: Never used    Alcohol use: No    Drug use: No       REVIEW OF SYSTEMS:   GENERAL HEALTH: feels well otherwise  SKIN: denies any unusu

## 2021-08-25 NOTE — PROGRESS NOTES
Breast Surgery Surveillance Visit    Diagnosis: Left breast cancer status post left breast lumpectomy on 2/4/21      Stage: Cancer Staging  No matching staging information was found for the patient.     Disease Status:  Surgical treatment complete, on letro new concerns related to her breast bilaterally. She is here today for evaluation and recommendations for further therapy.         Past Medical History:   Diagnosis Date   • Age-related nuclear cataract of both eyes    • Atherosclerosis of coronary artery Age of Menopause: 52  Type: Hysterectomy with ovaries removed  She has history of hormone replacement therapy for 5 years, last 12. She denies any history of oral contraceptive use. She denies infertility treatment to achieve pregnancy.     Medication 200 each, Rfl: 1  JUBLIA 10 % External Solution, as needed. , Disp: , Rfl:   Pimecrolimus 1 % External Cream, as needed.  APPLY TO AFFECTED AREA OF THE FOLDS OF THE ABDOMEN TWICE DAILY , Disp: , Rfl: 1  Oxybutynin Chloride ER 15 MG Oral Tablet 24 Hr, Take near-fainting, difficulty breathing when lying flat, SOB/Coughing at night, swelling of the legs or chest pain while walking.     Breasts:  See history of present illness    Gastrointestinal:     There is no history of difficulty or pain with swallowing, re well-nourished and  well-developed woman who appears her stated age. Her speech patterns and movements are normal. Her affect is appropriate. HEENT: The head is normocephalic. The neck is supple.  The thyroid is not enlarged and is without palpable rohini lumpectomy. Recommendations:   I had a discussion with the Patient regarding her breast exam.  She is healing well since surgery with no signs of new or recurrent disease.  I personally reviewed her recent imaging which is concordant with her clinical ex

## 2021-10-12 ENCOUNTER — HOSPITAL ENCOUNTER (OUTPATIENT)
Dept: GENERAL RADIOLOGY | Facility: HOSPITAL | Age: 81
Discharge: HOME | End: 2021-10-12
Attending: PODIATRIST
Payer: MEDICARE

## 2021-10-12 ENCOUNTER — OFFICE VISIT (OUTPATIENT)
Dept: PODIATRY CLINIC | Facility: CLINIC | Age: 81
End: 2021-10-12
Payer: COMMERCIAL

## 2021-10-12 ENCOUNTER — TELEPHONE (OUTPATIENT)
Dept: CARDIOLOGY | Age: 81
End: 2021-10-12

## 2021-10-12 VITALS — HEIGHT: 65 IN | WEIGHT: 200 LBS | BODY MASS INDEX: 33.32 KG/M2

## 2021-10-12 DIAGNOSIS — M79.671 PAIN OF RIGHT HEEL: Primary | ICD-10-CM

## 2021-10-12 DIAGNOSIS — M76.61 ACHILLES TENDINITIS OF RIGHT LOWER EXTREMITY: ICD-10-CM

## 2021-10-12 DIAGNOSIS — M79.671 PAIN OF RIGHT HEEL: ICD-10-CM

## 2021-10-12 PROCEDURE — 3008F BODY MASS INDEX DOCD: CPT | Performed by: PODIATRIST

## 2021-10-12 PROCEDURE — 99203 OFFICE O/P NEW LOW 30 MIN: CPT | Performed by: PODIATRIST

## 2021-10-12 PROCEDURE — L3060 FOOT ARCH SUPP LONGITUD/META: HCPCS | Performed by: PODIATRIST

## 2021-10-12 PROCEDURE — 73630 X-RAY EXAM OF FOOT: CPT | Performed by: PODIATRIST

## 2021-10-12 NOTE — PROGRESS NOTES
HPI:    Patient ID: Damaso Garland is a [de-identified]year old female. This 25-year-old female presents as a new patient to me on referral from [address] [place] [address]. Patient's chief complaint is right heel pain.   Is been off and on over an extended period of time and now sh 3   • METFORMIN HCL  MG Oral Tablet 24 Hr TAKE 2 TABLETS BY MOUTH  TWICE DAILY 360 tablet 3   • atorvastatin 40 MG Oral Tab Take 40 mg by mouth nightly. • Clopidogrel Bisulfate 75 MG Oral Tab Take 1 tablet (75 mg total) by mouth daily.  30 table appropriate. She is well-informed         ASSESSMENT/PLAN:   Pain of right heel  (primary encounter diagnosis)  Achilles tendinitis of right lower extremity    No orders of the defined types were placed in this encounter.       Meds This Visit:  Requested

## 2021-10-18 ENCOUNTER — OFFICE VISIT (OUTPATIENT)
Dept: ENDOCRINOLOGY CLINIC | Facility: CLINIC | Age: 81
End: 2021-10-18
Payer: COMMERCIAL

## 2021-10-18 VITALS
BODY MASS INDEX: 33 KG/M2 | SYSTOLIC BLOOD PRESSURE: 112 MMHG | WEIGHT: 199.19 LBS | HEART RATE: 68 BPM | DIASTOLIC BLOOD PRESSURE: 69 MMHG

## 2021-10-18 DIAGNOSIS — E11.9 TYPE 2 DIABETES MELLITUS WITHOUT COMPLICATION, WITHOUT LONG-TERM CURRENT USE OF INSULIN (HCC): ICD-10-CM

## 2021-10-18 DIAGNOSIS — E11.9 CONTROLLED TYPE 2 DIABETES MELLITUS WITHOUT COMPLICATION, WITHOUT LONG-TERM CURRENT USE OF INSULIN (HCC): Primary | ICD-10-CM

## 2021-10-18 PROCEDURE — 36416 COLLJ CAPILLARY BLOOD SPEC: CPT | Performed by: INTERNAL MEDICINE

## 2021-10-18 PROCEDURE — 99213 OFFICE O/P EST LOW 20 MIN: CPT | Performed by: INTERNAL MEDICINE

## 2021-10-18 PROCEDURE — 3078F DIAST BP <80 MM HG: CPT | Performed by: INTERNAL MEDICINE

## 2021-10-18 PROCEDURE — 83036 HEMOGLOBIN GLYCOSYLATED A1C: CPT | Performed by: INTERNAL MEDICINE

## 2021-10-18 PROCEDURE — 3074F SYST BP LT 130 MM HG: CPT | Performed by: INTERNAL MEDICINE

## 2021-10-18 PROCEDURE — 82947 ASSAY GLUCOSE BLOOD QUANT: CPT | Performed by: INTERNAL MEDICINE

## 2021-10-18 RX ORDER — EMPAGLIFLOZIN 25 MG/1
1 TABLET, FILM COATED ORAL DAILY
Qty: 90 TABLET | Refills: 3 | Status: SHIPPED | OUTPATIENT
Start: 2021-10-18

## 2021-10-18 RX ORDER — METFORMIN HYDROCHLORIDE 500 MG/1
1000 TABLET, EXTENDED RELEASE ORAL 2 TIMES DAILY
Qty: 360 TABLET | Refills: 3 | Status: SHIPPED | OUTPATIENT
Start: 2021-10-18

## 2021-10-18 RX ORDER — GLIMEPIRIDE 4 MG/1
8 TABLET ORAL DAILY
Qty: 180 TABLET | Refills: 3 | Status: SHIPPED | OUTPATIENT
Start: 2021-10-18 | End: 2022-01-20

## 2021-10-18 NOTE — PROGRESS NOTES
Name: Jeancarlos Rodriguez  Date: 10/18/2021    Referring Physician: No ref. provider found    HISTORY OF PRESENT ILLNESS   Jenacarlos Rodriguez is a [de-identified]year old female who presents for diabetes mellitus. Since last visit her  passed away in 2/2018. Oral Cap, Take 240 mg by mouth daily. , Disp: , Rfl:   •  clotrimazole-betamethasone 1-0.05 % External Cream, Apply 1 Application topically 2 (two) times daily.  (Patient taking differently: Apply 1 Application topically as needed.), Disp: 60 g, Rfl: 3  •  L Smokeless tobacco: Never Used    Vaping Use      Vaping Use: Never used    Substance and Sexual Activity      Alcohol use: No      Drug use: No    Other Topics      Concerns:        Caffeine Concern: No      Medical History:   Past Medical History:   Jade Coni April 13, 2021   • TONSILLECTOMY           PHYSICAL EXAM  /69   Pulse 68   Wt 199 lb 3.2 oz (90.4 kg)   LMP  (LMP Unknown)   BMI 33.15 kg/m²     General Appearance:  alert, well developed, in no acute distress  Eyes:  normal conjunctivae, sc

## 2021-11-08 ENCOUNTER — APPOINTMENT (OUTPATIENT)
Dept: URBAN - METROPOLITAN AREA CLINIC 321 | Age: 81
Setting detail: DERMATOLOGY
End: 2021-11-08

## 2021-11-08 DIAGNOSIS — L81.4 OTHER MELANIN HYPERPIGMENTATION: ICD-10-CM

## 2021-11-08 DIAGNOSIS — D22 MELANOCYTIC NEVI: ICD-10-CM

## 2021-11-08 DIAGNOSIS — L30.4 ERYTHEMA INTERTRIGO: ICD-10-CM

## 2021-11-08 DIAGNOSIS — L40.0 PSORIASIS VULGARIS: ICD-10-CM

## 2021-11-08 DIAGNOSIS — L82.1 OTHER SEBORRHEIC KERATOSIS: ICD-10-CM

## 2021-11-08 PROBLEM — L30.9 DERMATITIS, UNSPECIFIED: Status: ACTIVE | Noted: 2021-11-08

## 2021-11-08 PROBLEM — D22.5 MELANOCYTIC NEVI OF TRUNK: Status: ACTIVE | Noted: 2021-11-08

## 2021-11-08 PROCEDURE — OTHER ADDITIONAL NOTES: OTHER

## 2021-11-08 PROCEDURE — OTHER COUNSELING: OTHER

## 2021-11-08 PROCEDURE — 99214 OFFICE O/P EST MOD 30 MIN: CPT

## 2021-11-08 PROCEDURE — OTHER PRESCRIPTION: OTHER

## 2021-11-08 RX ORDER — CLOBETASOL PROPIONATE 0.5 MG/ML
SOLUTION TOPICAL QHS
Qty: 50 | Refills: 2 | Status: ERX | COMMUNITY
Start: 2021-11-08

## 2021-11-08 RX ORDER — FLUOCINONIDE 0.5 MG/G
OINTMENT TOPICAL
Qty: 60 | Refills: 1 | Status: ERX | COMMUNITY
Start: 2021-11-08

## 2021-11-08 ASSESSMENT — LOCATION ZONE DERM
LOCATION ZONE: SCALP
LOCATION ZONE: TRUNK
LOCATION ZONE: EAR

## 2021-11-08 ASSESSMENT — LOCATION DETAILED DESCRIPTION DERM
LOCATION DETAILED: LEFT MEDIAL UPPER BACK
LOCATION DETAILED: MID-OCCIPITAL SCALP
LOCATION DETAILED: LEFT POSTERIOR EAR
LOCATION DETAILED: RIGHT MEDIAL FRONTAL SCALP
LOCATION DETAILED: UPPER STERNUM
LOCATION DETAILED: RIGHT POSTERIOR EAR
LOCATION DETAILED: RIGHT INFERIOR MEDIAL LOWER BACK
LOCATION DETAILED: INFERIOR LUMBAR SPINE
LOCATION DETAILED: RIGHT SUPERIOR MEDIAL UPPER BACK

## 2021-11-08 ASSESSMENT — LOCATION SIMPLE DESCRIPTION DERM
LOCATION SIMPLE: RIGHT SCALP
LOCATION SIMPLE: RIGHT EAR
LOCATION SIMPLE: LOWER BACK
LOCATION SIMPLE: RIGHT LOWER BACK
LOCATION SIMPLE: LEFT EAR
LOCATION SIMPLE: POSTERIOR SCALP
LOCATION SIMPLE: LEFT UPPER BACK
LOCATION SIMPLE: RIGHT UPPER BACK
LOCATION SIMPLE: CHEST

## 2021-11-08 ASSESSMENT — BSA PSORIASIS: % BODY COVERED IN PSORIASIS: 5

## 2021-11-08 NOTE — HPI: RASH
How Severe Is Your Rash?: moderate
Is This A New Presentation, Or A Follow-Up?: Follow Up Rash
Additional History: Pt needs refill today

## 2021-11-08 NOTE — PROCEDURE: ADDITIONAL NOTES
Render Risk Assessment In Note?: no
Additional Notes: Pt will call for [facility] Rx
Detail Level: Simple

## 2021-11-09 ENCOUNTER — OFFICE VISIT (OUTPATIENT)
Dept: PODIATRY CLINIC | Facility: CLINIC | Age: 81
End: 2021-11-09
Payer: COMMERCIAL

## 2021-11-09 DIAGNOSIS — M76.61 ACHILLES TENDINITIS OF RIGHT LOWER EXTREMITY: Primary | ICD-10-CM

## 2021-11-09 PROCEDURE — 99213 OFFICE O/P EST LOW 20 MIN: CPT | Performed by: PODIATRIST

## 2021-11-09 NOTE — PROGRESS NOTES
HPI:    Patient ID: Damaso Garland is a 80year old female. This 80-year-old female presents for follow-up in reference to right heel pain. Patient states the treatment rendered over the last few weeks is provided her with 40 to 50% improvement.   She atorvastatin 40 MG Oral Tab Take 40 mg by mouth nightly. • Clopidogrel Bisulfate 75 MG Oral Tab Take 1 tablet (75 mg total) by mouth daily. 30 tablet 3   • Glucose Blood (ONETOUCH VERIO) In Vitro Strip Check BG 2 times per day.  DX:E11.8 without insul

## 2021-11-15 RX ORDER — SEMAGLUTIDE 1.34 MG/ML
INJECTION, SOLUTION SUBCUTANEOUS
Qty: 4.5 ML | Refills: 1 | Status: SHIPPED | OUTPATIENT
Start: 2021-11-15

## 2021-11-23 RX ORDER — LOSARTAN POTASSIUM AND HYDROCHLOROTHIAZIDE 12.5; 5 MG/1; MG/1
1 TABLET ORAL DAILY
Qty: 90 TABLET | Refills: 3 | Status: SHIPPED | OUTPATIENT
Start: 2021-11-23 | End: 2022-11-18

## 2021-12-07 ENCOUNTER — OFFICE VISIT (OUTPATIENT)
Dept: PODIATRY CLINIC | Facility: CLINIC | Age: 81
End: 2021-12-07
Payer: COMMERCIAL

## 2021-12-07 DIAGNOSIS — M76.61 ACHILLES TENDINITIS OF RIGHT LOWER EXTREMITY: Primary | ICD-10-CM

## 2021-12-07 PROCEDURE — 99213 OFFICE O/P EST LOW 20 MIN: CPT | Performed by: PODIATRIST

## 2021-12-07 NOTE — PROGRESS NOTES
HPI:    Patient ID: Diana Fernandez is a 80year old female. This 80year-old patient presents for follow-up in reference to right heel pain. Patient is actually very happy and has progressed quite significantly.   There are days now where there is no s OneTouch Delica Lancets 05M Does not apply Misc Check BG 2 times per day. DX: E11.8 without insulin use. 200 each 1   • JUBLIA 10 % External Solution as needed. • Pimecrolimus 1 % External Cream as needed.  APPLY TO AFFECTED AREA OF THE FOLDS OF THE ABD

## 2021-12-09 ENCOUNTER — LAB ENCOUNTER (OUTPATIENT)
Dept: LAB | Age: 81
End: 2021-12-09
Attending: INTERNAL MEDICINE
Payer: MEDICARE

## 2021-12-09 DIAGNOSIS — E78.5 HYPERLIPIDEMIA: Primary | ICD-10-CM

## 2021-12-09 PROCEDURE — 85025 COMPLETE CBC W/AUTO DIFF WBC: CPT | Performed by: INTERNAL MEDICINE

## 2021-12-09 PROCEDURE — 80053 COMPREHEN METABOLIC PANEL: CPT | Performed by: INTERNAL MEDICINE

## 2021-12-09 PROCEDURE — 80061 LIPID PANEL: CPT

## 2021-12-14 ENCOUNTER — OFFICE VISIT (OUTPATIENT)
Dept: INTERNAL MEDICINE CLINIC | Facility: CLINIC | Age: 81
End: 2021-12-14
Payer: COMMERCIAL

## 2021-12-14 VITALS
WEIGHT: 203 LBS | BODY MASS INDEX: 33.82 KG/M2 | TEMPERATURE: 98 F | OXYGEN SATURATION: 98 % | DIASTOLIC BLOOD PRESSURE: 68 MMHG | HEIGHT: 65 IN | HEART RATE: 72 BPM | SYSTOLIC BLOOD PRESSURE: 130 MMHG

## 2021-12-14 DIAGNOSIS — I25.110 CORONARY ARTERY DISEASE INVOLVING NATIVE CORONARY ARTERY OF NATIVE HEART WITH UNSTABLE ANGINA PECTORIS (HCC): Primary | ICD-10-CM

## 2021-12-14 DIAGNOSIS — C50.412 CARCINOMA OF UPPER-OUTER QUADRANT OF LEFT BREAST IN FEMALE, ESTROGEN RECEPTOR POSITIVE (HCC): ICD-10-CM

## 2021-12-14 DIAGNOSIS — E11.9 TYPE 2 DIABETES MELLITUS WITHOUT COMPLICATION, WITHOUT LONG-TERM CURRENT USE OF INSULIN (HCC): ICD-10-CM

## 2021-12-14 DIAGNOSIS — E78.5 HYPERLIPIDEMIA, UNSPECIFIED HYPERLIPIDEMIA TYPE: ICD-10-CM

## 2021-12-14 DIAGNOSIS — Z17.0 CARCINOMA OF UPPER-OUTER QUADRANT OF LEFT BREAST IN FEMALE, ESTROGEN RECEPTOR POSITIVE (HCC): ICD-10-CM

## 2021-12-14 DIAGNOSIS — I10 HYPERTENSION, BENIGN: ICD-10-CM

## 2021-12-14 PROCEDURE — 3078F DIAST BP <80 MM HG: CPT | Performed by: INTERNAL MEDICINE

## 2021-12-14 PROCEDURE — 99214 OFFICE O/P EST MOD 30 MIN: CPT | Performed by: INTERNAL MEDICINE

## 2021-12-14 PROCEDURE — 3075F SYST BP GE 130 - 139MM HG: CPT | Performed by: INTERNAL MEDICINE

## 2021-12-14 PROCEDURE — 3008F BODY MASS INDEX DOCD: CPT | Performed by: INTERNAL MEDICINE

## 2021-12-14 NOTE — PROGRESS NOTES
Byron Greene is a 80year old female. HPI:   She is here for check up. She generally is doing well but has difficulty sleeping    IHD - no chest pain or sob, s/p CABG x2 a little over a year ago.      Doing well carcinoma brteast post lumpectomy th (CENTRUM SILVER ULTRA WOMENS) Oral Tab Take by mouth daily. • atorvastatin 40 MG Oral Tab Take 40 mg by mouth nightly. • Glucose Blood (ONETOUCH VERIO) In Vitro Strip Check BG 2 times per day. DX:E11.8 without insulin use.  200 strip 1   • OneTouc shortness of breath with exertion  CARDIOVASCULAR: denies chest pain on exertion  GI: denies abdominal pain and denies heartburn  NEURO: denies headaches    EXAM:   /68   Pulse 72   Temp 98.2 °F (36.8 °C)   Ht 5' 5\" (1.651 m)   Wt 203 lb (92.1 kg)

## 2021-12-28 ENCOUNTER — OFFICE VISIT (OUTPATIENT)
Dept: HEMATOLOGY/ONCOLOGY | Facility: HOSPITAL | Age: 81
End: 2021-12-28
Attending: INTERNAL MEDICINE
Payer: MEDICARE

## 2021-12-28 VITALS
SYSTOLIC BLOOD PRESSURE: 126 MMHG | DIASTOLIC BLOOD PRESSURE: 50 MMHG | OXYGEN SATURATION: 97 % | WEIGHT: 205.81 LBS | BODY MASS INDEX: 34.29 KG/M2 | HEIGHT: 65 IN | HEART RATE: 72 BPM | TEMPERATURE: 98 F | RESPIRATION RATE: 18 BRPM

## 2021-12-28 DIAGNOSIS — C50.912 MALIGNANT NEOPLASM OF LEFT FEMALE BREAST, UNSPECIFIED ESTROGEN RECEPTOR STATUS, UNSPECIFIED SITE OF BREAST (HCC): ICD-10-CM

## 2021-12-28 DIAGNOSIS — Z78.0 MENOPAUSE: Primary | ICD-10-CM

## 2021-12-28 DIAGNOSIS — Z51.81 ENCOUNTER FOR MONITORING ADJUVANT HORMONAL THERAPY: ICD-10-CM

## 2021-12-28 DIAGNOSIS — Z79.899 ENCOUNTER FOR MONITORING ADJUVANT HORMONAL THERAPY: ICD-10-CM

## 2021-12-28 DIAGNOSIS — M81.0 OSTEOPOROSIS, UNSPECIFIED OSTEOPOROSIS TYPE, UNSPECIFIED PATHOLOGICAL FRACTURE PRESENCE: ICD-10-CM

## 2021-12-28 PROCEDURE — 99214 OFFICE O/P EST MOD 30 MIN: CPT | Performed by: INTERNAL MEDICINE

## 2021-12-28 NOTE — PROGRESS NOTES
Cancer Center Progress Note    Patient Name: Hudson Campos   YOB: 1940   Medical Record Number: [id number]   Attending Physician: Mike Aguilera M.D.      Chief Complaint:  Breast cancer    History of Present Illness:  Cancer history:  80 ye breast, medial margin, excision:  -DCIS, nuclear grade 1, cribriform type, measuring 4 mm (0.4 cm). -DCIS is present at 1 mm (0.1 cm) from the final medial margin.     D.   Left breast, lateral margin, excision:  -Atypical ductal hyperplasia (ADH).     E. BREAST BIOPSY Right 2013   • CARPAL TUNNEL RELEASE Right    • CATARACT     • CATARACT EXTRACTION Left 12/12/2018    SN6AT3 21.00 D LENSX WITH ORA   • CATARACT EXTRACTION W/  INTRAOCULAR LENS IMPLANT Right 11/14/2018    Lensx Toric IOL    model:[id number]     P Take 2 tablets (8 mg total) by mouth daily. , Disp: 180 tablet, Rfl: 3  •  Empagliflozin (JARDIANCE) 25 MG Oral Tab, Take 1 tablet by mouth daily. , Disp: 90 tablet, Rfl: 3  •  metFORMIN HCl  MG Oral Tablet 24 Hr, Take 2 tablets (1,000 mg total) by armando nightly., Disp: 1 g, Rfl: 1  LOSARTAN-HYDROCHLOROTHIAZIDE 50-12.5 MG Oral Tab, TAKE 1 TABLET BY MOUTH  DAILY, Disp: 90 tablet, Rfl: 3  OZEMPIC, 0.25 OR 0.5 MG/DOSE, 2 MG/1.5ML Subcutaneous Solution Pen-injector, INJECT 0.5 MG INTO THE SKIN WEEKLY, Disp: 4. Glycol (SYSTANE ULTRA) 0.4-0.3 % Ophthalmic Solution, Place 1 drop into both eyes as needed. , Disp: , Rfl:   aspirin 81 MG Oral Tab, Take 81 mg by mouth daily. , Disp: , Rfl:     No current facility-administered medications on file prior to visit. 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 She is strongly hormone receptor positive and HER2 negative.   She underwent surgery wi

## 2022-01-03 ENCOUNTER — TELEPHONE (OUTPATIENT)
Dept: INTERNAL MEDICINE CLINIC | Facility: CLINIC | Age: 82
End: 2022-01-03

## 2022-01-03 ENCOUNTER — LAB ENCOUNTER (OUTPATIENT)
Dept: LAB | Age: 82
End: 2022-01-03
Attending: INTERNAL MEDICINE
Payer: MEDICARE

## 2022-01-03 DIAGNOSIS — Z11.52 ENCOUNTER FOR SCREENING FOR COVID-19: ICD-10-CM

## 2022-01-03 DIAGNOSIS — Z11.52 ENCOUNTER FOR SCREENING FOR COVID-19: Primary | ICD-10-CM

## 2022-01-03 NOTE — TELEPHONE ENCOUNTER
Called patient who was exposed to Covid on 12/30 . She is asymptomatic . Order for covid test entered per protocol.  Instructed patient to isolate and monitor for symptoms like SOB,  High fever , chest pain or oxygen below 90 % which warrants ER visit - [facility]

## 2022-01-03 NOTE — TELEPHONE ENCOUNTER
Patient is calling on 12/30 she was exposed to someone with covid, she got her covid results today  They were together for 4 hours inside her home, they were not wearing masks    Patient has no symptoms  She has been vaccinated & booster    Will Dr Georgina jason

## 2022-01-06 LAB — SARS-COV-2 RNA RESP QL NAA+PROBE: NOT DETECTED

## 2022-01-20 ENCOUNTER — TELEPHONE (OUTPATIENT)
Dept: ENDOCRINOLOGY CLINIC | Facility: CLINIC | Age: 82
End: 2022-01-20

## 2022-01-20 RX ORDER — GLIPIZIDE 10 MG/1
10 TABLET ORAL
Qty: 180 TABLET | Refills: 1 | Status: SHIPPED | OUTPATIENT
Start: 2022-01-20

## 2022-01-20 NOTE — TELEPHONE ENCOUNTER
Received notice from [facility] that Glimepiride Tab 4mg will no longer be covered in 2022. Alternative drug: Glipizide    Drug Tier: 1    Please advise if suggested alternative is approapriate and/ or next steps. Thanks!

## 2022-02-08 RX ORDER — LETROZOLE 2.5 MG/1
TABLET, FILM COATED ORAL
Qty: 90 TABLET | Refills: 3 | Status: SHIPPED | OUTPATIENT
Start: 2022-02-08

## 2022-02-18 ENCOUNTER — HOSPITAL ENCOUNTER (OUTPATIENT)
Dept: MAMMOGRAPHY | Facility: HOSPITAL | Age: 82
Discharge: HOME | End: 2022-02-18
Attending: SURGERY
Payer: MEDICARE

## 2022-02-18 DIAGNOSIS — Z17.0 MALIGNANT NEOPLASM OF UPPER-OUTER QUADRANT OF LEFT BREAST IN FEMALE, ESTROGEN RECEPTOR POSITIVE (HCC): ICD-10-CM

## 2022-02-18 DIAGNOSIS — C50.412 MALIGNANT NEOPLASM OF UPPER-OUTER QUADRANT OF LEFT BREAST IN FEMALE, ESTROGEN RECEPTOR POSITIVE (HCC): ICD-10-CM

## 2022-02-18 PROCEDURE — 77065 DX MAMMO INCL CAD UNI: CPT | Performed by: SURGERY

## 2022-02-18 PROCEDURE — 77061 BREAST TOMOSYNTHESIS UNI: CPT | Performed by: SURGERY

## 2022-03-09 ENCOUNTER — OFFICE VISIT (OUTPATIENT)
Dept: SURGERY | Facility: CLINIC | Age: 82
End: 2022-03-09
Payer: COMMERCIAL

## 2022-03-09 VITALS
HEART RATE: 79 BPM | RESPIRATION RATE: 16 BRPM | SYSTOLIC BLOOD PRESSURE: 135 MMHG | WEIGHT: 200 LBS | DIASTOLIC BLOOD PRESSURE: 47 MMHG | HEIGHT: 65 IN | TEMPERATURE: 97 F | OXYGEN SATURATION: 99 % | BODY MASS INDEX: 33.32 KG/M2

## 2022-03-09 DIAGNOSIS — Z17.0 MALIGNANT NEOPLASM OF UPPER-OUTER QUADRANT OF LEFT BREAST IN FEMALE, ESTROGEN RECEPTOR POSITIVE (HCC): Primary | ICD-10-CM

## 2022-03-09 DIAGNOSIS — C50.412 MALIGNANT NEOPLASM OF UPPER-OUTER QUADRANT OF LEFT BREAST IN FEMALE, ESTROGEN RECEPTOR POSITIVE (HCC): Primary | ICD-10-CM

## 2022-03-09 PROCEDURE — 3078F DIAST BP <80 MM HG: CPT | Performed by: SURGERY

## 2022-03-09 PROCEDURE — 3075F SYST BP GE 130 - 139MM HG: CPT | Performed by: SURGERY

## 2022-03-09 PROCEDURE — 3008F BODY MASS INDEX DOCD: CPT | Performed by: SURGERY

## 2022-03-09 PROCEDURE — 99213 OFFICE O/P EST LOW 20 MIN: CPT | Performed by: SURGERY

## 2022-03-29 ENCOUNTER — OFFICE VISIT (OUTPATIENT)
Dept: INTERNAL MEDICINE CLINIC | Facility: CLINIC | Age: 82
End: 2022-03-29
Payer: COMMERCIAL

## 2022-03-29 VITALS
HEIGHT: 65 IN | HEART RATE: 69 BPM | SYSTOLIC BLOOD PRESSURE: 130 MMHG | OXYGEN SATURATION: 100 % | BODY MASS INDEX: 32.82 KG/M2 | DIASTOLIC BLOOD PRESSURE: 60 MMHG | TEMPERATURE: 98 F | WEIGHT: 197 LBS

## 2022-03-29 DIAGNOSIS — E78.5 HYPERLIPIDEMIA, UNSPECIFIED HYPERLIPIDEMIA TYPE: ICD-10-CM

## 2022-03-29 DIAGNOSIS — Z17.0 CARCINOMA OF UPPER-OUTER QUADRANT OF LEFT BREAST IN FEMALE, ESTROGEN RECEPTOR POSITIVE (HCC): ICD-10-CM

## 2022-03-29 DIAGNOSIS — C50.412 CARCINOMA OF UPPER-OUTER QUADRANT OF LEFT BREAST IN FEMALE, ESTROGEN RECEPTOR POSITIVE (HCC): ICD-10-CM

## 2022-03-29 DIAGNOSIS — I25.110 CORONARY ARTERY DISEASE INVOLVING NATIVE CORONARY ARTERY OF NATIVE HEART WITH UNSTABLE ANGINA PECTORIS (HCC): ICD-10-CM

## 2022-03-29 DIAGNOSIS — Z95.5 H/O HEART ARTERY STENT: ICD-10-CM

## 2022-03-29 DIAGNOSIS — E11.9 TYPE 2 DIABETES MELLITUS WITHOUT COMPLICATION, WITHOUT LONG-TERM CURRENT USE OF INSULIN (HCC): ICD-10-CM

## 2022-03-29 DIAGNOSIS — Z00.00 ENCOUNTER FOR MEDICARE ANNUAL WELLNESS EXAM: Primary | ICD-10-CM

## 2022-03-29 DIAGNOSIS — I10 HYPERTENSION, BENIGN: ICD-10-CM

## 2022-03-29 PROCEDURE — 3075F SYST BP GE 130 - 139MM HG: CPT | Performed by: INTERNAL MEDICINE

## 2022-03-29 PROCEDURE — 96160 PT-FOCUSED HLTH RISK ASSMT: CPT | Performed by: INTERNAL MEDICINE

## 2022-03-29 PROCEDURE — 99397 PER PM REEVAL EST PAT 65+ YR: CPT | Performed by: INTERNAL MEDICINE

## 2022-03-29 PROCEDURE — G0439 PPPS, SUBSEQ VISIT: HCPCS | Performed by: INTERNAL MEDICINE

## 2022-03-29 PROCEDURE — 3078F DIAST BP <80 MM HG: CPT | Performed by: INTERNAL MEDICINE

## 2022-03-29 PROCEDURE — 3008F BODY MASS INDEX DOCD: CPT | Performed by: INTERNAL MEDICINE

## 2022-03-29 RX ORDER — OXYBUTYNIN CHLORIDE 15 MG/1
15 TABLET, EXTENDED RELEASE ORAL NIGHTLY
Qty: 90 TABLET | Refills: 3 | Status: SHIPPED | OUTPATIENT
Start: 2022-03-29

## 2022-04-08 ENCOUNTER — OFFICE VISIT (OUTPATIENT)
Dept: INTERNAL MEDICINE CLINIC | Facility: CLINIC | Age: 82
End: 2022-04-08
Payer: COMMERCIAL

## 2022-04-08 VITALS
HEIGHT: 65 IN | OXYGEN SATURATION: 100 % | BODY MASS INDEX: 32.49 KG/M2 | TEMPERATURE: 97 F | SYSTOLIC BLOOD PRESSURE: 114 MMHG | DIASTOLIC BLOOD PRESSURE: 62 MMHG | WEIGHT: 195 LBS | HEART RATE: 71 BPM

## 2022-04-08 DIAGNOSIS — R07.89 STERNUM PAIN: Primary | ICD-10-CM

## 2022-04-08 PROCEDURE — 99213 OFFICE O/P EST LOW 20 MIN: CPT | Performed by: INTERNAL MEDICINE

## 2022-04-08 PROCEDURE — 3074F SYST BP LT 130 MM HG: CPT | Performed by: INTERNAL MEDICINE

## 2022-04-08 PROCEDURE — 3078F DIAST BP <80 MM HG: CPT | Performed by: INTERNAL MEDICINE

## 2022-04-08 PROCEDURE — 3008F BODY MASS INDEX DOCD: CPT | Performed by: INTERNAL MEDICINE

## 2022-04-18 ENCOUNTER — OFFICE VISIT (OUTPATIENT)
Dept: ENDOCRINOLOGY CLINIC | Facility: CLINIC | Age: 82
End: 2022-04-18
Payer: COMMERCIAL

## 2022-04-18 VITALS
WEIGHT: 197 LBS | BODY MASS INDEX: 33 KG/M2 | DIASTOLIC BLOOD PRESSURE: 77 MMHG | SYSTOLIC BLOOD PRESSURE: 135 MMHG | HEART RATE: 74 BPM

## 2022-04-18 DIAGNOSIS — E11.9 CONTROLLED TYPE 2 DIABETES MELLITUS WITHOUT COMPLICATION, WITHOUT LONG-TERM CURRENT USE OF INSULIN (HCC): Primary | ICD-10-CM

## 2022-04-18 LAB
CARTRIDGE LOT#: ABNORMAL NUMERIC
GLUCOSE BLOOD: 164
HEMOGLOBIN A1C: 6.5 % (ref 4.3–5.6)
TEST STRIP LOT #: NORMAL NUMERIC

## 2022-04-18 PROCEDURE — 83036 HEMOGLOBIN GLYCOSYLATED A1C: CPT | Performed by: INTERNAL MEDICINE

## 2022-04-18 PROCEDURE — 99213 OFFICE O/P EST LOW 20 MIN: CPT | Performed by: INTERNAL MEDICINE

## 2022-04-18 PROCEDURE — 3078F DIAST BP <80 MM HG: CPT | Performed by: INTERNAL MEDICINE

## 2022-04-18 PROCEDURE — 3075F SYST BP GE 130 - 139MM HG: CPT | Performed by: INTERNAL MEDICINE

## 2022-04-18 PROCEDURE — 82947 ASSAY GLUCOSE BLOOD QUANT: CPT | Performed by: INTERNAL MEDICINE

## 2022-05-22 LAB — AMB EXT COVID-19 RESULT: DETECTED

## 2022-05-23 ENCOUNTER — TELEPHONE (OUTPATIENT)
Dept: INTERNAL MEDICINE CLINIC | Facility: CLINIC | Age: 82
End: 2022-05-23

## 2022-05-23 RX ORDER — SEMAGLUTIDE 1.34 MG/ML
INJECTION, SOLUTION SUBCUTANEOUS
Qty: 4.5 ML | Refills: 0 | Status: SHIPPED | OUTPATIENT
Start: 2022-05-23

## 2022-05-23 NOTE — TELEPHONE ENCOUNTER
Spoke to pt - Rx for paxlovid sent in; reviewed with pt;  Pt will monitor O2 sats, BS, and symptoms; She will call with any issues;   She will hold the atorvastatin for 10 days

## 2022-05-23 NOTE — TELEPHONE ENCOUNTER
Prescription sent by [facility]. Left message to call back for patient. Please relay Dr. Chacho Ashton message below. To , please call patient to assist with virtual visit scheduling with Dr. Carmen Coon on Friday per his message below.

## 2022-05-23 NOTE — TELEPHONE ENCOUNTER
To Dr. Eduardo Zarate to please advise---    Spoke to pt, reports symptoms started saturday, when she noticed a scratchy throat. Tested positive for covid on Sunday with at home test. Recently went to [place] and 1 other person that she traveled with tested positive. Also reporting dry cough, runny nose, and throat is more sore. Temperature up to 100.2 this morning. Taking tylenol for temperature. She reports that she may have lissy occasional wheezing. Denies any SOB. Eating and drinking ok, appetite slightly decreased. Reports cough is keeping her up at night. Reviewed CDC guidelines for isolation and advised to call back with any worsening symptoms or ER with any SOB, chest pain, high fever, etc. Pt verbalized understanding. Dr. Eduardo Zarate, pt requesting for MD recommendations for medication for cough and any other medications she can take.

## 2022-05-23 NOTE — TELEPHONE ENCOUNTER
Tested positive for Covid on home test Sunday morning. Coughing, feeling awful, laying down causes patient to cough, sore throat, noise not normal, eyes tire and hurt, runny nose, had fever but has gone down. Symptoms started Sunday night. Just came back from [employer] Saturday. Traveled with 12 people, one other person has tested positive. Calling for advise.     [facility] call back number [phone]

## 2022-05-27 ENCOUNTER — VIRTUAL PHONE E/M (OUTPATIENT)
Dept: INTERNAL MEDICINE CLINIC | Facility: CLINIC | Age: 82
End: 2022-05-27
Payer: COMMERCIAL

## 2022-05-27 DIAGNOSIS — U07.1 COVID: Primary | ICD-10-CM

## 2022-05-27 PROCEDURE — 99442 PHONE E/M BY PHYS 11-20 MIN: CPT | Performed by: INTERNAL MEDICINE

## 2022-05-27 NOTE — PROGRESS NOTES
Virtual Telephone Check-In    Remigio Amin verbally consents to a Virtual/Telephone Check-In visit on 05/27/22. Patient has been referred to the [facility] website at [email or web address] to review the yearly Consent to Treat document. Patient understands and accepts financial responsibility for any deductible, co-insurance and/or co-pays associated with this service. Duration of the service: 14 minutes      Summary of topics discussed:     I had a phone visit with William Maxwell at about 10 AM on 5/27/2022. She was on a trip with a group of 12 people and when she returned she developed cough fever head congestion and she tested positive on Sunday with a home test for COVID. I gave her Paxlovid on 5/23. Her cough was quite intense initially she was unable to sleep but it is considerably improved now. She still has significant head congestion. On Monday and Tuesday she tells me she had \"brain fog\" and was not able to even remember the events of the day. This is returned to normal since then. She cannot smell or taste. Yesterday she developed diarrhea and had 3 episodes and then took Imodium and it is back to normal now. She is eating but below her usual amounts. It appears to me that she has turned the corner regarding COVID. I asked her to call me next Tuesday if she is not doing well or sooner as needed. Solo Sahu.  MD Yordan

## 2022-05-31 ENCOUNTER — VIRTUAL PHONE E/M (OUTPATIENT)
Dept: INTERNAL MEDICINE CLINIC | Facility: CLINIC | Age: 82
End: 2022-05-31
Payer: COMMERCIAL

## 2022-05-31 DIAGNOSIS — U07.1 COVID: Primary | ICD-10-CM

## 2022-05-31 PROCEDURE — 99442 PHONE E/M BY PHYS 11-20 MIN: CPT | Performed by: INTERNAL MEDICINE

## 2022-05-31 NOTE — PROGRESS NOTES
Virtual Telephone Check-In    Chiki Bahena verbally consents to a Virtual/Telephone Check-In visit on 05/31/22. Patient has been referred to the [facility] website at [email or web address] to review the yearly Consent to Treat document. Patient understands and accepts financial responsibility for any deductible, co-insurance and/or co-pays associated with this service. Duration of the service: 15  minutes      Summary of topics discussed:     Jyoti Ayoub is making slow progress but she is still has not felt well enough to go out of the house. She still has significant head and chest congestion and at times has significant paroxysms of cough. I have sent in Phenergan with codeine to try to help control the cough. She does not have fever or chills but she has daily headaches but they are improving in severity and respond to Tylenol. The brain fog that she had complained about on our last phone visit is gone. She does not have any more diarrhea. She is eating okay. I have asked her to stay the course and just continue to rest at home and let her body tell her what she is capable of doing. I asked her to call us back if she is not making steady progress. Eunice Avery.  MD Yordan

## 2022-06-27 ENCOUNTER — HOSPITAL ENCOUNTER (OUTPATIENT)
Dept: BONE DENSITY | Age: 82
Discharge: HOME | End: 2022-06-27
Attending: INTERNAL MEDICINE
Payer: MEDICARE

## 2022-06-27 DIAGNOSIS — Z78.0 MENOPAUSE: ICD-10-CM

## 2022-06-27 PROCEDURE — 77080 DXA BONE DENSITY AXIAL: CPT | Performed by: INTERNAL MEDICINE

## 2022-06-28 ENCOUNTER — OFFICE VISIT (OUTPATIENT)
Dept: HEMATOLOGY/ONCOLOGY | Facility: HOSPITAL | Age: 82
End: 2022-06-28
Attending: INTERNAL MEDICINE
Payer: MEDICARE

## 2022-06-28 VITALS
DIASTOLIC BLOOD PRESSURE: 51 MMHG | HEIGHT: 65 IN | WEIGHT: 192.38 LBS | SYSTOLIC BLOOD PRESSURE: 127 MMHG | HEART RATE: 70 BPM | BODY MASS INDEX: 32.05 KG/M2 | OXYGEN SATURATION: 99 % | TEMPERATURE: 99 F | RESPIRATION RATE: 18 BRPM

## 2022-06-28 DIAGNOSIS — C50.912 MALIGNANT NEOPLASM OF LEFT FEMALE BREAST, UNSPECIFIED ESTROGEN RECEPTOR STATUS, UNSPECIFIED SITE OF BREAST (HCC): ICD-10-CM

## 2022-06-28 DIAGNOSIS — Z79.899 ENCOUNTER FOR MONITORING ADJUVANT HORMONAL THERAPY: Primary | ICD-10-CM

## 2022-06-28 DIAGNOSIS — M81.0 OSTEOPOROSIS, UNSPECIFIED OSTEOPOROSIS TYPE, UNSPECIFIED PATHOLOGICAL FRACTURE PRESENCE: ICD-10-CM

## 2022-06-28 DIAGNOSIS — Z51.81 ENCOUNTER FOR MONITORING ADJUVANT HORMONAL THERAPY: Primary | ICD-10-CM

## 2022-06-28 DIAGNOSIS — Z78.0 MENOPAUSE: ICD-10-CM

## 2022-06-28 PROCEDURE — 99214 OFFICE O/P EST MOD 30 MIN: CPT | Performed by: INTERNAL MEDICINE

## 2022-07-25 ENCOUNTER — OFFICE VISIT (OUTPATIENT)
Dept: INTERNAL MEDICINE CLINIC | Facility: CLINIC | Age: 82
End: 2022-07-25
Payer: COMMERCIAL

## 2022-07-25 VITALS
OXYGEN SATURATION: 99 % | HEART RATE: 73 BPM | SYSTOLIC BLOOD PRESSURE: 142 MMHG | DIASTOLIC BLOOD PRESSURE: 62 MMHG | BODY MASS INDEX: 33 KG/M2 | TEMPERATURE: 98 F | WEIGHT: 197 LBS

## 2022-07-25 DIAGNOSIS — M54.2 NECK PAIN: Primary | ICD-10-CM

## 2022-07-25 DIAGNOSIS — I10 HYPERTENSION, BENIGN: ICD-10-CM

## 2022-07-25 DIAGNOSIS — I25.110 CORONARY ARTERY DISEASE INVOLVING NATIVE CORONARY ARTERY OF NATIVE HEART WITH UNSTABLE ANGINA PECTORIS (HCC): ICD-10-CM

## 2022-07-25 PROCEDURE — 3078F DIAST BP <80 MM HG: CPT | Performed by: INTERNAL MEDICINE

## 2022-07-25 PROCEDURE — 99214 OFFICE O/P EST MOD 30 MIN: CPT | Performed by: INTERNAL MEDICINE

## 2022-07-25 PROCEDURE — 3077F SYST BP >= 140 MM HG: CPT | Performed by: INTERNAL MEDICINE

## 2022-07-25 PROCEDURE — 1126F AMNT PAIN NOTED NONE PRSNT: CPT | Performed by: INTERNAL MEDICINE

## 2022-08-29 ENCOUNTER — HOSPITAL ENCOUNTER (OUTPATIENT)
Dept: MAMMOGRAPHY | Facility: HOSPITAL | Age: 82
Discharge: HOME | End: 2022-08-29
Attending: SURGERY
Payer: MEDICARE

## 2022-08-29 DIAGNOSIS — C50.412 MALIGNANT NEOPLASM OF UPPER-OUTER QUADRANT OF LEFT BREAST IN FEMALE, ESTROGEN RECEPTOR POSITIVE (HCC): ICD-10-CM

## 2022-08-29 DIAGNOSIS — Z17.0 MALIGNANT NEOPLASM OF UPPER-OUTER QUADRANT OF LEFT BREAST IN FEMALE, ESTROGEN RECEPTOR POSITIVE (HCC): ICD-10-CM

## 2022-08-29 PROCEDURE — 77062 BREAST TOMOSYNTHESIS BI: CPT | Performed by: SURGERY

## 2022-08-29 PROCEDURE — 77066 DX MAMMO INCL CAD BI: CPT | Performed by: SURGERY

## 2022-09-07 ENCOUNTER — OFFICE VISIT (OUTPATIENT)
Dept: SURGERY | Facility: CLINIC | Age: 82
End: 2022-09-07
Payer: COMMERCIAL

## 2022-09-07 VITALS
WEIGHT: 195 LBS | RESPIRATION RATE: 16 BRPM | HEART RATE: 74 BPM | BODY MASS INDEX: 32.49 KG/M2 | HEIGHT: 65 IN | TEMPERATURE: 98 F | DIASTOLIC BLOOD PRESSURE: 50 MMHG | OXYGEN SATURATION: 98 % | SYSTOLIC BLOOD PRESSURE: 128 MMHG

## 2022-09-07 DIAGNOSIS — C50.412 CARCINOMA OF UPPER-OUTER QUADRANT OF LEFT BREAST IN FEMALE, ESTROGEN RECEPTOR POSITIVE (HCC): Primary | ICD-10-CM

## 2022-09-07 DIAGNOSIS — Z17.0 CARCINOMA OF UPPER-OUTER QUADRANT OF LEFT BREAST IN FEMALE, ESTROGEN RECEPTOR POSITIVE (HCC): Primary | ICD-10-CM

## 2022-09-07 PROCEDURE — 3078F DIAST BP <80 MM HG: CPT | Performed by: SURGERY

## 2022-09-07 PROCEDURE — 3008F BODY MASS INDEX DOCD: CPT | Performed by: SURGERY

## 2022-09-07 PROCEDURE — 3074F SYST BP LT 130 MM HG: CPT | Performed by: SURGERY

## 2022-09-07 PROCEDURE — 99213 OFFICE O/P EST LOW 20 MIN: CPT | Performed by: SURGERY

## 2022-09-19 ENCOUNTER — LAB ENCOUNTER (OUTPATIENT)
Dept: LAB | Age: 82
End: 2022-09-19
Attending: INTERNAL MEDICINE

## 2022-09-19 ENCOUNTER — TELEPHONE (OUTPATIENT)
Dept: INTERNAL MEDICINE CLINIC | Facility: CLINIC | Age: 82
End: 2022-09-19

## 2022-09-19 DIAGNOSIS — E78.5 HYPERLIPIDEMIA, UNSPECIFIED HYPERLIPIDEMIA TYPE: ICD-10-CM

## 2022-09-19 DIAGNOSIS — E11.9 TYPE 2 DIABETES MELLITUS WITHOUT COMPLICATION, WITHOUT LONG-TERM CURRENT USE OF INSULIN (HCC): ICD-10-CM

## 2022-09-19 LAB
ALBUMIN SERPL-MCNC: 3.5 G/DL (ref 3.4–5)
ALBUMIN/GLOB SERPL: 0.9 {RATIO} (ref 1–2)
ALP LIVER SERPL-CCNC: 72 U/L
ALT SERPL-CCNC: 22 U/L
ANION GAP SERPL CALC-SCNC: 5 MMOL/L (ref 0–18)
AST SERPL-CCNC: 16 U/L (ref 15–37)
BASOPHILS # BLD AUTO: 0.03 X10(3) UL (ref 0–0.2)
BASOPHILS NFR BLD AUTO: 0.3 %
BILIRUB SERPL-MCNC: 0.6 MG/DL (ref 0.1–2)
BILIRUB UR QL: NEGATIVE
BILIRUB UR QL: NEGATIVE
BUN BLD-MCNC: 20 MG/DL (ref 7–18)
BUN/CREAT SERPL: 26 (ref 10–20)
CALCIUM BLD-MCNC: 9.3 MG/DL (ref 8.5–10.1)
CHLORIDE SERPL-SCNC: 104 MMOL/L (ref 98–112)
CHOLEST SERPL-MCNC: 105 MG/DL (ref ?–200)
CLARITY UR: CLEAR
CO2 SERPL-SCNC: 28 MMOL/L (ref 21–32)
COLOR UR: YELLOW
COLOR UR: YELLOW
CREAT BLD-MCNC: 0.77 MG/DL
CREAT UR-SCNC: 70.5 MG/DL
DEPRECATED RDW RBC AUTO: 49.4 FL (ref 35.1–46.3)
EOSINOPHIL # BLD AUTO: 0.13 X10(3) UL (ref 0–0.7)
EOSINOPHIL NFR BLD AUTO: 1.5 %
ERYTHROCYTE [DISTWIDTH] IN BLOOD BY AUTOMATED COUNT: 14 % (ref 11–15)
FASTING PATIENT LIPID ANSWER: YES
FASTING STATUS PATIENT QL REPORTED: YES
GFR SERPLBLD BASED ON 1.73 SQ M-ARVRAT: 77 ML/MIN/1.73M2 (ref 60–?)
GLOBULIN PLAS-MCNC: 3.7 G/DL (ref 2.8–4.4)
GLUCOSE BLD-MCNC: 104 MG/DL (ref 70–99)
GLUCOSE UR-MCNC: 500 MG/DL
GLUCOSE UR-MCNC: 500 MG/DL
HCT VFR BLD AUTO: 43.5 %
HDLC SERPL-MCNC: 48 MG/DL (ref 40–59)
HGB BLD-MCNC: 13.5 G/DL
HGB UR QL STRIP.AUTO: NEGATIVE
HGB UR QL STRIP.AUTO: NEGATIVE
IMM GRANULOCYTES # BLD AUTO: 0.04 X10(3) UL (ref 0–1)
IMM GRANULOCYTES NFR BLD: 0.5 %
KETONES UR-MCNC: NEGATIVE MG/DL
LDLC SERPL CALC-MCNC: 32 MG/DL (ref ?–100)
LYMPHOCYTES # BLD AUTO: 2.94 X10(3) UL (ref 1–4)
LYMPHOCYTES NFR BLD AUTO: 33.3 %
MCH RBC QN AUTO: 29.8 PG (ref 26–34)
MCHC RBC AUTO-ENTMCNC: 31 G/DL (ref 31–37)
MCV RBC AUTO: 96 FL
MICROALBUMIN UR-MCNC: 0.89 MG/DL
MICROALBUMIN/CREAT 24H UR-RTO: 12.6 UG/MG (ref ?–30)
MONOCYTES # BLD AUTO: 0.79 X10(3) UL (ref 0.1–1)
MONOCYTES NFR BLD AUTO: 8.9 %
NEUTROPHILS # BLD AUTO: 4.9 X10 (3) UL (ref 1.5–7.7)
NEUTROPHILS # BLD AUTO: 4.9 X10(3) UL (ref 1.5–7.7)
NEUTROPHILS NFR BLD AUTO: 55.5 %
NITRITE UR QL STRIP.AUTO: NEGATIVE
NITRITE UR QL STRIP.AUTO: NEGATIVE
NONHDLC SERPL-MCNC: 57 MG/DL (ref ?–130)
OSMOLALITY SERPL CALC.SUM OF ELEC: 287 MOSM/KG (ref 275–295)
PH UR: 5 [PH] (ref 5–8)
PH UR: 5 [PH] (ref 5–8)
PLATELET # BLD AUTO: 272 10(3)UL (ref 150–450)
POTASSIUM SERPL-SCNC: 4 MMOL/L (ref 3.5–5.1)
PROT SERPL-MCNC: 7.2 G/DL (ref 6.4–8.2)
PROT UR-MCNC: NEGATIVE MG/DL
PROT UR-MCNC: NEGATIVE MG/DL
RBC # BLD AUTO: 4.53 X10(6)UL
SODIUM SERPL-SCNC: 137 MMOL/L (ref 136–145)
SP GR UR STRIP: 1.02 (ref 1–1.03)
SP GR UR STRIP: 1.02 (ref 1–1.03)
TRIGL SERPL-MCNC: 149 MG/DL (ref 30–149)
TSI SER-ACNC: 3.23 MIU/ML (ref 0.36–3.74)
UROBILINOGEN UR STRIP-ACNC: 0.2
UROBILINOGEN UR STRIP-ACNC: 0.2
VLDLC SERPL CALC-MCNC: 20 MG/DL (ref 0–30)
WBC # BLD AUTO: 8.8 X10(3) UL (ref 4–11)

## 2022-09-19 PROCEDURE — 80061 LIPID PANEL: CPT | Performed by: INTERNAL MEDICINE

## 2022-09-19 PROCEDURE — 81015 MICROSCOPIC EXAM OF URINE: CPT

## 2022-09-19 PROCEDURE — 84443 ASSAY THYROID STIM HORMONE: CPT | Performed by: INTERNAL MEDICINE

## 2022-09-19 PROCEDURE — 87077 CULTURE AEROBIC IDENTIFY: CPT | Performed by: INTERNAL MEDICINE

## 2022-09-19 PROCEDURE — 87186 SC STD MICRODIL/AGAR DIL: CPT | Performed by: INTERNAL MEDICINE

## 2022-09-19 PROCEDURE — 82570 ASSAY OF URINE CREATININE: CPT | Performed by: INTERNAL MEDICINE

## 2022-09-19 PROCEDURE — 82043 UR ALBUMIN QUANTITATIVE: CPT | Performed by: INTERNAL MEDICINE

## 2022-09-19 PROCEDURE — 85025 COMPLETE CBC W/AUTO DIFF WBC: CPT | Performed by: INTERNAL MEDICINE

## 2022-09-19 PROCEDURE — 81015 MICROSCOPIC EXAM OF URINE: CPT | Performed by: INTERNAL MEDICINE

## 2022-09-19 PROCEDURE — 81001 URINALYSIS AUTO W/SCOPE: CPT | Performed by: INTERNAL MEDICINE

## 2022-09-19 PROCEDURE — 80053 COMPREHEN METABOLIC PANEL: CPT | Performed by: INTERNAL MEDICINE

## 2022-09-19 PROCEDURE — 87086 URINE CULTURE/COLONY COUNT: CPT | Performed by: INTERNAL MEDICINE

## 2022-09-19 NOTE — TELEPHONE ENCOUNTER
Component      Latest Ref Rng & Units 9/19/2022   WBC Urine      0 - 5 /HPF 11-20 (A)   RBC Urine      0 - 2 /HPF 0-2   Bacteria Urine      None Seen /HPF 2+ (A)   SQUAM EPI CELLS UR      None Seen /HPF Few (A)     Tried calling patient to triage for UTI sx. VM box full (mobile). S/w patient on land line. UTI symptoms    []Frequency  []Urgency  []Pain/burning  []Blood in urine, If yes: []use of anticoagulants  []Low back pain  []Flank pain  []Pelvic/bladder pressure  []Fevers/chills  []Odor  []Cloudy Urine  []Confusion  []Incomplete bladder emptying    []Incontinent bladder    []OTC medication      NOTES:    Patient screened for above UTI sx. Patient denies any and all symptoms. Urine culture is process. Patient is scheduled for appointment today. In the meantime, patient was advised to call the office with any new symptoms-verbalized understanding.      To on call Dr. Jose Foster

## 2022-09-20 ENCOUNTER — OFFICE VISIT (OUTPATIENT)
Dept: INTERNAL MEDICINE CLINIC | Facility: CLINIC | Age: 82
End: 2022-09-20

## 2022-09-20 VITALS
TEMPERATURE: 98 F | OXYGEN SATURATION: 98 % | SYSTOLIC BLOOD PRESSURE: 102 MMHG | HEIGHT: 65 IN | HEART RATE: 68 BPM | DIASTOLIC BLOOD PRESSURE: 60 MMHG | BODY MASS INDEX: 32.82 KG/M2 | WEIGHT: 197 LBS

## 2022-09-20 DIAGNOSIS — I10 HYPERTENSION, BENIGN: ICD-10-CM

## 2022-09-20 DIAGNOSIS — N30.00 ACUTE CYSTITIS WITHOUT HEMATURIA: ICD-10-CM

## 2022-09-20 DIAGNOSIS — Z95.5 H/O HEART ARTERY STENT: ICD-10-CM

## 2022-09-20 DIAGNOSIS — C50.412 CARCINOMA OF UPPER-OUTER QUADRANT OF LEFT BREAST IN FEMALE, ESTROGEN RECEPTOR POSITIVE (HCC): ICD-10-CM

## 2022-09-20 DIAGNOSIS — I25.110 CORONARY ARTERY DISEASE INVOLVING NATIVE CORONARY ARTERY OF NATIVE HEART WITH UNSTABLE ANGINA PECTORIS (HCC): Primary | ICD-10-CM

## 2022-09-20 DIAGNOSIS — Z17.0 CARCINOMA OF UPPER-OUTER QUADRANT OF LEFT BREAST IN FEMALE, ESTROGEN RECEPTOR POSITIVE (HCC): ICD-10-CM

## 2022-09-20 PROCEDURE — 3078F DIAST BP <80 MM HG: CPT | Performed by: INTERNAL MEDICINE

## 2022-09-20 PROCEDURE — 99214 OFFICE O/P EST MOD 30 MIN: CPT | Performed by: INTERNAL MEDICINE

## 2022-09-20 PROCEDURE — 3008F BODY MASS INDEX DOCD: CPT | Performed by: INTERNAL MEDICINE

## 2022-09-20 PROCEDURE — 1126F AMNT PAIN NOTED NONE PRSNT: CPT | Performed by: INTERNAL MEDICINE

## 2022-09-20 PROCEDURE — 3074F SYST BP LT 130 MM HG: CPT | Performed by: INTERNAL MEDICINE

## 2022-09-20 RX ORDER — CIPROFLOXACIN 250 MG/1
250 TABLET, FILM COATED ORAL 2 TIMES DAILY
Qty: 20 TABLET | Refills: 0 | Status: SHIPPED | OUTPATIENT
Start: 2022-09-20 | End: 2022-09-30

## 2022-09-20 RX ORDER — ALPRAZOLAM 0.25 MG/1
0.25 TABLET ORAL NIGHTLY PRN
Qty: 60 TABLET | Refills: 1 | Status: SHIPPED | OUTPATIENT
Start: 2022-09-20

## 2022-10-20 ENCOUNTER — OFFICE VISIT (OUTPATIENT)
Dept: ENDOCRINOLOGY CLINIC | Facility: CLINIC | Age: 82
End: 2022-10-20
Payer: COMMERCIAL

## 2022-10-20 VITALS
HEART RATE: 81 BPM | DIASTOLIC BLOOD PRESSURE: 75 MMHG | WEIGHT: 193 LBS | BODY MASS INDEX: 32 KG/M2 | SYSTOLIC BLOOD PRESSURE: 121 MMHG

## 2022-10-20 DIAGNOSIS — E11.9 CONTROLLED TYPE 2 DIABETES MELLITUS WITHOUT COMPLICATION, WITHOUT LONG-TERM CURRENT USE OF INSULIN (HCC): Primary | ICD-10-CM

## 2022-10-20 LAB
CARTRIDGE LOT#: ABNORMAL NUMERIC
GLUCOSE BLOOD: 158
HEMOGLOBIN A1C: 7.2 % (ref 4.3–5.6)
TEST STRIP LOT #: NORMAL NUMERIC

## 2022-10-20 PROCEDURE — 3074F SYST BP LT 130 MM HG: CPT | Performed by: INTERNAL MEDICINE

## 2022-10-20 PROCEDURE — 99213 OFFICE O/P EST LOW 20 MIN: CPT | Performed by: INTERNAL MEDICINE

## 2022-10-20 PROCEDURE — 82947 ASSAY GLUCOSE BLOOD QUANT: CPT | Performed by: INTERNAL MEDICINE

## 2022-10-20 PROCEDURE — 1126F AMNT PAIN NOTED NONE PRSNT: CPT | Performed by: INTERNAL MEDICINE

## 2022-10-20 PROCEDURE — 83036 HEMOGLOBIN GLYCOSYLATED A1C: CPT | Performed by: INTERNAL MEDICINE

## 2022-10-20 PROCEDURE — 3078F DIAST BP <80 MM HG: CPT | Performed by: INTERNAL MEDICINE

## 2022-10-26 ENCOUNTER — ORDER TRANSCRIPTION (OUTPATIENT)
Dept: PHYSICAL THERAPY | Facility: HOSPITAL | Age: 82
End: 2022-10-26

## 2022-10-26 DIAGNOSIS — R26.89 BALANCE PROBLEM: Primary | ICD-10-CM

## 2022-10-26 DIAGNOSIS — Z74.09 IMPAIRED FUNCTIONAL MOBILITY, BALANCE, GAIT, AND ENDURANCE: ICD-10-CM

## 2022-10-28 RX ORDER — SEMAGLUTIDE 1.34 MG/ML
INJECTION, SOLUTION SUBCUTANEOUS
Qty: 4.5 ML | Refills: 1 | Status: SHIPPED | OUTPATIENT
Start: 2022-10-28

## 2022-10-31 ENCOUNTER — LAB ENCOUNTER (OUTPATIENT)
Dept: LAB | Age: 82
End: 2022-10-31
Attending: INTERNAL MEDICINE
Payer: MEDICARE

## 2022-10-31 PROCEDURE — 87147 CULTURE TYPE IMMUNOLOGIC: CPT | Performed by: INTERNAL MEDICINE

## 2022-10-31 PROCEDURE — 87086 URINE CULTURE/COLONY COUNT: CPT | Performed by: INTERNAL MEDICINE

## 2022-10-31 PROCEDURE — 87186 SC STD MICRODIL/AGAR DIL: CPT | Performed by: INTERNAL MEDICINE

## 2022-11-01 ENCOUNTER — TELEPHONE (OUTPATIENT)
Dept: INTERNAL MEDICINE CLINIC | Facility: CLINIC | Age: 82
End: 2022-11-01

## 2022-11-01 NOTE — TELEPHONE ENCOUNTER
Reviewed office visit 9/20. Urine culture was sent at that time. Does show staph aureus. May be contaminant? Please currently call the patient and see if she is having any symptoms concerning for UTI.   If no symptoms, should hold off on any antibiotics

## 2022-11-01 NOTE — TELEPHONE ENCOUNTER
TO Dr. Vipul Nelson-- can you please advise on urine culture in MD absence? It looks like this may have been a follow up test to when she was treated with cipro in office visit 9/20/22.

## 2022-11-07 ENCOUNTER — TELEPHONE (OUTPATIENT)
Dept: INTERNAL MEDICINE CLINIC | Facility: CLINIC | Age: 82
End: 2022-11-07

## 2022-11-07 DIAGNOSIS — Z98.890 HISTORY OF LUMPECTOMY OF LEFT BREAST: Primary | ICD-10-CM

## 2022-11-07 NOTE — TELEPHONE ENCOUNTER
Please review and sign if agreeable order already placed , just need MD to sign or agree on behalf of [facility] who is not in office today.

## 2022-11-07 NOTE — TELEPHONE ENCOUNTER
Patient is calling to request an order  Prosthetic Bra  Qty 3  Refills 1    [facility]  Fax #[phone]

## 2022-11-08 ENCOUNTER — APPOINTMENT (OUTPATIENT)
Dept: URBAN - METROPOLITAN AREA CLINIC 244 | Age: 82
Setting detail: DERMATOLOGY
End: 2022-11-10

## 2022-11-08 DIAGNOSIS — L82.1 OTHER SEBORRHEIC KERATOSIS: ICD-10-CM

## 2022-11-08 DIAGNOSIS — L40.0 PSORIASIS VULGARIS: ICD-10-CM

## 2022-11-08 DIAGNOSIS — L57.0 ACTINIC KERATOSIS: ICD-10-CM

## 2022-11-08 DIAGNOSIS — L81.4 OTHER MELANIN HYPERPIGMENTATION: ICD-10-CM

## 2022-11-08 DIAGNOSIS — D22 MELANOCYTIC NEVI: ICD-10-CM

## 2022-11-08 PROBLEM — D22.5 MELANOCYTIC NEVI OF TRUNK: Status: ACTIVE | Noted: 2022-11-08

## 2022-11-08 PROCEDURE — 17003 DESTRUCT PREMALG LES 2-14: CPT

## 2022-11-08 PROCEDURE — OTHER LIQUID NITROGEN: OTHER

## 2022-11-08 PROCEDURE — 17000 DESTRUCT PREMALG LESION: CPT

## 2022-11-08 PROCEDURE — OTHER COUNSELING: OTHER

## 2022-11-08 PROCEDURE — OTHER PRESCRIPTION: OTHER

## 2022-11-08 PROCEDURE — 99213 OFFICE O/P EST LOW 20 MIN: CPT | Mod: 25

## 2022-11-08 RX ORDER — FLUOCINONIDE 0.5 MG/ML
SOLUTION TOPICAL
Qty: 60 | Refills: 6 | Status: ERX | COMMUNITY
Start: 2022-11-08

## 2022-11-08 RX ORDER — FLUOCINONIDE 0.5 MG/ML
SOLUTION TOPICAL
Qty: 60 | Refills: 6 | Status: ERX

## 2022-11-08 RX ORDER — PIMECROLIMUS 10 MG/G
CREAM TOPICAL
Qty: 60 | Refills: 0 | Status: ERX | COMMUNITY
Start: 2022-11-08

## 2022-11-08 RX ORDER — PIMECROLIMUS 10 MG/G
CREAM TOPICAL
Qty: 60 | Refills: 0 | Status: ERX

## 2022-11-08 ASSESSMENT — LOCATION SIMPLE DESCRIPTION DERM
LOCATION SIMPLE: ANTERIOR SCALP
LOCATION SIMPLE: RIGHT EAR
LOCATION SIMPLE: CHEST
LOCATION SIMPLE: RIGHT UPPER BACK
LOCATION SIMPLE: POSTERIOR SCALP
LOCATION SIMPLE: LEFT CHEEK
LOCATION SIMPLE: LEFT UPPER BACK

## 2022-11-08 ASSESSMENT — LOCATION DETAILED DESCRIPTION DERM
LOCATION DETAILED: LEFT INFERIOR POSTAURICULAR SKIN
LOCATION DETAILED: MID-OCCIPITAL SCALP
LOCATION DETAILED: RIGHT POSTERIOR EAR
LOCATION DETAILED: LEFT MEDIAL UPPER BACK
LOCATION DETAILED: RIGHT SUPERIOR MEDIAL UPPER BACK
LOCATION DETAILED: MID-FRONTAL SCALP
LOCATION DETAILED: LEFT INFERIOR CENTRAL MALAR CHEEK
LOCATION DETAILED: UPPER STERNUM

## 2022-11-08 ASSESSMENT — LOCATION ZONE DERM
LOCATION ZONE: FACE
LOCATION ZONE: EAR
LOCATION ZONE: TRUNK
LOCATION ZONE: SCALP

## 2022-11-08 ASSESSMENT — BSA PSORIASIS: % BODY COVERED IN PSORIASIS: 8

## 2022-11-08 NOTE — PROCEDURE: LIQUID NITROGEN
Post-Care Instructions: I reviewed with the patient in detail post-care instructions. Patient is to wear sunprotection, and avoid picking at any of the treated lesions. Pt may apply Vaseline to crusted or scabbing areas.
Render Post-Care Instructions In Note?: no
Consent: The patient's consent was obtained including but not limited to risks of crusting, scabbing, blistering, scarring, darker or lighter pigmentary change, recurrence, incomplete removal and infection.
Duration Of Freeze Thaw-Cycle (Seconds): 0
Detail Level: Zone
Total Number Of Aks Treated: 5

## 2022-11-08 NOTE — HPI: EVALUATION OF SKIN LESION(S)
What Type Of Note Output Would You Prefer (Optional)?: Bullet Format
How Severe Are Your Spot(S)?: mild
Hpi Title: Evaluation of Skin Lesions
Additional History: Only lotion, face cream applied.\\n

## 2022-12-06 DIAGNOSIS — E11.9 TYPE 2 DIABETES MELLITUS WITHOUT COMPLICATION, WITHOUT LONG-TERM CURRENT USE OF INSULIN (HCC): ICD-10-CM

## 2022-12-06 DIAGNOSIS — E78.5 HYPERLIPIDEMIA, UNSPECIFIED HYPERLIPIDEMIA TYPE: ICD-10-CM

## 2022-12-07 RX ORDER — OXYBUTYNIN CHLORIDE 15 MG/1
TABLET, EXTENDED RELEASE ORAL
Qty: 90 TABLET | Refills: 3 | OUTPATIENT
Start: 2022-12-07

## 2022-12-07 RX ORDER — METFORMIN HYDROCHLORIDE 500 MG/1
TABLET, EXTENDED RELEASE ORAL
Qty: 360 TABLET | Refills: 1 | Status: SHIPPED | OUTPATIENT
Start: 2022-12-07

## 2022-12-08 NOTE — PROGRESS NOTES
Patient presents to the [facility] for second opinion with Dr. Renee Fish. She is accompanied by her friend Alexey Bass. Dr. Renee Fish has placed orders for bilateral breast US complete.   Exam scheduled for Tuesday 1/19/21 at 9am.  All appointment infomration pro Please advise

## 2022-12-13 ENCOUNTER — OFFICE VISIT (OUTPATIENT)
Dept: INTERNAL MEDICINE CLINIC | Facility: CLINIC | Age: 82
End: 2022-12-13
Payer: COMMERCIAL

## 2022-12-13 VITALS
DIASTOLIC BLOOD PRESSURE: 60 MMHG | HEART RATE: 70 BPM | OXYGEN SATURATION: 99 % | HEIGHT: 65 IN | WEIGHT: 199 LBS | TEMPERATURE: 98 F | BODY MASS INDEX: 33.15 KG/M2 | SYSTOLIC BLOOD PRESSURE: 122 MMHG

## 2022-12-13 DIAGNOSIS — Z95.5 H/O HEART ARTERY STENT: ICD-10-CM

## 2022-12-13 DIAGNOSIS — I25.110 CORONARY ARTERY DISEASE INVOLVING NATIVE CORONARY ARTERY OF NATIVE HEART WITH UNSTABLE ANGINA PECTORIS (HCC): Primary | ICD-10-CM

## 2022-12-13 DIAGNOSIS — Z17.0 CARCINOMA OF UPPER-OUTER QUADRANT OF LEFT BREAST IN FEMALE, ESTROGEN RECEPTOR POSITIVE (HCC): ICD-10-CM

## 2022-12-13 DIAGNOSIS — C50.412 CARCINOMA OF UPPER-OUTER QUADRANT OF LEFT BREAST IN FEMALE, ESTROGEN RECEPTOR POSITIVE (HCC): ICD-10-CM

## 2022-12-13 DIAGNOSIS — E11.9 TYPE 2 DIABETES MELLITUS WITHOUT COMPLICATION, WITHOUT LONG-TERM CURRENT USE OF INSULIN (HCC): ICD-10-CM

## 2022-12-13 DIAGNOSIS — I10 HYPERTENSION, BENIGN: ICD-10-CM

## 2022-12-13 PROCEDURE — 99214 OFFICE O/P EST MOD 30 MIN: CPT | Performed by: INTERNAL MEDICINE

## 2022-12-13 PROCEDURE — 3078F DIAST BP <80 MM HG: CPT | Performed by: INTERNAL MEDICINE

## 2022-12-13 PROCEDURE — 3074F SYST BP LT 130 MM HG: CPT | Performed by: INTERNAL MEDICINE

## 2022-12-13 PROCEDURE — 3008F BODY MASS INDEX DOCD: CPT | Performed by: INTERNAL MEDICINE

## 2022-12-13 PROCEDURE — 1126F AMNT PAIN NOTED NONE PRSNT: CPT | Performed by: INTERNAL MEDICINE

## 2022-12-18 DIAGNOSIS — E11.9 TYPE 2 DIABETES MELLITUS WITHOUT COMPLICATION, WITHOUT LONG-TERM CURRENT USE OF INSULIN (HCC): ICD-10-CM

## 2022-12-19 RX ORDER — EMPAGLIFLOZIN 25 MG/1
1 TABLET, FILM COATED ORAL DAILY
Qty: 90 TABLET | Refills: 3 | Status: SHIPPED | OUTPATIENT
Start: 2022-12-19

## 2022-12-19 RX ORDER — GLIPIZIDE 10 MG/1
TABLET ORAL
Qty: 180 TABLET | Refills: 3 | Status: SHIPPED | OUTPATIENT
Start: 2022-12-19

## 2022-12-20 ENCOUNTER — TELEPHONE (OUTPATIENT)
Dept: INTERNAL MEDICINE CLINIC | Facility: CLINIC | Age: 82
End: 2022-12-20

## 2022-12-20 ENCOUNTER — LAB ENCOUNTER (OUTPATIENT)
Dept: LAB | Age: 82
End: 2022-12-20
Attending: INTERNAL MEDICINE
Payer: MEDICARE

## 2022-12-20 DIAGNOSIS — R05.9 COUGH, UNSPECIFIED TYPE: ICD-10-CM

## 2022-12-20 DIAGNOSIS — J02.9 SORE THROAT: Primary | ICD-10-CM

## 2022-12-20 DIAGNOSIS — J02.9 SORE THROAT: ICD-10-CM

## 2022-12-20 LAB — BETA STREP GRP A SCREEN: NEGATIVE

## 2022-12-20 PROCEDURE — 87430 STREP A AG IA: CPT

## 2022-12-20 PROCEDURE — 87637 SARSCOV2&INF A&B&RSV AMP PRB: CPT

## 2022-12-20 RX ORDER — CODEINE PHOSPHATE AND GUAIFENESIN 10; 100 MG/5ML; MG/5ML
10 SOLUTION ORAL 4 TIMES DAILY PRN
Qty: 180 ML | Refills: 1 | Status: SHIPPED | OUTPATIENT
Start: 2022-12-20

## 2022-12-20 RX ORDER — LOSARTAN POTASSIUM AND HYDROCHLOROTHIAZIDE 12.5; 5 MG/1; MG/1
1 TABLET ORAL DAILY
Qty: 90 TABLET | Refills: 3 | Status: SHIPPED | OUTPATIENT
Start: 2022-12-20 | End: 2023-12-15

## 2022-12-20 RX ORDER — PROMETHAZINE HYDROCHLORIDE AND CODEINE PHOSPHATE 6.25; 1 MG/5ML; MG/5ML
5 SYRUP ORAL EVERY 4 HOURS PRN
Qty: 180 ML | Refills: 0 | Status: CANCELLED | OUTPATIENT
Start: 2022-12-20

## 2022-12-20 RX ORDER — SULFACETAMIDE SODIUM 100 MG/ML
1 SOLUTION/ DROPS OPHTHALMIC 4 TIMES DAILY
Qty: 5 ML | Refills: 0 | Status: SHIPPED | OUTPATIENT
Start: 2022-12-20

## 2022-12-20 NOTE — TELEPHONE ENCOUNTER
I sent in cough medicine with codeine and antibiotic eyedrops for her. Otherwise use Tylenol or Aleve for aches and pains and just rest and plenty of fluids.

## 2022-12-20 NOTE — TELEPHONE ENCOUNTER
Patient is calling and states she may have a sinus infection or a cold. Patient woke up with a stuffy nose, congestion on one side of her nose and a sore throat. Patient was coughing last night and took the last of her Promethazine-Codeine 6.25-10MG and it seemed to help.     Patient is asking if there is anything she can take for the sinus issue and would like a refill for the  Promethazine-Codeine 6.25-10MG     Please call and advise

## 2022-12-20 NOTE — TELEPHONE ENCOUNTER
URI Triage:    Fever: Yes[]  No[x]  Unknown[]   [x] Temperature: 97.8F This morning. Did not take temp last night. [x] Chills  [] Night sweats  [] Body aches    Cough: Yes[x]  No[]  [] Productive cough  [] Cough with exertion  [x] Dry cough     Respiratory Symptoms: Yes[] No[x]  [] Wheezing  [] Pain with deep breathing  [] SOB with exertion  [] SOB at rest  [] Heavy breathing  [] Chest discomfort with deep breathing or coughing    GI Symptoms: Yes [] No[x]  [] Diarrhea  [] Nausea  [] Vomiting  [] Abdominal pain  [] Lack of appetite    Other symptoms:  [x] Sore throat  [] Difficulty swallowing  [x] Sinus pressure  [] Nasal drainage  [x] Nasal congestion (RT nostril)  [] Chest congestion  [x] Head congestion  [x] PND  [] Facial pain   [] Ear pain  [] Conjunctivitis   [] Headache   [] Fatigue  [] Weakness  [x] 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 Last taken at 4am (felt feverish, temperature not taken)    [] Any recent travel? [] Any sick contacts? [] Positive Covid exposure (<6 feet, >15 min, no mask worn)  If yes, date of exposure:   Date of last contact:  When did the person develop symptoms:   When did they test positive:     [x] Previous history of Covid  If yes, when:    Vaccinated: Yes  [x]   No []  Booster:  Yes  [x]  No []     Symptom onset: 12/19/22  Crusty drainage this morning to RT eye. No redness to sclera. No pain/discomfort. Negative at home covid test today. Covid/Flu/RSV, + strep test ordered per protocol. Scheduling instructions provided. Advised to quarantine until results are known. Instructed to monitor symptoms at home and call the office with any new or worsening symptoms. ER is recommended should she develop shortness of breath, chest pain, high fever that's non-responsive to fever reducing medicine, or spo2 (oxygen level) <90% (if pulse oximeter is accessible).      To Dr. Bullard [place]: patient requesting refill on Promethazine with Codeine and seeking recommendation for symptoms. Patient was notified that this message will be routed to the physician to determine what their recommendation (s) would be. In the meantime, if they develop new or worsening symptoms, they were advised to call back or seek emergent evaluation at the ER.

## 2022-12-20 NOTE — TELEPHONE ENCOUNTER
Spoke with patient and relayed MD's message. Verbalized understanding and agreement with plan. Reviewed medications and instructions. Cheratussin syrup was sent by Dr. Polina Alexander (no further action needed by MD). Patient was advised to call the office with any new or worsening symptoms. Results for flu/covid. rsv in process. Strep test negative.

## 2022-12-21 LAB
FLUAV + FLUBV RNA SPEC NAA+PROBE: NOT DETECTED
FLUAV + FLUBV RNA SPEC NAA+PROBE: NOT DETECTED
RSV RNA SPEC NAA+PROBE: NOT DETECTED
SARS-COV-2 RNA RESP QL NAA+PROBE: NOT DETECTED

## 2022-12-22 NOTE — TELEPHONE ENCOUNTER
If her eyes are better, she probably does not need the drops, and it is okay for her to take those 2 cough suppressants she is contemplating, as long as its not for more than 2 to 3 days.   Let me know if she needs that promethazine with codeine syrup refilled, that seems to be something she has used in the past.  We will have to task back to me if she thinks she has located that prescription at a certain pharmacy and I will have to send it in myself since it is a controlled substance

## 2022-12-22 NOTE — TELEPHONE ENCOUNTER
Pt called, wanted to speak to nursing to report in  Pt unable to get cough medication or eye drops   Pt feels eyes are better  But has had some wheezing  Cough is dry cough, but exhausts herself with coughing   Pt bought Delsym and high blood pressure medication over the counter   Pt went to drive in testing and tested negative for COVID, strep and flu A & B  Please call patient to discuss/advise  Cough is bad and now in chest  Tasked to nursing

## 2022-12-22 NOTE — TELEPHONE ENCOUNTER
To Dr. Heidi Contreras on call to please advise----    See strep, covid/flu/RSV test results, all negative. Spoke with pt, reports she \"feels pretty good today\". Last night she had \"coughing fits\" with wheezing and was unable to lie down. She has not had any coughing fits or wheezing today. Denies SOB, pain with deep breathing, or fevers. Pulse ox showing SpO2 97-98%. States the sore throat has improved. Cough is dry. Her pharmacy did not have the sulfacetamide eye drops that were sent in. She was told they ordered them for her. She states her eyes are back no normal. No more crusting. Denies drainage, redness, pain. She asks if she still needs to take the eye drops when they come in. Guaifenesin-codeine was also out of stock at her pharmacy. Advised that she contact local pharmacies to see if it is in stock. She will call back if she finds a pharmacy that has the medication. In the meantime, she is taking coricidin and delsym. Pt inquires if these are ok to take. Advised to call back with any worsening symptoms in the meantime, or seek ER evaluation with any SOB, chest pain, high fever, etc. Pt verbalized understanding.

## 2022-12-27 ENCOUNTER — OFFICE VISIT (OUTPATIENT)
Dept: HEMATOLOGY/ONCOLOGY | Facility: HOSPITAL | Age: 82
End: 2022-12-27
Attending: INTERNAL MEDICINE
Payer: MEDICARE

## 2022-12-27 VITALS
DIASTOLIC BLOOD PRESSURE: 44 MMHG | RESPIRATION RATE: 18 BRPM | OXYGEN SATURATION: 95 % | HEIGHT: 65 IN | WEIGHT: 201.81 LBS | TEMPERATURE: 98 F | BODY MASS INDEX: 33.62 KG/M2 | HEART RATE: 79 BPM | SYSTOLIC BLOOD PRESSURE: 144 MMHG

## 2022-12-27 DIAGNOSIS — C50.912 MALIGNANT NEOPLASM OF LEFT FEMALE BREAST, UNSPECIFIED ESTROGEN RECEPTOR STATUS, UNSPECIFIED SITE OF BREAST (HCC): ICD-10-CM

## 2022-12-27 DIAGNOSIS — Z51.81 ENCOUNTER FOR MONITORING ADJUVANT HORMONAL THERAPY: Primary | ICD-10-CM

## 2022-12-27 DIAGNOSIS — Z78.0 MENOPAUSE: ICD-10-CM

## 2022-12-27 DIAGNOSIS — Z79.899 ENCOUNTER FOR MONITORING ADJUVANT HORMONAL THERAPY: Primary | ICD-10-CM

## 2022-12-27 PROCEDURE — 99214 OFFICE O/P EST MOD 30 MIN: CPT | Performed by: INTERNAL MEDICINE

## 2022-12-27 RX ORDER — LETROZOLE 2.5 MG/1
2.5 TABLET, FILM COATED ORAL DAILY
Qty: 90 TABLET | Refills: 3 | Status: SHIPPED | OUTPATIENT
Start: 2022-12-27

## 2023-01-11 ENCOUNTER — LAB ENCOUNTER (OUTPATIENT)
Dept: LAB | Age: 83
End: 2023-01-11
Attending: OPHTHALMOLOGY
Payer: MEDICARE

## 2023-01-11 DIAGNOSIS — H02.401 PTOSIS OF RIGHT EYELID: Primary | ICD-10-CM

## 2023-01-11 PROCEDURE — 83519 RIA NONANTIBODY: CPT

## 2023-01-11 PROCEDURE — 83516 IMMUNOASSAY NONANTIBODY: CPT

## 2023-01-11 PROCEDURE — 84238 ASSAY NONENDOCRINE RECEPTOR: CPT

## 2023-01-11 PROCEDURE — 86255 FLUORESCENT ANTIBODY SCREEN: CPT

## 2023-01-16 LAB
ACETYLCHOLINE BINDING AB: 0 NMOL/L
ACETYLCHOLINE BLOCKING AB: 11 %
TITIN ANTIBODY: <0.09 IV

## 2023-01-23 DIAGNOSIS — E78.5 HYPERLIPIDEMIA, UNSPECIFIED HYPERLIPIDEMIA TYPE: ICD-10-CM

## 2023-01-23 DIAGNOSIS — E11.9 TYPE 2 DIABETES MELLITUS WITHOUT COMPLICATION, WITHOUT LONG-TERM CURRENT USE OF INSULIN (HCC): ICD-10-CM

## 2023-01-23 NOTE — TELEPHONE ENCOUNTER
Pt called, she has new insurance  Will use [facility] [place] rather than [facility] for refills  Patient requesting that NEW prescriptions be sent to [facility] [place]  Tasked to [employer]

## 2023-01-26 RX ORDER — METFORMIN HYDROCHLORIDE 500 MG/1
1000 TABLET, EXTENDED RELEASE ORAL 2 TIMES DAILY
Qty: 360 TABLET | Refills: 1 | OUTPATIENT
Start: 2023-01-26

## 2023-01-26 RX ORDER — EMPAGLIFLOZIN 25 MG/1
1 TABLET, FILM COATED ORAL DAILY
Qty: 90 TABLET | Refills: 3 | OUTPATIENT
Start: 2023-01-26

## 2023-01-26 RX ORDER — GLIPIZIDE 10 MG/1
10 TABLET ORAL
Qty: 180 TABLET | Refills: 3 | OUTPATIENT
Start: 2023-01-26

## 2023-01-26 RX ORDER — ATORVASTATIN CALCIUM 40 MG/1
40 TABLET, FILM COATED ORAL NIGHTLY
Refills: 0 | OUTPATIENT
Start: 2023-01-26

## 2023-01-26 RX ORDER — LETROZOLE 2.5 MG/1
2.5 TABLET, FILM COATED ORAL DAILY
Qty: 90 TABLET | Refills: 3 | OUTPATIENT
Start: 2023-01-26

## 2023-01-26 RX ORDER — SEMAGLUTIDE 1.34 MG/ML
0.5 INJECTION, SOLUTION SUBCUTANEOUS WEEKLY
Qty: 4.5 ML | Refills: 1 | OUTPATIENT
Start: 2023-01-26

## 2023-01-26 RX ORDER — OXYBUTYNIN CHLORIDE 15 MG/1
15 TABLET, EXTENDED RELEASE ORAL NIGHTLY
Qty: 90 TABLET | Refills: 3 | Status: SHIPPED | OUTPATIENT
Start: 2023-01-26

## 2023-01-26 RX ORDER — LOSARTAN POTASSIUM AND HYDROCHLOROTHIAZIDE 12.5; 5 MG/1; MG/1
1 TABLET ORAL DAILY
Qty: 90 TABLET | Refills: 3 | Status: SHIPPED | OUTPATIENT
Start: 2023-01-26 | End: 2024-01-21

## 2023-03-13 ENCOUNTER — HOSPITAL ENCOUNTER (OUTPATIENT)
Dept: MAMMOGRAPHY | Facility: HOSPITAL | Age: 83
Discharge: HOME | End: 2023-03-13
Attending: SURGERY
Payer: MEDICARE

## 2023-03-13 DIAGNOSIS — Z17.0 CARCINOMA OF UPPER-OUTER QUADRANT OF LEFT BREAST IN FEMALE, ESTROGEN RECEPTOR POSITIVE (HCC): ICD-10-CM

## 2023-03-13 DIAGNOSIS — C50.412 CARCINOMA OF UPPER-OUTER QUADRANT OF LEFT BREAST IN FEMALE, ESTROGEN RECEPTOR POSITIVE (HCC): ICD-10-CM

## 2023-03-13 PROCEDURE — 77065 DX MAMMO INCL CAD UNI: CPT | Performed by: SURGERY

## 2023-03-13 PROCEDURE — 77061 BREAST TOMOSYNTHESIS UNI: CPT | Performed by: SURGERY

## 2023-03-15 ENCOUNTER — OFFICE VISIT (OUTPATIENT)
Dept: SURGERY | Facility: CLINIC | Age: 83
End: 2023-03-15
Payer: MEDICARE

## 2023-03-15 VITALS
SYSTOLIC BLOOD PRESSURE: 135 MMHG | OXYGEN SATURATION: 96 % | WEIGHT: 200 LBS | HEART RATE: 72 BPM | RESPIRATION RATE: 18 BRPM | TEMPERATURE: 98 F | DIASTOLIC BLOOD PRESSURE: 50 MMHG | HEIGHT: 65 IN | BODY MASS INDEX: 33.32 KG/M2

## 2023-03-15 DIAGNOSIS — Z17.0 CARCINOMA OF UPPER-OUTER QUADRANT OF LEFT BREAST IN FEMALE, ESTROGEN RECEPTOR POSITIVE (HCC): Primary | ICD-10-CM

## 2023-03-15 DIAGNOSIS — C50.412 CARCINOMA OF UPPER-OUTER QUADRANT OF LEFT BREAST IN FEMALE, ESTROGEN RECEPTOR POSITIVE (HCC): Primary | ICD-10-CM

## 2023-03-15 PROCEDURE — 3078F DIAST BP <80 MM HG: CPT | Performed by: SURGERY

## 2023-03-15 PROCEDURE — 1126F AMNT PAIN NOTED NONE PRSNT: CPT | Performed by: SURGERY

## 2023-03-15 PROCEDURE — 3075F SYST BP GE 130 - 139MM HG: CPT | Performed by: SURGERY

## 2023-03-15 PROCEDURE — 3008F BODY MASS INDEX DOCD: CPT | Performed by: SURGERY

## 2023-03-15 PROCEDURE — 99213 OFFICE O/P EST LOW 20 MIN: CPT | Performed by: SURGERY

## 2023-03-27 NOTE — TELEPHONE ENCOUNTER
March 27, 2023       Chuy Liriano MD  [address]  [facility] [address]  Via In Basket      Patient: Yusuf Aceves   YOB: 1953   Date of Visit: 3/27/2023       Dear Chuy Liriano MD:    I wanted to try and provide you with an update on Yusuf Aceves. Below are my notes for my visit with him on March 27, 2023.    If you have questions, please do not hesitate to call me.       Sincerely,        Sonia Huitron MD        CC: No Recipients  Sonia Huitron MD  3/27/2023  9:26 AM  Signed  I saw Yusuf Aceves on 3/27/2023 in my clinic in new visit.    HISTORY OF PRESENT ILLNESS:  Yusuf Aceves is a pleasant 69 year old male patient referred by Chuy Liriano MD for evaluation of memory loss.  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  This is manifested as word-finding difficulty.  He had 1 incident when he was unable to calculate tip in a restaurant.  He also had 1 occasion where he thought that he lost his way although he was driving in the right direction.  His wife has been handling finances for 40 years and she he has all the bills.  He is retired but is very active.  He goes with friends once a week to have breakfast and he plays golf twice a week and he walks on daily basis.  He has no history of stroke.  He has history of concussion in 1968.  He has family history of Alzheimer's disease and Parkinson's disease but at older age.    PAST MEDICAL HISTORY:    Varicella without mention of complication       as child        Comment: Chicken Pox    Measles without mention of complication         as child        Comment: Measles    Mumps without mention of complication           as child        Comment: Mumps    Essential hypertension, benign                                Stricture and stenosis of esophagus             8/14/06       Acoustic neuroma (CMD)        UTI symptoms:    []Frequency  []Urgency  []Pain/burning  []Blood in urine  []Low back pain  []Flank pain  []Fevers/chills  []Odor  []Confusion    NOTES:    Called patient who has no symptoms - relayed DR. COLEMAN message and she verbalized understanding                    1973            Comment: acoustic neuroma age [age] right    Other facial nerve disorders                                    Comment: right paralysis post surgery    Tubular adenoma polyp of rectum                 11/18/15        Comment: X1    Past Surgical History:   Procedure Laterality Date   • Acoustic neuroma cpg  1973    Removal brain tumor   Acoustic neuroma   • Appendectomy  1968   • Colonoscopy  11/18/15    Dr. Read recall 11/2020   • Colonoscopy diagnostic  6/22/05    normal colon to cecum (Dr. Read) -- colon due 6/2015   • Esophagogastroduodenoscopy transoral flex w/bx single or mult  11/15/14; 8/14/06    Dr. Peng   • Eye surgery  1956    Eye surgery   • Femur/knee surg unlisted  2004    R knee Meniscious repair   • Knee scope,diagnostic  2002    Left knee. Cartilage.   • Remove tonsils/adenoids,<13 y/o  1958    T & A   • Remv pilonidal lesion extens  1970   • Rotator cuff repair Right 10/2020   • Total hip arthroplasty Right 9/18/2014    Dr. Edgar       Social History     Socioeconomic History   • Marital status: /Civil Union     Spouse name: Michelle   • Number of children: 2   • Years of education: 13   • Highest education level: Not on file   Occupational History   • Occupation: route sales     Comment: [employer]   Tobacco Use   • Smoking status: Never   • Smokeless tobacco: Never   • Tobacco comments:     1-2008   Substance and Sexual Activity   • Alcohol use: Yes     Alcohol/week: 5.0 standard drinks     Types: 5 Standard drinks or equivalent per week   • Drug use: No   • Sexual activity: Yes     Partners: Female     Birth control/protection: Condom   Other Topics Concern   •  Service No   • Blood Transfusions No   • Caffeine Concern No   • Occupational Exposure Yes     Comment: noise and dust   • Hobby Hazards No   • Sleep Concern No   • Stress Concern No   • Weight Concern Yes     Comment: would like to lose   • Special Diet No   • Back Care Yes      Comment: low back pain   • Exercise Yes     Comment: walk   • Bike Helmet Yes   • Seat Belt Yes   • Self-Exams Not Asked   Social History Narrative   • Not on file     Social Determinants of Health     Financial Resource Strain: Not on file   Food Insecurity: Not on file   Transportation Needs: Not on file   Physical Activity: Not on file   Stress: Not on file   Social Connections: Not on file   Intimate Partner Violence: Not on file       Family History   Problem Relation Age of Onset   • Osteoarthritis Mother    • Diabetes Mother         Type 2   • Neurological Disorder Mother         Alzhiemers   • Cancer Mother         Skin   • Ophthalmology Mother    • Hypertension Father    • Diabetes Maternal Grandmother         Type 2   • Hypertension Maternal Grandmother    • Diabetes Maternal Grandfather    • Stroke Paternal Grandfather    • Neurological Disorder Maternal Uncle         Alzhiemers   • Neurological Disorder Maternal Uncle         Alzhiemers   • Cancer Sister         Skin   • Cancer, Ovarian Sister    • Cancer Brother         Prostate       ALLERGIES:  No Known Allergies    Current Outpatient Medications   Medication Sig Dispense Refill   • lisinopril (ZESTRIL) 20 MG tablet Take 1 tablet by mouth daily. 90 tablet 1   • metFORMIN (GLUCOPHAGE-XR) 500 MG 24 hr tablet TAKE TWO TABLETS BY MOUTH DAILY WITH BREAKFAST 180 tablet 1   • atorvastatin (LIPITOR) 20 MG tablet TAKE ONE TABLET BY MOUTH DAILY 90 tablet 1   • amLODIPine (NORVASC) 5 MG tablet TAKE ONE TABLET BY MOUTH DAILY 90 tablet 1   • OneTouch Ultra test strip USE TO TEST ONCE DAILY 100 strip 1   • alfuzosin (UROXATRAL) 10 MG 24 hr tablet Take 1 tablet by mouth daily. 90 tablet 3   • colchicine (COLCRYS) 0.6 MG tablet TAKE 2 TABLETS BY MOUTH ONCE FOLLOWED BY 1 TABLET IN AN HOUR 6 tablet 0   • Lancets (OneTouch Delica Plus [id number]) Misc USE TO TEST ONCE DAILY 100 each 2   • pantoprazole (PROTONIX) 40 MG tablet Take 1 tablet by mouth daily.     • blood  glucose meter Test blood sugar 1 times daily as directed. Diagnosis: e11.9. 1 kit 0   • TURMERIC PO Take 1,500 mg by mouth.     • indomethacin (INDOCIN) 50 MG capsule TAKE ONE CAPSULE BY MOUTH THREE TIMES A DAY WITH MEALS 30 capsule 0   • Multiple Vitamins-Minerals (CENTRUM SILVER ADULT 50+) TABS Take  by mouth. 1 daily       No current facility-administered medications for this visit.       GENERAL REVIEW OF SYSTEM:   Constitutional:  Negative for fever and chills.   Skin:  Negative for rash.   HEENT:  Negative for ear pain or sore throat.  Respiratory:  Negative cough or shortness of breath.    Cardiovascular:  Negative for chest pain or chest pressure.   Gastrointestinal:  Negative for nausea, vomiting, diarrhea or abdominal pain.   Genitourinary:  Negative for dysuria, urgency or frequency.  Extremities:  Negative for joint swelling or joint pain.  Psychiatric:  Negative for suicidal ideation or abnormal behavior.  Neurological:  As per history of present illness.    PHYSICAL EXAM:  The patient is well nourished and well developed, in no acute distress.  Blood pressure 138/82, pulse 81, height 6' 1\" (1.854 m), weight 119.8 kg (264 lb 1.8 oz), SpO2 97 %..     Carotid exam showed no carotid bruit.  Cardiovascular: Normal S1 and S2. No murmurs.    NEUROLOGICAL EXAMINATION:  The patient is awake, alert, and oriented x3. Recent and remote memory are normal. Attention span and fund of knowledge are normal. The patient's speech is fluent and language is intact.  He scored 30/30 on mini-mental state exam.  He named 21 animals in 1 minute.  There was no evidence of apraxia.  He chet a clock normal.  Pupils are 3 mm, round, reactive to light. Visual fields are full to finger confrontation. Extraocular movements are normal. There is no nystagmus. Facial sensation and muscles of mastication are intact. Nasolabial folds are intact and symmetric with no evidence of facial weakness. Hearing is intact bilaterally. The uvula is  midline, and palate moves symmetrically. Shoulder shrugging is normal and symmetric. Tongue is midline with no atrophy or fasciculation.   There is no pronator drift. Muscle bulk and tone are normal. Strength is normal in all extremities. Deep tendon reflexes are 2 in both upper and lower extremities. Plantars are downgoing.   Sensory examination to pinprick and vibration sensation are intact.   Coordination did not reveal dysmetria or dysdiadochokinesis. Gait examination is unremarkable.         Assessment and plan:  Yusuf was seen today for neurologic problem and new patient.    Diagnoses and all orders for this visit:  MCI (mild cognitive impairment) with memory loss  -     MRI BRAIN WO CONTRAST; Future  -     SERVICE TO NEUROPSYCHOLOGY    This is a 69-year-old male patient with progressive memory loss.  He did very well on mini-mental state exam.  Most likely this is secondary to mild cognitive impairment.  I explained him that this is condition that sometimes can proceed to dementia usually Alzheimer type.  However at this time there is no decline from a previously attained level and therefore this will not be considered dementia.  His neurological examination otherwise is unremarkable.  I will order MRI of the brain and formal neuropsychological assessment as a baseline.  The patient is very active both physically and mentally.  He plays golf twice weekly and he walks 2 miles on daily basis.  He is also an avid reader.  He was encouraged to remain active both physically and mentally.  There is no role for memory enhancing medications at this time.  Further management will be decided after the tests are completed.    45 minutes were spent with the patient and in same day chart preparation.    Thank you for the opportunity to continue to participate in the care of this patient.      Sonia Huitron MD  March 27, 2023

## 2023-04-20 ENCOUNTER — OFFICE VISIT (OUTPATIENT)
Dept: ENDOCRINOLOGY CLINIC | Facility: CLINIC | Age: 83
End: 2023-04-20

## 2023-04-20 VITALS
HEART RATE: 73 BPM | BODY MASS INDEX: 33 KG/M2 | WEIGHT: 196 LBS | SYSTOLIC BLOOD PRESSURE: 125 MMHG | DIASTOLIC BLOOD PRESSURE: 73 MMHG

## 2023-04-20 DIAGNOSIS — E11.9 TYPE 2 DIABETES MELLITUS WITHOUT COMPLICATION, WITHOUT LONG-TERM CURRENT USE OF INSULIN (HCC): ICD-10-CM

## 2023-04-20 LAB
CARTRIDGE LOT#: ABNORMAL NUMERIC
GLUCOSE BLOOD: 153
HEMOGLOBIN A1C: 7 % (ref 4.3–5.6)
TEST STRIP LOT #: NORMAL NUMERIC

## 2023-04-20 PROCEDURE — 83036 HEMOGLOBIN GLYCOSYLATED A1C: CPT | Performed by: INTERNAL MEDICINE

## 2023-04-20 PROCEDURE — 99213 OFFICE O/P EST LOW 20 MIN: CPT | Performed by: INTERNAL MEDICINE

## 2023-04-20 PROCEDURE — 1126F AMNT PAIN NOTED NONE PRSNT: CPT | Performed by: INTERNAL MEDICINE

## 2023-04-20 PROCEDURE — 82947 ASSAY GLUCOSE BLOOD QUANT: CPT | Performed by: INTERNAL MEDICINE

## 2023-04-20 PROCEDURE — 3074F SYST BP LT 130 MM HG: CPT | Performed by: INTERNAL MEDICINE

## 2023-04-20 PROCEDURE — 3078F DIAST BP <80 MM HG: CPT | Performed by: INTERNAL MEDICINE

## 2023-04-20 RX ORDER — EMPAGLIFLOZIN 25 MG/1
1 TABLET, FILM COATED ORAL DAILY
Qty: 90 TABLET | Refills: 3 | Status: SHIPPED | OUTPATIENT
Start: 2023-04-20

## 2023-04-20 RX ORDER — METFORMIN HYDROCHLORIDE 500 MG/1
1000 TABLET, EXTENDED RELEASE ORAL 2 TIMES DAILY
Qty: 360 TABLET | Refills: 1 | Status: SHIPPED | OUTPATIENT
Start: 2023-04-20

## 2023-04-20 RX ORDER — GLIPIZIDE 10 MG/1
10 TABLET ORAL
Qty: 180 TABLET | Refills: 3 | Status: SHIPPED | OUTPATIENT
Start: 2023-04-20

## 2023-04-20 RX ORDER — SEMAGLUTIDE 1.34 MG/ML
0.5 INJECTION, SOLUTION SUBCUTANEOUS WEEKLY
Qty: 4.5 ML | Refills: 1 | Status: SHIPPED | OUTPATIENT
Start: 2023-04-20

## 2023-04-24 ENCOUNTER — TELEPHONE (OUTPATIENT)
Dept: INTERNAL MEDICINE CLINIC | Facility: CLINIC | Age: 83
End: 2023-04-24

## 2023-04-24 RX ORDER — BENZONATATE 200 MG/1
200 CAPSULE ORAL 3 TIMES DAILY PRN
Qty: 60 CAPSULE | Refills: 0 | Status: SHIPPED | OUTPATIENT
Start: 2023-04-24

## 2023-04-24 NOTE — TELEPHONE ENCOUNTER
I sent in refill for tessalon     Would also recommend the additional for cough suppression:  -warm tea/honey  -cepacol or chloraseptic lozenges   -voice rest  -sleeping propped up on a few pillows  -would recommend she be seen sooner if she doesn't improve

## 2023-04-24 NOTE — TELEPHONE ENCOUNTER
COVID triage:    Start of symptoms:  4/17/23    Fever:  [x]  No fever  []  Temperature:   []  Chills  []  Night sweats    Cough:  [x] Productive cough  [] Cough with exertion  [] Dry cough    Breathing:  [] Wheezing  [] Pain with deep breathing  [] SOB with exertion  [] SOB at rest  [] Heavy breathing  [] Chest discomfort with deep breathing or coughing    GI Symptoms:  [] Diarrhea  [] Nausea  [] Vomiting  [] Abdominal pain  [] Lack of appetite    Other symptoms:  [x] Sore throat & hoarse voice  [] Difficulty swallowing  [] Nasal drainage  [] Nasal congestion  [] PND  [] Sinus pressure  [] Chest congestion  [] Head congestion  [] Facial pain   [] Ear pain  [] Body aches  [] Loss of sense of smell   [] Loss of sense of taste  []Conjunctivitis  [x] Headache - on Wednesday   [x] Fatigue  [] Weakness    [x]OTC Medications: Corisedin - didn't help. Benzonatate - helped, but ran out      [] Any recent travel? [] Any sick contacts? 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 Vaccinated: Yes  [x]   No []  Booster:  Yes  [x]  No []      Patient states when she coughs, she can't stop. It lasts for a long time. The Benzonatate was helping her sleep at night, but now she ran out of it. She sleeps with the head of her bed raised up. Refill pended.      To Dr. Susan Quintanilla to please advise--

## 2023-04-24 NOTE — TELEPHONE ENCOUNTER
Pt has a cold since last Monday that started with a sore throat  Got progressively worse since then  Pt voice is hoarse & has a cough  When pt is active she starts coughing and cannot stop, cough is productive and clear   Cough kept her up all night  She had adry pills for cough on hand but she has run out   Asks if something can be prescribed for her    Call back # [phone]    Two Covid tests were negative that pt took on Monday and Thursday last week

## 2023-04-27 ENCOUNTER — LAB ENCOUNTER (OUTPATIENT)
Dept: LAB | Age: 83
End: 2023-04-27
Attending: INTERNAL MEDICINE
Payer: MEDICARE

## 2023-04-27 LAB
ALBUMIN SERPL-MCNC: 3.4 G/DL (ref 3.4–5)
ALBUMIN/GLOB SERPL: 0.8 {RATIO} (ref 1–2)
ALP LIVER SERPL-CCNC: 86 U/L
ALT SERPL-CCNC: 25 U/L
ANION GAP SERPL CALC-SCNC: 8 MMOL/L (ref 0–18)
AST SERPL-CCNC: 21 U/L (ref 15–37)
BASOPHILS # BLD AUTO: 0.04 X10(3) UL (ref 0–0.2)
BASOPHILS NFR BLD AUTO: 0.4 %
BILIRUB SERPL-MCNC: 0.4 MG/DL (ref 0.1–2)
BUN BLD-MCNC: 20 MG/DL (ref 7–18)
CALCIUM BLD-MCNC: 9.7 MG/DL (ref 8.5–10.1)
CHLORIDE SERPL-SCNC: 102 MMOL/L (ref 98–112)
CHOLEST SERPL-MCNC: 103 MG/DL (ref ?–200)
CO2 SERPL-SCNC: 27 MMOL/L (ref 21–32)
CREAT BLD-MCNC: 0.74 MG/DL
EOSINOPHIL # BLD AUTO: 0.21 X10(3) UL (ref 0–0.7)
EOSINOPHIL NFR BLD AUTO: 2.1 %
ERYTHROCYTE [DISTWIDTH] IN BLOOD BY AUTOMATED COUNT: 13.7 %
FASTING PATIENT LIPID ANSWER: YES
FASTING STATUS PATIENT QL REPORTED: YES
GFR SERPLBLD BASED ON 1.73 SQ M-ARVRAT: 81 ML/MIN/1.73M2 (ref 60–?)
GLOBULIN PLAS-MCNC: 4.1 G/DL (ref 2.8–4.4)
GLUCOSE BLD-MCNC: 138 MG/DL (ref 70–99)
HCT VFR BLD AUTO: 43.4 %
HDLC SERPL-MCNC: 47 MG/DL (ref 40–59)
HGB BLD-MCNC: 13.8 G/DL
IMM GRANULOCYTES # BLD AUTO: 0.05 X10(3) UL (ref 0–1)
IMM GRANULOCYTES NFR BLD: 0.5 %
LDLC SERPL CALC-MCNC: 31 MG/DL (ref ?–100)
LYMPHOCYTES # BLD AUTO: 3.12 X10(3) UL (ref 1–4)
LYMPHOCYTES NFR BLD AUTO: 31.9 %
MCH RBC QN AUTO: 30.3 PG (ref 26–34)
MCHC RBC AUTO-ENTMCNC: 31.8 G/DL (ref 31–37)
MCV RBC AUTO: 95.2 FL
MONOCYTES # BLD AUTO: 0.9 X10(3) UL (ref 0.1–1)
MONOCYTES NFR BLD AUTO: 9.2 %
NEUTROPHILS # BLD AUTO: 5.47 X10 (3) UL (ref 1.5–7.7)
NEUTROPHILS # BLD AUTO: 5.47 X10(3) UL (ref 1.5–7.7)
NEUTROPHILS NFR BLD AUTO: 55.9 %
NONHDLC SERPL-MCNC: 56 MG/DL (ref ?–130)
OSMOLALITY SERPL CALC.SUM OF ELEC: 289 MOSM/KG (ref 275–295)
PLATELET # BLD AUTO: 285 10(3)UL (ref 150–450)
POTASSIUM SERPL-SCNC: 4.3 MMOL/L (ref 3.5–5.1)
PROT SERPL-MCNC: 7.5 G/DL (ref 6.4–8.2)
RBC # BLD AUTO: 4.56 X10(6)UL
SODIUM SERPL-SCNC: 137 MMOL/L (ref 136–145)
TRIGL SERPL-MCNC: 150 MG/DL (ref 30–149)
TSI SER-ACNC: 2.06 MIU/ML (ref 0.36–3.74)
VLDLC SERPL CALC-MCNC: 20 MG/DL (ref 0–30)
WBC # BLD AUTO: 9.8 X10(3) UL (ref 4–11)

## 2023-04-27 PROCEDURE — 85025 COMPLETE CBC W/AUTO DIFF WBC: CPT | Performed by: INTERNAL MEDICINE

## 2023-04-27 PROCEDURE — 84443 ASSAY THYROID STIM HORMONE: CPT | Performed by: INTERNAL MEDICINE

## 2023-04-27 PROCEDURE — 80053 COMPREHEN METABOLIC PANEL: CPT | Performed by: INTERNAL MEDICINE

## 2023-04-27 PROCEDURE — 80061 LIPID PANEL: CPT | Performed by: INTERNAL MEDICINE

## 2023-05-02 ENCOUNTER — OFFICE VISIT (OUTPATIENT)
Dept: INTERNAL MEDICINE CLINIC | Facility: CLINIC | Age: 83
End: 2023-05-02

## 2023-05-02 VITALS
TEMPERATURE: 98 F | WEIGHT: 193 LBS | HEIGHT: 65 IN | HEART RATE: 78 BPM | OXYGEN SATURATION: 97 % | SYSTOLIC BLOOD PRESSURE: 122 MMHG | BODY MASS INDEX: 32.15 KG/M2 | DIASTOLIC BLOOD PRESSURE: 64 MMHG

## 2023-05-02 DIAGNOSIS — C50.412 CARCINOMA OF UPPER-OUTER QUADRANT OF LEFT BREAST IN FEMALE, ESTROGEN RECEPTOR POSITIVE (HCC): ICD-10-CM

## 2023-05-02 DIAGNOSIS — I10 HYPERTENSION, BENIGN: ICD-10-CM

## 2023-05-02 DIAGNOSIS — R05.2 SUBACUTE COUGH: Primary | ICD-10-CM

## 2023-05-02 DIAGNOSIS — Z17.0 CARCINOMA OF UPPER-OUTER QUADRANT OF LEFT BREAST IN FEMALE, ESTROGEN RECEPTOR POSITIVE (HCC): ICD-10-CM

## 2023-05-02 DIAGNOSIS — E78.5 HYPERLIPIDEMIA, UNSPECIFIED HYPERLIPIDEMIA TYPE: ICD-10-CM

## 2023-05-02 DIAGNOSIS — E11.9 TYPE 2 DIABETES MELLITUS WITHOUT COMPLICATION, WITHOUT LONG-TERM CURRENT USE OF INSULIN (HCC): ICD-10-CM

## 2023-05-02 DIAGNOSIS — I25.110 CORONARY ARTERY DISEASE INVOLVING NATIVE CORONARY ARTERY OF NATIVE HEART WITH UNSTABLE ANGINA PECTORIS (HCC): ICD-10-CM

## 2023-05-02 DIAGNOSIS — Z00.00 ENCOUNTER FOR MEDICARE ANNUAL WELLNESS EXAM: ICD-10-CM

## 2023-05-02 RX ORDER — CODEINE PHOSPHATE AND GUAIFENESIN 10; 100 MG/5ML; MG/5ML
10 SOLUTION ORAL 4 TIMES DAILY PRN
Qty: 180 ML | Refills: 1 | Status: SHIPPED | OUTPATIENT
Start: 2023-05-02

## 2023-05-05 ENCOUNTER — HOSPITAL ENCOUNTER (OUTPATIENT)
Dept: GENERAL RADIOLOGY | Age: 83
Discharge: HOME | End: 2023-05-05
Attending: INTERNAL MEDICINE
Payer: MEDICARE

## 2023-05-05 DIAGNOSIS — R05.2 SUBACUTE COUGH: ICD-10-CM

## 2023-05-05 PROCEDURE — 71046 X-RAY EXAM CHEST 2 VIEWS: CPT | Performed by: INTERNAL MEDICINE

## 2023-05-07 ENCOUNTER — TELEPHONE (OUTPATIENT)
Dept: INTERNAL MEDICINE CLINIC | Facility: CLINIC | Age: 83
End: 2023-05-07

## 2023-05-08 NOTE — TELEPHONE ENCOUNTER
Left message (OK per HIPPA) relaying MD message. Advised in voicemail to call back with any questions or concerns.

## 2023-06-27 ENCOUNTER — APPOINTMENT (OUTPATIENT)
Dept: HEMATOLOGY/ONCOLOGY | Facility: HOSPITAL | Age: 83
End: 2023-06-27
Attending: INTERNAL MEDICINE
Payer: MEDICARE

## 2023-06-29 ENCOUNTER — OFFICE VISIT (OUTPATIENT)
Dept: HEMATOLOGY/ONCOLOGY | Facility: HOSPITAL | Age: 83
End: 2023-06-29
Attending: INTERNAL MEDICINE
Payer: MEDICARE

## 2023-06-29 VITALS
HEIGHT: 65 IN | WEIGHT: 203 LBS | OXYGEN SATURATION: 97 % | HEART RATE: 78 BPM | BODY MASS INDEX: 33.82 KG/M2 | SYSTOLIC BLOOD PRESSURE: 141 MMHG | RESPIRATION RATE: 18 BRPM | DIASTOLIC BLOOD PRESSURE: 47 MMHG | TEMPERATURE: 99 F

## 2023-06-29 DIAGNOSIS — Z79.899 ENCOUNTER FOR MONITORING ADJUVANT HORMONAL THERAPY: Primary | ICD-10-CM

## 2023-06-29 DIAGNOSIS — Z78.0 MENOPAUSE: ICD-10-CM

## 2023-06-29 DIAGNOSIS — C50.912 MALIGNANT NEOPLASM OF LEFT FEMALE BREAST, UNSPECIFIED ESTROGEN RECEPTOR STATUS, UNSPECIFIED SITE OF BREAST (HCC): ICD-10-CM

## 2023-06-29 DIAGNOSIS — Z51.81 ENCOUNTER FOR MONITORING ADJUVANT HORMONAL THERAPY: Primary | ICD-10-CM

## 2023-06-29 PROCEDURE — 99214 OFFICE O/P EST MOD 30 MIN: CPT | Performed by: INTERNAL MEDICINE

## 2023-07-29 ENCOUNTER — MOBILE ENCOUNTER (OUTPATIENT)
Dept: INTERNAL MEDICINE CLINIC | Facility: CLINIC | Age: 83
End: 2023-07-29

## 2023-07-29 RX ORDER — AZITHROMYCIN 250 MG/1
TABLET, FILM COATED ORAL
Qty: 6 TABLET | Refills: 0 | Status: SHIPPED | OUTPATIENT
Start: 2023-07-29

## 2023-07-29 RX ORDER — AZITHROMYCIN 250 MG/1
TABLET, FILM COATED ORAL
Qty: 6 TABLET | Refills: 0 | Status: CANCELLED
Start: 2023-07-29

## 2023-08-18 ENCOUNTER — LAB ENCOUNTER (OUTPATIENT)
Dept: LAB | Age: 83
End: 2023-08-18
Attending: INTERNAL MEDICINE
Payer: MEDICARE

## 2023-08-18 LAB
ALBUMIN SERPL-MCNC: 3.6 G/DL (ref 3.4–5)
ALBUMIN/GLOB SERPL: 1.1 {RATIO} (ref 1–2)
ALP LIVER SERPL-CCNC: 68 U/L
ALT SERPL-CCNC: 31 U/L
ANION GAP SERPL CALC-SCNC: 5 MMOL/L (ref 0–18)
AST SERPL-CCNC: 13 U/L (ref 15–37)
BASOPHILS # BLD AUTO: 0.03 X10(3) UL (ref 0–0.2)
BASOPHILS NFR BLD AUTO: 0.3 %
BILIRUB SERPL-MCNC: 0.5 MG/DL (ref 0.1–2)
BUN BLD-MCNC: 20 MG/DL (ref 7–18)
CALCIUM BLD-MCNC: 9.6 MG/DL (ref 8.5–10.1)
CHLORIDE SERPL-SCNC: 105 MMOL/L (ref 98–112)
CHOLEST SERPL-MCNC: 101 MG/DL (ref ?–200)
CO2 SERPL-SCNC: 27 MMOL/L (ref 21–32)
CREAT BLD-MCNC: 0.99 MG/DL
EGFRCR SERPLBLD CKD-EPI 2021: 57 ML/MIN/1.73M2 (ref 60–?)
EOSINOPHIL # BLD AUTO: 0.1 X10(3) UL (ref 0–0.7)
EOSINOPHIL NFR BLD AUTO: 1.1 %
ERYTHROCYTE [DISTWIDTH] IN BLOOD BY AUTOMATED COUNT: 13.6 %
EST. AVERAGE GLUCOSE BLD GHB EST-MCNC: 189 MG/DL (ref 68–126)
FASTING PATIENT LIPID ANSWER: YES
FASTING STATUS PATIENT QL REPORTED: YES
GLOBULIN PLAS-MCNC: 3.4 G/DL (ref 2.8–4.4)
GLUCOSE BLD-MCNC: 157 MG/DL (ref 70–99)
HBA1C MFR BLD: 8.2 % (ref ?–5.7)
HCT VFR BLD AUTO: 44 %
HDLC SERPL-MCNC: 46 MG/DL (ref 40–59)
HGB BLD-MCNC: 14 G/DL
IMM GRANULOCYTES # BLD AUTO: 0.05 X10(3) UL (ref 0–1)
IMM GRANULOCYTES NFR BLD: 0.5 %
LDLC SERPL CALC-MCNC: 32 MG/DL (ref ?–100)
LYMPHOCYTES # BLD AUTO: 3.5 X10(3) UL (ref 1–4)
LYMPHOCYTES NFR BLD AUTO: 38 %
MCH RBC QN AUTO: 30.2 PG (ref 26–34)
MCHC RBC AUTO-ENTMCNC: 31.8 G/DL (ref 31–37)
MCV RBC AUTO: 95 FL
MONOCYTES # BLD AUTO: 0.85 X10(3) UL (ref 0.1–1)
MONOCYTES NFR BLD AUTO: 9.2 %
NEUTROPHILS # BLD AUTO: 4.68 X10 (3) UL (ref 1.5–7.7)
NEUTROPHILS # BLD AUTO: 4.68 X10(3) UL (ref 1.5–7.7)
NEUTROPHILS NFR BLD AUTO: 50.9 %
NONHDLC SERPL-MCNC: 55 MG/DL (ref ?–130)
OSMOLALITY SERPL CALC.SUM OF ELEC: 290 MOSM/KG (ref 275–295)
PLATELET # BLD AUTO: 251 10(3)UL (ref 150–450)
POTASSIUM SERPL-SCNC: 3.9 MMOL/L (ref 3.5–5.1)
PROT SERPL-MCNC: 7 G/DL (ref 6.4–8.2)
RBC # BLD AUTO: 4.63 X10(6)UL
SODIUM SERPL-SCNC: 137 MMOL/L (ref 136–145)
TRIGL SERPL-MCNC: 129 MG/DL (ref 30–149)
TSI SER-ACNC: 2.66 MIU/ML (ref 0.36–3.74)
VLDLC SERPL CALC-MCNC: 17 MG/DL (ref 0–30)
WBC # BLD AUTO: 9.2 X10(3) UL (ref 4–11)

## 2023-08-18 PROCEDURE — 80061 LIPID PANEL: CPT | Performed by: INTERNAL MEDICINE

## 2023-08-18 PROCEDURE — 80053 COMPREHEN METABOLIC PANEL: CPT | Performed by: INTERNAL MEDICINE

## 2023-08-18 PROCEDURE — 84443 ASSAY THYROID STIM HORMONE: CPT | Performed by: INTERNAL MEDICINE

## 2023-08-18 PROCEDURE — 83036 HEMOGLOBIN GLYCOSYLATED A1C: CPT | Performed by: INTERNAL MEDICINE

## 2023-08-18 PROCEDURE — 85025 COMPLETE CBC W/AUTO DIFF WBC: CPT | Performed by: INTERNAL MEDICINE

## 2023-08-25 ENCOUNTER — TELEPHONE (OUTPATIENT)
Dept: INTERNAL MEDICINE CLINIC | Facility: CLINIC | Age: 83
End: 2023-08-25

## 2023-08-25 NOTE — TELEPHONE ENCOUNTER
Labs including CBC, CMP, lipids, thyroid all normal - continue same meds     A1C 8.8 -  diabetes could be better controlled - d/w Dr Sahni Areas

## 2023-08-29 ENCOUNTER — TELEPHONE (OUTPATIENT)
Dept: INTERNAL MEDICINE CLINIC | Facility: CLINIC | Age: 83
End: 2023-08-29

## 2023-08-29 ENCOUNTER — OFFICE VISIT (OUTPATIENT)
Dept: INTERNAL MEDICINE CLINIC | Facility: CLINIC | Age: 83
End: 2023-08-29

## 2023-08-29 VITALS
OXYGEN SATURATION: 98 % | BODY MASS INDEX: 33.29 KG/M2 | HEART RATE: 78 BPM | SYSTOLIC BLOOD PRESSURE: 132 MMHG | WEIGHT: 199.81 LBS | HEIGHT: 65 IN | TEMPERATURE: 99 F | DIASTOLIC BLOOD PRESSURE: 60 MMHG

## 2023-08-29 DIAGNOSIS — G47.30 SLEEP APNEA, UNSPECIFIED TYPE: ICD-10-CM

## 2023-08-29 DIAGNOSIS — I10 HYPERTENSION, BENIGN: ICD-10-CM

## 2023-08-29 DIAGNOSIS — Z95.5 H/O HEART ARTERY STENT: ICD-10-CM

## 2023-08-29 DIAGNOSIS — E78.5 HYPERLIPIDEMIA, UNSPECIFIED HYPERLIPIDEMIA TYPE: ICD-10-CM

## 2023-08-29 DIAGNOSIS — E11.9 TYPE 2 DIABETES MELLITUS WITHOUT COMPLICATION, WITHOUT LONG-TERM CURRENT USE OF INSULIN (HCC): Primary | ICD-10-CM

## 2023-08-29 PROBLEM — N18.30 CKD (CHRONIC KIDNEY DISEASE) STAGE 3, GFR 30-59 ML/MIN (HCC): Chronic | Status: ACTIVE | Noted: 2023-08-29

## 2023-08-29 PROCEDURE — 3075F SYST BP GE 130 - 139MM HG: CPT | Performed by: INTERNAL MEDICINE

## 2023-08-29 PROCEDURE — 3078F DIAST BP <80 MM HG: CPT | Performed by: INTERNAL MEDICINE

## 2023-08-29 PROCEDURE — 3008F BODY MASS INDEX DOCD: CPT | Performed by: INTERNAL MEDICINE

## 2023-08-29 PROCEDURE — 99214 OFFICE O/P EST MOD 30 MIN: CPT | Performed by: INTERNAL MEDICINE

## 2023-08-29 PROCEDURE — 1159F MED LIST DOCD IN RCRD: CPT | Performed by: INTERNAL MEDICINE

## 2023-08-29 PROCEDURE — 1126F AMNT PAIN NOTED NONE PRSNT: CPT | Performed by: INTERNAL MEDICINE

## 2023-08-29 RX ORDER — ALPRAZOLAM 0.25 MG/1
0.25 TABLET ORAL 3 TIMES DAILY PRN
Qty: 90 TABLET | Refills: 2 | Status: SHIPPED | OUTPATIENT
Start: 2023-08-29

## 2023-09-05 ENCOUNTER — HOSPITAL ENCOUNTER (OUTPATIENT)
Dept: MAMMOGRAPHY | Facility: HOSPITAL | Age: 83
Discharge: HOME | End: 2023-09-05
Attending: SURGERY
Payer: MEDICARE

## 2023-09-05 DIAGNOSIS — Z17.0 CARCINOMA OF UPPER-OUTER QUADRANT OF LEFT BREAST IN FEMALE, ESTROGEN RECEPTOR POSITIVE: ICD-10-CM

## 2023-09-05 DIAGNOSIS — C50.412 CARCINOMA OF UPPER-OUTER QUADRANT OF LEFT BREAST IN FEMALE, ESTROGEN RECEPTOR POSITIVE: ICD-10-CM

## 2023-09-05 PROCEDURE — 77066 DX MAMMO INCL CAD BI: CPT | Performed by: SURGERY

## 2023-09-05 PROCEDURE — 77062 BREAST TOMOSYNTHESIS BI: CPT | Performed by: SURGERY

## 2023-09-11 ENCOUNTER — APPOINTMENT (OUTPATIENT)
Dept: URBAN - METROPOLITAN AREA CLINIC 244 | Age: 83
Setting detail: DERMATOLOGY
End: 2023-09-11

## 2023-09-11 DIAGNOSIS — T07XXXA INSECT BITE, NONVENOMOUS, OF OTHER, MULTIPLE, AND UNSPECIFIED SITES, WITHOUT MENTION OF INFECTION: ICD-10-CM

## 2023-09-11 DIAGNOSIS — L91.8 OTHER HYPERTROPHIC DISORDERS OF THE SKIN: ICD-10-CM

## 2023-09-11 PROBLEM — S00.36XA INSECT BITE (NONVENOMOUS) OF NOSE, INITIAL ENCOUNTER: Status: ACTIVE | Noted: 2023-09-11

## 2023-09-11 PROCEDURE — OTHER COUNSELING: OTHER

## 2023-09-11 PROCEDURE — 99212 OFFICE O/P EST SF 10 MIN: CPT

## 2023-09-11 PROCEDURE — OTHER ADDITIONAL NOTES: OTHER

## 2023-09-11 ASSESSMENT — LOCATION ZONE DERM
LOCATION ZONE: NOSE
LOCATION ZONE: EYELID

## 2023-09-11 ASSESSMENT — LOCATION DETAILED DESCRIPTION DERM
LOCATION DETAILED: RIGHT LATERAL CANTHUS
LOCATION DETAILED: NASAL DORSUM

## 2023-09-11 ASSESSMENT — LOCATION SIMPLE DESCRIPTION DERM
LOCATION SIMPLE: NOSE
LOCATION SIMPLE: RIGHT EYELID

## 2023-09-11 NOTE — PROCEDURE: ADDITIONAL NOTES
Render Risk Assessment In Note?: no
Additional Notes: RTC if not resolved within 2 wks
Detail Level: Simple

## 2023-09-13 ENCOUNTER — OFFICE VISIT (OUTPATIENT)
Dept: SURGERY | Facility: CLINIC | Age: 83
End: 2023-09-13
Payer: MEDICARE

## 2023-09-13 VITALS
SYSTOLIC BLOOD PRESSURE: 124 MMHG | RESPIRATION RATE: 16 BRPM | HEART RATE: 72 BPM | TEMPERATURE: 97 F | BODY MASS INDEX: 33 KG/M2 | WEIGHT: 199 LBS | OXYGEN SATURATION: 98 % | DIASTOLIC BLOOD PRESSURE: 79 MMHG

## 2023-09-13 DIAGNOSIS — C50.412 MALIGNANT NEOPLASM OF UPPER-OUTER QUADRANT OF LEFT BREAST IN FEMALE, ESTROGEN RECEPTOR POSITIVE: Primary | ICD-10-CM

## 2023-09-13 DIAGNOSIS — Z17.0 MALIGNANT NEOPLASM OF UPPER-OUTER QUADRANT OF LEFT BREAST IN FEMALE, ESTROGEN RECEPTOR POSITIVE: Primary | ICD-10-CM

## 2023-09-13 PROCEDURE — 1159F MED LIST DOCD IN RCRD: CPT | Performed by: SURGERY

## 2023-09-13 PROCEDURE — 99213 OFFICE O/P EST LOW 20 MIN: CPT | Performed by: SURGERY

## 2023-09-13 PROCEDURE — 3078F DIAST BP <80 MM HG: CPT | Performed by: SURGERY

## 2023-09-13 PROCEDURE — 1160F RVW MEDS BY RX/DR IN RCRD: CPT | Performed by: SURGERY

## 2023-09-13 PROCEDURE — 1126F AMNT PAIN NOTED NONE PRSNT: CPT | Performed by: SURGERY

## 2023-09-13 PROCEDURE — 3074F SYST BP LT 130 MM HG: CPT | Performed by: SURGERY

## 2023-09-14 ENCOUNTER — TELEPHONE (OUTPATIENT)
Dept: INTERNAL MEDICINE CLINIC | Facility: CLINIC | Age: 83
End: 2023-09-14

## 2023-09-14 PROBLEM — C50.412 MALIGNANT NEOPLASM OF UPPER-OUTER QUADRANT OF LEFT BREAST IN FEMALE, ESTROGEN RECEPTOR POSITIVE  (HCC): Status: ACTIVE | Noted: 2023-09-14

## 2023-09-14 PROBLEM — Z17.0 MALIGNANT NEOPLASM OF UPPER-OUTER QUADRANT OF LEFT BREAST IN FEMALE, ESTROGEN RECEPTOR POSITIVE  (HCC): Status: ACTIVE | Noted: 2023-09-14

## 2023-09-14 PROBLEM — Z17.0 MALIGNANT NEOPLASM OF UPPER-OUTER QUADRANT OF LEFT BREAST IN FEMALE, ESTROGEN RECEPTOR POSITIVE (HCC): Status: ACTIVE | Noted: 2023-09-14

## 2023-09-14 PROBLEM — C50.412 MALIGNANT NEOPLASM OF UPPER-OUTER QUADRANT OF LEFT BREAST IN FEMALE, ESTROGEN RECEPTOR POSITIVE: Status: ACTIVE | Noted: 2023-09-14

## 2023-09-14 PROBLEM — Z17.0 MALIGNANT NEOPLASM OF UPPER-OUTER QUADRANT OF LEFT BREAST IN FEMALE, ESTROGEN RECEPTOR POSITIVE: Status: ACTIVE | Noted: 2023-09-14

## 2023-09-14 PROBLEM — C50.412 MALIGNANT NEOPLASM OF UPPER-OUTER QUADRANT OF LEFT BREAST IN FEMALE, ESTROGEN RECEPTOR POSITIVE (HCC): Status: ACTIVE | Noted: 2023-09-14

## 2023-09-15 RX ORDER — AZITHROMYCIN 250 MG/1
TABLET, FILM COATED ORAL
Qty: 6 TABLET | Refills: 0 | Status: SHIPPED | OUTPATIENT
Start: 2023-09-15 | End: 2023-09-19

## 2023-10-04 ENCOUNTER — OFFICE VISIT (OUTPATIENT)
Dept: INTERNAL MEDICINE CLINIC | Facility: CLINIC | Age: 83
End: 2023-10-04

## 2023-10-04 VITALS
HEIGHT: 65 IN | WEIGHT: 193 LBS | DIASTOLIC BLOOD PRESSURE: 58 MMHG | SYSTOLIC BLOOD PRESSURE: 116 MMHG | HEART RATE: 73 BPM | TEMPERATURE: 98 F | BODY MASS INDEX: 32.15 KG/M2 | RESPIRATION RATE: 16 BRPM | OXYGEN SATURATION: 95 %

## 2023-10-04 DIAGNOSIS — Z17.0 MALIGNANT NEOPLASM OF UPPER-OUTER QUADRANT OF LEFT BREAST IN FEMALE, ESTROGEN RECEPTOR POSITIVE: Primary | ICD-10-CM

## 2023-10-04 DIAGNOSIS — I10 HYPERTENSION, BENIGN: ICD-10-CM

## 2023-10-04 DIAGNOSIS — E78.5 HYPERLIPIDEMIA, UNSPECIFIED HYPERLIPIDEMIA TYPE: ICD-10-CM

## 2023-10-04 DIAGNOSIS — C50.412 MALIGNANT NEOPLASM OF UPPER-OUTER QUADRANT OF LEFT BREAST IN FEMALE, ESTROGEN RECEPTOR POSITIVE: Primary | ICD-10-CM

## 2023-10-04 DIAGNOSIS — Z95.5 H/O HEART ARTERY STENT: ICD-10-CM

## 2023-10-04 PROCEDURE — 3074F SYST BP LT 130 MM HG: CPT | Performed by: INTERNAL MEDICINE

## 2023-10-04 PROCEDURE — G0008 ADMIN INFLUENZA VIRUS VAC: HCPCS | Performed by: INTERNAL MEDICINE

## 2023-10-04 PROCEDURE — 99214 OFFICE O/P EST MOD 30 MIN: CPT | Performed by: INTERNAL MEDICINE

## 2023-10-04 PROCEDURE — 90662 IIV NO PRSV INCREASED AG IM: CPT | Performed by: INTERNAL MEDICINE

## 2023-10-04 PROCEDURE — 3008F BODY MASS INDEX DOCD: CPT | Performed by: INTERNAL MEDICINE

## 2023-10-04 PROCEDURE — 1125F AMNT PAIN NOTED PAIN PRSNT: CPT | Performed by: INTERNAL MEDICINE

## 2023-10-04 PROCEDURE — 1159F MED LIST DOCD IN RCRD: CPT | Performed by: INTERNAL MEDICINE

## 2023-10-04 PROCEDURE — 3078F DIAST BP <80 MM HG: CPT | Performed by: INTERNAL MEDICINE

## 2023-10-04 RX ORDER — DOXYCYCLINE 100 MG/1
100 TABLET ORAL 2 TIMES DAILY
Qty: 20 TABLET | Refills: 0 | Status: SHIPPED | OUTPATIENT
Start: 2023-10-04 | End: 2023-10-14

## 2023-10-04 RX ORDER — PREDNISONE 10 MG/1
10 TABLET ORAL DAILY
Qty: 7 TABLET | Refills: 0 | Status: SHIPPED | OUTPATIENT
Start: 2023-10-04

## 2023-10-20 ENCOUNTER — OFFICE VISIT (OUTPATIENT)
Dept: ENDOCRINOLOGY CLINIC | Facility: CLINIC | Age: 83
End: 2023-10-20

## 2023-10-20 VITALS
SYSTOLIC BLOOD PRESSURE: 135 MMHG | WEIGHT: 193 LBS | BODY MASS INDEX: 32 KG/M2 | DIASTOLIC BLOOD PRESSURE: 83 MMHG | HEART RATE: 72 BPM

## 2023-10-20 DIAGNOSIS — E11.8 CONTROLLED TYPE 2 DIABETES MELLITUS WITH COMPLICATION, WITHOUT LONG-TERM CURRENT USE OF INSULIN (HCC): Primary | ICD-10-CM

## 2023-10-20 DIAGNOSIS — E11.9 TYPE 2 DIABETES MELLITUS WITHOUT COMPLICATION, WITHOUT LONG-TERM CURRENT USE OF INSULIN (HCC): ICD-10-CM

## 2023-10-20 LAB
CARTRIDGE LOT#: ABNORMAL NUMERIC
GLUCOSE BLOOD: 152
HEMOGLOBIN A1C: 7.1 % (ref 4.3–5.6)
TEST STRIP LOT #: NORMAL NUMERIC

## 2023-10-20 PROCEDURE — 83036 HEMOGLOBIN GLYCOSYLATED A1C: CPT | Performed by: INTERNAL MEDICINE

## 2023-10-20 PROCEDURE — 82947 ASSAY GLUCOSE BLOOD QUANT: CPT | Performed by: INTERNAL MEDICINE

## 2023-10-20 PROCEDURE — 3079F DIAST BP 80-89 MM HG: CPT | Performed by: INTERNAL MEDICINE

## 2023-10-20 PROCEDURE — 1126F AMNT PAIN NOTED NONE PRSNT: CPT | Performed by: INTERNAL MEDICINE

## 2023-10-20 PROCEDURE — 99214 OFFICE O/P EST MOD 30 MIN: CPT | Performed by: INTERNAL MEDICINE

## 2023-10-20 PROCEDURE — 3075F SYST BP GE 130 - 139MM HG: CPT | Performed by: INTERNAL MEDICINE

## 2023-10-20 PROCEDURE — 1160F RVW MEDS BY RX/DR IN RCRD: CPT | Performed by: INTERNAL MEDICINE

## 2023-10-20 PROCEDURE — 1159F MED LIST DOCD IN RCRD: CPT | Performed by: INTERNAL MEDICINE

## 2023-10-20 RX ORDER — EMPAGLIFLOZIN 25 MG/1
1 TABLET, FILM COATED ORAL DAILY
Qty: 90 TABLET | Refills: 3 | Status: SHIPPED | OUTPATIENT
Start: 2023-10-20

## 2023-10-20 RX ORDER — GLIPIZIDE 10 MG/1
10 TABLET ORAL
Qty: 180 TABLET | Refills: 3 | Status: SHIPPED | OUTPATIENT
Start: 2023-10-20

## 2023-10-20 RX ORDER — METFORMIN HYDROCHLORIDE 500 MG/1
1000 TABLET, EXTENDED RELEASE ORAL 2 TIMES DAILY
Qty: 360 TABLET | Refills: 1 | Status: SHIPPED | OUTPATIENT
Start: 2023-10-20

## 2023-10-27 ENCOUNTER — LAB ENCOUNTER (OUTPATIENT)
Dept: LAB | Age: 83
End: 2023-10-27
Attending: INTERNAL MEDICINE

## 2023-10-27 ENCOUNTER — TELEPHONE (OUTPATIENT)
Dept: INTERNAL MEDICINE CLINIC | Facility: CLINIC | Age: 83
End: 2023-10-27

## 2023-10-27 DIAGNOSIS — Z78.0 POST-MENOPAUSAL: Primary | ICD-10-CM

## 2023-10-27 LAB
CREAT UR-SCNC: 34.9 MG/DL
MICROALBUMIN UR-MCNC: <0.5 MG/DL

## 2023-10-27 PROCEDURE — 82043 UR ALBUMIN QUANTITATIVE: CPT | Performed by: INTERNAL MEDICINE

## 2023-10-27 PROCEDURE — 82570 ASSAY OF URINE CREATININE: CPT | Performed by: INTERNAL MEDICINE

## 2023-10-27 NOTE — TELEPHONE ENCOUNTER
Dexa scan ordered. Do not see cologuard results in chart. Called and spoke with patient. Patient states her orthopedic thinks she has osteoporosis based on the MRI he did. Has not been 2 years since her last Dexa scan so patient is going to contact her insurance company to see if they will cover it. She states she has been having problems with her knee since September. Patient stated she got a notification that she can check her cologuard results online, but she doesn't know how to do that. She states they sent the results to Dr. Lee Copeland too on 10/22. Informed patient we will call her with the results after MD reviews them.     FYI to Dr. Lee Copeland--

## 2023-10-29 NOTE — TELEPHONE ENCOUNTER
Please look for Dolly in my inbox on my desk - if cannot locate then call company and get another report.  Thank you

## 2023-10-30 ENCOUNTER — TELEPHONE (OUTPATIENT)
Dept: INTERNAL MEDICINE CLINIC | Facility: CLINIC | Age: 83
End: 2023-10-30

## 2023-10-30 ENCOUNTER — HOSPITAL ENCOUNTER (OUTPATIENT)
Dept: BONE DENSITY | Age: 83
Discharge: HOME | End: 2023-10-30
Attending: INTERNAL MEDICINE
Payer: MEDICARE

## 2023-10-30 DIAGNOSIS — Z78.0 POST-MENOPAUSAL: ICD-10-CM

## 2023-10-30 DIAGNOSIS — M15.9 PRIMARY OSTEOARTHRITIS INVOLVING MULTIPLE JOINTS: Primary | ICD-10-CM

## 2023-10-30 PROCEDURE — 77080 DXA BONE DENSITY AXIAL: CPT | Performed by: INTERNAL MEDICINE

## 2023-10-30 NOTE — TELEPHONE ENCOUNTER
Patient is calling she had a Dexa done today 10/30    Results are supposed to be ready tomorrow, please call with results when ready    Phone [phone]

## 2023-10-30 NOTE — TELEPHONE ENCOUNTER
Bone density is normal.     As a general rule she should take OsCal or citracal bid for bone health     Get Vit D level at time of next lab draw - please order

## 2023-10-31 NOTE — TELEPHONE ENCOUNTER
Spoke with patient relayed physician message below. Patient verbalized understanding.     Vitamin D ordered

## 2023-11-01 NOTE — TELEPHONE ENCOUNTER
Spoke to patient and advised that cologuard results were normal per , pt asked that a copy be mailed to home address, copy made and mailed, no further questions from patient, pt verbalized understanding.

## 2023-11-06 ENCOUNTER — TELEPHONE (OUTPATIENT)
Dept: HEMATOLOGY/ONCOLOGY | Facility: HOSPITAL | Age: 83
End: 2023-11-06

## 2023-11-06 NOTE — TELEPHONE ENCOUNTER
New stress fracture in lower leg. Had bone density scan by PCP recently. Wants to know if letrazole should be continued. Explained this should be discussed at a visit with Dr James Chavez and appt in December moved up per pt request to tomorrow. Appreciative of call back.

## 2023-11-06 NOTE — TELEPHONE ENCOUNTER
jean marie calling to inform Dr Tammy Unger she has a stress fracture and is taking medication that causes bone loss medication is letrazol .  roxy

## 2023-11-07 ENCOUNTER — OFFICE VISIT (OUTPATIENT)
Dept: HEMATOLOGY/ONCOLOGY | Facility: HOSPITAL | Age: 83
End: 2023-11-07
Attending: INTERNAL MEDICINE
Payer: MEDICARE

## 2023-11-07 VITALS
OXYGEN SATURATION: 96 % | HEIGHT: 65 IN | BODY MASS INDEX: 32.99 KG/M2 | SYSTOLIC BLOOD PRESSURE: 121 MMHG | HEART RATE: 87 BPM | RESPIRATION RATE: 18 BRPM | TEMPERATURE: 98 F | WEIGHT: 198 LBS | DIASTOLIC BLOOD PRESSURE: 54 MMHG

## 2023-11-07 DIAGNOSIS — C50.912 MALIGNANT NEOPLASM OF LEFT FEMALE BREAST, UNSPECIFIED ESTROGEN RECEPTOR STATUS, UNSPECIFIED SITE OF BREAST (HCC): ICD-10-CM

## 2023-11-07 DIAGNOSIS — Z79.899 ENCOUNTER FOR MONITORING ADJUVANT HORMONAL THERAPY: Primary | ICD-10-CM

## 2023-11-07 DIAGNOSIS — Z51.81 ENCOUNTER FOR MONITORING ADJUVANT HORMONAL THERAPY: Primary | ICD-10-CM

## 2023-11-07 DIAGNOSIS — Z78.0 MENOPAUSE: ICD-10-CM

## 2023-11-07 PROCEDURE — 99214 OFFICE O/P EST MOD 30 MIN: CPT | Performed by: INTERNAL MEDICINE

## 2023-12-11 ENCOUNTER — APPOINTMENT (OUTPATIENT)
Dept: URBAN - METROPOLITAN AREA CLINIC 244 | Age: 83
Setting detail: DERMATOLOGY
End: 2023-12-12

## 2023-12-11 DIAGNOSIS — L81.4 OTHER MELANIN HYPERPIGMENTATION: ICD-10-CM

## 2023-12-11 DIAGNOSIS — L40.0 PSORIASIS VULGARIS: ICD-10-CM

## 2023-12-11 DIAGNOSIS — D22 MELANOCYTIC NEVI: ICD-10-CM

## 2023-12-11 DIAGNOSIS — B35.1 TINEA UNGUIUM: ICD-10-CM

## 2023-12-11 DIAGNOSIS — L82.1 OTHER SEBORRHEIC KERATOSIS: ICD-10-CM

## 2023-12-11 PROBLEM — D22.5 MELANOCYTIC NEVI OF TRUNK: Status: ACTIVE | Noted: 2023-12-11

## 2023-12-11 PROBLEM — D48.5 NEOPLASM OF UNCERTAIN BEHAVIOR OF SKIN: Status: ACTIVE | Noted: 2023-12-11

## 2023-12-11 PROCEDURE — 11102 TANGNTL BX SKIN SINGLE LES: CPT

## 2023-12-11 PROCEDURE — 99214 OFFICE O/P EST MOD 30 MIN: CPT | Mod: 25

## 2023-12-11 PROCEDURE — OTHER ADDITIONAL NOTES: OTHER

## 2023-12-11 PROCEDURE — OTHER PRESCRIPTION: OTHER

## 2023-12-11 PROCEDURE — OTHER PRESCRIPTION MEDICATION MANAGEMENT: OTHER

## 2023-12-11 PROCEDURE — OTHER COUNSELING: OTHER

## 2023-12-11 PROCEDURE — OTHER BIOPSY BY SHAVE METHOD: OTHER

## 2023-12-11 RX ORDER — CICLOPIROX 80 MG/ML
SOLUTION TOPICAL
Qty: 6.6 | Refills: 10 | Status: ERX | COMMUNITY
Start: 2023-12-11

## 2023-12-11 ASSESSMENT — LOCATION SIMPLE DESCRIPTION DERM
LOCATION SIMPLE: ABDOMEN
LOCATION SIMPLE: RIGHT EAR
LOCATION SIMPLE: POSTERIOR SCALP
LOCATION SIMPLE: RIGHT LOWER BACK

## 2023-12-11 ASSESSMENT — LOCATION ZONE DERM
LOCATION ZONE: SCALP
LOCATION ZONE: EAR
LOCATION ZONE: TRUNK

## 2023-12-11 ASSESSMENT — LOCATION DETAILED DESCRIPTION DERM
LOCATION DETAILED: PERIUMBILICAL SKIN
LOCATION DETAILED: RIGHT INFERIOR MEDIAL LOWER BACK
LOCATION DETAILED: LEFT INFERIOR POSTAURICULAR SKIN
LOCATION DETAILED: MID-OCCIPITAL SCALP
LOCATION DETAILED: RIGHT POSTERIOR EAR

## 2023-12-11 ASSESSMENT — BSA PSORIASIS: % BODY COVERED IN PSORIASIS: 3

## 2023-12-11 NOTE — PROCEDURE: PRESCRIPTION MEDICATION MANAGEMENT
Continue Regimen: pimecrolimus 1 % topical cream
Detail Level: Zone
Render In Strict Bullet Format?: No

## 2023-12-11 NOTE — PROCEDURE: BIOPSY BY SHAVE METHOD
Render Post-Care Instructions In Note?: no
Silver Nitrate Text: The wound bed was treated with silver nitrate after the biopsy was performed.
Lab: -[id number]
Consent: Written consent was obtained and risks were reviewed including but not limited to scarring, infection, bleeding, scabbing, incomplete removal, nerve damage and allergy to anesthesia.
Electrodesiccation Text: The wound bed was treated with electrodesiccation after the biopsy was performed.
Wound Care: Petrolatum
Depth Of Biopsy: dermis
X Size Of Lesion In Cm: 0
Biopsy Method: double edge Personna blade
Biopsy Type: H and E
Anesthesia Volume In Cc: 0.5
Detail Level: Detailed
Cryotherapy Text: The wound bed was treated with cryotherapy after the biopsy was performed.
Was A Bandage Applied: Yes
Hemostasis: Drysol
Anesthesia Type: 0.5% lidocaine with 1:100,000 epinephrine and a 1:10 solution of 8.4% sodium bicarbonate
Dressing: bandage
Electrodesiccation And Curettage Text: The wound bed was treated with electrodesiccation and curettage after the biopsy was performed.
Notification Instructions: Patient will be notified of biopsy results. However, patient instructed to call the office if not contacted within 2 weeks.
Billing Type: Third-Party Bill
Curettage Text: The wound bed was treated with curettage after the biopsy was performed.
Type Of Destruction Used: Curettage
Post-Care Instructions: I reviewed with the patient in detail post-care instructions. Patient is to keep the biopsy site dry overnight, and then apply bacitracin twice daily until healed. Patient may apply hydrogen peroxide soaks to remove any crusting.
Information: Selecting Yes will display possible errors in your note based on the variables you have selected. This validation is only offered as a suggestion for you. PLEASE NOTE THAT THE VALIDATION TEXT WILL BE REMOVED WHEN YOU FINALIZE YOUR NOTE. IF YOU WANT TO FAX A PRELIMINARY NOTE YOU WILL NEED TO TOGGLE THIS TO 'NO' IF YOU DO NOT WANT IT IN YOUR FAXED NOTE.

## 2023-12-11 NOTE — PROCEDURE: ADDITIONAL NOTES
Detail Level: Simple
Additional Notes: Pt not using topical pimecrolimus - will try regularly and call if not improved
Render Risk Assessment In Note?: no

## 2023-12-12 ENCOUNTER — LAB ENCOUNTER (OUTPATIENT)
Dept: LAB | Age: 83
End: 2023-12-12
Attending: INTERNAL MEDICINE
Payer: MEDICARE

## 2023-12-12 ENCOUNTER — OFFICE VISIT (OUTPATIENT)
Dept: INTERNAL MEDICINE CLINIC | Facility: CLINIC | Age: 83
End: 2023-12-12

## 2023-12-12 VITALS
HEIGHT: 65 IN | TEMPERATURE: 98 F | WEIGHT: 201 LBS | BODY MASS INDEX: 33.49 KG/M2 | SYSTOLIC BLOOD PRESSURE: 118 MMHG | OXYGEN SATURATION: 97 % | HEART RATE: 81 BPM | DIASTOLIC BLOOD PRESSURE: 60 MMHG

## 2023-12-12 DIAGNOSIS — I10 HYPERTENSION, BENIGN: ICD-10-CM

## 2023-12-12 DIAGNOSIS — E55.9 VITAMIN D DEFICIENCY: ICD-10-CM

## 2023-12-12 DIAGNOSIS — E78.5 HYPERLIPIDEMIA, UNSPECIFIED HYPERLIPIDEMIA TYPE: ICD-10-CM

## 2023-12-12 DIAGNOSIS — E11.9 TYPE 2 DIABETES MELLITUS WITHOUT COMPLICATION, WITHOUT LONG-TERM CURRENT USE OF INSULIN (HCC): Primary | ICD-10-CM

## 2023-12-12 DIAGNOSIS — I25.110 CORONARY ARTERY DISEASE INVOLVING NATIVE CORONARY ARTERY OF NATIVE HEART WITH UNSTABLE ANGINA PECTORIS (HCC): ICD-10-CM

## 2023-12-12 LAB — VIT D+METAB SERPL-MCNC: 62.9 NG/ML (ref 30–100)

## 2023-12-12 PROCEDURE — 3074F SYST BP LT 130 MM HG: CPT | Performed by: INTERNAL MEDICINE

## 2023-12-12 PROCEDURE — 1159F MED LIST DOCD IN RCRD: CPT | Performed by: INTERNAL MEDICINE

## 2023-12-12 PROCEDURE — 82306 VITAMIN D 25 HYDROXY: CPT

## 2023-12-12 PROCEDURE — 36415 COLL VENOUS BLD VENIPUNCTURE: CPT

## 2023-12-12 PROCEDURE — 1170F FXNL STATUS ASSESSED: CPT | Performed by: INTERNAL MEDICINE

## 2023-12-12 PROCEDURE — 99214 OFFICE O/P EST MOD 30 MIN: CPT | Performed by: INTERNAL MEDICINE

## 2023-12-12 PROCEDURE — 1125F AMNT PAIN NOTED PAIN PRSNT: CPT | Performed by: INTERNAL MEDICINE

## 2023-12-12 PROCEDURE — 3008F BODY MASS INDEX DOCD: CPT | Performed by: INTERNAL MEDICINE

## 2023-12-12 PROCEDURE — 3078F DIAST BP <80 MM HG: CPT | Performed by: INTERNAL MEDICINE

## 2023-12-17 ENCOUNTER — TELEPHONE (OUTPATIENT)
Dept: INTERNAL MEDICINE CLINIC | Facility: CLINIC | Age: 83
End: 2023-12-17

## 2023-12-18 NOTE — TELEPHONE ENCOUNTER
Left message to call back.  VM is not confidential. Subjective


Progress Note for:: 11/14/18


Subjective:: 





The patient appears to be slightly better.  He has gotten out of bed yesterday 

with physical therapy assistance and how walked about 150 feet with a walker.  

The physical therapy has recommended short-term rehab.


The patient appears to be slightly in the better mood.  Discussed the need to 

get a shower and getting a recliner.


Discussed about the drop in H&H and the need for transfusion.


Reason For Visit: 


FEVER OF UNKNOWN ORIGIN METASTIC PROSTATE CANCER








Physical Exam


Vital Signs: 


 











Temp Pulse Resp BP Pulse Ox


 


 99.0 F   98   16   131/49 H  93 


 


 11/14/18 07:22  11/14/18 07:22  11/14/18 07:22  11/14/18 07:22  11/14/18 07:22








 Intake & Output











 11/13/18 11/14/18 11/15/18





 06:59 06:59 06:59


 


Intake Total 820 1092 


 


Output Total 900 850 


 


Balance -80 242 


 


Weight 82.2 kg 83.2 kg 











General appearance: PRESENT: mild distress


Head exam: PRESENT: atraumatic


Eye exam: PRESENT: conjunctiva pink


Neck exam: ABSENT: carotid bruit, JVD


Respiratory exam: PRESENT: clear to auscultation venessa


Cardiovascular exam: PRESENT: RRR, +S1, +S2


GI/Abdominal exam: PRESENT: normal bowel sounds, soft


Extremities exam: PRESENT: tenderness


Musculoskeletal exam: PRESENT: tenderness


Neurological exam: PRESENT: alert, awake





Results


Laboratory Results: 


 





 11/14/18 05:16 





 











  11/13/18 11/14/18 11/14/18





  05:20 03:10 05:16


 


WBC    19.6 H


 


RBC    3.12 L


 


Hgb    7.8 L


 


Hct    23.7 L


 


MCV    76 L


 


MCH    25.1 L


 


MCHC    33.0


 


RDW    17.1 H


 


Plt Count    297


 


Seg Neutrophils %    Not Reportable


 


Lymphocytes %    Not Reportable


 


Monocytes %    Not Reportable


 


Eosinophils %    Not Reportable


 


Basophils %    Not Reportable


 


Absolute Neutrophils    Not Reportable


 


Absolute Lymphocytes    Not Reportable


 


Absolute Monocytes    Not Reportable


 


Absolute Eosinophils    Not Reportable


 


Absolute Basophils    Not Reportable


 


Serum Osmolality  270 L  


 


Urine Osmolality   388 








 





11/08/18 04:54   Blood   Blood Culture - Final


                            NO GROWTH IN 5 DAYS





 











  11/08/18





  06:47


 


Creatine Kinase  209 H











Impressions: 


 





Abdomen/Pelvis CT  11/08/18 01:37


IMPRESSION:


 


Pulmonary nodules in the right lung base measuring up to 8 mm,


concerning for metastatic disease given the history of prostate


cancer. No evidence of bony metastatic disease.


Bilateral pleural effusions.


Enlarged prostate gland.


Bilateral inguinal hernias containing short segments of small


bowel with no evidence of an obstruction


 


TECHNICAL DOCUMENTATION:


 


Quality ID # 436: Final reports with documentation of one or more


dose reduction techniques (e.g., Automated exposure control,


adjustment of the mA and/or kV according to patient size, use of


iterative reconstruction technique)


 


 2011 [employer]- All Rights Reserved


 








Body Scan Nuclear Medicine  11/09/18 00:00


IMPRESSION:  New diffuse bony foci of increased uptake worrisome for metastatic 

disease.


 








Chest CT  11/09/18 00:00


IMPRESSION:  New small bilateral pleural effusions


New 6 mm posterior left upper lobe pulmonary nodule, 8 mm nodule periphery 

right upper lobe


New subtle smudgy sclerotic foci are present throughout the ribs and thoracic 

spine which correlate with new foci of increased uptake on bone scan today, 

worrisome for bony metastatic involvement


 














Assessment & Plan





- Diagnosis


(1) Hyponatremia


Is this a current diagnosis for this admission?: Yes   


Plan: 


Improving continue current treatment








(2) Leukocytosis


Qualifiers: 


   Leukocytosis type: unspecified   Qualified Code(s): D72.829 - Elevated white 

blood cell count, unspecified   


Is this a current diagnosis for this admission?: Yes   


Plan: 


Source unknown.  Will obtain blood cultures urine culture and start antibiotics








(3) Nausea & vomiting


Qualifiers: 


   Vomiting type: unspecified   Vomiting Intractability: unspecified   

Qualified Code(s): R11.2 - Nausea with vomiting, unspecified   


Is this a current diagnosis for this admission?: Yes   





(4) Prostate cancer


Is this a current diagnosis for this admission?: Yes   


Plan: 


At this point there is metastatic disease and the patient is being followed by 

the oncologist and will probably start hormonal therapy.








(5) CKD (chronic kidney disease) stage 3, GFR 30-59 ml/min


Is this a current diagnosis for this admission?: Yes   


Plan: 


Improved with rehydration








(6) Hypertension


Qualifiers: 


   Hypertension type: unspecified   Qualified Code(s): I10 - Essential (primary

) hypertension   


Is this a current diagnosis for this admission?: Yes   


Plan: 


Continue current medications








(7) Physical deconditioning


Is this a current diagnosis for this admission?: Yes   


Plan: 


We will start with physical therapy.  Discussed the short-term rehab








(8) Depression


Is this a current diagnosis for this admission?: Yes   


Plan: 


We will start antidepressant.

## 2023-12-26 ENCOUNTER — APPOINTMENT (OUTPATIENT)
Dept: HEMATOLOGY/ONCOLOGY | Facility: HOSPITAL | Age: 83
End: 2023-12-26
Attending: INTERNAL MEDICINE
Payer: MEDICARE

## 2024-01-02 RX ORDER — LOSARTAN POTASSIUM AND HYDROCHLOROTHIAZIDE 12.5; 5 MG/1; MG/1
1 TABLET ORAL DAILY
Qty: 90 TABLET | Refills: 3 | Status: SHIPPED | OUTPATIENT
Start: 2024-01-02

## 2024-01-02 NOTE — TELEPHONE ENCOUNTER
Refill request is for a maintenance medication and has met the criteria specified in the Ambulatory Medication Refill Standing Order for eligibility, visits, laboratory, alerts and was sent to the requested pharmacy.    Requested Prescriptions     Signed Prescriptions Disp Refills    LOSARTAN-HYDROCHLOROTHIAZIDE 50-12.5 MG Oral Tab 90 tablet 3     Sig: TAKE 1 TABLET BY MOUTH EVERY DAY     Authorizing Provider: OMI LIU     Ordering User: OSKAR COSME

## 2024-01-14 DIAGNOSIS — E78.5 HYPERLIPIDEMIA, UNSPECIFIED HYPERLIPIDEMIA TYPE: ICD-10-CM

## 2024-01-14 DIAGNOSIS — E11.9 TYPE 2 DIABETES MELLITUS WITHOUT COMPLICATION, WITHOUT LONG-TERM CURRENT USE OF INSULIN (HCC): ICD-10-CM

## 2024-01-15 RX ORDER — OXYBUTYNIN CHLORIDE 15 MG/1
15 TABLET, EXTENDED RELEASE ORAL NIGHTLY
Qty: 90 TABLET | Refills: 3 | Status: SHIPPED | OUTPATIENT
Start: 2024-01-15

## 2024-01-15 NOTE — TELEPHONE ENCOUNTER
Refill request is for a maintenance medication and has met the criteria specified in the Ambulatory Medication Refill Standing Order for eligibility, visits, laboratory, alerts and was sent to the requested pharmacy.    Requested Prescriptions     Signed Prescriptions Disp Refills    OXYBUTYNIN CHLORIDE ER 15 MG Oral Tablet 24 Hr 90 tablet 3     Sig: TAKE 1 TABLET BY MOUTH EVERY DAY AT NIGHT     Authorizing Provider: OMI LIU     Ordering User: OSKAR COSME

## 2024-01-16 ENCOUNTER — APPOINTMENT (OUTPATIENT)
Dept: URBAN - METROPOLITAN AREA CLINIC 244 | Age: 84
Setting detail: DERMATOLOGY
End: 2024-01-16

## 2024-01-16 PROBLEM — C44.41 BASAL CELL CARCINOMA OF SKIN OF SCALP AND NECK: Status: ACTIVE | Noted: 2024-01-16

## 2024-01-16 PROCEDURE — 17311 MOHS 1 STAGE H/N/HF/G: CPT

## 2024-01-16 PROCEDURE — 13132 CMPLX RPR F/C/C/M/N/AX/G/H/F: CPT

## 2024-01-16 PROCEDURE — OTHER MOHS SURGERY: OTHER

## 2024-01-16 NOTE — PROCEDURE: MOHS SURGERY
Mohs Case Number: 
Consent Type: Consent 1 (Standard)
Eye Shield Used: No
Initial Size Of Lesion: 0.9
X Size Of Lesion In Cm (Optional): 1.8
Number Of Stages: 1
Primary Defect Length In Cm (Final Defect Size - Required For Flaps/Grafts): 1.7
Primary Defect Width In Cm (Final Defect Size - Required For Flaps/Grafts): 1.5
Repair Type: Complex Repair
Which Eyelid Repair Cpt Are You Using?: 29961
Oculoplastic Surgeon Procedure Text (A): After obtaining clear surgical margins the patient was sent to oculoplastics for surgical repair.  The patient understands they will receive post-surgical care and follow-up from the referring physician's office.
Otolaryngologist Procedure Text (A): After obtaining clear surgical margins the patient was sent to otolaryngology for surgical repair.  The patient understands they will receive post-surgical care and follow-up from the referring physician's office.
Plastic Surgeon Procedure Text (A): After obtaining clear surgical margins the patient was sent to plastics for surgical repair.  The patient understands they will receive post-surgical care and follow-up from the referring physician's office.
Mid-Level Procedure Text (A): After obtaining clear surgical margins the patient was sent to a mid-level provider for surgical repair.  The patient understands they will receive post-surgical care and follow-up from the mid-level provider.
Provider Procedure Text (A): After obtaining clear surgical margins the defect was repaired by another provider.
Asc Procedure Text (A): After obtaining clear surgical margins the patient was sent to an ASC for surgical repair.  The patient understands they will receive post-surgical care and follow-up from the ASC physician.
Simple / Intermediate / Complex Repair - Final Wound Length In Cm: 4.8
Suturegard Retention Suture: 2-0 Nylon
Retention Suture Bite Size: 3 mm
Length To Time In Minutes Device Was In Place: 10
Complex Requirements: Extensive Undermining Performed?: Yes
Undermining Type: Entire Wound
Debridement Text: The wound edges were debrided prior to proceeding with the closure to facilitate wound healing.
Helical Rim Text: The closure involved the helical rim.
Vermilion Border Text: The closure involved the vermilion border.
Nostril Rim Text: The closure involved the nostril rim.
Retention Suture Text: Retention sutures were placed to support the closure and prevent dehiscence.
Secondary Defect Length In Cm (Required For Flaps): 0
Area H Indication Text: Tumors in this location are included in Area H (eyelids, eyebrows, nose, lips, chin, ear, pre-auricular, post-auricular, temple, genitalia, hands, feet, ankles and areola).  Tissue conservation is critical in these anatomic locations.
Area M Indication Text: Tumors in this location are included in Area M (cheek, forehead, scalp, neck, jawline and pretibial skin).  Mohs surgery is indicated for tumors in these anatomic locations.
Area L Indication Text: Tumors in this location are included in Area L (trunk and extremities).  Mohs surgery is indicated for larger tumors, or tumors with aggressive histologic features, in these anatomic locations.
Tumor Debulked?: not debulked
Depth Of Tumor Invasion (For Histology): tumor not visualized
Perineural Invasion (For Histology - Be Specific If Possible): absent
Special Stains Stage 1 - Results: Base On Clearance Noted Above
Stage 2: Additional Anesthesia Type: 1% lidocaine with epinephrine
Staging Info: By selecting yes to the question above you will include information on AJCC 8 tumor staging in your Mohs note. Information on tumor staging will be automatically added for SCCs on the head and neck. AJCC 8 includes tumor size, tumor depth, perineural involvement and bone invasion.
Tumor Depth: Less than 6mm from granular layer and no invasion beyond the subcutaneous fat
Was The Patient On Physician Recommended Anticoagulation Therapy?: Please Select the Appropriate Response
Medical Necessity Statement: Based on my medical judgement, Mohs surgery is the most appropriate treatment for this cancer compared to other treatments.
Alternatives Discussed Intro (Do Not Add Period): I discussed alternative treatments to Mohs surgery and specifically discussed the risks and benefits of
Consent 1/Introductory Paragraph: The rationale for Mohs was explained to the patient and consent was obtained. The risks, benefits and alternatives to therapy were discussed in detail. Specifically, the risks of infection, scarring, bleeding, prolonged wound healing, incomplete removal, allergy to anesthesia, nerve injury and recurrence were addressed. Prior to the procedure, the treatment site was clearly identified and confirmed by the patient. All components of Universal Protocol/PAUSE Rule completed.
Consent 2/Introductory Paragraph: Mohs surgery was explained to the patient and consent was obtained. The risks, benefits and alternatives to therapy were discussed in detail. Specifically, the risks of infection, scarring, bleeding, prolonged wound healing, incomplete removal, allergy to anesthesia, nerve injury and recurrence were addressed. Prior to the procedure, the treatment site was clearly identified and confirmed by the patient. All components of Universal Protocol/PAUSE Rule completed.
Consent 3/Introductory Paragraph: I gave the patient a chance to ask questions they had about the procedure.  Following this I explained the Mohs procedure and consent was obtained. The risks, benefits and alternatives to therapy were discussed in detail. Specifically, the risks of infection, scarring, bleeding, prolonged wound healing, incomplete removal, allergy to anesthesia, nerve injury and recurrence were addressed. Prior to the procedure, the treatment site was clearly identified and confirmed by the patient. All components of Universal Protocol/PAUSE Rule completed.
Consent (Temporal Branch)/Introductory Paragraph: The rationale for Mohs was explained to the patient and consent was obtained. The risks, benefits and alternatives to therapy were discussed in detail. Specifically, the risks of damage to the temporal branch of the facial nerve, infection, scarring, bleeding, prolonged wound healing, incomplete removal, allergy to anesthesia, and recurrence were addressed. Prior to the procedure, the treatment site was clearly identified and confirmed by the patient. All components of Universal Protocol/PAUSE Rule completed.
Consent (Marginal Mandibular)/Introductory Paragraph: The rationale for Mohs was explained to the patient and consent was obtained. The risks, benefits and alternatives to therapy were discussed in detail. Specifically, the risks of damage to the marginal mandibular branch of the facial nerve, infection, scarring, bleeding, prolonged wound healing, incomplete removal, allergy to anesthesia, and recurrence were addressed. Prior to the procedure, the treatment site was clearly identified and confirmed by the patient. All components of Universal Protocol/PAUSE Rule completed.
Consent (Spinal Accessory)/Introductory Paragraph: The rationale for Mohs was explained to the patient and consent was obtained. The risks, benefits and alternatives to therapy were discussed in detail. Specifically, the risks of damage to the spinal accessory nerve, infection, scarring, bleeding, prolonged wound healing, incomplete removal, allergy to anesthesia, and recurrence were addressed. Prior to the procedure, the treatment site was clearly identified and confirmed by the patient. All components of Universal Protocol/PAUSE Rule completed.
Consent (Near Eyelid Margin)/Introductory Paragraph: The rationale for Mohs was explained to the patient and consent was obtained. The risks, benefits and alternatives to therapy were discussed in detail. Specifically, the risks of ectropion or eyelid deformity, infection, scarring, bleeding, prolonged wound healing, incomplete removal, allergy to anesthesia, nerve injury and recurrence were addressed. Prior to the procedure, the treatment site was clearly identified and confirmed by the patient. All components of Universal Protocol/PAUSE Rule completed.
Consent (Ear)/Introductory Paragraph: The rationale for Mohs was explained to the patient and consent was obtained. The risks, benefits and alternatives to therapy were discussed in detail. Specifically, the risks of ear deformity, infection, scarring, bleeding, prolonged wound healing, incomplete removal, allergy to anesthesia, nerve injury and recurrence were addressed. Prior to the procedure, the treatment site was clearly identified and confirmed by the patient. All components of Universal Protocol/PAUSE Rule completed.
Consent (Nose)/Introductory Paragraph: The rationale for Mohs was explained to the patient and consent was obtained. The risks, benefits and alternatives to therapy were discussed in detail. Specifically, the risks of nasal deformity, changes in the flow of air through the nose, infection, scarring, bleeding, prolonged wound healing, incomplete removal, allergy to anesthesia, nerve injury and recurrence were addressed. Prior to the procedure, the treatment site was clearly identified and confirmed by the patient. All components of Universal Protocol/PAUSE Rule completed.
Consent (Lip)/Introductory Paragraph: The rationale for Mohs was explained to the patient and consent was obtained. The risks, benefits and alternatives to therapy were discussed in detail. Specifically, the risks of lip deformity, changes in the oral aperture, infection, scarring, bleeding, prolonged wound healing, incomplete removal, allergy to anesthesia, nerve injury and recurrence were addressed. Prior to the procedure, the treatment site was clearly identified and confirmed by the patient. All components of Universal Protocol/PAUSE Rule completed.
Consent (Scalp)/Introductory Paragraph: The rationale for Mohs was explained to the patient and consent was obtained. The risks, benefits and alternatives to therapy were discussed in detail. Specifically, the risks of changes in hair growth pattern secondary to repair, infection, scarring, bleeding, prolonged wound healing, incomplete removal, allergy to anesthesia, nerve injury and recurrence were addressed. Prior to the procedure, the treatment site was clearly identified and confirmed by the patient. All components of Universal Protocol/PAUSE Rule completed.
Detail Level: Detailed
Postop Diagnosis: same
Anesthesia Volume In Cc: 6
Hemostasis: Electrocautery
Estimated Blood Loss (Cc): minimal
Brow Lift Text: A midfrontal incision was made medially to the defect to allow access to the tissues just superior to the left eyebrow. Following careful dissection inferiorly in a supraperiosteal plane to the level of the left eyebrow, several 3-0 monocryl sutures were used to resuspend the eyebrow orbicularis oculi muscular unit to the superior frontal bone periosteum. This resulted in an appropriate reapproximation of static eyebrow symmetry and correction of the left brow ptosis.
Deep Sutures: 4-0 Vicryl
Epidermal Sutures: 5-0 Nylon
Epidermal Closure: simple interrupted
Suturegard Intro: Intraoperative tissue expansion was performed, utilizing the SUTUREGARD device, in order to reduce wound tension.
Suturegard Body: The suture ends were repeatedly re-tightened and re-clamped to achieve the desired tissue expansion.
Hemigard Intro: Due to skin fragility and wound tension, it was decided to use HEMIGARD adhesive retention suture devices to permit a linear closure. The skin was cleaned and dried for a 6cm distance away from the wound. Excessive hair, if present, was removed to allow for adhesion.
Hemigard Postcare Instructions: The HEMIGARD strips are to remain completely dry for at least 5-7 days.
Donor Site Anesthesia Type: same as repair anesthesia
Graft Donor Site Bandage (Optional-Leave Blank If You Don't Want In Note): Steri-strips and a pressure bandage were applied to the donor site.
Closure 2 Information: This tab is for additional flaps and grafts, including complex repair and grafts and complex repair and flaps. You can also specify a different location for the additional defect, if the location is the same you do not need to select a new one. We will insert the automated text for the repair you select below just as we do for solitary flaps and grafts. Please note that at this time if you select a location with a different insurance zone you will need to override the ICD10 and CPT if appropriate.
Closure 3 Information: This tab is for additional flaps and grafts above and beyond our usual structured repairs.  Please note if you enter information here it will not currently bill and you will need to add the billing information manually.
Wound Care: Petrolatum
Dressing: dry sterile dressing
Suture Removal: 7 days
Unna Boot Text: An Unna boot was placed to help immobilize the limb and facilitate more rapid healing.
Home Suture Removal Text: Patient was provided instructions on removing sutures and will remove their sutures at home.  If they have any questions or difficulties they will call the office.
Post-Care Instructions: I reviewed with the patient in detail post-care instructions. Patient is not to engage in any heavy lifting, exercise, or swimming for the next 14 days. Should the patient develop any fevers, chills, bleeding, severe pain patient will contact the office immediately.
Pain Refusal Text: I offered to prescribe pain medication but the patient refused to take this medication.
Mauc Instructions: By selecting yes to the question below the MAUC number will be added into the note.  This will be calculated automatically based on the diagnosis chosen, the size entered, the body zone selected (H,M,L) and the specific indications you chose. You will also have the option to override the Mohs AUC if you disagree with the automatically calculated number and this option is found in the Case Summary tab.
Where Do You Want The Question To Include Opioid Counseling Located?: Case Summary Tab
Eye Protection Verbiage: Before proceeding with the stage, a plastic scleral shield was inserted. The globe was anesthetized with a few drops of 1% lidocaine with 1:100,000 epinephrine. Then, an appropriate sized scleral shield was chosen and coated with lacrilube ointment. The shield was gently inserted and left in place for the duration of each stage. After the stage was completed, the shield was gently removed.
Mohs Method Verbiage: An incision at a 45 degree angle following the standard Mohs approach was done and the specimen was harvested as a microscopic controlled layer.
Surgeon/Pathologist Verbiage (Will Incorporate Name Of Surgeon From Intro If Not Blank): operated in two distinct and integrated capacities as the surgeon and pathologist.
Mohs Histo Method Verbiage: Each section was then chromacoded and processed in the Mohs lab using the Mohs protocol and submitted for frozen section, utilizing hematoxylin and eosin histochemical stains.
Subsequent Stages Histo Method Verbiage: Using a similar technique to that described above, a thin layer of tissue was removed from all areas where tumor was visible on the previous stage.  The tissue was again oriented, mapped, dyed, and processed as above.
Mohs Rapid Report Verbiage: The area of clinically evident tumor was marked with skin marking ink and appropriately hatched.  The initial incision was made following the Mohs approach through the skin.  The specimen was taken to the lab, divided into the necessary number of pieces, chromacoded and processed according to the Mohs protocol.  This was repeated in successive stages until a tumor free defect was achieved.
Complex Repair Preamble Text (Leave Blank If You Do Not Want): Extensive wide undermining was performed.
Intermediate Repair Preamble Text (Leave Blank If You Do Not Want): Undermining was performed with blunt dissection.
Non-Graft Cartilage Fenestration Text: The cartilage was fenestrated with a 2mm punch biopsy to help facilitate healing.
Graft Cartilage Fenestration Text: The cartilage was fenestrated with a 2mm punch biopsy to help facilitate graft survival and healing.
Secondary Intention Text (Leave Blank If You Do Not Want): The defect will heal with secondary intention.
No Repair - Repaired With Adjacent Surgical Defect Text (Leave Blank If You Do Not Want): After obtaining clear surgical margins the defect was repaired concurrently with another surgical defect which was in close approximation.
Adjacent Tissue Transfer Text: The defect edges were debeveled with a #15 scalpel blade. Given the location of the defect and the proximity to free margins an adjacent tissue transfer was deemed most appropriate. Using a sterile surgical marker, an appropriate flap was drawn incorporating the defect and placing the expected incisions within the relaxed skin tension lines where possible. The area thus outlined was incised deep to adipose tissue with a #15 scalpel blade. The skin margins were undermined to an appropriate distance in all directions utilizing iris scissors and carried over to close the primary defect.
Advancement Flap (Single) Text: The defect edges were debeveled with a #15 scalpel blade. Given the location of the defect and the proximity to free margins a single advancement flap was deemed most appropriate. Using a sterile surgical marker, an appropriate advancement flap was drawn incorporating the defect and placing the expected incisions within the relaxed skin tension lines where possible. The area thus outlined was incised deep to adipose tissue with a #15 scalpel blade. The skin margins were undermined to an appropriate distance in all directions utilizing iris scissors. Following this, the designed flap was advanced and carried over into the primary defect and sutured into place.
Advancement Flap (Double) Text: The defect edges were debeveled with a #15 scalpel blade. Given the location of the defect and the proximity to free margins a double advancement flap was deemed most appropriate. Using a sterile surgical marker, the appropriate advancement flaps were drawn incorporating the defect and placing the expected incisions within the relaxed skin tension lines where possible. The area thus outlined was incised deep to adipose tissue with a #15 scalpel blade. The skin margins were undermined to an appropriate distance in all directions utilizing iris scissors. Following this, the designed flaps were advanced and carried over into the primary defect and sutured into place.
Burow's Advancement Flap Text: The defect edges were debeveled with a #15 scalpel blade. Given the location of the defect and the proximity to free margins a Burow's advancement flap was deemed most appropriate. Using a sterile surgical marker, the appropriate advancement flap was drawn incorporating the defect and placing the expected incisions within the relaxed skin tension lines where possible. The area thus outlined was incised deep to adipose tissue with a #15 scalpel blade. The skin margins were undermined to an appropriate distance in all directions utilizing iris scissors. Following this, the designed flap was advanced and carried over into the primary defect and sutured into place.
Chonodrocutaneous Helical Advancement Flap Text: The defect edges were debeveled with a #15 scalpel blade. Given the location of the defect and the proximity to free margins a chondrocutaneous helical advancement flap was deemed most appropriate. Using a sterile surgical marker, the appropriate advancement flap was drawn incorporating the defect and placing the expected incisions within the relaxed skin tension lines where possible. The area thus outlined was incised deep to adipose tissue with a #15 scalpel blade. The skin margins were undermined to an appropriate distance in all directions utilizing iris scissors. Following this, the designed flap was advanced and carried over into the primary defect and sutured into place.
Crescentic Advancement Flap Text: The defect edges were debeveled with a #15 scalpel blade. Given the location of the defect and the proximity to free margins a crescentic advancement flap was deemed most appropriate. Using a sterile surgical marker, the appropriate advancement flap was drawn incorporating the defect and placing the expected incisions within the relaxed skin tension lines where possible. The area thus outlined was incised deep to adipose tissue with a #15 scalpel blade. The skin margins were undermined to an appropriate distance in all directions utilizing iris scissors. Following this, the designed flap was advanced and carried over into the primary defect and sutured into place.
A-T Advancement Flap Text: The defect edges were debeveled with a #15 scalpel blade. Given the location of the defect, shape of the defect and the proximity to free margins an A-T advancement flap was deemed most appropriate. Using a sterile surgical marker, an appropriate advancement flap was drawn incorporating the defect and placing the expected incisions within the relaxed skin tension lines where possible. The area thus outlined was incised deep to adipose tissue with a #15 scalpel blade. The skin margins were undermined to an appropriate distance in all directions utilizing iris scissors. Following this, the designed flap was advanced and carried over into the primary defect and sutured into place.
O-T Advancement Flap Text: The defect edges were debeveled with a #15 scalpel blade. Given the location of the defect, shape of the defect and the proximity to free margins an O-T advancement flap was deemed most appropriate. Using a sterile surgical marker, an appropriate advancement flap was drawn incorporating the defect and placing the expected incisions within the relaxed skin tension lines where possible. The area thus outlined was incised deep to adipose tissue with a #15 scalpel blade. The skin margins were undermined to an appropriate distance in all directions utilizing iris scissors. Following this, the designed flap was advanced and carried over into the primary defect and sutured into place.
O-L Flap Text: The defect edges were debeveled with a #15 scalpel blade. Given the location of the defect, shape of the defect and the proximity to free margins an O-L flap was deemed most appropriate. Using a sterile surgical marker, an appropriate advancement flap was drawn incorporating the defect and placing the expected incisions within the relaxed skin tension lines where possible. The area thus outlined was incised deep to adipose tissue with a #15 scalpel blade. The skin margins were undermined to an appropriate distance in all directions utilizing iris scissors. Following this, the designed flap was advanced and carried over into the primary defect and sutured into place.
O-Z Flap Text: The defect edges were debeveled with a #15 scalpel blade. Given the location of the defect, shape of the defect and the proximity to free margins an O-Z flap was deemed most appropriate. Using a sterile surgical marker, an appropriate transposition flap was drawn incorporating the defect and placing the expected incisions within the relaxed skin tension lines where possible. The area thus outlined was incised deep to adipose tissue with a #15 scalpel blade. The skin margins were undermined to an appropriate distance in all directions utilizing iris scissors. Following this, the designed flap was carried over into the primary defect and sutured into place.
Double O-Z Flap Text: The defect edges were debeveled with a #15 scalpel blade. Given the location of the defect, shape of the defect and the proximity to free margins a Double O-Z flap was deemed most appropriate. Using a sterile surgical marker, an appropriate transposition flap was drawn incorporating the defect and placing the expected incisions within the relaxed skin tension lines where possible. The area thus outlined was incised deep to adipose tissue with a #15 scalpel blade. The skin margins were undermined to an appropriate distance in all directions utilizing iris scissors. Following this, the designed flap was carried over into the primary defect and sutured into place.
V-Y Flap Text: The defect edges were debeveled with a #15 scalpel blade. Given the location of the defect, shape of the defect and the proximity to free margins a V-Y flap was deemed most appropriate. Using a sterile surgical marker, an appropriate advancement flap was drawn incorporating the defect and placing the expected incisions within the relaxed skin tension lines where possible. The area thus outlined was incised deep to adipose tissue with a #15 scalpel blade. The skin margins were undermined to an appropriate distance in all directions utilizing iris scissors. Following this, the designed flap was advanced and carried over into the primary defect and sutured into place.
Advancement-Rotation Flap Text: The defect edges were debeveled with a #15 scalpel blade. Given the location of the defect, shape of the defect and the proximity to free margins an advancement-rotation flap was deemed most appropriate. Using a sterile surgical marker, an appropriate flap was drawn incorporating the defect and placing the expected incisions within the relaxed skin tension lines where possible. The area thus outlined was incised deep to adipose tissue with a #15 scalpel blade. The skin margins were undermined to an appropriate distance in all directions utilizing iris scissors. Following this, the designed flap was carried over into the primary defect and sutured into place.
Mercedes Flap Text: The defect edges were debeveled with a #15 scalpel blade. Given the location of the defect, shape of the defect and the proximity to free margins a Mercedes flap was deemed most appropriate. Using a sterile surgical marker, an appropriate advancement flap was drawn incorporating the defect and placing the expected incisions within the relaxed skin tension lines where possible. The area thus outlined was incised deep to adipose tissue with a #15 scalpel blade. The skin margins were undermined to an appropriate distance in all directions utilizing iris scissors. Following this, the designed flap was advanced and carried over into the primary defect and sutured into place.
Modified Advancement Flap Text: The defect edges were debeveled with a #15 scalpel blade. Given the location of the defect, shape of the defect and the proximity to free margins a modified advancement flap was deemed most appropriate. Using a sterile surgical marker, an appropriate advancement flap was drawn incorporating the defect and placing the expected incisions within the relaxed skin tension lines where possible. The area thus outlined was incised deep to adipose tissue with a #15 scalpel blade. The skin margins were undermined to an appropriate distance in all directions utilizing iris scissors. Following this, the designed flap was advanced and carried over into the primary defect and sutured into place.
Mucosal Advancement Flap Text: Given the location of the defect, shape of the defect and the proximity to free margins a mucosal advancement flap was deemed most appropriate. Incisions were made with a 15 blade scalpel in the appropriate fashion along the cutaneous vermilion border and the mucosal lip. The remaining actinically damaged mucosal tissue was excised.  The mucosal advancement flap was then elevated to the gingival sulcus with care taken to preserve the neurovascular structures and advanced into the primary defect. Care was taken to ensure that precise realignment of the vermilion border was achieved.
Peng Advancement Flap Text: The defect edges were debeveled with a #15 scalpel blade. Given the location of the defect, shape of the defect and the proximity to free margins a Peng advancement flap was deemed most appropriate. Using a sterile surgical marker, an appropriate advancement flap was drawn incorporating the defect and placing the expected incisions within the relaxed skin tension lines where possible. The area thus outlined was incised deep to adipose tissue with a #15 scalpel blade. The skin margins were undermined to an appropriate distance in all directions utilizing iris scissors. Following this, the designed flap was advanced and carried over into the primary defect and sutured into place.
Hatchet Flap Text: The defect edges were debeveled with a #15 scalpel blade. Given the location of the defect, shape of the defect and the proximity to free margins a hatchet flap was deemed most appropriate. Using a sterile surgical marker, an appropriate hatchet flap was drawn incorporating the defect and placing the expected incisions within the relaxed skin tension lines where possible. The area thus outlined was incised deep to adipose tissue with a #15 scalpel blade. The skin margins were undermined to an appropriate distance in all directions utilizing iris scissors. Following this, the designed flap was carried over into the primary defect and sutured into place.
Rotation Flap Text: The defect edges were debeveled with a #15 scalpel blade. Given the location of the defect, shape of the defect and the proximity to free margins a rotation flap was deemed most appropriate. Using a sterile surgical marker, an appropriate rotation flap was drawn incorporating the defect and placing the expected incisions within the relaxed skin tension lines where possible. The area thus outlined was incised deep to adipose tissue with a #15 scalpel blade. The skin margins were undermined to an appropriate distance in all directions utilizing iris scissors. Following this, the designed flap was carried over into the primary defect and sutured into place.
Bilateral Rotation Flap Text: The defect edges were debeveled with a #15 scalpel blade. Given the location of the defect, shape of the defect and the proximity to free margins a bilateral rotation flap was deemed most appropriate. Using a sterile surgical marker, an appropriate rotation flap was drawn incorporating the defect and placing the expected incisions within the relaxed skin tension lines where possible. The area thus outlined was incised deep to adipose tissue with a #15 scalpel blade. The skin margins were undermined to an appropriate distance in all directions utilizing iris scissors. Following this, the designed flap was carried over into the primary defect and sutured into place.
Spiral Flap Text: The defect edges were debeveled with a #15 scalpel blade. Given the location of the defect, shape of the defect and the proximity to free margins a spiral flap was deemed most appropriate. Using a sterile surgical marker, an appropriate rotation flap was drawn incorporating the defect and placing the expected incisions within the relaxed skin tension lines where possible. The area thus outlined was incised deep to adipose tissue with a #15 scalpel blade. The skin margins were undermined to an appropriate distance in all directions utilizing iris scissors. Following this, the designed flap was carried over into the primary defect and sutured into place.
Staged Advancement Flap Text: The defect edges were debeveled with a #15 scalpel blade. Given the location of the defect, shape of the defect and the proximity to free margins a staged advancement flap was deemed most appropriate. Using a sterile surgical marker, an appropriate advancement flap was drawn incorporating the defect and placing the expected incisions within the relaxed skin tension lines where possible. The area thus outlined was incised deep to adipose tissue with a #15 scalpel blade. The skin margins were undermined to an appropriate distance in all directions utilizing iris scissors. Following this, the designed flap was carried over into the primary defect and sutured into place.
Star Wedge Flap Text: The defect edges were debeveled with a #15 scalpel blade. Given the location of the defect, shape of the defect and the proximity to free margins a star wedge flap was deemed most appropriate. Using a sterile surgical marker, an appropriate rotation flap was drawn incorporating the defect and placing the expected incisions within the relaxed skin tension lines where possible. The area thus outlined was incised deep to adipose tissue with a #15 scalpel blade. The skin margins were undermined to an appropriate distance in all directions utilizing iris scissors. Following this, the designed flap was carried over into the primary defect and sutured into place.
Transposition Flap Text: The defect edges were debeveled with a #15 scalpel blade. Given the location of the defect and the proximity to free margins a transposition flap was deemed most appropriate. Using a sterile surgical marker, an appropriate transposition flap was drawn incorporating the defect. The area thus outlined was incised deep to adipose tissue with a #15 scalpel blade. The skin margins were undermined to an appropriate distance in all directions utilizing iris scissors. Following this, the designed flap was carried over into the primary defect and sutured into place.
Muscle Hinge Flap Text: The defect edges were debeveled with a #15 scalpel blade.  Given the size, depth and location of the defect and the proximity to free margins a muscle hinge flap was deemed most appropriate. Using a sterile surgical marker, an appropriate hinge flap was drawn incorporating the defect. The area thus outlined was incised with a #15 scalpel blade. The skin margins were undermined to an appropriate distance in all directions utilizing iris scissors. Following this, the designed flap was carried into the primary defect and sutured into place.
Mustarde Flap Text: The defect edges were debeveled with a #15 scalpel blade.  Given the size, depth and location of the defect and the proximity to free margins a Mustarde flap was deemed most appropriate. Using a sterile surgical marker, an appropriate flap was drawn incorporating the defect. The area thus outlined was incised with a #15 scalpel blade. The skin margins were undermined to an appropriate distance in all directions utilizing iris scissors. Following this, the designed flap was carried into the primary defect and sutured into place.
Nasal Turnover Hinge Flap Text: The defect edges were debeveled with a #15 scalpel blade.  Given the size, depth, location of the defect and the defect being full thickness a nasal turnover hinge flap was deemed most appropriate. Using a sterile surgical marker, an appropriate hinge flap was drawn incorporating the defect. The area thus outlined was incised with a #15 scalpel blade. The flap was designed to recreate the nasal mucosal lining and the alar rim. The skin margins were undermined to an appropriate distance in all directions utilizing iris scissors. Following this, the designed flap was carried over into the primary defect and sutured into place
Nasalis-Muscle-Based Myocutaneous Island Pedicle Flap Text: Using a #15 blade, an incision was made around the donor flap to the level of the nasalis muscle. Wide lateral undermining was then performed in both the subcutaneous plane above the nasalis muscle, and in a submuscular plane just above periosteum. This allowed the formation of a free nasalis muscle axial pedicle (based on the angular artery) which was still attached to the actual cutaneous flap, increasing its mobility and vascular viability. Hemostasis was obtained with pinpoint electrocoagulation. The flap was mobilized into position and the pivotal anchor points positioned and stabilized with buried interrupted sutures. Subcutaneous and dermal tissues were closed in a multilayered fashion with sutures. Tissue redundancies were excised, and the epidermal edges were apposed without significant tension and sutured with sutures.
Orbicularis Oris Muscle Flap Text: The defect edges were debeveled with a #15 scalpel blade.  Given that the defect affected the competency of the oral sphincter an orbicularis oris muscle flap was deemed most appropriate to restore this competency and normal muscle function.  Using a sterile surgical marker, an appropriate flap was drawn incorporating the defect. The area thus outlined was incised with a #15 scalpel blade. Following this, the designed flap was carried over into the primary defect and sutured into place.
Melolabial Transposition Flap Text: The defect edges were debeveled with a #15 scalpel blade. Given the location of the defect and the proximity to free margins a melolabial flap was deemed most appropriate. Using a sterile surgical marker, an appropriate melolabial transposition flap was drawn incorporating the defect. The area thus outlined was incised deep to adipose tissue with a #15 scalpel blade. The skin margins were undermined to an appropriate distance in all directions utilizing iris scissors. Following this, the designed flap was carried over into the primary defect and sutured into place.
Rhombic Flap Text: The defect edges were debeveled with a #15 scalpel blade. Given the location of the defect and the proximity to free margins a rhombic flap was deemed most appropriate. Using a sterile surgical marker, an appropriate rhombic flap was drawn incorporating the defect. The area thus outlined was incised deep to adipose tissue with a #15 scalpel blade. The skin margins were undermined to an appropriate distance in all directions utilizing iris scissors. Following this, the designed flap was carried over into the primary defect and sutured into place.
Rhomboid Transposition Flap Text: The defect edges were debeveled with a #15 scalpel blade. Given the location of the defect and the proximity to free margins a rhomboid transposition flap was deemed most appropriate. Using a sterile surgical marker, an appropriate rhomboid flap was drawn incorporating the defect. The area thus outlined was incised deep to adipose tissue with a #15 scalpel blade. The skin margins were undermined to an appropriate distance in all directions utilizing iris scissors. Following this, the designed flap was carried over into the primary defect and sutured into place.
Bi-Rhombic Flap Text: The defect edges were debeveled with a #15 scalpel blade. Given the location of the defect and the proximity to free margins a bi-rhombic flap was deemed most appropriate. Using a sterile surgical marker, an appropriate rhombic flap was drawn incorporating the defect. The area thus outlined was incised deep to adipose tissue with a #15 scalpel blade. The skin margins were undermined to an appropriate distance in all directions utilizing iris scissors. Following this, the designed flap was carried over into the primary defect and sutured into place.
Helical Rim Advancement Flap Text: The defect edges were debeveled with a #15 blade scalpel.  Given the location of the defect and the proximity to free margins (helical rim) a double helical rim advancement flap was deemed most appropriate. Using a sterile surgical marker, the appropriate advancement flaps were drawn incorporating the defect and placing the expected incisions between the helical rim and antihelix where possible.  The area thus outlined was incised through and through with a #15 scalpel blade.  With a skin hook and iris scissors, the flaps were gently and sharply undermined and freed up. Folllowing this, the designed flaps were carried over into the primary defect and sutured into place.
Bilateral Helical Rim Advancement Flap Text: The defect edges were debeveled with a #15 blade scalpel.  Given the location of the defect and the proximity to free margins (helical rim) a bilateral helical rim advancement flap was deemed most appropriate. Using a sterile surgical marker, the appropriate advancement flaps were drawn incorporating the defect and placing the expected incisions between the helical rim and antihelix where possible.  The area thus outlined was incised through and through with a #15 scalpel blade.  With a skin hook and iris scissors, the flaps were gently and sharply undermined and freed up. Following this, the designed flaps were placed into the primary defect and sutured into place.
Ear Star Wedge Flap Text: The defect edges were debeveled with a #15 blade scalpel.  Given the location of the defect and the proximity to free margins (helical rim) an ear star wedge flap was deemed most appropriate. Using a sterile surgical marker, the appropriate flap was drawn incorporating the defect and placing the expected incisions between the helical rim and antihelix where possible.  The area thus outlined was incised through and through with a #15 scalpel blade. Following this, the designed flap was carried over into the primary defect and sutured into place.
Banner Transposition Flap Text: The defect edges were debeveled with a #15 scalpel blade. Given the location of the defect and the proximity to free margins a Banner transposition flap was deemed most appropriate. Using a sterile surgical marker, an appropriate flap was drawn around the defect. The area thus outlined was incised deep to adipose tissue with a #15 scalpel blade. The skin margins were undermined to an appropriate distance in all directions utilizing iris scissors. Following this, the designed flap was carried into the primary defect and sutured into place.
Bilobed Flap Text: The defect edges were debeveled with a #15 scalpel blade. Given the location of the defect and the proximity to free margins a bilobe flap was deemed most appropriate. Using a sterile surgical marker, an appropriate bilobe flap drawn around the defect. The area thus outlined was incised deep to adipose tissue with a #15 scalpel blade. The skin margins were undermined to an appropriate distance in all directions utilizing iris scissors. Following this, the designed flap was carried over into the primary defect and sutured into place.
Bilobed Transposition Flap Text: The defect edges were debeveled with a #15 scalpel blade. Given the location of the defect and the proximity to free margins a bilobed transposition flap was deemed most appropriate. Using a sterile surgical marker, an appropriate bilobe flap drawn around the defect. The area thus outlined was incised deep to adipose tissue with a #15 scalpel blade. The skin margins were undermined to an appropriate distance in all directions utilizing iris scissors. Following this, the designed flap was carried over into the primary defect and sutured into place.
Trilobed Flap Text: The defect edges were debeveled with a #15 scalpel blade. Given the location of the defect and the proximity to free margins a trilobed flap was deemed most appropriate. Using a sterile surgical marker, an appropriate trilobed flap was drawn around the defect. The area thus outlined was incised deep to adipose tissue with a #15 scalpel blade. The skin margins were undermined to an appropriate distance in all directions utilizing iris scissors. Following this, the designed flap was carried into the primary defect and sutured into place.
Dorsal Nasal Flap Text: The defect edges were debeveled with a #15 scalpel blade. Given the location of the defect and the proximity to free margins a dorsal nasal flap was deemed most appropriate. Using a sterile surgical marker, an appropriate dorsal nasal flap was drawn around the defect. The area thus outlined was incised deep to adipose tissue with a #15 scalpel blade. The skin margins were undermined to an appropriate distance in all directions utilizing iris scissors. Following this, the designed flap was carried into the primary defect and sutured into place.
Island Pedicle Flap Text: The defect edges were debeveled with a #15 scalpel blade. Given the location of the defect, shape of the defect and the proximity to free margins an island pedicle advancement flap was deemed most appropriate. Using a sterile surgical marker, an appropriate advancement flap was drawn incorporating the defect, outlining the appropriate donor tissue and placing the expected incisions within the relaxed skin tension lines where possible. The area thus outlined was incised deep to adipose tissue with a #15 scalpel blade. The skin margins were undermined to an appropriate distance in all directions around the primary defect and laterally outward around the island pedicle utilizing iris scissors.  There was minimal undermining beneath the pedicle flap. Following this, the flap was carried over into the primary defect and sutured into place.
Island Pedicle Flap With Canthal Suspension Text: The defect edges were debeveled with a #15 scalpel blade. Given the location of the defect, shape of the defect and the proximity to free margins an island pedicle advancement flap was deemed most appropriate. Using a sterile surgical marker, an appropriate advancement flap was drawn incorporating the defect, outlining the appropriate donor tissue and placing the expected incisions within the relaxed skin tension lines where possible. The area thus outlined was incised deep to adipose tissue with a #15 scalpel blade. The skin margins were undermined to an appropriate distance in all directions around the primary defect and laterally outward around the island pedicle utilizing iris scissors.  There was minimal undermining beneath the pedicle flap. A suspension suture was placed in the canthal tendon to prevent tension and prevent ectropion. Following this, the designed flap was placed into the primary defect and sutured into place.
Alar Island Pedicle Flap Text: The defect edges were debeveled with a #15 scalpel blade. Given the location of the defect, shape of the defect and the proximity to the alar rim an island pedicle advancement flap was deemed most appropriate. Using a sterile surgical marker, an appropriate advancement flap was drawn incorporating the defect, outlining the appropriate donor tissue and placing the expected incisions within the nasal ala running parallel to the alar rim. The area thus outlined was incised with a #15 scalpel blade. The skin margins were undermined minimally to an appropriate distance in all directions around the primary defect and laterally outward around the island pedicle utilizing iris scissors.  There was minimal undermining beneath the pedicle flap. Following this, the designed flap was carried over into the primary defect and sutured into place.
Double Island Pedicle Flap Text: The defect edges were debeveled with a #15 scalpel blade. Given the location of the defect, shape of the defect and the proximity to free margins a double island pedicle advancement flap was deemed most appropriate. Using a sterile surgical marker, an appropriate advancement flap was drawn incorporating the defect, outlining the appropriate donor tissue and placing the expected incisions within the relaxed skin tension lines where possible. The area thus outlined was incised deep to adipose tissue with a #15 scalpel blade. The skin margins were undermined to an appropriate distance in all directions around the primary defect and laterally outward around the island pedicle utilizing iris scissors.  There was minimal undermining beneath the pedicle flap. Following this, the flap was carried over into the primary defect and sutured into place.
Island Pedicle Flap-Requiring Vessel Identification Text: The defect edges were debeveled with a #15 scalpel blade. Given the location of the defect, shape of the defect and the proximity to free margins an island pedicle advancement flap was deemed most appropriate. Using a sterile surgical marker, an appropriate advancement flap was drawn, based on the axial vessel mentioned above, incorporating the defect, outlining the appropriate donor tissue and placing the expected incisions within the relaxed skin tension lines where possible. The area thus outlined was incised deep to adipose tissue with a #15 scalpel blade. The skin margins were undermined to an appropriate distance in all directions around the primary defect and laterally outward around the island pedicle utilizing iris scissors.  There was minimal undermining beneath the pedicle flap. Following this, the designed flap was carried over into the primary defect and sutured into place.
Keystone Flap Text: The defect edges were debeveled with a #15 scalpel blade. Given the location of the defect, shape of the defect a keystone flap was deemed most appropriate. Using a sterile surgical marker, an appropriate keystone flap was drawn incorporating the defect, outlining the appropriate donor tissue and placing the expected incisions within the relaxed skin tension lines where possible. The area thus outlined was incised deep to adipose tissue with a #15 scalpel blade. The skin margins were undermined to an appropriate distance in all directions around the primary defect and laterally outward around the flap utilizing iris scissors. Following this, the designed flap was carried into the primary defect and sutured into place.
O-T Plasty Text: The defect edges were debeveled with a #15 scalpel blade. Given the location of the defect, shape of the defect and the proximity to free margins an O-T plasty was deemed most appropriate. Using a sterile surgical marker, an appropriate O-T plasty was drawn incorporating the defect and placing the expected incisions within the relaxed skin tension lines where possible. The area thus outlined was incised deep to adipose tissue with a #15 scalpel blade. The skin margins were undermined to an appropriate distance in all directions utilizing iris scissors. Following this, the designed flap was carried over into the primary defect and sutured into place.
O-Z Plasty Text: The defect edges were debeveled with a #15 scalpel blade. Given the location of the defect, shape of the defect and the proximity to free margins an O-Z plasty (double transposition flap) was deemed most appropriate. Using a sterile surgical marker, the appropriate transposition flaps were drawn incorporating the defect and placing the expected incisions within the relaxed skin tension lines where possible. The area thus outlined was incised deep to adipose tissue with a #15 scalpel blade. The skin margins were undermined to an appropriate distance in all directions utilizing iris scissors. Hemostasis was achieved with electrocautery. The flaps were then transposed and carried over into place, one clockwise and the other counterclockwise, and anchored with interrupted buried subcutaneous sutures.
Double O-Z Plasty Text: The defect edges were debeveled with a #15 scalpel blade. Given the location of the defect, shape of the defect and the proximity to free margins a Double O-Z plasty (double transposition flap) was deemed most appropriate. Using a sterile surgical marker, the appropriate transposition flaps were drawn incorporating the defect and placing the expected incisions within the relaxed skin tension lines where possible. The area thus outlined was incised deep to adipose tissue with a #15 scalpel blade. The skin margins were undermined to an appropriate distance in all directions utilizing iris scissors. Hemostasis was achieved with electrocautery. The flaps were then transposed and carried over into place, one clockwise and the other counterclockwise, and anchored with interrupted buried subcutaneous sutures.
V-Y Plasty Text: The defect edges were debeveled with a #15 scalpel blade. Given the location of the defect, shape of the defect and the proximity to free margins an V-Y advancement flap was deemed most appropriate. Using a sterile surgical marker, an appropriate advancement flap was drawn incorporating the defect and placing the expected incisions within the relaxed skin tension lines where possible. The area thus outlined was incised deep to adipose tissue with a #15 scalpel blade. The skin margins were undermined to an appropriate distance in all directions utilizing iris scissors. Following this, the designed flap was advanced and carried over into the primary defect and sutured into place.
H Plasty Text: Given the location of the defect, shape of the defect and the proximity to free margins a H-plasty was deemed most appropriate for repair. Using a sterile surgical marker, the appropriate advancement arms of the H-plasty were drawn incorporating the defect and placing the expected incisions within the relaxed skin tension lines where possible. The area thus outlined was incised deep to adipose tissue with a #15 scalpel blade. The skin margins were undermined to an appropriate distance in all directions utilizing iris scissors.  The opposing advancement arms were then advanced and carried over into place in opposite direction and anchored with interrupted buried subcutaneous sutures.
W Plasty Text: The lesion was extirpated to the level of the fat with a #15 scalpel blade. Given the location of the defect, shape of the defect and the proximity to free margins a W-plasty was deemed most appropriate for repair. Using a sterile surgical marker, the appropriate transposition arms of the W-plasty were drawn incorporating the defect and placing the expected incisions within the relaxed skin tension lines where possible. The area thus outlined was incised deep to adipose tissue with a #15 scalpel blade. The skin margins were undermined to an appropriate distance in all directions utilizing iris scissors. The opposing transposition arms were then transposed and carried over into place in opposite direction and anchored with interrupted buried subcutaneous sutures.
Z Plasty Text: The lesion was extirpated to the level of the fat with a #15 scalpel blade. Given the location of the defect, shape of the defect and the proximity to free margins a Z-plasty was deemed most appropriate for repair. Using a sterile surgical marker, the appropriate transposition arms of the Z-plasty were drawn incorporating the defect and placing the expected incisions within the relaxed skin tension lines where possible. The area thus outlined was incised deep to adipose tissue with a #15 scalpel blade. The skin margins were undermined to an appropriate distance in all directions utilizing iris scissors. The opposing transposition arms were then transposed and carried over into place in opposite direction and anchored with interrupted buried subcutaneous sutures.
Double Z Plasty Text: The lesion was extirpated to the level of the fat with a #15 scalpel blade. Given the location of the defect, shape of the defect and the proximity to free margins a double Z-plasty was deemed most appropriate for repair. Using a sterile surgical marker, the appropriate transposition arms of the double Z-plasty were drawn incorporating the defect and placing the expected incisions within the relaxed skin tension lines where possible. The area thus outlined was incised deep to adipose tissue with a #15 scalpel blade. The skin margins were undermined to an appropriate distance in all directions utilizing iris scissors. The opposing transposition arms were then transposed and carried over into place in opposite direction and anchored with interrupted buried subcutaneous sutures.
Zygomaticofacial Flap Text: Given the location of the defect, shape of the defect and the proximity to free margins a zygomaticofacial flap was deemed most appropriate for repair. Using a sterile surgical marker, the appropriate flap was drawn incorporating the defect and placing the expected incisions within the relaxed skin tension lines where possible. The area thus outlined was incised deep to adipose tissue with a #15 scalpel blade with preservation of a vascular pedicle.  The skin margins were undermined to an appropriate distance in all directions utilizing iris scissors. The flap was then carried over into the defect and anchored with interrupted buried subcutaneous sutures.
Cheek Interpolation Flap Text: A decision was made to reconstruct the defect utilizing an interpolation axial flap and a staged reconstruction.  A telfa template was made of the defect.  This telfa template was then used to outline the Cheek Interpolation flap.  The donor area for the pedicle flap was then injected with anesthesia.  The flap was excised through the skin and subcutaneous tissue down to the layer of the underlying musculature.  The interpolation flap was carefully excised within this deep plane to maintain its blood supply.  The edges of the donor site were undermined.   The donor site was closed in a primary fashion.  The pedicle was then rotated into position and sutured.  Once the tube was sutured into place, adequate blood supply was confirmed with blanching and refill.  The pedicle was then wrapped with xeroform gauze and dressed appropriately with a telfa and gauze bandage to ensure continued blood supply and protect the attached pedicle.
Cheek-To-Nose Interpolation Flap Text: A decision was made to reconstruct the defect utilizing an interpolation axial flap and a staged reconstruction.  A telfa template was made of the defect.  This telfa template was then used to outline the Cheek-To-Nose Interpolation flap.  The donor area for the pedicle flap was then injected with anesthesia.  The flap was excised through the skin and subcutaneous tissue down to the layer of the underlying musculature.  The interpolation flap was carefully excised within this deep plane to maintain its blood supply.  The edges of the donor site were undermined.   The donor site was closed in a primary fashion.  The pedicle was then rotated into position and sutured.  Once the tube was sutured into place, adequate blood supply was confirmed with blanching and refill.  The pedicle was then wrapped with xeroform gauze and dressed appropriately with a telfa and gauze bandage to ensure continued blood supply and protect the attached pedicle.
Interpolation Flap Text: A decision was made to reconstruct the defect utilizing an interpolation axial flap and a staged reconstruction.  A telfa template was made of the defect.  This telfa template was then used to outline the interpolation flap.  The donor area for the pedicle flap was then injected with anesthesia.  The flap was excised through the skin and subcutaneous tissue down to the layer of the underlying musculature.  The interpolation flap was carefully excised within this deep plane to maintain its blood supply.  The edges of the donor site were undermined.   The donor site was closed in a primary fashion.  The pedicle was then rotated into position and sutured.  Once the tube was sutured into place, adequate blood supply was confirmed with blanching and refill.  The pedicle was then wrapped with xeroform gauze and dressed appropriately with a telfa and gauze bandage to ensure continued blood supply and protect the attached pedicle.
Melolabial Interpolation Flap Text: A decision was made to reconstruct the defect utilizing an interpolation axial flap and a staged reconstruction.  A telfa template was made of the defect.  This telfa template was then used to outline the melolabial interpolation flap.  The donor area for the pedicle flap was then injected with anesthesia.  The flap was excised through the skin and subcutaneous tissue down to the layer of the underlying musculature.  The pedicle flap was carefully excised within this deep plane to maintain its blood supply.  The edges of the donor site were undermined.   The donor site was closed in a primary fashion.  The pedicle was then rotated into position and sutured.  Once the tube was sutured into place, adequate blood supply was confirmed with blanching and refill.  The pedicle was then wrapped with xeroform gauze and dressed appropriately with a telfa and gauze bandage to ensure continued blood supply and protect the attached pedicle.
Mastoid Interpolation Flap Text: A decision was made to reconstruct the defect utilizing an interpolation axial flap and a staged reconstruction.  A telfa template was made of the defect.  This telfa template was then used to outline the mastoid interpolation flap.  The donor area for the pedicle flap was then injected with anesthesia.  The flap was excised through the skin and subcutaneous tissue down to the layer of the underlying musculature.  The pedicle flap was carefully excised within this deep plane to maintain its blood supply.  The edges of the donor site were undermined.   The donor site was closed in a primary fashion.  The pedicle was then rotated into position and sutured.  Once the tube was sutured into place, adequate blood supply was confirmed with blanching and refill.  The pedicle was then wrapped with xeroform gauze and dressed appropriately with a telfa and gauze bandage to ensure continued blood supply and protect the attached pedicle.
Posterior Auricular Interpolation Flap Text: A decision was made to reconstruct the defect utilizing an interpolation axial flap and a staged reconstruction.  A telfa template was made of the defect.  This telfa template was then used to outline the posterior auricular interpolation flap.  The donor area for the pedicle flap was then injected with anesthesia.  The flap was excised through the skin and subcutaneous tissue down to the layer of the underlying musculature.  The pedicle flap was carefully excised within this deep plane to maintain its blood supply.  The edges of the donor site were undermined.   The donor site was closed in a primary fashion.  The pedicle was then rotated into position and sutured.  Once the tube was sutured into place, adequate blood supply was confirmed with blanching and refill.  The pedicle was then wrapped with xeroform gauze and dressed appropriately with a telfa and gauze bandage to ensure continued blood supply and protect the attached pedicle.
Paramedian Forehead Flap Text: A decision was made to reconstruct the defect utilizing an interpolation axial flap and a staged reconstruction.  A telfa template was made of the defect.  This telfa template was then used to outline the paramedian forehead pedicle flap.  The donor area for the pedicle flap was then injected with anesthesia.  The flap was excised through the skin and subcutaneous tissue down to the layer of the underlying musculature.  The pedicle flap was carefully excised within this deep plane to maintain its blood supply.  The edges of the donor site were undermined.   The donor site was closed in a primary fashion.  The pedicle was then rotated into position and sutured.  Once the tube was sutured into place, adequate blood supply was confirmed with blanching and refill.  The pedicle was then wrapped with xeroform gauze and dressed appropriately with a telfa and gauze bandage to ensure continued blood supply and protect the attached pedicle.
Abbe Flap (Upper To Lower Lip) Text: The defect of the lower lip was assessed and measured.  Given the location and size of the defect, an Abbe flap was deemed most appropriate. Using a sterile surgical marker, an appropriate Abbe flap was measured and drawn on the upper lip. Local anesthesia was then infiltrated.  A scalpel was then used to incise the upper lip through and through the skin, vermilion, muscle and mucosa, leaving the flap pedicled on the opposite side.  The flap was then rotated and transferred to the lower lip defect.  The flap was then sutured into place with a three layer technique, closing the orbicularis oris muscle layer with subcutaneous buried sutures, followed by a mucosal layer and an epidermal layer.
Abbe Flap (Lower To Upper Lip) Text: The defect of the upper lip was assessed and measured.  Given the location and size of the defect, an Abbe flap was deemed most appropriate. Using a sterile surgical marker, an appropriate Abbe flap was measured and drawn on the lower lip. Local anesthesia was then infiltrated. A scalpel was then used to incise the upper lip through and through the skin, vermilion, muscle and mucosa, leaving the flap pedicled on the opposite side.  The flap was then rotated and transferred to the lower lip defect.  The flap was then sutured into place with a three layer technique, closing the orbicularis oris muscle layer with subcutaneous buried sutures, followed by a mucosal layer and an epidermal layer.
Estlander Flap (Upper To Lower Lip) Text: The defect of the lower lip was assessed and measured.  Given the location and size of the defect, an Estlander flap was deemed most appropriate. Using a sterile surgical marker, an appropriate Estlander flap was measured and drawn on the upper lip. Local anesthesia was then infiltrated. A scalpel was then used to incise the lateral aspect of the flap, through skin, muscle and mucosa, leaving the flap pedicled medially.  The flap was then rotated and positioned to fill the lower lip defect.  The flap was then sutured into place with a three layer technique, closing the orbicularis oris muscle layer with subcutaneous buried sutures, followed by a mucosal layer and an epidermal layer.
Cheiloplasty (Less Than 50%) Text: A decision was made to reconstruct the defect with a  cheiloplasty.  The defect was undermined extensively.  Additional orbicularis oris muscle was excised with a 15 blade scalpel.  The defect was converted into a full thickness wedge, of less than 50% of the vertical height of the lip, to facilite a better cosmetic result.  Small vessels were then tied off with 5-0 monocyrl. The orbicularis oris, superficial fascia, adipose and dermis were then reapproximated.  After the deeper layers were approximated the epidermis was reapproximated with particular care given to realign the vermilion border.
Cheiloplasty (Complex) Text: A decision was made to reconstruct the defect with a  cheiloplasty.  The defect was undermined extensively.  Additional orbicularis oris muscle was excised with a 15 blade scalpel.  The defect was converted into a full thickness wedge to facilite a better cosmetic result.  Small vessels were then tied off with 5-0 monocyrl. The orbicularis oris, superficial fascia, adipose and dermis were then reapproximated.  After the deeper layers were approximated the epidermis was reapproximated with particular care given to realign the vermilion border.
Ear Wedge Repair Text: A wedge excision was completed by carrying down an excision through the full thickness of the ear and cartilage with an inward facing Burow's triangle. The wound was then closed in a layered fashion.
Full Thickness Lip Wedge Repair (Flap) Text: Given the location of the defect and the proximity to free margins a full thickness wedge repair was deemed most appropriate. Using a sterile surgical marker, the appropriate repair was drawn incorporating the defect and placing the expected incisions perpendicular to the vermilion border.  The vermilion border was also meticulously outlined to ensure appropriate reapproximation during the repair.  The area thus outlined was incised through and through with a #15 scalpel blade.  The muscularis and dermis were reaproximated with deep sutures following hemostasis. Care was taken to realign the vermilion border before proceeding with the superficial closure.  Once the vermilion was realigned the superfical and mucosal closure was finished.
Ftsg Text: The defect edges were debeveled with a #15 scalpel blade. Given the location of the defect, shape of the defect and the proximity to free margins a full thickness skin graft was deemed most appropriate. Using a sterile surgical marker, the primary defect shape was transferred to the donor site. The area thus outlined was incised deep to adipose tissue with a #15 scalpel blade.  The harvested graft was then trimmed of adipose tissue until only dermis and epidermis was left.  The skin margins of the secondary defect were undermined to an appropriate distance in all directions utilizing iris scissors.  The secondary defect was closed with interrupted buried subcutaneous sutures.  The skin edges were then re-apposed with running  sutures.  The skin graft was then placed in the primary defect and oriented appropriately.
Split-Thickness Skin Graft Text: The defect edges were debeveled with a #15 scalpel blade. Given the location of the defect, shape of the defect and the proximity to free margins a split thickness skin graft was deemed most appropriate. Using a sterile surgical marker, the primary defect shape was transferred to the donor site. The split thickness graft was then harvested.  The skin graft was then placed in the primary defect and oriented appropriately.
Pinch Graft Text: The defect edges were debeveled with a #15 scalpel blade. Given the location of the defect, shape of the defect and the proximity to free margins a pinch graft was deemed most appropriate. Using a sterile surgical marker, the primary defect shape was transferred to the donor site. The area thus outlined was incised deep to adipose tissue with a #15 scalpel blade.  The harvested graft was then trimmed of adipose tissue until only dermis and epidermis was left. The skin margins of the secondary defect were undermined to an appropriate distance in all directions utilizing iris scissors.  The secondary defect was closed with interrupted buried subcutaneous sutures.  The skin edges were then re-apposed with running  sutures.  The skin graft was then placed in the primary defect and oriented appropriately.
Burow's Graft Text: The defect edges were debeveled with a #15 scalpel blade. Given the location of the defect, shape of the defect, the proximity to free margins and the presence of a standing cone deformity a Burow's skin graft was deemed most appropriate. The standing cone was removed and this tissue was then trimmed to the shape of the primary defect. The adipose tissue was also removed until only dermis and epidermis were left.  The skin margins of the secondary defect were undermined to an appropriate distance in all directions utilizing iris scissors.  The secondary defect was closed with interrupted buried subcutaneous sutures.  The skin edges were then re-apposed with running  sutures.  The skin graft was then placed in the primary defect and oriented appropriately.
Cartilage Graft Text: The defect edges were debeveled with a #15 scalpel blade. Given the location of the defect, shape of the defect, the fact the defect involved a full thickness cartilage defect a cartilage graft was deemed most appropriate.  An appropriate donor site was identified, cleansed, and anesthetized. The cartilage graft was then harvested and transferred to the recipient site, oriented appropriately and then sutured into place.  The secondary defect was then repaired using a primary closure.
Composite Graft Text: The defect edges were debeveled with a #15 scalpel blade. Given the location of the defect, shape of the defect, the proximity to free margins and the fact the defect was full thickness a composite graft was deemed most appropriate.  The defect was outline and then transferred to the donor site.  A full thickness graft was then excised from the donor site. The graft was then placed in the primary defect, oriented appropriately and then sutured into place.  The secondary defect was then repaired using a primary closure.
Epidermal Autograft Text: The defect edges were debeveled with a #15 scalpel blade. Given the location of the defect, shape of the defect and the proximity to free margins an epidermal autograft was deemed most appropriate. Using a sterile surgical marker, the primary defect shape was transferred to the donor site. The epidermal graft was then harvested.  The skin graft was then placed in the primary defect and oriented appropriately.
Dermal Autograft Text: The defect edges were debeveled with a #15 scalpel blade. Given the location of the defect, shape of the defect and the proximity to free margins a dermal autograft was deemed most appropriate. Using a sterile surgical marker, the primary defect shape was transferred to the donor site. The area thus outlined was incised deep to adipose tissue with a #15 scalpel blade.  The harvested graft was then trimmed of adipose and epidermal tissue until only dermis was left.  The skin graft was then placed in the primary defect and oriented appropriately.
Skin Substitute Text: The defect edges were debeveled with a #15 scalpel blade. Given the location of the defect, shape of the defect and the proximity to free margins a skin substitute graft was deemed most appropriate.  The graft material was trimmed to fit the size of the defect. The graft was then placed in the primary defect and oriented appropriately.
Tissue Cultured Epidermal Autograft Text: The defect edges were debeveled with a #15 scalpel blade. Given the location of the defect, shape of the defect and the proximity to free margins a tissue cultured epidermal autograft was deemed most appropriate.  The graft was then trimmed to fit the size of the defect.  The graft was then placed in the primary defect and oriented appropriately.
Xenograft Text: The defect edges were debeveled with a #15 scalpel blade. Given the location of the defect, shape of the defect and the proximity to free margins a xenograft was deemed most appropriate.  The graft was then trimmed to fit the size of the defect.  The graft was then placed in the primary defect and oriented appropriately.
Purse String (Simple) Text: Given the location of the defect and the characteristics of the surrounding skin a purse string closure was deemed most appropriate.  Undermining was performed circumferentially around the surgical defect.  A purse string suture was then placed and tightened.
Purse String (Intermediate) Text: Given the location of the defect and the characteristics of the surrounding skin a purse string intermediate closure was deemed most appropriate.  Undermining was performed circumferentially around the surgical defect.  A purse string suture was then placed and tightened.
Partial Purse String (Simple) Text: Given the location of the defect and the characteristics of the surrounding skin a simple purse string closure was deemed most appropriate.  Undermining was performed circumferentially around the surgical defect.  A purse string suture was then placed and tightened. Wound tension only allowed a partial closure of the circular defect.
Partial Purse String (Intermediate) Text: Given the location of the defect and the characteristics of the surrounding skin an intermediate purse string closure was deemed most appropriate.  Undermining was performed circumferentially around the surgical defect.  A purse string suture was then placed and tightened. Wound tension only allowed a partial closure of the circular defect.
Localized Dermabrasion With Wire Brush Text: The patient was draped in routine manner.  Localized dermabrasion using 3 x 17 mm wire brush was performed in routine manner to papillary dermis. This spot dermabrasion is being performed to complete skin cancer reconstruction. It also will eliminate the other sun damaged precancerous cells that are known to be part of the regional effect of a lifetime's worth of sun exposure. This localized dermabrasion is therapeutic and should not be considered cosmetic in any regard.
Tarsorrhaphy Text: A tarsorrhaphy was performed using Frost sutures.
Intermediate Repair And Flap Additional Text (Will Appearing After The Standard Complex Repair Text): The intermediate repair was not sufficient to completely close the primary defect. The remaining additional defect was repaired with the flap mentioned below.
Intermediate Repair And Graft Additional Text (Will Appearing After The Standard Complex Repair Text): The intermediate repair was not sufficient to completely close the primary defect. The remaining additional defect was repaired with the graft mentioned below.
Complex Repair And Flap Additional Text (Will Appearing After The Standard Complex Repair Text): The complex repair was not sufficient to completely close the primary defect. The remaining additional defect was repaired with the flap mentioned below.
Complex Repair And Graft Additional Text (Will Appearing After The Standard Complex Repair Text): The complex repair was not sufficient to completely close the primary defect. The remaining additional defect was repaired with the graft mentioned below.
Eyelid Full Thickness Repair - 84863: The eyelid defect was full thickness which required a wedge repair of the eyelid. Special care was taken to ensure that the eyelid margin was realligned when placing sutures.
Manual Repair Warning Statement: We plan on removing the manually selected variable below in favor of our much easier automatic structured text blocks found in the previous tab. We decided to do this to help make the flow better and give you the full power of structured data. Manual selection is never going to be ideal in our platform and I would encourage you to avoid using manual selection from this point on, especially since I will be sunsetting this feature. It is important that you do one of two things with the customized text below. First, you can save all of the text in a word file so you can have it for future reference. Second, transfer the text to the appropriate area in the Library tab. Lastly, if there is a flap or graft type which we do not have you need to let us know right away so I can add it in before the variable is hidden. No need to panic, we plan to give you roughly 6 months to make the change.
Same Histology In Subsequent Stages Text: The pattern and morphology of the tumor is as described in the first stage.
No Residual Tumor Seen Histology Text: There were no malignant cells seen in the sections examined.
Inflammation Suggestive Of Cancer Camouflage Histology Text: There was a dense lymphocytic infiltrate which prevented adequate histologic evaluation of adjacent structures.
Bcc Histology Text: There were numerous aggregates of basaloid cells.
Bcc Infiltrative Histology Text: There were numerous aggregates of basaloid cells demonstrating an infiltrative pattern.
Mart-1 - Positive Histology Text: MART-1 staining demonstrates areas of higher density and clustering of melanocytes with Pagetoid spread upwards within the epidermis. The surgical margins are positive for tumor cells.
Mart-1 - Negative Histology Text: MART-1 staining demonstrates a normal density and pattern of melanocytes along the dermal-epidermal junction. The surgical margins are negative for tumor cells.
Information: Selecting Yes will display possible errors in your note based on the variables you have selected. This validation is only offered as a suggestion for you. PLEASE NOTE THAT THE VALIDATION TEXT WILL BE REMOVED WHEN YOU FINALIZE YOUR NOTE. IF YOU WANT TO FAX A PRELIMINARY NOTE YOU WILL NEED TO TOGGLE THIS TO 'NO' IF YOU DO NOT WANT IT IN YOUR FAXED NOTE.

## 2024-01-18 ENCOUNTER — APPOINTMENT (OUTPATIENT)
Dept: URBAN - METROPOLITAN AREA CLINIC 244 | Age: 84
Setting detail: DERMATOLOGY
End: 2024-01-20

## 2024-01-18 DIAGNOSIS — Z48.817 ENCOUNTER FOR SURGICAL AFTERCARE FOLLOWING SURGERY ON THE SKIN AND SUBCUTANEOUS TISSUE: ICD-10-CM

## 2024-01-18 PROCEDURE — OTHER DRESSING CHANGE: OTHER

## 2024-01-18 ASSESSMENT — LOCATION DETAILED DESCRIPTION DERM: LOCATION DETAILED: RIGHT INFERIOR POSTERIOR NECK

## 2024-01-18 ASSESSMENT — LOCATION SIMPLE DESCRIPTION DERM: LOCATION SIMPLE: POSTERIOR NECK

## 2024-01-18 ASSESSMENT — LOCATION ZONE DERM: LOCATION ZONE: NECK

## 2024-01-18 NOTE — PROCEDURE: DRESSING CHANGE
Detail Level: Detailed
Add 02789 Cpt? (Important Note: In 2017 The Use Of 66656 Is Being Tracked By Cms To Determine Future Global Period Reimbursement For Global Periods): no

## 2024-01-23 ENCOUNTER — VIRTUAL PHONE E/M (OUTPATIENT)
Dept: INTERNAL MEDICINE CLINIC | Facility: CLINIC | Age: 84
End: 2024-01-23

## 2024-01-23 DIAGNOSIS — Z02.9 ADMINISTRATIVE ENCOUNTER: Primary | ICD-10-CM

## 2024-01-24 ENCOUNTER — TELEPHONE (OUTPATIENT)
Dept: INTERNAL MEDICINE CLINIC | Facility: CLINIC | Age: 84
End: 2024-01-24

## 2024-01-24 PROBLEM — M25.561 CHRONIC PAIN OF RIGHT KNEE: Status: ACTIVE | Noted: 2024-01-24

## 2024-01-24 PROBLEM — F48.9 TENSION: Status: ACTIVE | Noted: 2024-01-24

## 2024-01-24 PROBLEM — G89.29 CHRONIC PAIN OF RIGHT KNEE: Status: ACTIVE | Noted: 2024-01-24

## 2024-01-24 NOTE — TELEPHONE ENCOUNTER
Per Dr. Farr called patient to try to reschedule her missed phone visit from yesterday. Got voicemail left message Dr would like to reschedule for next Tuesday the 30th at 4:00. Advised patient to call us back.

## 2024-01-29 ENCOUNTER — APPOINTMENT (OUTPATIENT)
Dept: URBAN - METROPOLITAN AREA CLINIC 244 | Age: 84
Setting detail: DERMATOLOGY
End: 2024-01-31

## 2024-01-29 DIAGNOSIS — L82.0 INFLAMED SEBORRHEIC KERATOSIS: ICD-10-CM

## 2024-01-29 DIAGNOSIS — Z48.02 ENCOUNTER FOR REMOVAL OF SUTURES: ICD-10-CM

## 2024-01-29 PROCEDURE — 17110 DESTRUCT B9 LESION 1-14: CPT | Mod: 79

## 2024-01-29 PROCEDURE — OTHER COUNSELING: OTHER

## 2024-01-29 PROCEDURE — OTHER LIQUID NITROGEN: OTHER

## 2024-01-29 PROCEDURE — OTHER SUTURE REMOVAL (GLOBAL PERIOD): OTHER

## 2024-01-29 PROCEDURE — 99024 POSTOP FOLLOW-UP VISIT: CPT

## 2024-01-29 ASSESSMENT — LOCATION DETAILED DESCRIPTION DERM
LOCATION DETAILED: RIGHT INFERIOR ANTERIOR NECK
LOCATION DETAILED: RIGHT INFERIOR POSTERIOR NECK

## 2024-01-29 ASSESSMENT — LOCATION SIMPLE DESCRIPTION DERM
LOCATION SIMPLE: RIGHT ANTERIOR NECK
LOCATION SIMPLE: POSTERIOR NECK

## 2024-01-29 ASSESSMENT — LOCATION ZONE DERM: LOCATION ZONE: NECK

## 2024-01-29 NOTE — PROCEDURE: SUTURE REMOVAL (GLOBAL PERIOD)
Detail Level: Detailed
Add 05133 Cpt? (Important Note: In 2017 The Use Of 37150 Is Being Tracked By Cms To Determine Future Global Period Reimbursement For Global Periods): yes

## 2024-02-01 ENCOUNTER — APPOINTMENT (OUTPATIENT)
Dept: URBAN - METROPOLITAN AREA CLINIC 244 | Age: 84
Setting detail: DERMATOLOGY
End: 2024-02-01

## 2024-02-01 DIAGNOSIS — Z48.817 ENCOUNTER FOR SURGICAL AFTERCARE FOLLOWING SURGERY ON THE SKIN AND SUBCUTANEOUS TISSUE: ICD-10-CM

## 2024-02-01 PROCEDURE — 99024 POSTOP FOLLOW-UP VISIT: CPT | Mod: 24

## 2024-02-01 PROCEDURE — OTHER COUNSELING: OTHER

## 2024-02-01 PROCEDURE — OTHER ADDITIONAL NOTES: OTHER

## 2024-02-01 ASSESSMENT — LOCATION DETAILED DESCRIPTION DERM: LOCATION DETAILED: MID POSTERIOR NECK

## 2024-02-01 ASSESSMENT — LOCATION SIMPLE DESCRIPTION DERM: LOCATION SIMPLE: POSTERIOR NECK

## 2024-02-01 ASSESSMENT — LOCATION ZONE DERM: LOCATION ZONE: NECK

## 2024-02-01 NOTE — PROCEDURE: ADDITIONAL NOTES
Render Risk Assessment In Note?: no
Additional Notes: 1 suture was intact and removed today.
Detail Level: Simple

## 2024-02-13 ENCOUNTER — OFFICE VISIT (OUTPATIENT)
Dept: INTERNAL MEDICINE CLINIC | Facility: CLINIC | Age: 84
End: 2024-02-13

## 2024-02-13 ENCOUNTER — HOSPITAL ENCOUNTER (OUTPATIENT)
Dept: GENERAL RADIOLOGY | Age: 84
Discharge: HOME | End: 2024-02-13
Attending: INTERNAL MEDICINE
Payer: MEDICARE

## 2024-02-13 VITALS
WEIGHT: 201 LBS | HEIGHT: 65 IN | TEMPERATURE: 98 F | BODY MASS INDEX: 33.49 KG/M2 | DIASTOLIC BLOOD PRESSURE: 60 MMHG | HEART RATE: 80 BPM | SYSTOLIC BLOOD PRESSURE: 144 MMHG

## 2024-02-13 DIAGNOSIS — Z95.5 H/O HEART ARTERY STENT: ICD-10-CM

## 2024-02-13 DIAGNOSIS — J01.10 ACUTE NON-RECURRENT FRONTAL SINUSITIS: Primary | ICD-10-CM

## 2024-02-13 DIAGNOSIS — I10 HYPERTENSION, BENIGN: ICD-10-CM

## 2024-02-13 DIAGNOSIS — M25.561 CHRONIC PAIN OF RIGHT KNEE: ICD-10-CM

## 2024-02-13 DIAGNOSIS — G89.29 CHRONIC PAIN OF RIGHT KNEE: ICD-10-CM

## 2024-02-13 DIAGNOSIS — J01.10 ACUTE NON-RECURRENT FRONTAL SINUSITIS: ICD-10-CM

## 2024-02-13 PROBLEM — J32.1 FRONTAL SINUSITIS: Status: ACTIVE | Noted: 2024-02-13

## 2024-02-13 PROCEDURE — 99214 OFFICE O/P EST MOD 30 MIN: CPT | Performed by: INTERNAL MEDICINE

## 2024-02-13 PROCEDURE — 71046 X-RAY EXAM CHEST 2 VIEWS: CPT | Performed by: INTERNAL MEDICINE

## 2024-02-13 RX ORDER — DOXYCYCLINE 100 MG/1
100 TABLET ORAL 2 TIMES DAILY
Qty: 20 TABLET | Refills: 0 | Status: SHIPPED | OUTPATIENT
Start: 2024-02-13 | End: 2024-02-23

## 2024-02-13 RX ORDER — BENZONATATE 100 MG/1
CAPSULE ORAL
Qty: 30 CAPSULE | Refills: 1 | Status: SHIPPED | OUTPATIENT
Start: 2024-02-13

## 2024-02-13 NOTE — PROGRESS NOTES
Bijal Perez is a 83 year old female.  HPI:   She has had intense cough productive of yellow mucous for the last 3-4 days. Tested neg for COVID x 2. No fever or chills     Pain right knee continues with every step - she has stress fracture as well as torn meniscus and is seeing ortho.      IHD - stable s/p stenting 2017 and 2021.      SR: no chest pain or sob, no gu or gi sx.    BP Readings from Last 6 Encounters:   02/13/24 144/60   12/12/23 118/60   11/07/23 121/54   10/20/23 135/83   10/04/23 116/58   09/13/23 124/79     Wt Readings from Last 6 Encounters:   02/13/24 201 lb (91.2 kg)   12/12/23 201 lb (91.2 kg)   11/07/23 198 lb (89.8 kg)   10/20/23 193 lb (87.5 kg)   10/04/23 193 lb (87.5 kg)   09/13/23 199 lb (90.3 kg)     Current Outpatient Medications   Medication Sig Dispense Refill    ergocalciferol 1.25 MG (32153 UT) Oral Cap Take one tablet every other WEEK 6 capsule 3    rOPINIRole 1 MG Oral Tab Take one at HS for restless legs 90 tablet 3    ALPRAZolam 0.25 MG Oral Tab Take 1 tablet (0.25 mg total) by mouth 3 (three) times daily as needed. 90 tablet 2    OXYBUTYNIN CHLORIDE ER 15 MG Oral Tablet 24 Hr TAKE 1 TABLET BY MOUTH EVERY DAY AT NIGHT 90 tablet 3    LOSARTAN-HYDROCHLOROTHIAZIDE 50-12.5 MG Oral Tab TAKE 1 TABLET BY MOUTH EVERY DAY 90 tablet 3    Calcium 200 MG Oral Tab Take 1 tablet by mouth in the morning and 1 tablet before bedtime.      Empagliflozin (JARDIANCE) 25 MG Oral Tab Take 1 tablet by mouth daily. 90 tablet 3    glipiZIDE 10 MG Oral Tab Take 1 tablet (10 mg total) by mouth 2 (two) times daily before meals. 180 tablet 3    metFORMIN  MG Oral Tablet 24 Hr Take 2 tablets (1,000 mg total) by mouth 2 (two) times daily. 360 tablet 1    semaglutide 2 MG/3ML Subcutaneous Solution Pen-injector Inject 0.5 mg into the skin once a week. 9 mL 1    DOCUSATE SODIUM OR Take 1 capsule by mouth in the morning and 1 capsule before bedtime.      benzonatate 200 MG Oral Cap Take 1 capsule (200  mg total) by mouth 3 (three) times daily as needed for cough. 60 capsule 0    LETROZOLE 2.5 MG Oral Tab TAKE 1 TABLET BY MOUTH  DAILY 90 tablet 3    clotrimazole-betamethasone 1-0.05 % External Cream Apply 1 Application topically 2 (two) times daily. (Patient taking differently: Apply 1 Application  topically as needed.) 60 g 3    Multiple Vitamins-Minerals (CENTRUM SILVER ULTRA WOMENS) Oral Tab Take by mouth daily.      atorvastatin 40 MG Oral Tab Take 1 tablet (40 mg total) by mouth nightly.      Glucose Blood (ONETOUCH VERIO) In Vitro Strip Check BG 2 times per day. DX:E11.8 without insulin use. 200 strip 1    OneTouch Delica Lancets 33G Does not apply Misc Check BG 2 times per day. DX: E11.8 without insulin use. 200 each 1    Pimecrolimus 1 % External Cream as needed. APPLY TO AFFECTED AREA OF THE FOLDS OF THE ABDOMEN TWICE DAILY   1    Polyethyl Glycol-Propyl Glycol (SYSTANE ULTRA) 0.4-0.3 % Ophthalmic Solution Place 1 drop into both eyes as needed.      aspirin 81 MG Oral Tab Take 1 tablet (81 mg total) by mouth daily.        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 Rice    Type II or unspecified type diabetes mellitus without mention of complication, not stated as uncontrolled 8/2007    Unspecified essential hypertension     Visual impairment     glasses for reading      Social History:  Social History     Socioeconomic History     Marital status:    Tobacco Use    Smoking status: Never     Passive exposure: Past    Smokeless tobacco: Never   Vaping Use    Vaping Use: Never used   Substance and Sexual Activity    Alcohol use: No    Drug use: No   Other Topics Concern    Caffeine Concern No        REVIEW OF SYSTEMS:   GENERAL HEALTH: feels well otherwise  SKIN: denies any unusual skin lesions or rashes  RESPIRATORY: denies shortness of breath with exertion  CARDIOVASCULAR: denies chest pain on exertion  GI: denies abdominal pain and denies heartburn  NEURO: denies headaches    EXAM:   /60   Pulse 80   Temp 97.7 °F (36.5 °C) (Oral)   Ht 5' 5\" (1.651 m)   Wt 201 lb (91.2 kg)   LMP  (LMP Unknown)   BMI 33.45 kg/m²   GENERAL: well developed, well nourished,in no apparent distress  SKIN: no rashes,no suspicious lesions  HEENT: atraumatic, normocephalic,ears and throat are clear  NECK: supple,no adenopathy,no bruits  LUNGS: clear to auscultation  CARDIO: RRR without murmur  GI: good BS's,no masses, HSM or tenderness  EXTREMITIES: no cyanosis, clubbing or edema    ASSESSMENT AND PLAN:   1. Acute non-recurrent frontal sinusitis  Doxycyline 100 mg bid for 10 days, Tessalon     2. Hypertension, benign  At goal, same meds     3. H/O heart artery stent  Doing well.     4. Chronic pain of right knee  Continue ortho f/u     The patient indicates understanding of these issues and agrees to the plan.  The patient is asked to return at next visit .

## 2024-02-19 ENCOUNTER — TELEPHONE (OUTPATIENT)
Dept: INTERNAL MEDICINE CLINIC | Facility: CLINIC | Age: 84
End: 2024-02-19

## 2024-03-05 ENCOUNTER — TELEPHONE (OUTPATIENT)
Dept: INTERNAL MEDICINE CLINIC | Facility: CLINIC | Age: 84
End: 2024-03-05

## 2024-03-05 ENCOUNTER — TELEPHONE (OUTPATIENT)
Dept: ENDOCRINOLOGY CLINIC | Facility: CLINIC | Age: 84
End: 2024-03-05

## 2024-03-05 NOTE — TELEPHONE ENCOUNTER
Received a clearance request from CASSY Dawkins DO [facility] for patient above. Patient is schedule for surgery on 04/09/24 and they are requesting clearance from Dr. Jones. Placed clearance in providers folder for review.    Dongmtcatherine to set up an appt with provider for the March 14th.

## 2024-03-05 NOTE — TELEPHONE ENCOUNTER
Patient is calling she was scheduled on 3/26 for a Medicare Annual.    Patient is requesting to be seen the week earlier due to her upcoming surgery    Can patient be added?    Phone [phone]

## 2024-03-06 NOTE — TELEPHONE ENCOUNTER
Patient informed of the plan for endo clearance as indicated by Dr Jones below.  Patient voiced understanding. Per patient, the surgical procedure has been moved to 3/24/24. Upcoming appointment confirmed.

## 2024-03-06 NOTE — TELEPHONE ENCOUNTER
Spoke to patient, PATEL scheduled for 3/12/24- states she has visit with surgeon on 3/11 to obtain pre op information.

## 2024-03-12 ENCOUNTER — OFFICE VISIT (OUTPATIENT)
Dept: INTERNAL MEDICINE CLINIC | Facility: CLINIC | Age: 84
End: 2024-03-12

## 2024-03-12 VITALS
TEMPERATURE: 98 F | SYSTOLIC BLOOD PRESSURE: 136 MMHG | HEIGHT: 65 IN | BODY MASS INDEX: 33.35 KG/M2 | WEIGHT: 200.19 LBS | OXYGEN SATURATION: 97 % | HEART RATE: 83 BPM | DIASTOLIC BLOOD PRESSURE: 58 MMHG

## 2024-03-12 DIAGNOSIS — E78.5 HYPERLIPIDEMIA, UNSPECIFIED HYPERLIPIDEMIA TYPE: ICD-10-CM

## 2024-03-12 DIAGNOSIS — Z17.0 MALIGNANT NEOPLASM OF UPPER-OUTER QUADRANT OF LEFT BREAST IN FEMALE, ESTROGEN RECEPTOR POSITIVE (HCC): ICD-10-CM

## 2024-03-12 DIAGNOSIS — F48.9 TENSION: ICD-10-CM

## 2024-03-12 DIAGNOSIS — C50.412 MALIGNANT NEOPLASM OF UPPER-OUTER QUADRANT OF LEFT BREAST IN FEMALE, ESTROGEN RECEPTOR POSITIVE (HCC): ICD-10-CM

## 2024-03-12 DIAGNOSIS — Z95.5 H/O HEART ARTERY STENT: ICD-10-CM

## 2024-03-12 DIAGNOSIS — Z01.818 PREOP EXAMINATION: Primary | ICD-10-CM

## 2024-03-12 DIAGNOSIS — I10 HYPERTENSION, BENIGN: ICD-10-CM

## 2024-03-12 PROCEDURE — 99215 OFFICE O/P EST HI 40 MIN: CPT | Performed by: INTERNAL MEDICINE

## 2024-03-12 RX ORDER — ERGOCALCIFEROL 1.25 MG/1
CAPSULE ORAL
Qty: 6 CAPSULE | Refills: 3 | Status: SHIPPED | OUTPATIENT
Start: 2024-03-12

## 2024-03-12 RX ORDER — BENZONATATE 200 MG/1
200 CAPSULE ORAL 3 TIMES DAILY PRN
Qty: 60 CAPSULE | Refills: 0 | Status: SHIPPED | OUTPATIENT
Start: 2024-03-12

## 2024-03-12 NOTE — PROGRESS NOTES
Bijal Perez is a 83 year old female.  HPI:   She is here for pre-op exam for right TKR Dr Astorga, March 21, [facility]. She had abnormal EKG with Q waves in precordium - this is unchanged from EKG 2018 -  she had single stent then and then second stent (x2) in same vessel in 2020. No chest pain or sob since. Recent labs all normal except  and A1C up from 7.1 to 8.1.     She is other wise feeling well and no alarm sx.     She does have nocturnal cough - CXR negative 2/2024      SR: no chest pain or sob, no gu or gi sx.    BP Readings from Last 6 Encounters:   03/12/24 136/58   02/13/24 144/60   12/12/23 118/60   11/07/23 121/54   10/20/23 135/83   10/04/23 116/58     Wt Readings from Last 6 Encounters:   03/12/24 200 lb 3.2 oz (90.8 kg)   02/13/24 201 lb (91.2 kg)   12/12/23 201 lb (91.2 kg)   11/07/23 198 lb (89.8 kg)   10/20/23 193 lb (87.5 kg)   10/04/23 193 lb (87.5 kg)     Current Outpatient Medications   Medication Sig Dispense Refill    benzonatate (TESSALON PERLES) 100 MG Oral Cap Take one every 4 hours as needed for cough 30 capsule 1    ergocalciferol 1.25 MG (04458 UT) Oral Cap Take one tablet every other WEEK (Patient taking differently: Take one tablet every three WEEKs) 6 capsule 3    rOPINIRole 1 MG Oral Tab Take one at HS for restless legs 90 tablet 3    ALPRAZolam 0.25 MG Oral Tab Take 1 tablet (0.25 mg total) by mouth 3 (three) times daily as needed. 90 tablet 2    OXYBUTYNIN CHLORIDE ER 15 MG Oral Tablet 24 Hr TAKE 1 TABLET BY MOUTH EVERY DAY AT NIGHT 90 tablet 3    LOSARTAN-HYDROCHLOROTHIAZIDE 50-12.5 MG Oral Tab TAKE 1 TABLET BY MOUTH EVERY DAY 90 tablet 3    Calcium 200 MG Oral Tab Take 1 tablet by mouth in the morning and 1 tablet before bedtime.      Empagliflozin (JARDIANCE) 25 MG Oral Tab Take 1 tablet by mouth daily. 90 tablet 3    glipiZIDE 10 MG Oral Tab Take 1 tablet (10 mg total) by mouth 2 (two) times daily before meals. 180 tablet 3    metFORMIN  MG Oral  Tablet 24 Hr Take 2 tablets (1,000 mg total) by mouth 2 (two) times daily. 360 tablet 1    semaglutide 2 MG/3ML Subcutaneous Solution Pen-injector Inject 0.5 mg into the skin once a week. 9 mL 1    DOCUSATE SODIUM OR Take 1 capsule by mouth in the morning and 1 capsule before bedtime.      benzonatate 200 MG Oral Cap Take 1 capsule (200 mg total) by mouth 3 (three) times daily as needed for cough. 60 capsule 0    LETROZOLE 2.5 MG Oral Tab TAKE 1 TABLET BY MOUTH  DAILY 90 tablet 3    clotrimazole-betamethasone 1-0.05 % External Cream Apply 1 Application topically 2 (two) times daily. (Patient taking differently: Apply 1 Application  topically as needed.) 60 g 3    Multiple Vitamins-Minerals (CENTRUM SILVER ULTRA WOMENS) Oral Tab Take by mouth daily.      atorvastatin 40 MG Oral Tab Take 1 tablet (40 mg total) by mouth nightly.      Glucose Blood (ONETOUCH VERIO) In Vitro Strip Check BG 2 times per day. DX:E11.8 without insulin use. 200 strip 1    OneTouch Delica Lancets 33G Does not apply Misc Check BG 2 times per day. DX: E11.8 without insulin use. 200 each 1    Pimecrolimus 1 % External Cream as needed. APPLY TO AFFECTED AREA OF THE FOLDS OF THE ABDOMEN TWICE DAILY   1    Polyethyl Glycol-Propyl Glycol (SYSTANE ULTRA) 0.4-0.3 % Ophthalmic Solution Place 1 drop into both eyes as needed.      aspirin 81 MG Oral Tab Take 1 tablet (81 mg total) by mouth daily.        Past Medical History:   Diagnosis Date    Age-related nuclear cataract of both eyes     Atherosclerosis of coronary artery     Benign neoplasm of skin of left eyelid     Blepharitis of both eyes     Cancer (HCC) 01/2021    Breast    Chronic allergic conjunctivitis     Coronary atherosclerosis     Edema of right lower eyelid     Gallstone 1/2010    NG: Asymptomatic     Gastroenteritis     High blood pressure     High cholesterol     Lipid screening 12/11/2013    Obesity     Osteoarthritis     Osteoporosis     Osteoporosis screening 1/22/2013    Other and  unspecified hyperlipidemia     Restless leg syndrome 2015    Per Dr. Rice    Type II or unspecified type diabetes mellitus without mention of complication, not stated as uncontrolled 8/2007    Unspecified essential hypertension     Visual impairment     glasses for reading      Social History:  Social History     Socioeconomic History    Marital status:    Tobacco Use    Smoking status: Never     Passive exposure: Past    Smokeless tobacco: Never   Vaping Use    Vaping Use: Never used   Substance and Sexual Activity    Alcohol use: No    Drug use: No   Other Topics Concern    Caffeine Concern No        REVIEW OF SYSTEMS:   GENERAL HEALTH: feels well otherwise  SKIN: denies any unusual skin lesions or rashes  RESPIRATORY: denies shortness of breath with exertion  CARDIOVASCULAR: denies chest pain on exertion  GI: denies abdominal pain and denies heartburn  NEURO: denies headaches    EXAM:   /58   Pulse 83   Temp 98.3 °F (36.8 °C)   Ht 5' 5\" (1.651 m)   Wt 200 lb 3.2 oz (90.8 kg)   LMP  (LMP Unknown)   SpO2 97%   BMI 33.32 kg/m²   GENERAL: well developed, well nourished,in no apparent distress  SKIN: no rashes,no suspicious lesions  HEENT: atraumatic, normocephalic,ears and throat are clear  NECK: supple,no adenopathy,no bruits  LUNGS: clear to auscultation  CARDIO: RRR without murmur  GI: good BS's,no masses, HSM or tenderness  EXTREMITIES: no cyanosis, clubbing or edema    Breast: no masses     ASSESSMENT AND PLAN:   1. Preop examination  Her examination is unrevealing   She is at average risk for the proposed surgery.     Recent labs reviewed.     EKG has had chronic changes.     Stop ASA 1 week before surgery.     2. Malignant neoplasm of upper-outer quadrant of left breast in female, estrogen receptor positive (HCC)  Dx 2000 - she follows with Dr Macario.     3. Hypertension, benign  At goal, same meds     4. Hyperlipidemia, unspecified hyperlipidemia type  Last labs at goal     5. H/O heart artery  stent  Doing well and no chest pain or sob     6. Vit D def    - ergocalciferol 1.25 MG (87637 UT) Oral Cap; Take one tablet every other WEEK  Dispense: 6 capsule; Refill: 3    The patient indicates understanding of these issues and agrees to the plan.  The patient is asked to return in 2-3  months

## 2024-03-14 ENCOUNTER — OFFICE VISIT (OUTPATIENT)
Dept: ENDOCRINOLOGY CLINIC | Facility: CLINIC | Age: 84
End: 2024-03-14
Payer: MEDICARE

## 2024-03-14 VITALS
SYSTOLIC BLOOD PRESSURE: 121 MMHG | DIASTOLIC BLOOD PRESSURE: 61 MMHG | HEART RATE: 83 BPM | BODY MASS INDEX: 33 KG/M2 | WEIGHT: 199 LBS

## 2024-03-14 DIAGNOSIS — E11.65 UNCONTROLLED TYPE 2 DIABETES MELLITUS WITH HYPERGLYCEMIA (HCC): Primary | ICD-10-CM

## 2024-03-14 LAB
GLUCOSE BLOOD: 201
TEST STRIP LOT #: NORMAL NUMERIC

## 2024-03-14 PROCEDURE — 99214 OFFICE O/P EST MOD 30 MIN: CPT | Performed by: INTERNAL MEDICINE

## 2024-03-14 PROCEDURE — 82947 ASSAY GLUCOSE BLOOD QUANT: CPT | Performed by: INTERNAL MEDICINE

## 2024-03-14 RX ORDER — SEMAGLUTIDE 0.68 MG/ML
0.5 INJECTION, SOLUTION SUBCUTANEOUS WEEKLY
Qty: 9 ML | Refills: 1 | Status: SHIPPED | OUTPATIENT
Start: 2024-03-14

## 2024-03-14 NOTE — PROGRESS NOTES
Name: Bijal Peerz  Date: 3/14/2024    Referring Physician: No ref. provider found    HISTORY OF PRESENT ILLNESS   Bijal Perez is a 83 year old female who presents for diabetes mellitus.  Since last visit her  passed away in 2/2018.       [place] [place] [place] some [place] noncompliance; 2/26/-3/4.    Prior HbA, C or glycohemoglobin were 8.2% 6/2017; 7.6% 10/2017; 6.6% 1/2018; 7.5% 7/2018; 7.2% 8/2019; 9.0% 3/2020; 7.2% 6/2020; 7.8% 10/2020; 8.0% 2/2021; 7.4% 6/2021; 6.8% 10/2021; 6.5% 4/2022; 7.2% 10/2022; 7.1% 10/2023; 8.1% 3/2024     Dietary compliance: Good - some more cheating on diet over the past few months   Exercise: No   Polyuria/polydipsia: No  Blurred vision: No    Episodes of hypoglycemia: No  Blood Glucose:  Checking infrequently     Medications for DM   Glipizide 10mg PO BID   Metformin 1000mg PO BID  Jardiance 25mg PO daily    Ozempic 0.5mg subcutaneous weekly     Trulicity d/c'd due to side effects        REVIEW OF SYSTEMS  Eyes: Diabetic retinopathy present: No            Most recent visit to eye doctor in last 12 months: Yes, due for follow up     CV: Cardiovascular disease present: Yes, CAD s/p stent 11/2017         Hypertension present: Yes         Hyperlipidemia present: Yes         Peripheral Vascular Disease present: No    : Nephropathy present: No    Neuro: Neuropathy present: No    Skin: Infection or ulceration: No    Osteoporosis: No    Thyroid disease: No      Medications:     Current Outpatient Medications:     benzonatate 200 MG Oral Cap, Take 1 capsule (200 mg total) by mouth 3 (three) times daily as needed for cough., Disp: 60 capsule, Rfl: 0    ergocalciferol 1.25 MG (13969 UT) Oral Cap, Take one tablet every other WEEK, Disp: 6 capsule, Rfl: 3    rOPINIRole 1 MG Oral Tab, Take one at HS for restless legs, Disp: 90 tablet, Rfl: 3    ALPRAZolam 0.25 MG Oral Tab, Take 1 tablet (0.25 mg total) by mouth 3 (three) times daily as needed., Disp: 90  tablet, Rfl: 2    OXYBUTYNIN CHLORIDE ER 15 MG Oral Tablet 24 Hr, TAKE 1 TABLET BY MOUTH EVERY DAY AT NIGHT, Disp: 90 tablet, Rfl: 3    LOSARTAN-HYDROCHLOROTHIAZIDE 50-12.5 MG Oral Tab, TAKE 1 TABLET BY MOUTH EVERY DAY, Disp: 90 tablet, Rfl: 3    Calcium 200 MG Oral Tab, Take 1 tablet by mouth in the morning and 1 tablet before bedtime., Disp: , Rfl:     Empagliflozin (JARDIANCE) 25 MG Oral Tab, Take 1 tablet by mouth daily., Disp: 90 tablet, Rfl: 3    glipiZIDE 10 MG Oral Tab, Take 1 tablet (10 mg total) by mouth 2 (two) times daily before meals., Disp: 180 tablet, Rfl: 3    metFORMIN  MG Oral Tablet 24 Hr, Take 2 tablets (1,000 mg total) by mouth 2 (two) times daily., Disp: 360 tablet, Rfl: 1    semaglutide 2 MG/3ML Subcutaneous Solution Pen-injector, Inject 0.5 mg into the skin once a week., Disp: 9 mL, Rfl: 1    DOCUSATE SODIUM OR, Take 1 capsule by mouth in the morning and 1 capsule before bedtime., Disp: , Rfl:     LETROZOLE 2.5 MG Oral Tab, TAKE 1 TABLET BY MOUTH  DAILY, Disp: 90 tablet, Rfl: 3    clotrimazole-betamethasone 1-0.05 % External Cream, Apply 1 Application topically 2 (two) times daily. (Patient taking differently: Apply 1 Application  topically as needed.), Disp: 60 g, Rfl: 3    Multiple Vitamins-Minerals (CENTRUM SILVER ULTRA WOMENS) Oral Tab, Take by mouth daily., Disp: , Rfl:     atorvastatin 40 MG Oral Tab, Take 1 tablet (40 mg total) by mouth nightly., Disp: , Rfl:     Glucose Blood (ONETOUCH VERIO) In Vitro Strip, Check BG 2 times per day. DX:E11.8 without insulin use., Disp: 200 strip, Rfl: 1    OneTouch Delica Lancets 33G Does not apply Misc, Check BG 2 times per day. DX: E11.8 without insulin use., Disp: 200 each, Rfl: 1    Pimecrolimus 1 % External Cream, as needed. APPLY TO AFFECTED AREA OF THE FOLDS OF THE ABDOMEN TWICE DAILY , Disp: , Rfl: 1    Polyethyl Glycol-Propyl Glycol (SYSTANE ULTRA) 0.4-0.3 % Ophthalmic Solution, Place 1 drop into both eyes as needed., Disp: , Rfl:      aspirin 81 MG Oral Tab, Take 1 tablet (81 mg total) by mouth daily., Disp: , Rfl:      Allergies:   Allergies   Allergen Reactions    Januvia [Sitagliptin] ITCHING       Social History:   Social History     Socioeconomic History    Marital status:    Tobacco Use    Smoking status: Never     Passive exposure: Past    Smokeless tobacco: Never   Vaping Use    Vaping Use: Never used   Substance and Sexual Activity    Alcohol use: No    Drug use: No   Other Topics Concern    Caffeine Concern No       Medical History:   Past Medical History:   Diagnosis Date    Age-related nuclear cataract of both eyes     Atherosclerosis of coronary artery     Benign neoplasm of skin of left eyelid     Blepharitis of both eyes     Cancer (HCC) 01/2021    Breast    Chronic allergic conjunctivitis     Coronary atherosclerosis     Edema of right lower eyelid     Gallstone 1/2010    NG: Asymptomatic     Gastroenteritis     High blood pressure     High cholesterol     Lipid screening 12/11/2013    Obesity     Osteoarthritis     Osteoporosis     Osteoporosis screening 1/22/2013    Other and unspecified hyperlipidemia     Restless leg syndrome 2015    Per Dr. Rice    Type II or unspecified type diabetes mellitus without mention of complication, not stated as uncontrolled 8/2007    Unspecified essential hypertension     Visual impairment     glasses for reading       Surgical history:   Past Surgical History:   Procedure Laterality Date    BREAST BIOPSY Right 2013    CARPAL TUNNEL RELEASE Right     CATARACT      CATARACT EXTRACTION Left 12/12/2018    SN6AT3 21.00 D LENSX WITH ORA    CATARACT EXTRACTION W/  INTRAOCULAR LENS IMPLANT Right 11/14/2018    Lensx Toric IOL    model:[id number]     Power 19.0D   3.75CYL    CATH CARTOTID STENT  11/2017    CATH DRUG ELUTING STENT       3 total    COLONOSCOPY  11/2018    COLONOSCOPY N/A 11/12/2018    Procedure: COLONOSCOPY, POSSIBLE BIOPSY, POSSIBLE POLYPECTOMY 10093;  Surgeon: Teofilo Guerrero MD;   Location: [facility], [facility]    HYSTERECTOMY  1997    LUMPECTOMY LEFT Left 02/2021    CAITLYN BIOPSY STEREO NODULE 1 SITE RIGHT (CPT=19081) Right 2008    CAITLYN BIOPSY STEREO NODULE 1 SITE RIGHT (CPT=19081) Right 2013    NEEDLE BIOPSY LEFT Left 01/06/2021    NEEDLE BIOPSY RIGHT Right     OTHER SURGICAL HISTORY  11/29/2017    stent placement    RADIATION LEFT Left 2021    Finshied April 13, 2021    TONSILLECTOMY           PHYSICAL EXAM  /61   Pulse 83   Wt 199 lb (90.3 kg)   LMP  (LMP Unknown)   BMI 33.12 kg/m²     General Appearance:  alert, well developed, in no acute distress  Eyes:  normal conjunctivae, sclera., normal sclera and normal pupils  Ears/Nose/Mouth/Throat/Neck:  no palpable thyroid nodules or cervical lymphadenopathy  Back: no kyphosis or back tenderness  Musculoskeletal:  normal muscle strength and tone  Skin:  normal moisture and skin texture  Hair & Nails:  normal scalp hair     Hematologic:  no excessive bruising  Neuro:  sensory grossly intact and motor grossly intact  Psychiatric:  oriented to time, self, and place  Nutritional:  no abnormal weight gain or loss    ASSESSMENT/PLAN:      1. Diabetes Mellitus Type 2, controlled  -controlled, HgA1c 8.1% -->increased since last visit due to dietary noncompliance   -She admits to cheating on recent cruise  -Ok to proceed with orthopedic surgery   -Discussed importance of glycemic control to prevent complications of diabetes  -Discussed complications of diabetes include retinopathy, neuropathy, nephropathy and cardiovascular disease  -Discussed importance of SBGM  -Discussed importance of low CHO diet, recommend 45gm per meal or 135gm per day  -Continue Metformin and Glipizide   -Continue Jardiance 25mg PO daily, verbalized understanding of risks and benefits  -Continue Ozempic 0.5mg SQ weekly, verbalized understanding of risks and benefits  -Normotensive  -Normal Microalbumin 10/2023   -normal foot exam performed 9/2023     DM quality  measures:  A1C/Blood pressure: as reported above   Nephropathy screening: 10/2023    LIPID screenin2023 .  statin rx.   Last dilated eye exam: 2023 Exam shows retinopathy? No  Last diabetic foot exam: today  Dentist : recommend every 6m      RTC 6 months     3/14/2024  Justine Jones MD

## 2024-04-26 ENCOUNTER — TELEPHONE (OUTPATIENT)
Dept: INTERNAL MEDICINE CLINIC | Facility: CLINIC | Age: 84
End: 2024-04-26

## 2024-04-26 ENCOUNTER — LAB ENCOUNTER (OUTPATIENT)
Dept: LAB | Age: 84
End: 2024-04-26
Attending: INTERNAL MEDICINE
Payer: MEDICARE

## 2024-04-26 DIAGNOSIS — R39.9 UTI SYMPTOMS: Primary | ICD-10-CM

## 2024-04-26 LAB
BILIRUB UR QL STRIP.AUTO: NEGATIVE
GLUCOSE UR STRIP.AUTO-MCNC: >1000 MG/DL
KETONES UR STRIP.AUTO-MCNC: NEGATIVE MG/DL
LEUKOCYTE ESTERASE UR QL STRIP.AUTO: 250
PH UR STRIP.AUTO: 5 [PH] (ref 5–8)
PROT UR STRIP.AUTO-MCNC: NEGATIVE MG/DL
RBC UR QL AUTO: NEGATIVE
SP GR UR STRIP.AUTO: 1.03 (ref 1–1.03)
UROBILINOGEN UR STRIP.AUTO-MCNC: NORMAL MG/DL

## 2024-04-26 PROCEDURE — 87086 URINE CULTURE/COLONY COUNT: CPT | Performed by: INTERNAL MEDICINE

## 2024-04-26 PROCEDURE — 87088 URINE BACTERIA CULTURE: CPT | Performed by: INTERNAL MEDICINE

## 2024-04-26 PROCEDURE — 81001 URINALYSIS AUTO W/SCOPE: CPT | Performed by: INTERNAL MEDICINE

## 2024-04-26 PROCEDURE — 87186 SC STD MICRODIL/AGAR DIL: CPT | Performed by: INTERNAL MEDICINE

## 2024-04-26 NOTE — TELEPHONE ENCOUNTER
UTI Symptoms:        []Frequency  []Urgency  [x]Pain/burning  []Blood in urine  []Low back pain  []Flank pain  []Fevers  []Chills  []Night sweats  []Odor  []Confusion  []Bladder distention  [x]Feeling that bladder isn't empty after urinating  []Cloudy urine      Start of symptoms:   4/24    OTC meds:     Willing to get a UA/Cx:  [x]Yes   [] No    NOTES:    Patient states she is having difficulty starting urine stream and then very little urine comes out. She will go to [facility] lab this morning to give urine sample.     To Dr. Sosa--

## 2024-04-26 NOTE — TELEPHONE ENCOUNTER
Patient calling for urine test for UTI.    Itching, pain when urinating, difficulty starting to urinate. No fever or other symptoms.    Best call back number [phone]     [facility]

## 2024-04-27 RX ORDER — CEPHALEXIN 500 MG/1
500 CAPSULE ORAL 3 TIMES DAILY
Qty: 15 CAPSULE | Refills: 0 | Status: SHIPPED | OUTPATIENT
Start: 2024-04-27 | End: 2024-04-30

## 2024-04-27 NOTE — TELEPHONE ENCOUNTER
Left voicemail is preliminary urine test show urinary tract infection E. coli with sensitivities pending.  Antibiotics prescribed cephalexin every 8 hours for 5 days

## 2024-04-29 ENCOUNTER — TELEPHONE (OUTPATIENT)
Dept: INTERNAL MEDICINE CLINIC | Facility: CLINIC | Age: 84
End: 2024-04-29

## 2024-04-29 NOTE — TELEPHONE ENCOUNTER
Collected 4/26/2024  2:02 PM       Status: Final result       Dx: UTI symptoms    Specimen Information: Urine, clean catch   0 Result Notes       1 Follow-up Encounter  URINE CULTURE >100,000 CFU/ML Escherichia coli Abnormal            Resulting Agency: [facility] ([facility])     Trini           Patient is on cephalexin - please review for  thanks - too

## 2024-04-30 ENCOUNTER — TELEPHONE (OUTPATIENT)
Dept: INTERNAL MEDICINE CLINIC | Facility: CLINIC | Age: 84
End: 2024-04-30

## 2024-04-30 ENCOUNTER — OFFICE VISIT (OUTPATIENT)
Dept: INTERNAL MEDICINE CLINIC | Facility: CLINIC | Age: 84
End: 2024-04-30

## 2024-04-30 VITALS
HEIGHT: 65 IN | OXYGEN SATURATION: 98 % | WEIGHT: 197.63 LBS | BODY MASS INDEX: 32.93 KG/M2 | SYSTOLIC BLOOD PRESSURE: 136 MMHG | TEMPERATURE: 98 F | DIASTOLIC BLOOD PRESSURE: 68 MMHG | HEART RATE: 86 BPM

## 2024-04-30 DIAGNOSIS — Z95.5 H/O HEART ARTERY STENT: ICD-10-CM

## 2024-04-30 DIAGNOSIS — I25.110 CORONARY ARTERY DISEASE INVOLVING NATIVE CORONARY ARTERY OF NATIVE HEART WITH UNSTABLE ANGINA PECTORIS (HCC): ICD-10-CM

## 2024-04-30 DIAGNOSIS — E11.9 TYPE 2 DIABETES MELLITUS WITHOUT COMPLICATION, WITHOUT LONG-TERM CURRENT USE OF INSULIN (HCC): ICD-10-CM

## 2024-04-30 DIAGNOSIS — N30.90 CYSTITIS: ICD-10-CM

## 2024-04-30 DIAGNOSIS — Z00.00 ENCOUNTER FOR MEDICARE ANNUAL WELLNESS EXAM: Primary | ICD-10-CM

## 2024-04-30 DIAGNOSIS — E78.5 HYPERLIPIDEMIA, UNSPECIFIED HYPERLIPIDEMIA TYPE: ICD-10-CM

## 2024-04-30 DIAGNOSIS — Z17.0 CARCINOMA OF UPPER-OUTER QUADRANT OF LEFT BREAST IN FEMALE, ESTROGEN RECEPTOR POSITIVE (HCC): ICD-10-CM

## 2024-04-30 DIAGNOSIS — I10 HYPERTENSION, BENIGN: ICD-10-CM

## 2024-04-30 DIAGNOSIS — C50.412 CARCINOMA OF UPPER-OUTER QUADRANT OF LEFT BREAST IN FEMALE, ESTROGEN RECEPTOR POSITIVE (HCC): ICD-10-CM

## 2024-04-30 PROCEDURE — G0439 PPPS, SUBSEQ VISIT: HCPCS | Performed by: INTERNAL MEDICINE

## 2024-04-30 PROCEDURE — 96160 PT-FOCUSED HLTH RISK ASSMT: CPT | Performed by: INTERNAL MEDICINE

## 2024-04-30 PROCEDURE — 99214 OFFICE O/P EST MOD 30 MIN: CPT | Performed by: INTERNAL MEDICINE

## 2024-04-30 RX ORDER — CEPHALEXIN 500 MG/1
500 CAPSULE ORAL 3 TIMES DAILY
Qty: 15 CAPSULE | Refills: 0 | Status: SHIPPED | OUTPATIENT
Start: 2024-04-30 | End: 2024-05-05

## 2024-04-30 NOTE — PROGRESS NOTES
Bijal Perez is a 83 year old female.  HPI:   She is here for  annual wellness exam.     She is doing well post right TKR done about 5 weeks ago.   She had laser therapy for 6 sessions post op.     She has recent UTI - E Coli sensitive to cephalexin.     IHD - no chest pain or sob.     Restless leg syn - ropinerole was not effective - advise to take 2 (1 mg) at HS.     DM - last A1C 7.1      SR: no chest pain or sob, no gu or gi sx.    BP Readings from Last 6 Encounters:   04/30/24 136/68   03/14/24 121/61   03/12/24 136/58   02/13/24 144/60   12/12/23 118/60   11/07/23 121/54     Wt Readings from Last 6 Encounters:   04/30/24 197 lb 9.6 oz (89.6 kg)   03/14/24 199 lb (90.3 kg)   03/12/24 200 lb 3.2 oz (90.8 kg)   02/13/24 201 lb (91.2 kg)   12/12/23 201 lb (91.2 kg)   11/07/23 198 lb (89.8 kg)     Current Outpatient Medications   Medication Sig Dispense Refill    cephalexin 500 MG Oral Cap Take 1 capsule (500 mg total) by mouth 3 (three) times daily for 5 days. 15 capsule 0    semaglutide (OZEMPIC, 0.25 OR 0.5 MG/DOSE,) 2 MG/3ML Subcutaneous Solution Pen-injector Inject 0.5 mg into the skin once a week. 9 mL 1    ergocalciferol 1.25 MG (30820 UT) Oral Cap Take one tablet every other WEEK 6 capsule 3    ALPRAZolam 0.25 MG Oral Tab Take 1 tablet (0.25 mg total) by mouth 3 (three) times daily as needed. 90 tablet 2    OXYBUTYNIN CHLORIDE ER 15 MG Oral Tablet 24 Hr TAKE 1 TABLET BY MOUTH EVERY DAY AT NIGHT 90 tablet 3    LOSARTAN-HYDROCHLOROTHIAZIDE 50-12.5 MG Oral Tab TAKE 1 TABLET BY MOUTH EVERY DAY 90 tablet 3    Empagliflozin (JARDIANCE) 25 MG Oral Tab Take 1 tablet by mouth daily. 90 tablet 3    glipiZIDE 10 MG Oral Tab Take 1 tablet (10 mg total) by mouth 2 (two) times daily before meals. 180 tablet 3    metFORMIN  MG Oral Tablet 24 Hr Take 2 tablets (1,000 mg total) by mouth 2 (two) times daily. 360 tablet 1    semaglutide 2 MG/3ML Subcutaneous Solution Pen-injector Inject 0.5 mg into the skin once  a week. 9 mL 1    DOCUSATE SODIUM OR Take 1 capsule by mouth in the morning and 1 capsule before bedtime.      LETROZOLE 2.5 MG Oral Tab TAKE 1 TABLET BY MOUTH  DAILY 90 tablet 3    clotrimazole-betamethasone 1-0.05 % External Cream Apply 1 Application topically 2 (two) times daily. (Patient taking differently: Apply 1 Application  topically as needed.) 60 g 3    Multiple Vitamins-Minerals (CENTRUM SILVER ULTRA WOMENS) Oral Tab Take by mouth daily.      atorvastatin 40 MG Oral Tab Take 1 tablet (40 mg total) by mouth nightly.      Pimecrolimus 1 % External Cream as needed. APPLY TO AFFECTED AREA OF THE FOLDS OF THE ABDOMEN TWICE DAILY   1    Polyethyl Glycol-Propyl Glycol (SYSTANE ULTRA) 0.4-0.3 % Ophthalmic Solution Place 1 drop into both eyes as needed.      aspirin 81 MG Oral Tab Take 1 tablet (81 mg total) by mouth daily.      benzonatate 200 MG Oral Cap Take 1 capsule (200 mg total) by mouth 3 (three) times daily as needed for cough. (Patient not taking: Reported on 4/30/2024) 60 capsule 0    rOPINIRole 1 MG Oral Tab Take one at HS for restless legs (Patient not taking: Reported on 4/30/2024) 90 tablet 3    Calcium 200 MG Oral Tab Take 1 tablet by mouth in the morning and 1 tablet before bedtime. (Patient not taking: Reported on 4/30/2024)      Glucose Blood (ONETOUCH VERIO) In Vitro Strip Check BG 2 times per day. DX:E11.8 without insulin use. (Patient not taking: Reported on 4/30/2024) 200 strip 1    OneTouch Delica Lancets 33G Does not apply Misc Check BG 2 times per day. DX: E11.8 without insulin use. (Patient not taking: Reported on 4/30/2024) 200 each 1      Past Medical History:    Age-related nuclear cataract of both eyes    Atherosclerosis of coronary artery    Benign neoplasm of skin of left eyelid    Blepharitis of both eyes    Cancer (HCC)    Breast    Chronic allergic conjunctivitis    Coronary atherosclerosis    Edema of right lower eyelid    Gallstone    NG: Asymptomatic     Gastroenteritis     High blood pressure    High cholesterol    Lipid screening    Obesity    Osteoarthritis    Osteoporosis    Osteoporosis screening    Other and unspecified hyperlipidemia    Restless leg syndrome    Per Dr. Rice    Type II or unspecified type diabetes mellitus without mention of complication, not stated as uncontrolled    Unspecified essential hypertension    Visual impairment    glasses for reading      Social History:  Social History     Socioeconomic History    Marital status:    Tobacco Use    Smoking status: Never     Passive exposure: Past    Smokeless tobacco: Never   Vaping Use    Vaping status: Never Used   Substance and Sexual Activity    Alcohol use: No    Drug use: No   Other Topics Concern    Caffeine Concern No        REVIEW OF SYSTEMS:   GENERAL HEALTH: feels well otherwise  SKIN: denies any unusual skin lesions or rashes  RESPIRATORY: denies shortness of breath with exertion  CARDIOVASCULAR: denies chest pain on exertion  GI: denies abdominal pain and denies heartburn  NEURO: denies headaches    EXAM:   /68   Pulse 86   Temp 98.2 °F (36.8 °C)   Ht 5' 5\" (1.651 m)   Wt 197 lb 9.6 oz (89.6 kg)   LMP  (LMP Unknown)   SpO2 98%   BMI 32.88 kg/m²   GENERAL: well developed, well nourished,in no apparent distress  SKIN: no rashes,no suspicious lesions  HEENT: atraumatic, normocephalic,ears and throat are clear  NECK: supple,no adenopathy,no bruits  LUNGS: clear to auscultation  CARDIO: RRR without murmur  GI: good BS's,no masses, HSM or tenderness  EXTREMITIES: no cyanosis, clubbing or edema    Breasts: no masses, no axillary adenopathy.     Incision right TKR looks excellent      General Health     In the past six months, have you lost more than 10 pounds without trying?: 2 - No    Has your appetite been poor?: No    Type of Diet: Balanced;Diabetic    How does the patient maintain a good energy level?: Appropriate Exercise    How would you describe your daily physical activity?:  Light    How would you describe your current health state?: Good    How do you maintain positive mental well-being?: Social Interaction;Puzzles;Games;Visiting Friends         Have you had any immunizations at another office such as Influenza, Hepatitis B, Tetanus, or Pneumococcal?: No     Functional Ability     Bathing or Showering: Able without help    Toileting: Able without help    Dressing: Able without help    Eating: Able without help    Driving: Able without help    Preparing your meals: Able without help    Managing money/bills: Able without help    Taking medications as prescribed: Able without help    Are you able to afford your medications?: Yes    Hearing Problems?: No     Functional Status     Hearing Problems?: No    Vision Problems? : No    Difficulty walking?: No    Difficulty dressing or bathing?: No    Problems with daily activities? : No    Memory Problems?: No      Fall/Risk Assessment                                                              Depression Screening (PHQ-2/PHQ-9): Over the LAST 2 WEEKS                      Advance Directives     Do you have a healthcare power of ?: Yes    Do you have a living will?: Yes     Hearing Assessment (Required for AWV/SWV)      Hearing Screening    Entry User: Sydni Vegas RN  Screening Method: Whisper Test  Whisper Test Result: Pass         Visual Acuity                   Cognitive Assessment     What day of the week is this?: Correct    What month is it?: Correct    What year is it?: Correct    Recall \"Ball\": Correct    Recall \"Flag\": Correct    Recall \"Tree\": Correct        Bijal Perez's SCREENING SCHEDULE   Tests on this list are recommended by your physician but may not be covered, or covered at this frequency, by your insurer. Please check with your insurance carrier before scheduling to verify coverage.   PREVENTATIVE SERVICES  INDICATIONS AND SCHEDULE Internal Lab or Procedure External Lab or Procedure   Diabetes Screening       HbgA1C   Annually HEMOGLOBIN A1C (%)   Date Value   10/20/2023 7.1 (A)         No data to display                Fasting Blood Sugar (FSB)Annually Glucose (mg/dL)   Date Value   08/18/2023 157 (H)         No data to display               Cardiovascular Disease Screening     LDL Annually LDL Cholesterol (mg/dL)   Date Value   08/18/2023 32        EKG One Time yes    Colorectal Cancer Screening      Colonoscopy Screen every 10 years No recommendations at this time Update Health Maintenance if applicable    Flex Sigmoidoscopy Screen every 5 years No results found for this or any previous visit.      No data to display                 Fecal Occult Blood Annually Occult Blood, Stool (no units)   Date Value   10/09/2015 Negative   10/09/2015 Negative         No data to display                Glaucoma Screening      Ophthalmology Visit Annually yes    Bone Density Screening      Dexascan Every two years Last Dexa Scan:    XR DEXA BONE DENSITOMETRY (CPT=77080) 10/30/2023        No data to display               Pap and Pelvic      Pap: Every 3 yrs age 21-65 or Pap+HPV every 5 yrs age 30-65, age 65 and older at high risk   No recommendations at this time Update Health Maintenance if applicable    Chlamydia  Annually if high risk No results found for: \"CHLAMYDIA\"      No data to display                Screening Mammogram      Mammogram  Annually No recommendations at this time Update Health Maintenance if applicable   Immunizations      Influenza No orders found for this or any previous visit. Update Immunization Activity if applicable    Pneumococcal Orders placed or performed in visit on 09/08/20    PNEUMOCOCCAL IMM, 23   Orders placed or performed in visit on 04/21/15    PNEUMOCOCCAL VACC, 13 GLENSI IM    Update Immunization Activity if applicable    Hepatitis B No orders found for this or any previous visit. Update Immunization Activity if applicable    Tetanus No orders found for this or any previous visit. Update  Immunization Activity if applicable    Zoster (Not covered by Medicare Part B) No orders found for this or any previous visit. Update Immunization Activity if applicable     SPECIFIC DISEASE MONITORING Internal Lab or Procedure External Lab or Procedure   Annual Monitoring of Persistent     Medications (ACE/ARB, digoxin, diuretics)    Potassium  Annually Potassium (mmol/L)   Date Value   08/18/2023 3.9     POTASSIUM (P) (mmol/L)   Date Value   09/23/2016 3.9         No data to display                Creatinine  Annually Creatinine (mg/dL)   Date Value   08/18/2023 0.99         No data to display                Digoxin Serum Conc  Annually No results found for: \"DIGOXIN\"      No data to display                Diabetes      HgbA1C  Annually HEMOGLOBIN A1C (%)   Date Value   10/20/2023 7.1 (A)         No data to display                Creat/alb ratio  Annually      LDL  Annually LDL Cholesterol (mg/dL)   Date Value   08/18/2023 32         No data to display                 Dilated Eye exam  Annually     12/23/2021     9:46 AM   Data entered on:   Last Dilated Eye Exam 12/23/2021          No data to display                COPD      Spirometry Testing Annually No results found for this or any previous visit.      No data to display                       ASSESSMENT AND PLAN:   1. Encounter for Medicare annual wellness exam  Her examination is unrevealing and she has no alarm.     Labs ordered.     Cologuard done last year and negative.     2. 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 Coronary artery disease involving native coronary artery of native heart with unstable angina pectoris (HCC)  Doing well and no chest pain or sob.    4. H/O heart artery stent  Doing well since stent in 2018 and again in 2020;  no chest pain or sob.     5. Hypertension, benign  At goal, same meds     6. Hyperlipidemia, unspecified  hyperlipidemia type  Last labs at goal     7. Type 2 diabetes mellitus without complication, without long-term current use of insulin (HCC)  Stable, same meds, follows with Dr Jones     8. Total knee replacement - doing excellent, no can or walker.     The patient indicates understanding of these issues and agrees to the plan.  The patient is asked to return in 3 mo.

## 2024-05-02 RX ORDER — LETROZOLE 2.5 MG/1
2.5 TABLET, FILM COATED ORAL DAILY
Qty: 90 TABLET | Refills: 3 | Status: SHIPPED | OUTPATIENT
Start: 2024-05-02

## 2024-05-07 ENCOUNTER — OFFICE VISIT (OUTPATIENT)
Dept: HEMATOLOGY/ONCOLOGY | Facility: HOSPITAL | Age: 84
End: 2024-05-07
Attending: INTERNAL MEDICINE
Payer: MEDICARE

## 2024-05-07 VITALS
RESPIRATION RATE: 18 BRPM | OXYGEN SATURATION: 98 % | SYSTOLIC BLOOD PRESSURE: 138 MMHG | BODY MASS INDEX: 32.85 KG/M2 | HEIGHT: 65 IN | DIASTOLIC BLOOD PRESSURE: 48 MMHG | HEART RATE: 78 BPM | TEMPERATURE: 98 F | WEIGHT: 197.19 LBS

## 2024-05-07 DIAGNOSIS — Z78.0 MENOPAUSE: ICD-10-CM

## 2024-05-07 DIAGNOSIS — C50.912 MALIGNANT NEOPLASM OF LEFT FEMALE BREAST, UNSPECIFIED ESTROGEN RECEPTOR STATUS, UNSPECIFIED SITE OF BREAST (HCC): ICD-10-CM

## 2024-05-07 DIAGNOSIS — Z51.81 ENCOUNTER FOR MONITORING ADJUVANT HORMONAL THERAPY: Primary | ICD-10-CM

## 2024-05-07 DIAGNOSIS — Z79.899 ENCOUNTER FOR MONITORING ADJUVANT HORMONAL THERAPY: Primary | ICD-10-CM

## 2024-05-07 PROCEDURE — 99214 OFFICE O/P EST MOD 30 MIN: CPT | Performed by: INTERNAL MEDICINE

## 2024-05-07 NOTE — PROGRESS NOTES
Cancer Center Progress Note    Patient Name: Bijal Perez   YOB: 1940   Medical Record Number: [id number]   Attending Physician: Babar Macario M.D.     Chief Complaint:  Breast cancer    History of Present Illness:  Cancer history:  83 year old  female with history of coronary artery disease, hypertension, hyperlipidemia, and diabetes mellitus, being evaluated by Medical Oncology for estrogen receptor-positive left breast cancer.  Diagnosis was made based on ultrasound-guided biopsy with Dr. Thang Zamudio on 01/06/2021 with 2 separate sites showing invasive ductal carcinoma, grade 1, estrogen and progesterone receptors both greater than 90%, HER2 negative, Ki-67 of 5% and 8%.  The left breast mass was palpated on exam by her primary care physician.  She went on to have mammography with a left breast diagnostic mammogram 01/06/2021 that showed left breast masses at 12:30, 1 cm from the nipple, and 12:30, 4 cm from the nipple.    Underwent partial mastectomy with Dr. Gonzalez 2/4/2021.  She completed adjuvant radiation April 2021    Pathology as follows:  A.  Left breast, needle localization lumpectomy:  -Two foci of invasive ductal carcinoma.  (See comment)       -Focus #1 (Q Clip): Grade 1 (Tubules: 2, Nuclei: 2, Mitoses: 1) measuring 17 mm (1.7 cm).       -Focus #2 (Vision Clip): Grade 1 (Tubules: 2, Nuclei: 1, Mitoses: 1) measuring 16 mm (1.6 cm).  -Ductal carcinoma in situ (DCIS).       -Nuclear grades 1 and 2, cribriform, solid, and micropapillary types.  -Invasive carcinoma is present at <1 mm (<0.1 cm) from the superior edge and DCIS is present at <1 mm (<0.1 cm) from the posterior edge and 1mm (0.1 cm) from the medial edge on the main specimen.  -See parts B-G for final surgical margins.  -Atypical ductal hyperplasia (ADH).  -No lymphovascular invasion is identified.  -Biopsy site changes are present.  B.  Left breast, anterior margin, excision:  -Benign breast tissue.      C.  Left  breast, medial margin, excision:  -DCIS, nuclear grade 1, cribriform type, measuring 4 mm (0.4 cm).  -DCIS is present at 1 mm (0.1 cm) from the final medial margin.     D.  Left breast, lateral margin, excision:  -Atypical ductal hyperplasia (ADH).     E.  Left breast, superior margin, excision:  -Atypical ductal hyperplasia (ADH).     F.  Left breast, inferior margin, excision:  -Benign breast tissue.      G.  Left breast, deep margin, excision:  -DCIS, nuclear grade 1, cribriform type, measuring 3 mm (0.3 cm).  -DCIS is present at 1.5 mm (0.15 cm) from the deep margin.      Of note she also has a history of osteoporosis      Interval history:    The patient returns for outpatient follow-up to discuss adjuvant endocrine therapy.  She is feeling well overall.  She denies any pain from surgery she denies any breast masses she denies any adenopathy she denies any bone pain she is active and independent her activities of daily living            Performance Status:  ECOG 0  Past Medical History:  Past Medical History:    Age-related nuclear cataract of both eyes    Atherosclerosis of coronary artery    Benign neoplasm of skin of left eyelid    Blepharitis of both eyes    Cancer (HCC)    Breast    Chronic allergic conjunctivitis    Coronary atherosclerosis    Edema of right lower eyelid    Gallstone    NG: Asymptomatic     Gastroenteritis    High blood pressure    High cholesterol    Lipid screening    Obesity    Osteoarthritis    Osteoporosis    Osteoporosis screening    Other and unspecified hyperlipidemia    Restless leg syndrome    Per Dr. Rice    Type II or unspecified type diabetes mellitus without mention of complication, not stated as uncontrolled    Unspecified essential hypertension    Visual impairment    glasses for reading       Past Surgical History:  Past Surgical History:   Procedure Laterality Date    Breast biopsy Right 2013    Carpal tunnel release Right     Cataract      Cataract extraction Left  12/12/2018    [id number] 21.00 D LENSX WITH ORA    Cataract extraction w/  intraocular lens implant Right 11/14/2018    Lensx Toric IOL    model:[id number]     Power 19.0D   3.75CYL    Cath cartotid stent  11/2017    Cath drug eluting stent       3 total    Colonoscopy  11/2018    Colonoscopy N/A 11/12/2018    Procedure: COLONOSCOPY, POSSIBLE BIOPSY, POSSIBLE POLYPECTOMY 79520;  Surgeon: Teofilo Guerrero MD;  [facility]: [facility]    Hysterectomy  1997    Lumpectomy left Left 02/2021    Mina biopsy stereo nodule 1 site right (cpt=19081) Right 2008    Mina biopsy stereo nodule 1 site right (cpt=19081) Right 2013    Needle biopsy left Left 01/06/2021    Needle biopsy right Right     Other surgical history  11/29/2017    stent placement    Radiation left Left 2021    Finshied April 13, 2021    Tonsillectomy      Total knee replacement Right 03/21/2024       Family History:  Family History   Problem Relation Age of Onset    Neurological Disorder Father 76        NG: Parkinson's disease, 76 (cause of death)    Diabetes Father     Other (Old age) Mother 92        NG    Other (Lupus) Mother         NG    Cataracts Other     Breast Cancer Self 79    Ovarian Cancer Neg        Social History:  Social History     Socioeconomic History    Marital status:    Tobacco Use    Smoking status: Never     Passive exposure: Past    Smokeless tobacco: Never   Vaping Use    Vaping status: Never Used   Substance and Sexual Activity    Alcohol use: No    Drug use: No   Other Topics Concern    Caffeine Concern No         Current Medications:    Current Outpatient Medications:     letrozole 2.5 MG Oral Tab, Take 1 tablet (2.5 mg total) by mouth daily., Disp: 90 tablet, Rfl: 3    semaglutide (OZEMPIC, 0.25 OR 0.5 MG/DOSE,) 2 MG/3ML Subcutaneous Solution Pen-injector, Inject 0.5 mg into the skin once a week., Disp: 9 mL, Rfl: 1    benzonatate 200 MG Oral Cap, Take 1 capsule (200 mg total) by mouth 3 (three) times daily as needed for  cough., Disp: 60 capsule, Rfl: 0    ergocalciferol 1.25 MG (33117 UT) Oral Cap, Take one tablet every other WEEK, Disp: 6 capsule, Rfl: 3    rOPINIRole 1 MG Oral Tab, Take one at HS for restless legs, Disp: 90 tablet, Rfl: 3    ALPRAZolam 0.25 MG Oral Tab, Take 1 tablet (0.25 mg total) by mouth 3 (three) times daily as needed., Disp: 90 tablet, Rfl: 2    OXYBUTYNIN CHLORIDE ER 15 MG Oral Tablet 24 Hr, TAKE 1 TABLET BY MOUTH EVERY DAY AT NIGHT, Disp: 90 tablet, Rfl: 3    LOSARTAN-HYDROCHLOROTHIAZIDE 50-12.5 MG Oral Tab, TAKE 1 TABLET BY MOUTH EVERY DAY, Disp: 90 tablet, Rfl: 3    Calcium 200 MG Oral Tab, Take 1 tablet by mouth in the morning and 1 tablet before bedtime., Disp: , Rfl:     Empagliflozin (JARDIANCE) 25 MG Oral Tab, Take 1 tablet by mouth daily., Disp: 90 tablet, Rfl: 3    glipiZIDE 10 MG Oral Tab, Take 1 tablet (10 mg total) by mouth 2 (two) times daily before meals., Disp: 180 tablet, Rfl: 3    metFORMIN  MG Oral Tablet 24 Hr, Take 2 tablets (1,000 mg total) by mouth 2 (two) times daily., Disp: 360 tablet, Rfl: 1    semaglutide 2 MG/3ML Subcutaneous Solution Pen-injector, Inject 0.5 mg into the skin once a week., Disp: 9 mL, Rfl: 1    DOCUSATE SODIUM OR, Take 1 capsule by mouth in the morning and 1 capsule before bedtime., Disp: , Rfl:     clotrimazole-betamethasone 1-0.05 % External Cream, Apply 1 Application topically 2 (two) times daily. (Patient taking differently: Apply 1 Application  topically as needed.), Disp: 60 g, Rfl: 3    Multiple Vitamins-Minerals (CENTRUM SILVER ULTRA WOMENS) Oral Tab, Take by mouth daily., Disp: , Rfl:     atorvastatin 40 MG Oral Tab, Take 1 tablet (40 mg total) by mouth nightly., Disp: , Rfl:     Glucose Blood (ONETOUCH VERIO) In Vitro Strip, Check BG 2 times per day. DX:E11.8 without insulin use., Disp: 200 strip, Rfl: 1    OneTouch Delica Lancets 33G Does not apply Misc, Check BG 2 times per day. DX: E11.8 without insulin use., Disp: 200 each, Rfl: 1     Pimecrolimus 1 % External Cream, as needed. APPLY TO AFFECTED AREA OF THE FOLDS OF THE ABDOMEN TWICE DAILY , Disp: , Rfl: 1    Polyethyl Glycol-Propyl Glycol (SYSTANE ULTRA) 0.4-0.3 % Ophthalmic Solution, Place 1 drop into both eyes as needed., Disp: , Rfl:     aspirin 81 MG Oral Tab, Take 1 tablet (81 mg total) by mouth daily., Disp: , Rfl:     Current Outpatient Medications on File Prior to Visit   Medication Sig Dispense Refill    letrozole 2.5 MG Oral Tab Take 1 tablet (2.5 mg total) by mouth daily. 90 tablet 3    semaglutide (OZEMPIC, 0.25 OR 0.5 MG/DOSE,) 2 MG/3ML Subcutaneous Solution Pen-injector Inject 0.5 mg into the skin once a week. 9 mL 1    benzonatate 200 MG Oral Cap Take 1 capsule (200 mg total) by mouth 3 (three) times daily as needed for cough. 60 capsule 0    ergocalciferol 1.25 MG (35215 UT) Oral Cap Take one tablet every other WEEK 6 capsule 3    rOPINIRole 1 MG Oral Tab Take one at HS for restless legs 90 tablet 3    ALPRAZolam 0.25 MG Oral Tab Take 1 tablet (0.25 mg total) by mouth 3 (three) times daily as needed. 90 tablet 2    OXYBUTYNIN CHLORIDE ER 15 MG Oral Tablet 24 Hr TAKE 1 TABLET BY MOUTH EVERY DAY AT NIGHT 90 tablet 3    LOSARTAN-HYDROCHLOROTHIAZIDE 50-12.5 MG Oral Tab TAKE 1 TABLET BY MOUTH EVERY DAY 90 tablet 3    Calcium 200 MG Oral Tab Take 1 tablet by mouth in the morning and 1 tablet before bedtime.      Empagliflozin (JARDIANCE) 25 MG Oral Tab Take 1 tablet by mouth daily. 90 tablet 3    glipiZIDE 10 MG Oral Tab Take 1 tablet (10 mg total) by mouth 2 (two) times daily before meals. 180 tablet 3    metFORMIN  MG Oral Tablet 24 Hr Take 2 tablets (1,000 mg total) by mouth 2 (two) times daily. 360 tablet 1    semaglutide 2 MG/3ML Subcutaneous Solution Pen-injector Inject 0.5 mg into the skin once a week. 9 mL 1    DOCUSATE SODIUM OR Take 1 capsule by mouth in the morning and 1 capsule before bedtime.      clotrimazole-betamethasone 1-0.05 % External Cream Apply 1 Application  topically 2 (two) times daily. (Patient taking differently: Apply 1 Application  topically as needed.) 60 g 3    Multiple Vitamins-Minerals (CENTRUM SILVER ULTRA WOMENS) Oral Tab Take by mouth daily.      atorvastatin 40 MG Oral Tab Take 1 tablet (40 mg total) by mouth nightly.      Glucose Blood (ONETOUCH VERIO) In Vitro Strip Check BG 2 times per day. DX:E11.8 without insulin use. 200 strip 1    OneTouch Delica Lancets 33G Does not apply Misc Check BG 2 times per day. DX: E11.8 without insulin use. 200 each 1    Pimecrolimus 1 % External Cream as needed. APPLY TO AFFECTED AREA OF THE FOLDS OF THE ABDOMEN TWICE DAILY   1    Polyethyl Glycol-Propyl Glycol (SYSTANE ULTRA) 0.4-0.3 % Ophthalmic Solution Place 1 drop into both eyes as needed.      aspirin 81 MG Oral Tab Take 1 tablet (81 mg total) by mouth daily.      [] cephalexin 500 MG Oral Cap Take 1 capsule (500 mg total) by mouth 3 (three) times daily for 5 days. 15 capsule 0     No current facility-administered medications on file prior to visit.         Allergies:  Allergies   Allergen Reactions    Januvia [Sitagliptin] ITCHING        Review of Systems:  All other systems reviewed and negative x12    Vital Signs:  /48 (BP Location: Left arm, Patient Position: Sitting, Cuff Size: large)   Pulse 78   Temp 97.8 °F (36.6 °C) (Oral)   Resp 18   Ht 1.651 m (5' 5\")   Wt 89.4 kg (197 lb 3.2 oz)   LMP  (LMP Unknown)   SpO2 98%   BMI 32.82 kg/m²     Physical Examination:  General: Patient is alert and oriented x 3, not in acute distress.  Psych:  Mood and affect appropriate  HEENT: EOMs intact. PERRL. Oropharynx is clear.   Neck: No JVD. No palpable lymphadenopathy. Neck is supple.  Lymphatics: There is no palpable peripheral lymphadenopathy   Chest: Symmetric expansion, nonlabored breathing  Cardiovascular: Regular with palpable distal pulses   Abdomen: Soft, non tender.   Extremities: No edema.  Neurological: 5/5 motor x4.        Laboratory:  No  results for input(s): \"WBC\", \"HGB\", \"PLT\", \"NE\", \"NEUT\" in the last 504 hours.      Lab Results   Component Value Date     (H) 08/18/2023    BUN 20 (H) 08/18/2023    BUNCREA 26.0 (H) 09/19/2022    CREATSERUM 0.99 08/18/2023    ANIONGAP 5 08/18/2023    GFR 84 01/04/2018    GFRNAA 75 12/09/2021    GFRAA 86 12/09/2021    CA 9.6 08/18/2023    OSMOCALC 290 08/18/2023    ALKPHO 68 08/18/2023    AST 13 (L) 08/18/2023    ALT 31 08/18/2023    ALKPHOS 52 06/07/2016    BILT 0.5 08/18/2023    TP 7.0 08/18/2023    ALB 3.6 08/18/2023    GLOBULIN 3.4 08/18/2023    AGRATIO 1.1 06/07/2016     08/18/2023    K 3.9 08/18/2023     08/18/2023    CO2 27.0 08/18/2023       Radiology:  None     Cancer Staging   No matching staging information was found for the patient.      Impression and Plan:  83 year old  Postmenopausal female being evaluated by Medical Oncology for estrogen receptor-positive, HER2-negative left breast cancer, diagnosed 01/06/2021 as outlined above.  She is strongly hormone receptor positive and HER2 negative.  She underwent surgery with Dr. Gonzalez with pathology as outlined above 2/4/2021. DCIS present.  -completed adjuvant radiation  -Adjuvant letrozole. Tolerating.   Plan 5-7 years  -history of osteoporosis per her report.  Most recent bone densities were adequate summer 2020 repeat 2023 in setting of stress fracture showed no evidence of osteopenia or osteoporosis.  Normal bone density adequate. Repeat 2025        Babar Macario MD

## 2024-05-13 ENCOUNTER — LAB ENCOUNTER (OUTPATIENT)
Dept: LAB | Age: 84
End: 2024-05-13
Attending: INTERNAL MEDICINE

## 2024-05-13 PROCEDURE — 87086 URINE CULTURE/COLONY COUNT: CPT | Performed by: INTERNAL MEDICINE

## 2024-05-15 ENCOUNTER — APPOINTMENT (OUTPATIENT)
Dept: URBAN - METROPOLITAN AREA CLINIC 244 | Age: 84
Setting detail: DERMATOLOGY
End: 2024-05-16

## 2024-05-15 DIAGNOSIS — Z85.828 PERSONAL HISTORY OF OTHER MALIGNANT NEOPLASM OF SKIN: ICD-10-CM

## 2024-05-15 DIAGNOSIS — L40.0 PSORIASIS VULGARIS: ICD-10-CM

## 2024-05-15 DIAGNOSIS — D22 MELANOCYTIC NEVI: ICD-10-CM

## 2024-05-15 DIAGNOSIS — B35.1 TINEA UNGUIUM: ICD-10-CM

## 2024-05-15 DIAGNOSIS — L82.1 OTHER SEBORRHEIC KERATOSIS: ICD-10-CM

## 2024-05-15 DIAGNOSIS — L81.4 OTHER MELANIN HYPERPIGMENTATION: ICD-10-CM

## 2024-05-15 DIAGNOSIS — L30.4 ERYTHEMA INTERTRIGO: ICD-10-CM

## 2024-05-15 PROBLEM — D22.5 MELANOCYTIC NEVI OF TRUNK: Status: ACTIVE | Noted: 2024-05-15

## 2024-05-15 PROBLEM — D22.39 MELANOCYTIC NEVI OF OTHER PARTS OF FACE: Status: ACTIVE | Noted: 2024-05-15

## 2024-05-15 PROBLEM — D22.61 MELANOCYTIC NEVI OF RIGHT UPPER LIMB, INCLUDING SHOULDER: Status: ACTIVE | Noted: 2024-05-15

## 2024-05-15 PROBLEM — D22.62 MELANOCYTIC NEVI OF LEFT UPPER LIMB, INCLUDING SHOULDER: Status: ACTIVE | Noted: 2024-05-15

## 2024-05-15 PROCEDURE — OTHER COUNSELING: OTHER

## 2024-05-15 PROCEDURE — 99214 OFFICE O/P EST MOD 30 MIN: CPT

## 2024-05-15 PROCEDURE — OTHER PRESCRIPTION: OTHER

## 2024-05-15 PROCEDURE — OTHER PRESCRIPTION MEDICATION MANAGEMENT: OTHER

## 2024-05-15 RX ORDER — CICLOPIROX 80 MG/ML
SOLUTION TOPICAL
Qty: 6.6 | Refills: 10 | Status: ERX

## 2024-05-15 RX ORDER — KETOCONAZOLE 20 MG/G
CREAM TOPICAL
Qty: 60 | Refills: 3 | Status: ERX | COMMUNITY
Start: 2024-05-15

## 2024-05-15 RX ORDER — PIMECROLIMUS 10 MG/G
CREAM TOPICAL
Qty: 60 | Refills: 4 | Status: ERX | COMMUNITY
Start: 2024-05-15

## 2024-05-15 ASSESSMENT — LOCATION SIMPLE DESCRIPTION DERM
LOCATION SIMPLE: RIGHT POSTERIOR UPPER ARM
LOCATION SIMPLE: RIGHT ELBOW
LOCATION SIMPLE: LOWER BACK
LOCATION SIMPLE: POSTERIOR NECK
LOCATION SIMPLE: LEFT FOREARM
LOCATION SIMPLE: GLABELLA
LOCATION SIMPLE: LEFT CHEEK
LOCATION SIMPLE: RIGHT FOREARM
LOCATION SIMPLE: LEFT POSTERIOR UPPER ARM
LOCATION SIMPLE: LEFT ELBOW
LOCATION SIMPLE: UPPER BACK
LOCATION SIMPLE: RIGHT CHEEK
LOCATION SIMPLE: GROIN

## 2024-05-15 ASSESSMENT — LOCATION ZONE DERM
LOCATION ZONE: FACE
LOCATION ZONE: NECK
LOCATION ZONE: TRUNK
LOCATION ZONE: ARM

## 2024-05-15 ASSESSMENT — LOCATION DETAILED DESCRIPTION DERM
LOCATION DETAILED: RIGHT DISTAL POSTERIOR UPPER ARM
LOCATION DETAILED: GLABELLA
LOCATION DETAILED: LEFT INFERIOR CENTRAL MALAR CHEEK
LOCATION DETAILED: RIGHT SUPRAPUBIC SKIN
LOCATION DETAILED: LEFT DISTAL POSTERIOR UPPER ARM
LOCATION DETAILED: LEFT ELBOW
LOCATION DETAILED: RIGHT INFERIOR POSTERIOR NECK
LOCATION DETAILED: SUPERIOR THORACIC SPINE
LOCATION DETAILED: RIGHT INFERIOR CENTRAL MALAR CHEEK
LOCATION DETAILED: RIGHT PROXIMAL DORSAL FOREARM
LOCATION DETAILED: INFERIOR THORACIC SPINE
LOCATION DETAILED: SACRAL SPINE
LOCATION DETAILED: LEFT PROXIMAL DORSAL FOREARM
LOCATION DETAILED: RIGHT ELBOW

## 2024-05-15 ASSESSMENT — BSA RASH: BSA RASH: 3

## 2024-05-15 NOTE — PROCEDURE: PRESCRIPTION MEDICATION MANAGEMENT
Neck , no lymphadenopathy
Continue Regimen: pimecrolimus 1 % topical cream
Detail Level: Zone
Render In Strict Bullet Format?: No

## 2024-05-24 ENCOUNTER — LAB ENCOUNTER (OUTPATIENT)
Dept: LAB | Age: 84
End: 2024-05-24
Attending: INTERNAL MEDICINE

## 2024-05-24 DIAGNOSIS — N30.90 CYSTITIS: ICD-10-CM

## 2024-05-24 DIAGNOSIS — E11.9 TYPE 2 DIABETES MELLITUS WITHOUT COMPLICATION, WITHOUT LONG-TERM CURRENT USE OF INSULIN (HCC): ICD-10-CM

## 2024-05-24 DIAGNOSIS — M15.9 PRIMARY OSTEOARTHRITIS INVOLVING MULTIPLE JOINTS: ICD-10-CM

## 2024-05-24 DIAGNOSIS — I25.110 CORONARY ARTERY DISEASE INVOLVING NATIVE CORONARY ARTERY OF NATIVE HEART WITH UNSTABLE ANGINA PECTORIS (HCC): ICD-10-CM

## 2024-05-24 LAB
ALBUMIN SERPL-MCNC: 3.6 G/DL (ref 3.4–5)
ALBUMIN/GLOB SERPL: 0.9 {RATIO} (ref 1–2)
ALP LIVER SERPL-CCNC: 67 U/L
ALT SERPL-CCNC: 30 U/L
ANION GAP SERPL CALC-SCNC: 7 MMOL/L (ref 0–18)
AST SERPL-CCNC: 16 U/L (ref 15–37)
BASOPHILS # BLD AUTO: 0.03 X10(3) UL (ref 0–0.2)
BASOPHILS NFR BLD AUTO: 0.4 %
BILIRUB SERPL-MCNC: 0.5 MG/DL (ref 0.1–2)
BILIRUB UR QL STRIP.AUTO: NEGATIVE
BUN BLD-MCNC: 19 MG/DL (ref 9–23)
CALCIUM BLD-MCNC: 9.6 MG/DL (ref 8.5–10.1)
CHLORIDE SERPL-SCNC: 108 MMOL/L (ref 98–112)
CHOLEST SERPL-MCNC: 108 MG/DL (ref ?–200)
CO2 SERPL-SCNC: 25 MMOL/L (ref 21–32)
CREAT BLD-MCNC: 0.68 MG/DL
EGFRCR SERPLBLD CKD-EPI 2021: 86 ML/MIN/1.73M2 (ref 60–?)
EOSINOPHIL # BLD AUTO: 0.09 X10(3) UL (ref 0–0.7)
EOSINOPHIL NFR BLD AUTO: 1.1 %
ERYTHROCYTE [DISTWIDTH] IN BLOOD BY AUTOMATED COUNT: 14 %
EST. AVERAGE GLUCOSE BLD GHB EST-MCNC: 174 MG/DL (ref 68–126)
FASTING PATIENT LIPID ANSWER: YES
FASTING STATUS PATIENT QL REPORTED: YES
GLOBULIN PLAS-MCNC: 3.8 G/DL (ref 2.8–4.4)
GLUCOSE BLD-MCNC: 165 MG/DL (ref 70–99)
GLUCOSE UR STRIP.AUTO-MCNC: >1000 MG/DL
HBA1C MFR BLD: 7.7 % (ref ?–5.7)
HCT VFR BLD AUTO: 42.5 %
HDLC SERPL-MCNC: 51 MG/DL (ref 40–59)
HGB BLD-MCNC: 13.6 G/DL
IMM GRANULOCYTES # BLD AUTO: 0.05 X10(3) UL (ref 0–1)
IMM GRANULOCYTES NFR BLD: 0.6 %
KETONES UR STRIP.AUTO-MCNC: NEGATIVE MG/DL
LDLC SERPL CALC-MCNC: 36 MG/DL (ref ?–100)
LEUKOCYTE ESTERASE UR QL STRIP.AUTO: 75
LYMPHOCYTES # BLD AUTO: 2.5 X10(3) UL (ref 1–4)
LYMPHOCYTES NFR BLD AUTO: 30 %
MCH RBC QN AUTO: 30 PG (ref 26–34)
MCHC RBC AUTO-ENTMCNC: 32 G/DL (ref 31–37)
MCV RBC AUTO: 93.8 FL
MONOCYTES # BLD AUTO: 0.75 X10(3) UL (ref 0.1–1)
MONOCYTES NFR BLD AUTO: 9 %
NEUTROPHILS # BLD AUTO: 4.9 X10 (3) UL (ref 1.5–7.7)
NEUTROPHILS # BLD AUTO: 4.9 X10(3) UL (ref 1.5–7.7)
NEUTROPHILS NFR BLD AUTO: 58.9 %
NITRITE UR QL STRIP.AUTO: NEGATIVE
NONHDLC SERPL-MCNC: 57 MG/DL (ref ?–130)
OSMOLALITY SERPL CALC.SUM OF ELEC: 296 MOSM/KG (ref 275–295)
PH UR STRIP.AUTO: 5 [PH] (ref 5–8)
PLATELET # BLD AUTO: 265 10(3)UL (ref 150–450)
POTASSIUM SERPL-SCNC: 4.1 MMOL/L (ref 3.5–5.1)
PROT SERPL-MCNC: 7.4 G/DL (ref 6.4–8.2)
PROT UR STRIP.AUTO-MCNC: NEGATIVE MG/DL
RBC # BLD AUTO: 4.53 X10(6)UL
RBC UR QL AUTO: NEGATIVE
SODIUM SERPL-SCNC: 140 MMOL/L (ref 136–145)
SP GR UR STRIP.AUTO: >1.03 (ref 1–1.03)
TRIGL SERPL-MCNC: 115 MG/DL (ref 30–149)
TSI SER-ACNC: 2.15 MIU/ML (ref 0.36–3.74)
UROBILINOGEN UR STRIP.AUTO-MCNC: NORMAL MG/DL
VIT D+METAB SERPL-MCNC: 40.9 NG/ML (ref 30–100)
VLDLC SERPL CALC-MCNC: 16 MG/DL (ref 0–30)
WBC # BLD AUTO: 8.3 X10(3) UL (ref 4–11)
YEAST UR QL: PRESENT /HPF

## 2024-05-24 PROCEDURE — 81001 URINALYSIS AUTO W/SCOPE: CPT

## 2024-05-24 PROCEDURE — 84443 ASSAY THYROID STIM HORMONE: CPT

## 2024-05-24 PROCEDURE — 87077 CULTURE AEROBIC IDENTIFY: CPT

## 2024-05-24 PROCEDURE — 87186 SC STD MICRODIL/AGAR DIL: CPT

## 2024-05-24 PROCEDURE — 83036 HEMOGLOBIN GLYCOSYLATED A1C: CPT

## 2024-05-24 PROCEDURE — 85025 COMPLETE CBC W/AUTO DIFF WBC: CPT | Performed by: INTERNAL MEDICINE

## 2024-05-24 PROCEDURE — 80053 COMPREHEN METABOLIC PANEL: CPT | Performed by: INTERNAL MEDICINE

## 2024-05-24 PROCEDURE — 87086 URINE CULTURE/COLONY COUNT: CPT

## 2024-05-24 PROCEDURE — 82306 VITAMIN D 25 HYDROXY: CPT

## 2024-05-24 PROCEDURE — 80061 LIPID PANEL: CPT | Performed by: INTERNAL MEDICINE

## 2024-05-28 ENCOUNTER — TELEPHONE (OUTPATIENT)
Dept: INTERNAL MEDICINE CLINIC | Facility: CLINIC | Age: 84
End: 2024-05-28

## 2024-05-28 RX ORDER — SULFAMETHOXAZOLE AND TRIMETHOPRIM 800; 160 MG/1; MG/1
1 TABLET ORAL 2 TIMES DAILY
Qty: 6 TABLET | Refills: 0 | Status: SHIPPED | OUTPATIENT
Start: 2024-05-28 | End: 2024-05-31

## 2024-05-28 NOTE — TELEPHONE ENCOUNTER
0 Result Notes       1 Follow-up Encounter  URINE CULTURE >100,000 CFU/ML Klebsiella pneumoniae Abnormal            Resulting Agency: [facility] ([facility])     Susceptibility     Klebsiella pneumoniae     Not Specified    Ampicillin  Resistant    Ampicillin + Sulbactam <=2 Sensitive    Cefazolin <=4 Sensitive    Ciprofloxacin <=0.25 Sensitive    Gentamicin <=1 Sensitive    Levofloxacin <=0.12 Sensitive    Meropenem <=0.25 Sensitive    Nitrofurantoin 64 Intermediate    Piperacillin + Tazobactam <=4 Sensitive    Trimethoprim/Sulfa <=20 Sensitive                 Patient on macrobid - to

## 2024-05-28 NOTE — TELEPHONE ENCOUNTER
Please notify the patient that the antibiotics prescribed when the urine culture was negative may not have been strong enough to fight off this current urinary infection.  Therefore recommend:    Bactrim 1 tablet twice a day for 3 days.    Would still see urology as recommended.

## 2024-05-29 ENCOUNTER — TELEPHONE (OUTPATIENT)
Dept: INTERNAL MEDICINE CLINIC | Facility: CLINIC | Age: 84
End: 2024-05-29

## 2024-05-29 NOTE — TELEPHONE ENCOUNTER
S/w patient and relayed MD message.  Verbalizes understanding and agreement with tx. plan     Pt stated does have appointment. Scheduled with Urology for 6-15

## 2024-05-29 NOTE — TELEPHONE ENCOUNTER
We can tell Bijal labs good except blood sugar elevated about the same as before. It looks like Dr. Farr has addressed this in recent office visit note. Also, I did see Dr. Sosa's note on Bactrim and follow up with a urologist.    Labs forwarded to Dr. Farr  as an FYI

## 2024-05-31 ENCOUNTER — TELEPHONE (OUTPATIENT)
Dept: INTERNAL MEDICINE CLINIC | Facility: CLINIC | Age: 84
End: 2024-05-31

## 2024-06-01 ENCOUNTER — TELEPHONE (OUTPATIENT)
Dept: INTERNAL MEDICINE CLINIC | Facility: CLINIC | Age: 84
End: 2024-06-01

## 2024-06-05 ENCOUNTER — TELEPHONE (OUTPATIENT)
Dept: INTERNAL MEDICINE CLINIC | Facility: CLINIC | Age: 84
End: 2024-06-05

## 2024-06-05 DIAGNOSIS — R39.9 UTI SYMPTOMS: Primary | ICD-10-CM

## 2024-06-05 RX ORDER — FLUCONAZOLE 100 MG/1
100 TABLET ORAL DAILY
Qty: 3 TABLET | Refills: 0 | Status: SHIPPED | OUTPATIENT
Start: 2024-06-05

## 2024-06-05 NOTE — TELEPHONE ENCOUNTER
UA and culture ordered per written order. Called patient and relayed DR. OROZCO message -verbalized understanding . RX sent per written order

## 2024-06-05 NOTE — TELEPHONE ENCOUNTER
UTI and has taken 4 rounds of antibiotics.Started itching again last night. Also having some white Discharge. Should she get another Rx. Please call and advise.     #[phone]

## 2024-06-05 NOTE — TELEPHONE ENCOUNTER
Get urine and urine c&s     Start diflucan 100mg, #3, one daily.     If no improvement she needs to see GYN -  Dr Beatty at [facility] or partner, Dr Saldivar or partner here

## 2024-06-05 NOTE — TELEPHONE ENCOUNTER
To Dr. OROZCO:    Recent UTI - completed abx on Friday.     [x]    Itching and irritation in the vagina and vulva  []    Burning sensation (while/after urinating)  []    Redness and swelling of the vulva  []    Vaginal pain and soreness  []    Vaginal rash  [x]    Thick, white vaginal discharge (with a cottage cheese appearance)  []    Watery vaginal discharge    Symptom onset: Last night     OTCs: none      Recent antibiotic use: [x] YES   [] NO  Completed Bactrim on Friday    Denies pain/burning while urinating, increased frequency or urgency, urinary retention, blood in urine, pressure/cramping/bloating in the groin/lower abdomen/bladder, cloudy urine, change in odor of urine, lower back/flank pain, or fevers/chills/night sweats.       Please advise.

## 2024-06-10 ENCOUNTER — LAB ENCOUNTER (OUTPATIENT)
Dept: LAB | Age: 84
End: 2024-06-10
Attending: INTERNAL MEDICINE
Payer: MEDICARE

## 2024-06-10 DIAGNOSIS — R39.9 UTI SYMPTOMS: ICD-10-CM

## 2024-06-10 LAB
BILIRUB UR QL STRIP.AUTO: NEGATIVE
CLARITY UR REFRACT.AUTO: CLEAR
GLUCOSE UR STRIP.AUTO-MCNC: >1000 MG/DL
KETONES UR STRIP.AUTO-MCNC: NEGATIVE MG/DL
LEUKOCYTE ESTERASE UR QL STRIP.AUTO: NEGATIVE
PH UR STRIP.AUTO: 5 [PH] (ref 5–8)
PROT UR STRIP.AUTO-MCNC: NEGATIVE MG/DL
RBC UR QL AUTO: NEGATIVE
SP GR UR STRIP.AUTO: >1.03 (ref 1–1.03)
UROBILINOGEN UR STRIP.AUTO-MCNC: NORMAL MG/DL

## 2024-06-10 PROCEDURE — 87086 URINE CULTURE/COLONY COUNT: CPT

## 2024-06-10 PROCEDURE — 87186 SC STD MICRODIL/AGAR DIL: CPT

## 2024-06-10 PROCEDURE — 87088 URINE BACTERIA CULTURE: CPT

## 2024-06-10 PROCEDURE — 81001 URINALYSIS AUTO W/SCOPE: CPT

## 2024-06-22 ENCOUNTER — TELEPHONE (OUTPATIENT)
Dept: INTERNAL MEDICINE CLINIC | Facility: CLINIC | Age: 84
End: 2024-06-22

## 2024-06-24 ENCOUNTER — TELEPHONE (OUTPATIENT)
Dept: HEMATOLOGY/ONCOLOGY | Facility: HOSPITAL | Age: 84
End: 2024-06-24

## 2024-06-24 NOTE — TELEPHONE ENCOUNTER
Patient of former Dr. Macario said she asking a medication question regarding Estradiol 0.1 cream because another  Is prescribing it to her and she stated this may have something to do with her Breast Cancer she have not picked it up yet. Called 6/24/24.

## 2024-06-25 ENCOUNTER — TELEPHONE (OUTPATIENT)
Dept: HEMATOLOGY/ONCOLOGY | Facility: HOSPITAL | Age: 84
End: 2024-06-25

## 2024-06-25 NOTE — TELEPHONE ENCOUNTER
Patient is on Letrozole.  Urology prescribed Estrogen cream - Dr. Bailey.  Directions are every other day.  Frequent UTIs and yeast infections.  She feels better other than urinary urgency.      Ok to use sparingly - two to three times per week.  Will update Dr. Bonner and call back if she has additional comments.

## 2024-06-26 ENCOUNTER — TELEPHONE (OUTPATIENT)
Dept: HEMATOLOGY/ONCOLOGY | Facility: HOSPITAL | Age: 84
End: 2024-06-26

## 2024-06-26 NOTE — TELEPHONE ENCOUNTER
LMOVM.  Per Dr. Bonner, no topical estrogen cream.  Encouraged patient to contact Dr Kirk for alternative topical treatment.  Call back to discuss.  Will send [employer] message also.    The use of vaginal estrogen therapy (VET) increased the risk for breast cancer recurrence by 39% in women with early estrogen receptor-positive breast cancer who were taking aromatase inhibitors (AIs).    There was no increase in the risk for recurrence in women who were using VET and taking tamoxifen, or in women who were using VET and not taking any adjuvant endocrine therapy.

## 2024-06-26 NOTE — TELEPHONE ENCOUNTER
Patient called back.  Informed patient that Dr. Bonner does NOT want her to use the estrogen cream.  Patient will follow-up with Dr. Bailey.  Suggested Replens, but ultimately discuss with urology.  Patient verbalized understanding.

## 2024-07-01 ENCOUNTER — TELEPHONE (OUTPATIENT)
Dept: INTERNAL MEDICINE CLINIC | Facility: CLINIC | Age: 84
End: 2024-07-01

## 2024-07-03 ENCOUNTER — LAB ENCOUNTER (OUTPATIENT)
Dept: LAB | Age: 84
End: 2024-07-03
Attending: INTERNAL MEDICINE
Payer: MEDICARE

## 2024-07-03 DIAGNOSIS — Z01.818 PRE-OP TESTING: ICD-10-CM

## 2024-07-03 LAB
ANION GAP SERPL CALC-SCNC: 11 MMOL/L (ref 0–18)
BASOPHILS # BLD AUTO: 0.03 X10(3) UL (ref 0–0.2)
BASOPHILS NFR BLD AUTO: 0.3 %
BUN BLD-MCNC: 15 MG/DL (ref 9–23)
CALCIUM BLD-MCNC: 9.5 MG/DL (ref 8.5–10.1)
CHLORIDE SERPL-SCNC: 104 MMOL/L (ref 98–112)
CO2 SERPL-SCNC: 26 MMOL/L (ref 21–32)
CREAT BLD-MCNC: 0.76 MG/DL
EGFRCR SERPLBLD CKD-EPI 2021: 78 ML/MIN/1.73M2 (ref 60–?)
EOSINOPHIL # BLD AUTO: 0.1 X10(3) UL (ref 0–0.7)
EOSINOPHIL NFR BLD AUTO: 1.1 %
ERYTHROCYTE [DISTWIDTH] IN BLOOD BY AUTOMATED COUNT: 13.8 %
FASTING STATUS PATIENT QL REPORTED: NO
GLUCOSE BLD-MCNC: 160 MG/DL (ref 70–99)
HCT VFR BLD AUTO: 41.8 %
HGB BLD-MCNC: 13.4 G/DL
IMM GRANULOCYTES # BLD AUTO: 0.04 X10(3) UL (ref 0–1)
IMM GRANULOCYTES NFR BLD: 0.5 %
LYMPHOCYTES # BLD AUTO: 2.68 X10(3) UL (ref 1–4)
LYMPHOCYTES NFR BLD AUTO: 30.2 %
MCH RBC QN AUTO: 30 PG (ref 26–34)
MCHC RBC AUTO-ENTMCNC: 32.1 G/DL (ref 31–37)
MCV RBC AUTO: 93.7 FL
MONOCYTES # BLD AUTO: 0.73 X10(3) UL (ref 0.1–1)
MONOCYTES NFR BLD AUTO: 8.2 %
NEUTROPHILS # BLD AUTO: 5.3 X10 (3) UL (ref 1.5–7.7)
NEUTROPHILS # BLD AUTO: 5.3 X10(3) UL (ref 1.5–7.7)
NEUTROPHILS NFR BLD AUTO: 59.7 %
OSMOLALITY SERPL CALC.SUM OF ELEC: 296 MOSM/KG (ref 275–295)
PLATELET # BLD AUTO: 278 10(3)UL (ref 150–450)
POTASSIUM SERPL-SCNC: 3.5 MMOL/L (ref 3.5–5.1)
RBC # BLD AUTO: 4.46 X10(6)UL
SODIUM SERPL-SCNC: 141 MMOL/L (ref 136–145)
WBC # BLD AUTO: 8.9 X10(3) UL (ref 4–11)

## 2024-07-03 PROCEDURE — 80048 BASIC METABOLIC PNL TOTAL CA: CPT

## 2024-07-03 PROCEDURE — 85025 COMPLETE CBC W/AUTO DIFF WBC: CPT

## 2024-07-09 ENCOUNTER — HOSPITAL ENCOUNTER (OUTPATIENT)
Dept: INTERVENTIONAL RADIOLOGY/VASCULAR | Facility: HOSPITAL | Age: 84
Discharge: HOME | End: 2024-07-09
Attending: INTERNAL MEDICINE | Admitting: INTERNAL MEDICINE
Payer: MEDICARE

## 2024-07-09 VITALS
TEMPERATURE: 97 F | DIASTOLIC BLOOD PRESSURE: 42 MMHG | BODY MASS INDEX: 32 KG/M2 | HEART RATE: 80 BPM | SYSTOLIC BLOOD PRESSURE: 104 MMHG | WEIGHT: 191 LBS | RESPIRATION RATE: 19 BRPM | OXYGEN SATURATION: 97 %

## 2024-07-09 DIAGNOSIS — R94.39 ABNORMAL STRESS TEST: ICD-10-CM

## 2024-07-09 DIAGNOSIS — R06.02 SHORTNESS OF BREATH: ICD-10-CM

## 2024-07-09 DIAGNOSIS — Z01.818 PRE-OP TESTING: Primary | ICD-10-CM

## 2024-07-09 LAB
GLUCOSE BLDC GLUCOMTR-MCNC: 207 MG/DL (ref 70–99)
ISTAT ACTIVATED CLOTTING TIME: 317 SECONDS (ref 125–137)

## 2024-07-09 PROCEDURE — 82962 GLUCOSE BLOOD TEST: CPT

## 2024-07-09 PROCEDURE — 92978 ENDOLUMINL IVUS OCT C 1ST: CPT | Performed by: INTERNAL MEDICINE

## 2024-07-09 PROCEDURE — 36415 COLL VENOUS BLD VENIPUNCTURE: CPT

## 2024-07-09 PROCEDURE — B2111ZZ FLUOROSCOPY OF MULTIPLE CORONARY ARTERIES USING LOW OSMOLAR CONTRAST: ICD-10-PCS | Performed by: INTERNAL MEDICINE

## 2024-07-09 PROCEDURE — 93458 L HRT ARTERY/VENTRICLE ANGIO: CPT | Performed by: INTERNAL MEDICINE

## 2024-07-09 PROCEDURE — 027034Z DILATION OF CORONARY ARTERY, ONE ARTERY WITH DRUG-ELUTING INTRALUMINAL DEVICE, PERCUTANEOUS APPROACH: ICD-10-PCS | Performed by: INTERNAL MEDICINE

## 2024-07-09 PROCEDURE — 4A023N7 MEASUREMENT OF CARDIAC SAMPLING AND PRESSURE, LEFT HEART, PERCUTANEOUS APPROACH: ICD-10-PCS | Performed by: INTERNAL MEDICINE

## 2024-07-09 PROCEDURE — 99152 MOD SED SAME PHYS/QHP 5/>YRS: CPT | Performed by: INTERNAL MEDICINE

## 2024-07-09 PROCEDURE — 99153 MOD SED SAME PHYS/QHP EA: CPT | Performed by: INTERNAL MEDICINE

## 2024-07-09 PROCEDURE — 02F03ZZ FRAGMENTATION IN CORONARY ARTERY, ONE ARTERY, PERCUTANEOUS APPROACH: ICD-10-PCS | Performed by: INTERNAL MEDICINE

## 2024-07-09 PROCEDURE — 92972 PERQ TRLUML CORONRY LITHOTRP: CPT | Performed by: INTERNAL MEDICINE

## 2024-07-09 PROCEDURE — 85347 COAGULATION TIME ACTIVATED: CPT

## 2024-07-09 PROCEDURE — B240ZZ3 ULTRASONOGRAPHY OF SINGLE CORONARY ARTERY, INTRAVASCULAR: ICD-10-PCS | Performed by: INTERNAL MEDICINE

## 2024-07-09 RX ORDER — NITROGLYCERIN 20 MG/100ML
INJECTION INTRAVENOUS
Status: COMPLETED
Start: 2024-07-09 | End: 2024-07-09

## 2024-07-09 RX ORDER — SODIUM CHLORIDE 9 MG/ML
INJECTION, SOLUTION INTRAVENOUS
Status: COMPLETED | OUTPATIENT
Start: 2024-07-09 | End: 2024-07-09

## 2024-07-09 RX ORDER — LIDOCAINE HYDROCHLORIDE 20 MG/ML
INJECTION, SOLUTION EPIDURAL; INFILTRATION; INTRACAUDAL; PERINEURAL
Status: COMPLETED
Start: 2024-07-09 | End: 2024-07-09

## 2024-07-09 RX ORDER — CHLORHEXIDINE GLUCONATE 40 MG/ML
SOLUTION TOPICAL
Status: COMPLETED | OUTPATIENT
Start: 2024-07-09 | End: 2024-07-09

## 2024-07-09 RX ORDER — VERAPAMIL HYDROCHLORIDE 2.5 MG/ML
INJECTION, SOLUTION INTRAVENOUS
Status: COMPLETED
Start: 2024-07-09 | End: 2024-07-09

## 2024-07-09 RX ORDER — ASPIRIN 81 MG/1
81 TABLET ORAL DAILY
Status: DISCONTINUED | OUTPATIENT
Start: 2024-07-10 | End: 2024-07-09

## 2024-07-09 RX ORDER — SODIUM CHLORIDE 9 MG/ML
3 INJECTION, SOLUTION INTRAVENOUS CONTINUOUS
Status: DISCONTINUED | OUTPATIENT
Start: 2024-07-09 | End: 2024-07-09

## 2024-07-09 RX ORDER — MIDAZOLAM HYDROCHLORIDE 1 MG/ML
INJECTION INTRAMUSCULAR; INTRAVENOUS
Status: COMPLETED
Start: 2024-07-09 | End: 2024-07-09

## 2024-07-09 RX ORDER — SODIUM CHLORIDE 9 MG/ML
INJECTION, SOLUTION INTRAVENOUS CONTINUOUS
Status: ACTIVE | OUTPATIENT
Start: 2024-07-09 | End: 2024-07-09

## 2024-07-09 RX ORDER — HEPARIN SODIUM 1000 [USP'U]/ML
INJECTION, SOLUTION INTRAVENOUS; SUBCUTANEOUS
Status: COMPLETED
Start: 2024-07-09 | End: 2024-07-09

## 2024-07-09 RX ORDER — ASPIRIN 81 MG/1
324 TABLET, CHEWABLE ORAL ONCE
Status: DISCONTINUED | OUTPATIENT
Start: 2024-07-09 | End: 2024-07-09

## 2024-07-09 RX ADMIN — SODIUM CHLORIDE 3 ML/KG/HR: 9 INJECTION, SOLUTION INTRAVENOUS at 09:45:00

## 2024-07-09 RX ADMIN — SODIUM CHLORIDE: 9 INJECTION, SOLUTION INTRAVENOUS at 09:45:00

## 2024-07-09 RX ADMIN — CHLORHEXIDINE GLUCONATE: 40 SOLUTION TOPICAL at 09:45:00

## 2024-07-09 NOTE — PROCEDURES
[facility]  part of [facility]    Cardiac Cath Procedure Note  Bijal Perez Patient Status:  Outpatient    1940 MRN [id number]   Location [facility] INTERVENTIONAL SUITES Attending Silvino Bray MD   Hosp Day # 0 PCP Yusef Farr MD       Cardiologist: Silvino Bray MD  Primary Proceduralist: Silvino Bray MD  Procedure Performed: LHC, COR, LV, and IVUS Guided shockwave and PCI prox LAD  Date of Procedure: 2024   Indication: positive stress test    Summary of procedure:    Successful IVUS guided shock wave and PCI proximal LAD ISR.      Recommendations:  DAPT - aspirin and brilinta  DC HOME 4 hours      Left Ventriculography and hemodynamics:   LV EF not done  LV EDP 18 mmHg  No gradient across aortic valve        Coronary Angiography  RCA:  Dominant and free of obstructive disease, supplies PDA and PL    Left main:  Free of obstructive disease    Left anterior descending:  Moderately calcified proximal 80% lesion into the proximal LAD.  LAD-D bifurcation free of obstructive disease, supplies multiple diagonals which are non-obstructive    Circumflex:  Free of obstructive disease, supplies multiple OM branches which are patent      LAD intervention  Lesion Characteristics-mildly torturous, moderately calcified.  Type non-C lesion.  Pre-intervention stenosis 80%, Post intervention stenosis 0%.  Pre ARMEN 3, Post ARMEN 3.      Guide Catheter: EBU 3.5  Wire: Duyen blue, Duyen black  Intravascular ultrasound: Identifies calcified neoatherosclerosis in the proximal edge of the stent, calcified lesion proximal LAD proximal to the stent.  Pre-dil: 3.5 mm shockwave, all treatments given  Stent: 3.5 x 20 mm Synergy TRUONG  Post-dil: 4 x 12 mm compliant balloon proximal to the stent, 3.5 x 15 mm NC balloon entirety of the stent      Summary of Case: After written informed consent was obtained from the patient, patient was brought to the cardiac catheterization laboratory.  Patient was  prepped and draped in the usual sterile fashion. Lidocaine 1% was used to infiltrate the right radial artery for local anesthesia and a 6 Icelandic introducer sheath was inserted into the right radial artery.      Selective coronary angiography performed with JR4 catheter for RCA and JL3.5 catheter for LCA.  Angiography performed in standard projections.      6 Sami JR4 catheter placed in LV for hemodynamics.    Specimen sent to: No specimen collected  Estimated blood loss: 10 cc  Closure:  TR band      IV was maintained by RN and moderate conscious sedation of versed and fentanyl was given.  Patient was assessed and monitoring of oxygen, heart rate and blood pressure by nurse and myself during the exam 35 minutes.      Silvino Bray MD  07/09/24

## 2024-07-09 NOTE — INTERVAL H&P NOTE
Pre-op Diagnosis: * No pre-op diagnosis entered *    The above referenced H&P was reviewed by Silvino Bray MD on 7/9/2024, the patient was examined and no significant changes have occurred in the patient's condition since the H&P was performed.  I discussed with the patient and/or legal representative the potential benefits, risks and side effects of this procedure; the likelihood of the patient achieving goals; and potential problems that might occur during recuperation.  I discussed reasonable alternatives to the procedure, including risks, benefits and side effects related to the alternatives and risks related to not receiving this procedure.  We will proceed with procedure as planned.

## 2024-07-09 NOTE — DISCHARGE INSTRUCTIONS
TRANSRADIAL DISCHARGE INSTRUCTION  HOME CARE INSTRUCTIONS FOLLOWING CORONARY ANGIOGRAPHY,  INSERTION OF STENT IN THE CORONARY    Activity  DO NOT drive after the procedure.  You may resume driving late the following day according to the nurse or physician's instructions  Plan on resting and relaxing tonight and tomorrow  Resume your normal activity after 48 hours, or as instructed by your physician  Avoid drinking alcohol for the next 24 hours  Avoid wrist flexion, extension, and fine motor activities (i.e. texting, typing, using computer mouse, etc.) for 24 hours  Do not lift or pull anything heavier than 5 to 8  pounds with affected hand for 1 week    What is Normal?  A small lump at the procedure site associated with mild tenderness when touched  The procedure site may be bruised or discolored  There may be a small amount of drainage on the bandage    Special Instructions   Drink plenty of fluids during the next 24 hours to \"flush\" the contrast from your system  Keep the bandage clean and dry  After 24 hours, you must remove the bandage. Do not put ointment, powders, or creams to site.  You can shower after removing the bandage, and wash the procedure site gently with soap and water  DO NOT submerge the procedure site for 1 week (no bath tubs or pools)  If you choose to wear a bandage for a few days, make sure it remains clean and dry and that it is changed daily  For local swelling: apply ice  Bleeding can occur at the procedure site - both on the outside of the skin and/or beneath the surface of the skin        If bleeding occurs: Elevate hand above heart and apply local pressure  Swelling or a large lump at the procedure site can occur, which may be accompanied by moderate to severe pain  If the bleeding does not stop, call 911 and continue to apply pressure    When to contact physician:   Swelling, pain, or bleeding at the site that is not relieved by applying ice or pressure  Signs of infection: Redness,  warmth, drainage at the site, chills, or temperature of 100.5 or greater  Changes in sensation, numbness, or tingling of affected hand    You Received a Stent:    You will remain on an antiplatelet drug and/or aspirin.  Antiplatelet medications are usually taken for six months to one year and should not be stopped unless your cardiologist directs you to do so.  These medications help to prevent blockage at the stent site.  If another physician or dentist asks you to stop your antiplatelet medication, you need to consult your cardiologist first.  Together, your cardiologist and other physician can discuss the risks that may be involved if you are not taking the antiplatelet medication   If an MRI is necessary, it may be done 4-6 weeks after your procedure.  Verify this with your cardiologist  Keep your stent card with you at all times!  If you need an MRI in the future, your stent card will need to be shown to the technologist before performing the MRI.  A duplicate card CANNOT be reproduced.    Other    You may resume your present diet, unless otherwise specified by your physician.  A list of your medications was provided to you at discharge.  If you are on any METFORMIN containing agents - HOLD for 48 hours         **If you have any question or concern, please call the on-call nurse at [phone]

## 2024-07-09 NOTE — IVS NOTE
DISCHARGE NOTE     Pt is able to sit up and ambulate without difficulty.   Pt voided and tolerated fluids and food.   Procedural site remains dry and intact with good circulation, motion, and sensation.   No signs and symptoms of bleeding/hematoma noted.   IV access removed  Instruction provided, patient/family verbalizes understanding.   Dr. Bray spoke with patient/family post procedure.     Pt discharge via wheelchair to [facility]       Follow up Appointment: Nimisha 07/18/2024 at 900am    New Prescription: brilinta 90mg bid - first dose tonight. Ready for  per pharmacy - covered at $63.50 for 3 months supply.     Armboard and sling on for reminder not to use right arm.

## 2024-07-10 ENCOUNTER — TELEPHONE (OUTPATIENT)
Dept: HEMATOLOGY/ONCOLOGY | Facility: HOSPITAL | Age: 84
End: 2024-07-10

## 2024-07-10 NOTE — TELEPHONE ENCOUNTER
Returned phone call. Patient instructed to not use Intrarosa due to it increases estrogen levels. Discussed will mail list of over the counter vaginal moisturizers. Patient verbalizes understanding.

## 2024-07-10 NOTE — TELEPHONE ENCOUNTER
Bijal calling she is filling out appeal for entrarosa ordered by dr martinez wants dr martinez to know. This medication will cost her 1000.00 dollars. She was on estrogen but was told to stop taking it. Joan

## 2024-07-10 NOTE — TELEPHONE ENCOUNTER
Pt. Called to let Dr. Farr know that she had the Angiogram.  She was 80% blocked and had a new stent placed.  She is doing fine.

## 2024-07-16 ENCOUNTER — CARDPULM VISIT (OUTPATIENT)
Dept: CARDIAC REHAB | Facility: HOSPITAL | Age: 84
End: 2024-07-16
Attending: INTERNAL MEDICINE
Payer: MEDICARE

## 2024-07-18 ENCOUNTER — ORDER TRANSCRIPTION (OUTPATIENT)
Dept: CARDIAC REHAB | Facility: HOSPITAL | Age: 84
End: 2024-07-18

## 2024-07-18 DIAGNOSIS — E11.9 TYPE 2 DIABETES MELLITUS WITHOUT COMPLICATION, WITHOUT LONG-TERM CURRENT USE OF INSULIN (HCC): ICD-10-CM

## 2024-07-18 DIAGNOSIS — Z95.820 S/P ANGIOPLASTY WITH STENT: Primary | ICD-10-CM

## 2024-07-18 NOTE — PROCEDURE: LIQUID NITROGEN
Show Aperture Variable?: Yes
Render Post-Care Instructions In Note?: no
Post-Care Instructions: I reviewed with the patient in detail post-care instructions. Patient is to wear sunprotection, and avoid picking at any of the treated lesions. Pt may apply Vaseline to crusted or scabbing areas.
Spray Paint Text: The liquid nitrogen was applied to the skin utilizing a spray paint frosting technique.
Number Of Freeze-Thaw Cycles: 1 freeze-thaw cycle
Medical Necessity Information: It is in your best interest to select a reason for this procedure from the list below. All of these items fulfill various CMS LCD requirements except the new and changing color options.
home with continued HHA, has all DME
Medical Necessity Clause: This procedure was medically necessary because the lesions that were treated were:
Detail Level: Simple
Consent: The patient's consent was obtained including but not limited to risks of crusting, scabbing, blistering, scarring, darker or lighter pigmentary change, recurrence, incomplete removal and infection.

## 2024-07-19 RX ORDER — METFORMIN HYDROCHLORIDE 500 MG/1
1000 TABLET, EXTENDED RELEASE ORAL 2 TIMES DAILY
Qty: 360 TABLET | Refills: 1 | Status: SHIPPED | OUTPATIENT
Start: 2024-07-19

## 2024-07-19 NOTE — TELEPHONE ENCOUNTER
Endocrine Refill protocol for oral and injectable diabetic medications    Protocol Criteria: PASSED      -Appointment with Endocrinology completed in the last 6 months or scheduled in the next 3 months    -A1c result below 8.5% in the past 6 months      Verify the above has been completed or scheduled in the appropriate timeline. If so can send a 90 day supply with 1 refill.     Last completed office visit: 3/14/2024 Justine Jones MD   Next scheduled Follow up: 101/01/24    Last A1c result: Last A1c value was 7.7% done 5/24/2024.

## 2024-07-30 ENCOUNTER — LAB ENCOUNTER (OUTPATIENT)
Dept: LAB | Age: 84
End: 2024-07-30
Attending: INTERNAL MEDICINE
Payer: MEDICARE

## 2024-07-30 ENCOUNTER — OFFICE VISIT (OUTPATIENT)
Dept: INTERNAL MEDICINE CLINIC | Facility: CLINIC | Age: 84
End: 2024-07-30

## 2024-07-30 VITALS
HEIGHT: 65 IN | SYSTOLIC BLOOD PRESSURE: 110 MMHG | HEART RATE: 88 BPM | DIASTOLIC BLOOD PRESSURE: 60 MMHG | WEIGHT: 194 LBS | TEMPERATURE: 98 F | BODY MASS INDEX: 32.32 KG/M2

## 2024-07-30 DIAGNOSIS — I10 HYPERTENSION, BENIGN: ICD-10-CM

## 2024-07-30 DIAGNOSIS — C50.412 CARCINOMA OF UPPER-OUTER QUADRANT OF LEFT BREAST IN FEMALE, ESTROGEN RECEPTOR POSITIVE (HCC): ICD-10-CM

## 2024-07-30 DIAGNOSIS — G47.8 UARS (UPPER AIRWAY RESISTANCE SYNDROME): ICD-10-CM

## 2024-07-30 DIAGNOSIS — N30.00 ACUTE CYSTITIS WITHOUT HEMATURIA: ICD-10-CM

## 2024-07-30 DIAGNOSIS — Z17.0 CARCINOMA OF UPPER-OUTER QUADRANT OF LEFT BREAST IN FEMALE, ESTROGEN RECEPTOR POSITIVE (HCC): ICD-10-CM

## 2024-07-30 DIAGNOSIS — E78.5 HYPERLIPIDEMIA, UNSPECIFIED HYPERLIPIDEMIA TYPE: ICD-10-CM

## 2024-07-30 DIAGNOSIS — Z95.5 H/O HEART ARTERY STENT: ICD-10-CM

## 2024-07-30 DIAGNOSIS — I10 HYPERTENSION, BENIGN: Primary | ICD-10-CM

## 2024-07-30 DIAGNOSIS — F48.9 TENSION: ICD-10-CM

## 2024-07-30 LAB
BILIRUB UR QL: NEGATIVE
CLARITY UR: CLEAR
GLUCOSE UR-MCNC: >1000 MG/DL
HGB UR QL STRIP.AUTO: NEGATIVE
KETONES UR-MCNC: NEGATIVE MG/DL
LEUKOCYTE ESTERASE UR QL STRIP.AUTO: NEGATIVE
PH UR: 5 [PH] (ref 5–8)
PROT UR-MCNC: NEGATIVE MG/DL
SP GR UR STRIP: 1.03 (ref 1–1.03)
UROBILINOGEN UR STRIP-ACNC: NORMAL

## 2024-07-30 PROCEDURE — 81001 URINALYSIS AUTO W/SCOPE: CPT

## 2024-07-30 PROCEDURE — 99214 OFFICE O/P EST MOD 30 MIN: CPT | Performed by: INTERNAL MEDICINE

## 2024-07-30 PROCEDURE — 87086 URINE CULTURE/COLONY COUNT: CPT

## 2024-07-30 PROCEDURE — 87088 URINE BACTERIA CULTURE: CPT

## 2024-07-30 PROCEDURE — 87186 SC STD MICRODIL/AGAR DIL: CPT

## 2024-07-30 RX ORDER — MONTELUKAST SODIUM 10 MG/1
10 TABLET ORAL DAILY
Qty: 90 TABLET | Refills: 3 | Status: SHIPPED | OUTPATIENT
Start: 2024-07-30 | End: 2025-07-25

## 2024-07-30 RX ORDER — ALPRAZOLAM 0.25 MG/1
0.25 TABLET ORAL 3 TIMES DAILY PRN
Qty: 90 TABLET | Refills: 2 | Status: SHIPPED | OUTPATIENT
Start: 2024-07-30

## 2024-07-30 NOTE — PROGRESS NOTES
Bijal Perez is a 83 year old female.  HPI:   She had noted decreased exercise tolerance and had abnormal stress test and then angio and stent - considerable improvement in her walking. She is starting cardiac rehab.     She has completed treatment for UTI - she has had 4 UTI s in last 4 mo - has seen Dr Lepe. I have advised D-mammose 500 mg bid. She feels she has UTI now.    Carcinoma breast - no evidence for recurrence.     Head congestion - I am giving Singulair.       SR: no chest pain or sob, no gu or gi sx.    BP Readings from Last 6 Encounters:   07/30/24 110/60   07/09/24 104/42   05/07/24 138/48   04/30/24 136/68   03/14/24 121/61   03/12/24 136/58     Wt Readings from Last 6 Encounters:   07/30/24 194 lb (88 kg)   06/28/24 191 lb (86.6 kg)   05/07/24 197 lb 3.2 oz (89.4 kg)   04/30/24 197 lb 9.6 oz (89.6 kg)   03/14/24 199 lb (90.3 kg)   03/12/24 200 lb 3.2 oz (90.8 kg)     Current Outpatient Medications   Medication Sig Dispense Refill    METFORMIN  MG Oral Tablet 24 Hr TAKE 2 TABLETS BY MOUTH TWICE A  tablet 1    ticagrelor 90 MG Oral Tab Take 1 tablet (90 mg total) by mouth every 12 (twelve) hours. 180 tablet 3    letrozole 2.5 MG Oral Tab Take 1 tablet (2.5 mg total) by mouth daily. 90 tablet 3    semaglutide (OZEMPIC, 0.25 OR 0.5 MG/DOSE,) 2 MG/3ML Subcutaneous Solution Pen-injector Inject 0.5 mg into the skin once a week. 9 mL 1    benzonatate 200 MG Oral Cap Take 1 capsule (200 mg total) by mouth 3 (three) times daily as needed for cough. 60 capsule 0    ALPRAZolam 0.25 MG Oral Tab Take 1 tablet (0.25 mg total) by mouth 3 (three) times daily as needed. 90 tablet 2    LOSARTAN-HYDROCHLOROTHIAZIDE 50-12.5 MG Oral Tab TAKE 1 TABLET BY MOUTH EVERY DAY 90 tablet 3    Empagliflozin (JARDIANCE) 25 MG Oral Tab Take 1 tablet by mouth daily. 90 tablet 3    glipiZIDE 10 MG Oral Tab Take 1 tablet (10 mg total) by mouth 2 (two) times daily before meals. 180 tablet 3    DOCUSATE SODIUM OR  Take 1 capsule by mouth in the morning and 1 capsule before bedtime.      Multiple Vitamins-Minerals (CENTRUM SILVER ULTRA WOMENS) Oral Tab Take 1 tablet by mouth daily.      atorvastatin 40 MG Oral Tab Take 1 tablet (40 mg total) by mouth nightly.      Glucose Blood (ONETOUCH VERIO) In Vitro Strip Check BG 2 times per day. DX:E11.8 without insulin use. 200 strip 1    OneTouch Delica Lancets 33G Does not apply Misc Check BG 2 times per day. DX: E11.8 without insulin use. 200 each 1    Pimecrolimus 1 % External Cream as needed. APPLY TO AFFECTED AREA OF THE FOLDS OF THE ABDOMEN TWICE DAILY   1    Polyethyl Glycol-Propyl Glycol (SYSTANE ULTRA) 0.4-0.3 % Ophthalmic Solution Place 1 drop into both eyes as needed.      aspirin 81 MG Oral Tab Take 1 tablet (81 mg total) by mouth at bedtime.      Calcium 200 MG Oral Tab Take 1 tablet by mouth daily. (Patient not taking: Reported on 7/30/2024)      clotrimazole-betamethasone 1-0.05 % External Cream Apply 1 Application topically 2 (two) times daily. (Patient not taking: Reported on 7/30/2024) 60 g 3      Past Medical History:    Age-related nuclear cataract of both eyes    Atherosclerosis of coronary artery    Benign neoplasm of skin of left eyelid    Blepharitis of both eyes    Cancer (HCC)    Breast    Chronic allergic conjunctivitis    Coronary atherosclerosis    Edema of right lower eyelid    Gallstone    NG: Asymptomatic     Gastroenteritis    High blood pressure    High cholesterol    Lipid screening    Obesity    Osteoarthritis    Osteoporosis    Osteoporosis screening    Other and unspecified hyperlipidemia    Restless leg syndrome    Per Dr. Rice    Type II or unspecified type diabetes mellitus without mention of complication, not stated as uncontrolled    Unspecified essential hypertension    Visual impairment    glasses for reading      Social History:  Social History     Socioeconomic History    Marital status:    Tobacco Use    Smoking status: Never      Passive exposure: Past    Smokeless tobacco: Never   Vaping Use    Vaping status: Never Used   Substance and Sexual Activity    Alcohol use: No    Drug use: No   Other Topics Concern    Caffeine Concern No        REVIEW OF SYSTEMS:   GENERAL HEALTH: feels well otherwise  SKIN: denies any unusual skin lesions or rashes  RESPIRATORY: denies shortness of breath with exertion  CARDIOVASCULAR: denies chest pain on exertion  GI: denies abdominal pain and denies heartburn  NEURO: denies headaches    EXAM:   /60   Pulse 88   Temp 98 °F (36.7 °C) (Oral)   Ht 5' 5\" (1.651 m)   Wt 194 lb (88 kg)   LMP  (LMP Unknown)   BMI 32.28 kg/m²   GENERAL: well developed, well nourished,in no apparent distress  SKIN: no rashes,no suspicious lesions  HEENT: atraumatic, normocephalic,ears and throat are clear  NECK: supple,no adenopathy,no bruits  LUNGS: clear to auscultation  CARDIO: RRR without murmur  GI: good BS's,no masses, HSM or tenderness  EXTREMITIES: no cyanosis, clubbing or edema    ASSESSMENT AND PLAN:   1. Hypertension, benign  At goal, same meds       2. Hyperlipidemia, unspecified hyperlipidemia type    Labs pending     3. H/O heart artery stent  Doing well, she now has total of 4 stents     4. 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  Acute cystitis without hematuria     - Urine Culture, Routine; Future  - Urinalysis, Routine [E]; Future    The patient indicates understanding of these issues and agrees to the plan.  The patient is asked to return in 2 mo.

## 2024-07-31 ENCOUNTER — CARDPULM VISIT (OUTPATIENT)
Dept: CARDIAC REHAB | Facility: HOSPITAL | Age: 84
End: 2024-07-31
Attending: INTERNAL MEDICINE
Payer: MEDICARE

## 2024-07-31 LAB
GLUCOSE BLDC GLUCOMTR-MCNC: 181 MG/DL (ref 70–99)
GLUCOSE BLDC GLUCOMTR-MCNC: 216 MG/DL (ref 70–99)

## 2024-07-31 PROCEDURE — 93798 PHYS/QHP OP CAR RHAB W/ECG: CPT

## 2024-08-01 ENCOUNTER — TELEPHONE (OUTPATIENT)
Dept: INTERNAL MEDICINE CLINIC | Facility: CLINIC | Age: 84
End: 2024-08-01

## 2024-08-01 RX ORDER — NITROFURANTOIN 25; 75 MG/1; MG/1
100 CAPSULE ORAL 2 TIMES DAILY
Qty: 14 CAPSULE | Refills: 0 | Status: SHIPPED | OUTPATIENT
Start: 2024-08-01

## 2024-08-01 NOTE — TELEPHONE ENCOUNTER
PAQ review:      Urine Culture, Routine  Order: [id number]   Collected 7/30/2024  4:07 PM       Status: Preliminary result       Dx: Hypertension, benign; Acute cystitis ...    Specimen Information: Urine, clean catch   0 Result Notes  URINE CULTURE >100,000 CFU/ML Escherichia coli Abnormal            Resulting Agency: [facility] ([facility])           Specimen Collected: 07/30/24  4:07 PM Last Resulted: 08/01/24  9:43 AM            To Dr. Finch to please advise--

## 2024-08-01 NOTE — TELEPHONE ENCOUNTER
Pt called; UCx with >100K E Coli    UA shows 1-5 WBC; 0-2 RBC, >1000 glucose    Pt reports +urine frequency    On Jardiance 25 mg po every day    Discussed stopping Jardiance due to frequent UTIs    Imp-recurrent UTI    Rec- stop Jardiance    Nitrofuranotin 100 mg po BID #14    Pt called; ERx sent; FYI to DR OROZCO

## 2024-08-02 ENCOUNTER — CARDPULM VISIT (OUTPATIENT)
Dept: CARDIAC REHAB | Facility: HOSPITAL | Age: 84
End: 2024-08-02
Attending: INTERNAL MEDICINE
Payer: MEDICARE

## 2024-08-02 LAB
GLUCOSE BLDC GLUCOMTR-MCNC: 232 MG/DL (ref 70–99)
GLUCOSE BLDC GLUCOMTR-MCNC: 240 MG/DL (ref 70–99)

## 2024-08-02 PROCEDURE — 93798 PHYS/QHP OP CAR RHAB W/ECG: CPT

## 2024-08-02 PROCEDURE — 82962 GLUCOSE BLOOD TEST: CPT

## 2024-08-05 ENCOUNTER — CARDPULM VISIT (OUTPATIENT)
Dept: CARDIAC REHAB | Facility: HOSPITAL | Age: 84
End: 2024-08-05
Attending: INTERNAL MEDICINE
Payer: MEDICARE

## 2024-08-05 ENCOUNTER — TELEPHONE (OUTPATIENT)
Dept: INTERNAL MEDICINE CLINIC | Facility: CLINIC | Age: 84
End: 2024-08-05

## 2024-08-05 LAB — GLUCOSE BLDC GLUCOMTR-MCNC: 151 MG/DL (ref 70–99)

## 2024-08-05 PROCEDURE — 82962 GLUCOSE BLOOD TEST: CPT

## 2024-08-05 PROCEDURE — 93798 PHYS/QHP OP CAR RHAB W/ECG: CPT

## 2024-08-07 ENCOUNTER — CARDPULM VISIT (OUTPATIENT)
Dept: CARDIAC REHAB | Facility: HOSPITAL | Age: 84
End: 2024-08-07
Attending: INTERNAL MEDICINE
Payer: MEDICARE

## 2024-08-07 PROCEDURE — 93798 PHYS/QHP OP CAR RHAB W/ECG: CPT

## 2024-08-09 ENCOUNTER — CARDPULM VISIT (OUTPATIENT)
Dept: CARDIAC REHAB | Facility: HOSPITAL | Age: 84
End: 2024-08-09
Attending: INTERNAL MEDICINE
Payer: MEDICARE

## 2024-08-09 LAB
GLUCOSE BLDC GLUCOMTR-MCNC: 150 MG/DL (ref 70–99)
GLUCOSE BLDC GLUCOMTR-MCNC: 172 MG/DL (ref 70–99)

## 2024-08-09 PROCEDURE — 93798 PHYS/QHP OP CAR RHAB W/ECG: CPT

## 2024-08-12 ENCOUNTER — CARDPULM VISIT (OUTPATIENT)
Dept: CARDIAC REHAB | Facility: HOSPITAL | Age: 84
End: 2024-08-12
Attending: INTERNAL MEDICINE
Payer: MEDICARE

## 2024-08-12 PROCEDURE — 93798 PHYS/QHP OP CAR RHAB W/ECG: CPT

## 2024-08-13 ENCOUNTER — LAB ENCOUNTER (OUTPATIENT)
Dept: LAB | Age: 84
End: 2024-08-13
Attending: INTERNAL MEDICINE
Payer: MEDICARE

## 2024-08-13 ENCOUNTER — OFFICE VISIT (OUTPATIENT)
Dept: INTERNAL MEDICINE CLINIC | Facility: CLINIC | Age: 84
End: 2024-08-13

## 2024-08-13 VITALS
WEIGHT: 192.81 LBS | TEMPERATURE: 98 F | DIASTOLIC BLOOD PRESSURE: 54 MMHG | OXYGEN SATURATION: 98 % | HEIGHT: 65 IN | HEART RATE: 80 BPM | SYSTOLIC BLOOD PRESSURE: 112 MMHG | BODY MASS INDEX: 32.12 KG/M2

## 2024-08-13 DIAGNOSIS — N39.0 FREQUENT UTI: Primary | ICD-10-CM

## 2024-08-13 DIAGNOSIS — N39.0 FREQUENT UTI: ICD-10-CM

## 2024-08-13 DIAGNOSIS — I10 HYPERTENSION, BENIGN: ICD-10-CM

## 2024-08-13 PROCEDURE — 99214 OFFICE O/P EST MOD 30 MIN: CPT | Performed by: INTERNAL MEDICINE

## 2024-08-13 PROCEDURE — 87086 URINE CULTURE/COLONY COUNT: CPT

## 2024-08-13 RX ORDER — AMOXICILLIN 500 MG/1
500 CAPSULE ORAL 3 TIMES DAILY
Qty: 30 CAPSULE | Refills: 0 | Status: SHIPPED | OUTPATIENT
Start: 2024-08-13 | End: 2024-08-23

## 2024-08-13 RX ORDER — FLUTICASONE PROPIONATE 50 MCG
2 SPRAY, SUSPENSION (ML) NASAL DAILY
Qty: 1 EACH | Refills: 5 | Status: SHIPPED | OUTPATIENT
Start: 2024-08-13 | End: 2025-08-08

## 2024-08-13 NOTE — PROGRESS NOTES
Bijal Perez is a 83 year old female.  HPI:   Nasal drainage continues, gillian after eating - Singulair did not help - I am giving Flonase and referring to ENT.     6 or 7 UTIs in last year, most recently 7/30/24 -  given nitrofurantoin and then diarrhea - she completed nitrofurantoin yesterday.     IHD - doing well. She is in cardiac rehab.     DM - advised by Dr LINDO to stop Jiardiance - she will check with Dr Jones.      SR: no chest pain or sob, no gu or gi sx.        BP Readings from Last 6 Encounters:   08/13/24 112/54   07/30/24 110/60   07/09/24 104/42   05/07/24 138/48   04/30/24 136/68   03/14/24 121/61     Wt Readings from Last 6 Encounters:   08/13/24 192 lb 12.8 oz (87.5 kg)   07/30/24 194 lb (88 kg)   06/28/24 191 lb (86.6 kg)   05/07/24 197 lb 3.2 oz (89.4 kg)   04/30/24 197 lb 9.6 oz (89.6 kg)   03/14/24 199 lb (90.3 kg)     Current Outpatient Medications   Medication Sig Dispense Refill    fluticasone propionate 50 MCG/ACT Nasal Suspension 2 sprays by Each Nare route daily. 1 each 5    montelukast (SINGULAIR) 10 MG Oral Tab Take 1 tablet (10 mg total) by mouth daily. 90 tablet 3    ALPRAZolam 0.25 MG Oral Tab Take 1 tablet (0.25 mg total) by mouth 3 (three) times daily as needed. 90 tablet 2    METFORMIN  MG Oral Tablet 24 Hr TAKE 2 TABLETS BY MOUTH TWICE A  tablet 1    ticagrelor 90 MG Oral Tab Take 1 tablet (90 mg total) by mouth every 12 (twelve) hours. 180 tablet 3    letrozole 2.5 MG Oral Tab Take 1 tablet (2.5 mg total) by mouth daily. 90 tablet 3    semaglutide (OZEMPIC, 0.25 OR 0.5 MG/DOSE,) 2 MG/3ML Subcutaneous Solution Pen-injector Inject 0.5 mg into the skin once a week. 9 mL 1    benzonatate 200 MG Oral Cap Take 1 capsule (200 mg total) by mouth 3 (three) times daily as needed for cough. 60 capsule 0    LOSARTAN-HYDROCHLOROTHIAZIDE 50-12.5 MG Oral Tab TAKE 1 TABLET BY MOUTH EVERY DAY 90 tablet 3    Empagliflozin (JARDIANCE) 25 MG Oral Tab Take 1 tablet by mouth daily. 90  tablet 3    glipiZIDE 10 MG Oral Tab Take 1 tablet (10 mg total) by mouth 2 (two) times daily before meals. 180 tablet 3    DOCUSATE SODIUM OR Take 1 capsule by mouth in the morning and 1 capsule before bedtime.      clotrimazole-betamethasone 1-0.05 % External Cream Apply 1 Application topically 2 (two) times daily. 60 g 3    Multiple Vitamins-Minerals (CENTRUM SILVER ULTRA WOMENS) Oral Tab Take 1 tablet by mouth daily.      atorvastatin 40 MG Oral Tab Take 1 tablet (40 mg total) by mouth nightly.      Glucose Blood (ONETOUCH VERIO) In Vitro Strip Check BG 2 times per day. DX:E11.8 without insulin use. 200 strip 1    OneTouch Delica Lancets 33G Does not apply Misc Check BG 2 times per day. DX: E11.8 without insulin use. 200 each 1    Pimecrolimus 1 % External Cream as needed. APPLY TO AFFECTED AREA OF THE FOLDS OF THE ABDOMEN TWICE DAILY   1    Polyethyl Glycol-Propyl Glycol (SYSTANE ULTRA) 0.4-0.3 % Ophthalmic Solution Place 1 drop into both eyes as needed.      aspirin 81 MG Oral Tab Take 1 tablet (81 mg total) by mouth at bedtime.      Calcium 200 MG Oral Tab Take 1 tablet by mouth daily. (Patient not taking: Reported on 8/13/2024)        Past Medical History:    Age-related nuclear cataract of both eyes    Atherosclerosis of coronary artery    Benign neoplasm of skin of left eyelid    Blepharitis of both eyes    Cancer (HCC)    Breast    Chronic allergic conjunctivitis    Coronary atherosclerosis    Edema of right lower eyelid    Gallstone    NG: Asymptomatic     Gastroenteritis    High blood pressure    High cholesterol    Lipid screening    Obesity    Osteoarthritis    Osteoporosis    Osteoporosis screening    Other and unspecified hyperlipidemia    Restless leg syndrome    Per Dr. Rice    Type II or unspecified type diabetes mellitus without mention of complication, not stated as uncontrolled    Unspecified essential hypertension    Visual impairment    glasses for reading      Social History:  Social History      Socioeconomic History    Marital status:    Tobacco Use    Smoking status: Never     Passive exposure: Past    Smokeless tobacco: Never   Vaping Use    Vaping status: Never Used   Substance and Sexual Activity    Alcohol use: No    Drug use: No   Other Topics Concern    Caffeine Concern No        REVIEW OF SYSTEMS:   GENERAL HEALTH: feels well otherwise  SKIN: denies any unusual skin lesions or rashes  RESPIRATORY: denies shortness of breath with exertion  CARDIOVASCULAR: denies chest pain on exertion  GI: denies abdominal pain and denies heartburn  NEURO: denies headaches    EXAM:   /54   Pulse 80   Temp 98 °F (36.7 °C)   Ht 5' 5\" (1.651 m)   Wt 192 lb 12.8 oz (87.5 kg)   LMP  (LMP Unknown)   SpO2 98%   BMI 32.08 kg/m²   GENERAL: well developed, well nourished,in no apparent distress  SKIN: no rashes,no suspicious lesions  HEENT: atraumatic, normocephalic,ears and throat are clear  NECK: supple,no adenopathy,no bruits  LUNGS: clear to auscultation  CARDIO: RRR without murmur  GI: good BS's,no masses, HSM or tenderness  EXTREMITIES: no cyanosis, clubbing or edema    ASSESSMENT AND PLAN:   1. Frequent UTI  She is working with  - still has sx after treatment with nitrofurantoin  Repeat urine culture.     2. Hypertension, benign  At goal, same meds     3. DM  - advise stop Jiardiance inview of frequent UTIs      4. Doing reasonably well post total knee about 4 mo ago     The patient indicates understanding of these issues and agrees to the plan.  The patient is asked to return in 2 mo.

## 2024-08-14 ENCOUNTER — APPOINTMENT (OUTPATIENT)
Dept: CARDIAC REHAB | Facility: HOSPITAL | Age: 84
End: 2024-08-14
Attending: INTERNAL MEDICINE
Payer: MEDICARE

## 2024-08-16 ENCOUNTER — CARDPULM VISIT (OUTPATIENT)
Dept: CARDIAC REHAB | Facility: HOSPITAL | Age: 84
End: 2024-08-16
Attending: INTERNAL MEDICINE
Payer: MEDICARE

## 2024-08-16 PROCEDURE — 93798 PHYS/QHP OP CAR RHAB W/ECG: CPT

## 2024-08-19 ENCOUNTER — TELEPHONE (OUTPATIENT)
Dept: INTERNAL MEDICINE CLINIC | Facility: CLINIC | Age: 84
End: 2024-08-19

## 2024-08-19 ENCOUNTER — CARDPULM VISIT (OUTPATIENT)
Dept: CARDIAC REHAB | Facility: HOSPITAL | Age: 84
End: 2024-08-19
Attending: INTERNAL MEDICINE
Payer: MEDICARE

## 2024-08-19 PROCEDURE — 93798 PHYS/QHP OP CAR RHAB W/ECG: CPT

## 2024-08-21 ENCOUNTER — CARDPULM VISIT (OUTPATIENT)
Dept: CARDIAC REHAB | Facility: HOSPITAL | Age: 84
End: 2024-08-21
Attending: INTERNAL MEDICINE
Payer: MEDICARE

## 2024-08-21 PROCEDURE — 93798 PHYS/QHP OP CAR RHAB W/ECG: CPT

## 2024-08-23 ENCOUNTER — CARDPULM VISIT (OUTPATIENT)
Dept: CARDIAC REHAB | Facility: HOSPITAL | Age: 84
End: 2024-08-23
Attending: INTERNAL MEDICINE
Payer: MEDICARE

## 2024-08-23 PROCEDURE — 93798 PHYS/QHP OP CAR RHAB W/ECG: CPT

## 2024-08-26 ENCOUNTER — CARDPULM VISIT (OUTPATIENT)
Dept: CARDIAC REHAB | Facility: HOSPITAL | Age: 84
End: 2024-08-26
Attending: INTERNAL MEDICINE
Payer: MEDICARE

## 2024-08-26 PROCEDURE — 93798 PHYS/QHP OP CAR RHAB W/ECG: CPT

## 2024-08-28 ENCOUNTER — CARDPULM VISIT (OUTPATIENT)
Dept: CARDIAC REHAB | Facility: HOSPITAL | Age: 84
End: 2024-08-28
Attending: INTERNAL MEDICINE
Payer: MEDICARE

## 2024-08-28 PROCEDURE — 93798 PHYS/QHP OP CAR RHAB W/ECG: CPT

## 2024-08-30 ENCOUNTER — APPOINTMENT (OUTPATIENT)
Dept: CARDIAC REHAB | Facility: HOSPITAL | Age: 84
End: 2024-08-30
Attending: INTERNAL MEDICINE
Payer: MEDICARE

## 2024-09-02 ENCOUNTER — APPOINTMENT (OUTPATIENT)
Dept: CARDIAC REHAB | Facility: HOSPITAL | Age: 84
End: 2024-09-02
Attending: INTERNAL MEDICINE
Payer: MEDICARE

## 2024-09-04 ENCOUNTER — APPOINTMENT (OUTPATIENT)
Dept: CARDIAC REHAB | Facility: HOSPITAL | Age: 84
End: 2024-09-04
Attending: INTERNAL MEDICINE
Payer: MEDICARE

## 2024-09-06 ENCOUNTER — APPOINTMENT (OUTPATIENT)
Dept: CARDIAC REHAB | Facility: HOSPITAL | Age: 84
End: 2024-09-06
Attending: INTERNAL MEDICINE
Payer: MEDICARE

## 2024-09-09 ENCOUNTER — APPOINTMENT (OUTPATIENT)
Dept: CARDIAC REHAB | Facility: HOSPITAL | Age: 84
End: 2024-09-09
Attending: INTERNAL MEDICINE
Payer: MEDICARE

## 2024-09-11 ENCOUNTER — APPOINTMENT (OUTPATIENT)
Dept: CARDIAC REHAB | Facility: HOSPITAL | Age: 84
End: 2024-09-11
Attending: INTERNAL MEDICINE
Payer: MEDICARE

## 2024-09-12 ENCOUNTER — HOSPITAL ENCOUNTER (OUTPATIENT)
Dept: MAMMOGRAPHY | Facility: HOSPITAL | Age: 84
Discharge: HOME | End: 2024-09-12
Attending: SURGERY
Payer: MEDICARE

## 2024-09-12 DIAGNOSIS — Z17.0 MALIGNANT NEOPLASM OF UPPER-OUTER QUADRANT OF LEFT BREAST IN FEMALE, ESTROGEN RECEPTOR POSITIVE (HCC): ICD-10-CM

## 2024-09-12 DIAGNOSIS — C50.412 MALIGNANT NEOPLASM OF UPPER-OUTER QUADRANT OF LEFT BREAST IN FEMALE, ESTROGEN RECEPTOR POSITIVE (HCC): ICD-10-CM

## 2024-09-12 PROCEDURE — 77062 BREAST TOMOSYNTHESIS BI: CPT | Performed by: SURGERY

## 2024-09-12 PROCEDURE — 77066 DX MAMMO INCL CAD BI: CPT | Performed by: SURGERY

## 2024-09-13 ENCOUNTER — APPOINTMENT (OUTPATIENT)
Dept: CARDIAC REHAB | Facility: HOSPITAL | Age: 84
End: 2024-09-13
Attending: INTERNAL MEDICINE
Payer: MEDICARE

## 2024-09-16 ENCOUNTER — APPOINTMENT (OUTPATIENT)
Dept: CARDIAC REHAB | Facility: HOSPITAL | Age: 84
End: 2024-09-16
Attending: INTERNAL MEDICINE
Payer: MEDICARE

## 2024-09-16 ENCOUNTER — OFFICE VISIT (OUTPATIENT)
Dept: SURGERY | Facility: CLINIC | Age: 84
End: 2024-09-16
Payer: MEDICARE

## 2024-09-16 VITALS
OXYGEN SATURATION: 98 % | DIASTOLIC BLOOD PRESSURE: 71 MMHG | HEART RATE: 87 BPM | SYSTOLIC BLOOD PRESSURE: 117 MMHG | RESPIRATION RATE: 16 BRPM | WEIGHT: 191 LBS | BODY MASS INDEX: 32 KG/M2 | TEMPERATURE: 99 F

## 2024-09-16 DIAGNOSIS — C50.412 MALIGNANT NEOPLASM OF UPPER-OUTER QUADRANT OF LEFT BREAST IN FEMALE, ESTROGEN RECEPTOR POSITIVE (HCC): Primary | ICD-10-CM

## 2024-09-16 DIAGNOSIS — Z17.0 MALIGNANT NEOPLASM OF UPPER-OUTER QUADRANT OF LEFT BREAST IN FEMALE, ESTROGEN RECEPTOR POSITIVE (HCC): Primary | ICD-10-CM

## 2024-09-16 NOTE — PROGRESS NOTES
Breast Surgery Surveillance Visit    Diagnosis: Left breast cancer status post left breast lumpectomy on 2/4/21      Stage:  Cancer Staging   No matching staging information was found for the patient.    Disease Status:  Surgical treatment complete, on letrozole per medical oncology, last radiation April 2021.    History of Present Illness:   Ms. Bijal Perez is a 83 year old woman who presents with a self detected change to the left breast.  The patient reports that she noted an area that felt different on her self-exam at the end of 2020.  She did have a screening mammogram in the summer 2020 that was unremarkable.  Given the presence of the palpable concern, she was referred for a left diagnostic evaluation on January 6, 2021 that showed heterogeneously dense breast tissue with 2 hypoechoic masses near her area of concern in the upper outer quadrant at 1230, 1 cm from the nipple and at 1230, 4 cm from the nipple measuring 0.7 cm and 1.2 cm respectively for which biopsies were recommended.  She was noted on ultrasound to have no abnormal appearing lymph nodes in the left axilla.  She presented for a 2 site ultrasound-guided biopsy of the left breast lesions on January 6, 2021 that confirmed invasive carcinoma, grade 1 at both spots that was ER/HI positive, HER-2/roberto negative with a Ki-67 favorable at 5 to 8%.  She has no family history of breast cancer.  She has personal history of breast biopsies on the right breast that were reportedly benign.  She denies any nipple discharge, skin changes or axillary symptoms.  She presents today after seeing another opinion from surgery for second opinion management surgically of her cancer.  She has been seen by medical oncology who has deferred any systemic treatment pending final surgical pathology. She underwent left breast lumpectomy which occurred without complication. She has completed her radiation and is tolerating her letrozole per medical oncology.  She has no  new concerns related to her breast bilaterally.  Most recent imaging included bilateral diagnostic evaluation on September 12, 2024 that showed stable lumpectomy changes with no new concerns.  She is here today for evaluation and recommendations for further therapy.        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 Rice    Type II or unspecified type diabetes mellitus without mention of complication, not stated as uncontrolled    Unspecified essential hypertension    Visual impairment    glasses for reading       Past Surgical History:   Procedure Laterality Date    Breast biopsy Right 2013    Carpal tunnel release Right     Cataract      Cataract extraction Left 12/12/2018    [id number] 21.00 D LENSX WITH ORA    Cataract extraction w/  intraocular lens implant Right 11/14/2018    Lensx Toric IOL    model:[id number]     Power 19.0D   3.75CYL    Cath cartotid stent  11/2017    Cath drug eluting stent       3 total    Colonoscopy  11/2018    Colonoscopy N/A 11/12/2018    Procedure: COLONOSCOPY, POSSIBLE BIOPSY, POSSIBLE POLYPECTOMY 05780;  Surgeon: Teofilo Guerrero MD;  Location: [facility] [place]    Hysterectomy  1997    Lumpectomy left Left 02/2021    Mina biopsy stereo nodule 1 site right (cpt=19081) Right 2008    Mina biopsy stereo nodule 1 site right (cpt=19081) Right 2013    Needle biopsy left Left 01/06/2021    Needle biopsy right Right     Other surgical history  11/29/2017    stent placement    Radiation left  Left     Finshied 2021    Tonsillectomy      Total knee replacement Right 2024       Gynecological History:  Pt is a   She achieved menarche at age 16   Age of Menopause: 47  Type: Hysterectomy with ovaries removed  She has history of hormone replacement therapy for 5 years, last .  She denies any history of oral contraceptive use.  She denies infertility treatment to achieve pregnancy.    Medications:     fluticasone propionate 50 MCG/ACT Nasal Suspension 2 sprays by Each Nare route daily. 1 each 5    montelukast (SINGULAIR) 10 MG Oral Tab Take 1 tablet (10 mg total) by mouth daily. 90 tablet 3    ALPRAZolam 0.25 MG Oral Tab Take 1 tablet (0.25 mg total) by mouth 3 (three) times daily as needed. 90 tablet 2    METFORMIN  MG Oral Tablet 24 Hr TAKE 2 TABLETS BY MOUTH TWICE A  tablet 1    ticagrelor 90 MG Oral Tab Take 1 tablet (90 mg total) by mouth every 12 (twelve) hours. 180 tablet 3    letrozole 2.5 MG Oral Tab Take 1 tablet (2.5 mg total) by mouth daily. 90 tablet 3    semaglutide (OZEMPIC, 0.25 OR 0.5 MG/DOSE,) 2 MG/3ML Subcutaneous Solution Pen-injector Inject 0.5 mg into the skin once a week. 9 mL 1    benzonatate 200 MG Oral Cap Take 1 capsule (200 mg total) by mouth 3 (three) times daily as needed for cough. 60 capsule 0    LOSARTAN-HYDROCHLOROTHIAZIDE 50-12.5 MG Oral Tab TAKE 1 TABLET BY MOUTH EVERY DAY 90 tablet 3    Calcium 200 MG Oral Tab Take 1 tablet by mouth daily.      Empagliflozin (JARDIANCE) 25 MG Oral Tab Take 1 tablet by mouth daily. 90 tablet 3    glipiZIDE 10 MG Oral Tab Take 1 tablet (10 mg total) by mouth 2 (two) times daily before meals. 180 tablet 3    DOCUSATE SODIUM OR Take 1 capsule by mouth in the morning and 1 capsule before bedtime.      clotrimazole-betamethasone 1-0.05 % External Cream Apply 1 Application topically 2 (two) times daily. 60 g 3    Multiple Vitamins-Minerals (CENTRUM SILVER ULTRA WOMENS) Oral Tab Take 1 tablet by mouth daily.       atorvastatin 40 MG Oral Tab Take 1 tablet (40 mg total) by mouth nightly.      Glucose Blood (ONETOUCH VERIO) In Vitro Strip Check BG 2 times per day. DX:E11.8 without insulin use. 200 strip 1    OneTouch Delica Lancets 33G Does not apply Misc Check BG 2 times per day. DX: E11.8 without insulin use. 200 each 1    Pimecrolimus 1 % External Cream as needed. APPLY TO AFFECTED AREA OF THE FOLDS OF THE ABDOMEN TWICE DAILY   1    Polyethyl Glycol-Propyl Glycol (SYSTANE ULTRA) 0.4-0.3 % Ophthalmic Solution Place 1 drop into both eyes as needed.      aspirin 81 MG Oral Tab Take 1 tablet (81 mg total) by mouth at bedtime.         Allergies:    Allergies   Allergen Reactions    Januvia [Sitagliptin] ITCHING       Family History:   Family History   Problem Relation Age of Onset    Neurological Disorder Father 76        NG: Parkinson's disease, 76 (cause of death)    Diabetes Father     Other (Old age) Mother 92        NG    Other (Lupus) Mother         NG    Cataracts Other     Breast Cancer Self 79    Ovarian Cancer Neg        She is not of Ashkenazi Roman Catholic ancestry.    Social History:  History   Alcohol Use No         History   Smoking Status    Never   Smokeless Tobacco    Never     The patient is .  She has no children.  She is retired.    Review of Systems:  General:   The patient denies, fever, chills, night sweats, fatigue, generalized weakness, change in appetite or weight loss.    HEENT:     The patient denies eye irritation, cataracts, redness, glaucoma, yellowing of the eyes, change in vision or color blindness. The patient denies hearing loss, ringing in the ears, ear drainage, earaches, nasal congestion, nose bleeds, snoring, pain in mouth/throat, hoarseness, change in voice, facial trauma.    Respiratory:  The patient denies chronic cough, phlegm, hemoptysis, pleurisy/chest pain, pneumonia, asthma, wheezing, difficulty in breathing with exertion, emphysema, chronic bronchitis, shortness of breath or  abnormal sound when breathing.     Cardiovascular:  There is no history of chest pain, chest pressure/discomfort, palpitations, irregular heartbeat, fainting or near-fainting, difficulty breathing when lying flat, SOB/Coughing at night, swelling of the legs or chest pain while walking.    Breasts:  See history of present illness    Gastrointestinal:     There is no history of difficulty or pain with swallowing, reflux symptoms, vomiting, dark or bloody stools, constipation, yellowing of the skin, indigestion, nausea, change in bowel habits, diarrhea, abdominal pain or vomiting blood.     Genitourinary:  The patient denies frequent urination, +needing to get up at night to urinate, urinary hesitancy or retaining urine, painful urination, urinary incontinence, decreased urine stream, blood in the urine or vaginal/penile discharge.    Skin:    The patient denies rash, itching, skin lesions, dry skin, change in skin color or change in moles.     Hematologic/Lymphatic:  The patient denies easily bruising or bleeding or persistent swollen glands or lymph nodes.     Musculoskeletal:  The patient denies muscle aches/pain, joint pain, stiff joints, neck pain, back pain or bone pain.    Neuropsychiatric:  There is no history of migraines or severe headaches, seizure/epilepsy, speech problems, coordination problems, trembling/tremors, fainting/black outs, dizziness, memory problems, loss of sensation/numbness, problems walking, weakness, tingling or burning in hands/feet. There is no history of abusive relationship, bipolar disorder, sleep disturbance, anxiety, depression or feeling of despair.    Endocrine:    There is no history of poor/slow wound healing, weight loss/gain, fertility or hormone problems, cold intolerance, thyroid disease.     Allergic/Immunologic:  There is no history of hives, hay fever, angioedema or anaphylaxis.    /71 (BP Location: Right arm, Patient Position: Sitting, Cuff Size: adult)   Pulse 87    Temp 99 °F (37.2 °C)   Resp 16   Wt 86.6 kg (191 lb)   LMP  (LMP Unknown)   SpO2 98%   BMI 31.78 kg/m²     Physical Exam:  The patient is an alert, oriented, well-nourished and  well-developed woman who appears her stated age. Her speech patterns and movements are normal. Her affect is appropriate.    HEENT: The head is normocephalic. The neck is supple. The thyroid is not enlarged and is without palpable masses/nodules. There are no palpable masses. The trachea is in the midline. Conjunctiva are clear, non-icteric.    Chest: The chest expands symmetrically. The lungs are clear to auscultation.    Heart: The rhythm is regular.  There are no murmurs, rubs, gallops or thrills.    Breasts:  Her breasts are symmetrical with a cup size B.  Right breast: The skin, nipple ,and areola appear normal. There is no skin dimpling with movement of the pectoralis. There is no nipple retraction. No nipple discharge can be elicited. The parenchyma is mildly nodular. There are no dominant masses in the breast. The axillary tail is normal.  Left breast:   The skin, nipple, and areola appear normal. There is no skin dimpling with movement of the pectoralis. There is no nipple retraction. No nipple discharge can be elicited. The parenchyma is mildly nodular. There is a well healing incision with no signs of new or recurrent disease. The axillary tail is normal.    Abdomen:  The abdomen is soft, flat and non tender. The liver is not enlarged. There are no palpable masses.    Lymph Nodes:  The supraclavicular, axillary and cervical regions are free of significant lymphadenopathy.    Back: There is no vertebral column tenderness.    Skin: The skin appears normal. There are no suspicious appearing rashes or lesions.    Extremities: The extremities are without deformity, cyanosis or edema.    Imaging: Bilateral diagnostic mammogram on 9/5/2023 showed postsurgical changes in the left breast with no suspicious findings.    Impression:    Ms. Bijal Perez is a 83 year old woman presents with self detected left breast cancer status post lumpectomy.    Recommendations:   I had a discussion with the Patient regarding her breast exam.  She is healing well since surgery with no signs of new or recurrent disease. I personally reviewed her recent imaging which is concordant with her clinical exam.   She tolerated radiation without sequelae.  We will plan for a bilateral diagnostic evaluation and clinical exam in 12 months.   I encouraged her to continue monitoring her ROM and strength and explained that a referral to physical therapy may be warranted in the future if she identifies any limitations or restrictions. She was given ample opportunity for questions and those questions were answered to her satisfaction. She was encouraged to contact the office with any questions or concerns as needed related to her breast health.      yes

## 2024-09-17 ENCOUNTER — OFFICE VISIT (OUTPATIENT)
Dept: INTERNAL MEDICINE CLINIC | Facility: CLINIC | Age: 84
End: 2024-09-17

## 2024-09-17 ENCOUNTER — LAB ENCOUNTER (OUTPATIENT)
Dept: LAB | Age: 84
End: 2024-09-17
Attending: INTERNAL MEDICINE
Payer: MEDICARE

## 2024-09-17 VITALS
HEIGHT: 65 IN | WEIGHT: 190 LBS | TEMPERATURE: 99 F | HEART RATE: 77 BPM | BODY MASS INDEX: 31.65 KG/M2 | OXYGEN SATURATION: 99 % | SYSTOLIC BLOOD PRESSURE: 122 MMHG | DIASTOLIC BLOOD PRESSURE: 62 MMHG

## 2024-09-17 DIAGNOSIS — E78.5 HYPERLIPIDEMIA, UNSPECIFIED HYPERLIPIDEMIA TYPE: ICD-10-CM

## 2024-09-17 DIAGNOSIS — L03.211 CELLULITIS OF FACE: Primary | ICD-10-CM

## 2024-09-17 DIAGNOSIS — I10 HYPERTENSION, BENIGN: ICD-10-CM

## 2024-09-17 PROBLEM — R53.83 FATIGUE: Status: ACTIVE | Noted: 2024-09-17

## 2024-09-17 LAB
ALBUMIN SERPL-MCNC: 4.6 G/DL (ref 3.2–4.8)
ALBUMIN/GLOB SERPL: 1.4 {RATIO} (ref 1–2)
ALP LIVER SERPL-CCNC: 86 U/L
ALT SERPL-CCNC: 30 U/L
ANION GAP SERPL CALC-SCNC: 11 MMOL/L (ref 0–18)
AST SERPL-CCNC: 25 U/L (ref ?–34)
BASOPHILS # BLD AUTO: 0.03 X10(3) UL (ref 0–0.2)
BASOPHILS NFR BLD AUTO: 0.3 %
BILIRUB SERPL-MCNC: 0.5 MG/DL (ref 0.2–1.1)
BUN BLD-MCNC: 22 MG/DL (ref 9–23)
BUN/CREAT SERPL: 24.7 (ref 10–20)
CALCIUM BLD-MCNC: 9.9 MG/DL (ref 8.7–10.4)
CHLORIDE SERPL-SCNC: 103 MMOL/L (ref 98–112)
CHOLEST SERPL-MCNC: 109 MG/DL (ref ?–200)
CO2 SERPL-SCNC: 24 MMOL/L (ref 21–32)
CREAT BLD-MCNC: 0.89 MG/DL
DEPRECATED RDW RBC AUTO: 52.4 FL (ref 35.1–46.3)
EGFRCR SERPLBLD CKD-EPI 2021: 64 ML/MIN/1.73M2 (ref 60–?)
EOSINOPHIL # BLD AUTO: 0.13 X10(3) UL (ref 0–0.7)
EOSINOPHIL NFR BLD AUTO: 1.3 %
ERYTHROCYTE [DISTWIDTH] IN BLOOD BY AUTOMATED COUNT: 15 % (ref 11–15)
FASTING PATIENT LIPID ANSWER: NO
FASTING STATUS PATIENT QL REPORTED: NO
GLOBULIN PLAS-MCNC: 3.2 G/DL (ref 2–3.5)
GLUCOSE BLD-MCNC: 150 MG/DL (ref 70–99)
HCT VFR BLD AUTO: 44.2 %
HDLC SERPL-MCNC: 41 MG/DL (ref 40–59)
HGB BLD-MCNC: 14.3 G/DL
IMM GRANULOCYTES # BLD AUTO: 0.03 X10(3) UL (ref 0–1)
IMM GRANULOCYTES NFR BLD: 0.3 %
LDLC SERPL CALC-MCNC: 47 MG/DL (ref ?–100)
LYMPHOCYTES # BLD AUTO: 2.71 X10(3) UL (ref 1–4)
LYMPHOCYTES NFR BLD AUTO: 26.9 %
MCH RBC QN AUTO: 30.7 PG (ref 26–34)
MCHC RBC AUTO-ENTMCNC: 32.4 G/DL (ref 31–37)
MCV RBC AUTO: 94.8 FL
MONOCYTES # BLD AUTO: 1.01 X10(3) UL (ref 0.1–1)
MONOCYTES NFR BLD AUTO: 10 %
NEUTROPHILS # BLD AUTO: 6.17 X10 (3) UL (ref 1.5–7.7)
NEUTROPHILS # BLD AUTO: 6.17 X10(3) UL (ref 1.5–7.7)
NEUTROPHILS NFR BLD AUTO: 61.2 %
NONHDLC SERPL-MCNC: 68 MG/DL (ref ?–130)
OSMOLALITY SERPL CALC.SUM OF ELEC: 292 MOSM/KG (ref 275–295)
PLATELET # BLD AUTO: 293 10(3)UL (ref 150–450)
POTASSIUM SERPL-SCNC: 3.8 MMOL/L (ref 3.5–5.1)
PROT SERPL-MCNC: 7.8 G/DL (ref 5.7–8.2)
RBC # BLD AUTO: 4.66 X10(6)UL
SODIUM SERPL-SCNC: 138 MMOL/L (ref 136–145)
TRIGL SERPL-MCNC: 117 MG/DL (ref 30–149)
TSI SER-ACNC: 2.52 MIU/ML (ref 0.55–4.78)
VLDLC SERPL CALC-MCNC: 17 MG/DL (ref 0–30)
WBC # BLD AUTO: 10.1 X10(3) UL (ref 4–11)

## 2024-09-17 PROCEDURE — 85025 COMPLETE CBC W/AUTO DIFF WBC: CPT

## 2024-09-17 PROCEDURE — 80061 LIPID PANEL: CPT

## 2024-09-17 PROCEDURE — 36415 COLL VENOUS BLD VENIPUNCTURE: CPT

## 2024-09-17 PROCEDURE — 99214 OFFICE O/P EST MOD 30 MIN: CPT | Performed by: INTERNAL MEDICINE

## 2024-09-17 PROCEDURE — 80053 COMPREHEN METABOLIC PANEL: CPT

## 2024-09-17 PROCEDURE — 84443 ASSAY THYROID STIM HORMONE: CPT

## 2024-09-17 RX ORDER — GRANULES FOR ORAL 3 G/1
3 POWDER ORAL ONCE
COMMUNITY
Start: 2024-08-14

## 2024-09-17 RX ORDER — ESTRADIOL 0.1 MG/G
1 CREAM VAGINAL
COMMUNITY

## 2024-09-17 NOTE — PROGRESS NOTES
Bijal Perez is a 83 year old female.  HPI:   Recently treated for \"plugged tear gland\", [facility] and given [facility].     She thought she had COVID - sore throat, felt fatigue, tested negative x3.     She feels a little better now.     She has had diarrhea for the last few days - has been completing Augmentin 875 mg bid - she has two more days of antibiotics left       ARB orders (720h ago, onward)      None             BP Readings from Last 6 Encounters:   09/17/24 122/62   09/16/24 117/71   08/13/24 112/54   07/30/24 110/60   07/09/24 104/42   05/07/24 138/48     Wt Readings from Last 6 Encounters:   09/17/24 190 lb (86.2 kg)   09/16/24 191 lb (86.6 kg)   08/13/24 192 lb 12.8 oz (87.5 kg)   07/30/24 194 lb (88 kg)   06/28/24 191 lb (86.6 kg)   05/07/24 197 lb 3.2 oz (89.4 kg)     Current Outpatient Medications   Medication Sig Dispense Refill    amoxicillin clavulanate 875-125 MG Oral Tab Take 1 tablet by mouth 2 (two) times daily.      fosfomycin 3 g Oral Powd Pack Take 3 g by mouth once.      estradiol 0.1 MG/GM Vaginal Cream Place 1 g vaginally Every Monday, Wednesday, and Friday.      fluticasone propionate 50 MCG/ACT Nasal Suspension 2 sprays by Each Nare route daily. 1 each 5    ALPRAZolam 0.25 MG Oral Tab Take 1 tablet (0.25 mg total) by mouth 3 (three) times daily as needed. 90 tablet 2    METFORMIN  MG Oral Tablet 24 Hr TAKE 2 TABLETS BY MOUTH TWICE A  tablet 1    ticagrelor 90 MG Oral Tab Take 1 tablet (90 mg total) by mouth every 12 (twelve) hours. 180 tablet 3    letrozole 2.5 MG Oral Tab Take 1 tablet (2.5 mg total) by mouth daily. 90 tablet 3    semaglutide (OZEMPIC, 0.25 OR 0.5 MG/DOSE,) 2 MG/3ML Subcutaneous Solution Pen-injector Inject 0.5 mg into the skin once a week. 9 mL 1    benzonatate 200 MG Oral Cap Take 1 capsule (200 mg total) by mouth 3 (three) times daily as needed for cough. 60 capsule 0    LOSARTAN-HYDROCHLOROTHIAZIDE 50-12.5 MG Oral Tab TAKE  1 TABLET BY MOUTH EVERY DAY 90 tablet 3    Empagliflozin (JARDIANCE) 25 MG Oral Tab Take 1 tablet by mouth daily. 90 tablet 3    glipiZIDE 10 MG Oral Tab Take 1 tablet (10 mg total) by mouth 2 (two) times daily before meals. 180 tablet 3    DOCUSATE SODIUM OR Take 1 capsule by mouth in the morning and 1 capsule before bedtime.      clotrimazole-betamethasone 1-0.05 % External Cream Apply 1 Application topically 2 (two) times daily. 60 g 3    Multiple Vitamins-Minerals (CENTRUM SILVER ULTRA WOMENS) Oral Tab Take 1 tablet by mouth daily.      atorvastatin 40 MG Oral Tab Take 1 tablet (40 mg total) by mouth nightly.      Glucose Blood (ONETOUCH VERIO) In Vitro Strip Check BG 2 times per day. DX:E11.8 without insulin use. 200 strip 1    OneTouch Delica Lancets 33G Does not apply Misc Check BG 2 times per day. DX: E11.8 without insulin use. 200 each 1    Pimecrolimus 1 % External Cream as needed. APPLY TO AFFECTED AREA OF THE FOLDS OF THE ABDOMEN TWICE DAILY   1    Polyethyl Glycol-Propyl Glycol (SYSTANE ULTRA) 0.4-0.3 % Ophthalmic Solution Place 1 drop into both eyes as needed.      aspirin 81 MG Oral Tab Take 1 tablet (81 mg total) by mouth at bedtime.        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 Rice    Type II or unspecified type diabetes mellitus without mention of complication, not stated as uncontrolled    Unspecified essential hypertension    Visual impairment    glasses for reading      Social  History:  Social History     Socioeconomic History    Marital status:    Tobacco Use    Smoking status: Never     Passive exposure: Past    Smokeless tobacco: Never   Vaping Use    Vaping status: Never Used   Substance and Sexual Activity    Alcohol use: No    Drug use: No   Other Topics Concern    Caffeine Concern No        REVIEW OF SYSTEMS:   GENERAL HEALTH: feels well otherwise  SKIN: denies any unusual skin lesions or rashes  RESPIRATORY: denies shortness of breath with exertion  CARDIOVASCULAR: denies chest pain on exertion  GI: denies abdominal pain and denies heartburn  NEURO: denies headaches    EXAM:   /62   Pulse 77   Temp 99.1 °F (37.3 °C)   Ht 5' 5\" (1.651 m)   Wt 190 lb (86.2 kg)   LMP  (LMP Unknown)   SpO2 99%   BMI 31.62 kg/m²   GENERAL: well developed, well nourished,in no apparent distress  SKIN: no rashes,no suspicious lesions  HEENT: atraumatic, normocephalic,ears and throat are clear  NECK: supple,no adenopathy,no bruits  LUNGS: clear to auscultation  CARDIO: RRR without murmur  GI: good BS's,no masses, HSM or tenderness  EXTREMITIES: no cyanosis, clubbing or edema    Left orbital cellulitis markedly improved compared to what pt described.     ASSESSMENT AND PLAN:   1. Cellulitis of face  Decrease Augmentin to one daily for 3 days and stop    Imodium for diarrhea    2. Hypertension, benign  At goal, same meds     3. Hyperlipidemia, unspecified hyperlipidemia type  At goal, same meds     The patient indicates understanding of these issues and agrees to the plan.  The patient is asked to return in 4 mo

## 2024-09-18 ENCOUNTER — APPOINTMENT (OUTPATIENT)
Dept: CARDIAC REHAB | Facility: HOSPITAL | Age: 84
End: 2024-09-18
Attending: INTERNAL MEDICINE
Payer: MEDICARE

## 2024-09-20 ENCOUNTER — APPOINTMENT (OUTPATIENT)
Dept: CARDIAC REHAB | Facility: HOSPITAL | Age: 84
End: 2024-09-20
Attending: INTERNAL MEDICINE
Payer: MEDICARE

## 2024-09-23 ENCOUNTER — CARDPULM VISIT (OUTPATIENT)
Dept: CARDIAC REHAB | Facility: HOSPITAL | Age: 84
End: 2024-09-23
Attending: INTERNAL MEDICINE
Payer: MEDICARE

## 2024-09-23 ENCOUNTER — TELEPHONE (OUTPATIENT)
Dept: INTERNAL MEDICINE CLINIC | Facility: CLINIC | Age: 84
End: 2024-09-23

## 2024-09-23 PROCEDURE — 93798 PHYS/QHP OP CAR RHAB W/ECG: CPT

## 2024-09-23 NOTE — TELEPHONE ENCOUNTER
Labs including CBC, CMP, lipids, thyroid all normal - continue same meds      - she follows with Dr Jones    
Spoke with patient. Relayed MD message. Pt verbalized understanding.     
within normal limits

## 2024-09-25 ENCOUNTER — APPOINTMENT (OUTPATIENT)
Dept: CARDIAC REHAB | Facility: HOSPITAL | Age: 84
End: 2024-09-25
Attending: INTERNAL MEDICINE
Payer: MEDICARE

## 2024-09-27 ENCOUNTER — CARDPULM VISIT (OUTPATIENT)
Dept: CARDIAC REHAB | Facility: HOSPITAL | Age: 84
End: 2024-09-27
Attending: INTERNAL MEDICINE
Payer: MEDICARE

## 2024-09-27 PROCEDURE — 93798 PHYS/QHP OP CAR RHAB W/ECG: CPT

## 2024-09-30 ENCOUNTER — CARDPULM VISIT (OUTPATIENT)
Dept: CARDIAC REHAB | Facility: HOSPITAL | Age: 84
End: 2024-09-30
Attending: INTERNAL MEDICINE
Payer: MEDICARE

## 2024-09-30 PROCEDURE — 93798 PHYS/QHP OP CAR RHAB W/ECG: CPT

## 2024-10-02 ENCOUNTER — CARDPULM VISIT (OUTPATIENT)
Dept: CARDIAC REHAB | Facility: HOSPITAL | Age: 84
End: 2024-10-02
Attending: INTERNAL MEDICINE
Payer: MEDICARE

## 2024-10-02 PROCEDURE — 93798 PHYS/QHP OP CAR RHAB W/ECG: CPT

## 2024-10-04 ENCOUNTER — CARDPULM VISIT (OUTPATIENT)
Dept: CARDIAC REHAB | Facility: HOSPITAL | Age: 84
End: 2024-10-04
Attending: INTERNAL MEDICINE
Payer: MEDICARE

## 2024-10-04 PROCEDURE — 93798 PHYS/QHP OP CAR RHAB W/ECG: CPT

## 2024-10-07 ENCOUNTER — APPOINTMENT (OUTPATIENT)
Dept: CARDIAC REHAB | Facility: HOSPITAL | Age: 84
End: 2024-10-07
Attending: INTERNAL MEDICINE
Payer: MEDICARE

## 2024-10-07 PROCEDURE — 93798 PHYS/QHP OP CAR RHAB W/ECG: CPT

## 2024-10-09 ENCOUNTER — CARDPULM VISIT (OUTPATIENT)
Dept: CARDIAC REHAB | Facility: HOSPITAL | Age: 84
End: 2024-10-09
Attending: INTERNAL MEDICINE
Payer: MEDICARE

## 2024-10-09 PROCEDURE — 93798 PHYS/QHP OP CAR RHAB W/ECG: CPT

## 2024-10-10 ENCOUNTER — OFFICE VISIT (OUTPATIENT)
Dept: ENDOCRINOLOGY CLINIC | Facility: CLINIC | Age: 84
End: 2024-10-10

## 2024-10-10 VITALS
SYSTOLIC BLOOD PRESSURE: 119 MMHG | WEIGHT: 193 LBS | DIASTOLIC BLOOD PRESSURE: 51 MMHG | BODY MASS INDEX: 32 KG/M2 | HEART RATE: 82 BPM

## 2024-10-10 DIAGNOSIS — E11.9 TYPE 2 DIABETES MELLITUS WITHOUT COMPLICATION, WITHOUT LONG-TERM CURRENT USE OF INSULIN (HCC): Primary | ICD-10-CM

## 2024-10-10 DIAGNOSIS — E11.65 UNCONTROLLED TYPE 2 DIABETES MELLITUS WITH HYPERGLYCEMIA (HCC): ICD-10-CM

## 2024-10-10 LAB
GLUCOSE BLOOD: 173
HEMOGLOBIN A1C: 7.5 % (ref 4.3–5.6)
TEST STRIP LOT #: NORMAL NUMERIC

## 2024-10-10 PROCEDURE — 99214 OFFICE O/P EST MOD 30 MIN: CPT | Performed by: INTERNAL MEDICINE

## 2024-10-10 PROCEDURE — 83036 HEMOGLOBIN GLYCOSYLATED A1C: CPT | Performed by: INTERNAL MEDICINE

## 2024-10-10 PROCEDURE — 82947 ASSAY GLUCOSE BLOOD QUANT: CPT | Performed by: INTERNAL MEDICINE

## 2024-10-10 RX ORDER — GLIPIZIDE 10 MG/1
10 TABLET ORAL
Qty: 180 TABLET | Refills: 3 | Status: SHIPPED | OUTPATIENT
Start: 2024-10-10

## 2024-10-10 RX ORDER — SEMAGLUTIDE 0.68 MG/ML
0.5 INJECTION, SOLUTION SUBCUTANEOUS WEEKLY
Qty: 9 ML | Refills: 1 | Status: SHIPPED | OUTPATIENT
Start: 2024-10-10

## 2024-10-10 NOTE — PROGRESS NOTES
Name: Bijal Perez  Date: 10/10/2024    Referring Physician: No ref. provider found    HISTORY OF PRESENT ILLNESS   Bijal Perez is a 83 year old female who presents for diabetes mellitus.  Since last visit her  passed away in 2/2018.       Prior HbA, C or glycohemoglobin were 8.2% 6/2017; 7.6% 10/2017; 6.6% 1/2018; 7.5% 7/2018; 7.2% 8/2019; 9.0% 3/2020; 7.2% 6/2020; 7.8% 10/2020; 8.0% 2/2021; 7.4% 6/2021; 6.8% 10/2021; 6.5% 4/2022; 7.2% 10/2022; 7.1% 10/2023; 8.1% 3/2024; 7.5% POC Today     Dietary compliance: Good - some more cheating on diet over the past few months   Exercise: No   Polyuria/polydipsia: No  Blurred vision: No    Episodes of hypoglycemia: No  Blood Glucose:  Checking infrequently     Medications for DM   Glipizide 10mg PO BID   Metformin 1000mg PO BID  Jardiance 25mg PO daily    Ozempic 0.5mg subcutaneous weekly     Trulicity d/c'd due to side effects        REVIEW OF SYSTEMS  Eyes: Diabetic retinopathy present: No            Most recent visit to eye doctor in last 12 months: Yes, due for follow up     CV: Cardiovascular disease present: Yes, CAD s/p stent 11/2017         Hypertension present: Yes         Hyperlipidemia present: Yes         Peripheral Vascular Disease present: No    : Nephropathy present: No    Neuro: Neuropathy present: No    Skin: Infection or ulceration: No    Osteoporosis: No    Thyroid disease: No      Medications:     Current Outpatient Medications:     METFORMIN  MG Oral Tablet 24 Hr, TAKE 2 TABLETS BY MOUTH TWICE A DAY, Disp: 360 tablet, Rfl: 1    semaglutide (OZEMPIC, 0.25 OR 0.5 MG/DOSE,) 2 MG/3ML Subcutaneous Solution Pen-injector, Inject 0.5 mg into the skin once a week., Disp: 9 mL, Rfl: 1    Empagliflozin (JARDIANCE) 25 MG Oral Tab, Take 1 tablet by mouth daily., Disp: 90 tablet, Rfl: 3    glipiZIDE 10 MG Oral Tab, Take 1 tablet (10 mg total) by mouth 2 (two) times daily before meals., Disp: 180 tablet, Rfl: 3    fosfomycin 3 g Oral Powd  Pack, Take 3 g by mouth once., Disp: , Rfl:     estradiol 0.1 MG/GM Vaginal Cream, Place 1 g vaginally Every Monday, Wednesday, and Friday., Disp: , Rfl:     fluticasone propionate 50 MCG/ACT Nasal Suspension, 2 sprays by Each Nare route daily., Disp: 1 each, Rfl: 5    ALPRAZolam 0.25 MG Oral Tab, Take 1 tablet (0.25 mg total) by mouth 3 (three) times daily as needed., Disp: 90 tablet, Rfl: 2    ticagrelor 90 MG Oral Tab, Take 1 tablet (90 mg total) by mouth every 12 (twelve) hours., Disp: 180 tablet, Rfl: 3    letrozole 2.5 MG Oral Tab, Take 1 tablet (2.5 mg total) by mouth daily., Disp: 90 tablet, Rfl: 3    benzonatate 200 MG Oral Cap, Take 1 capsule (200 mg total) by mouth 3 (three) times daily as needed for cough., Disp: 60 capsule, Rfl: 0    LOSARTAN-HYDROCHLOROTHIAZIDE 50-12.5 MG Oral Tab, TAKE 1 TABLET BY MOUTH EVERY DAY, Disp: 90 tablet, Rfl: 3    DOCUSATE SODIUM OR, Take 1 capsule by mouth in the morning and 1 capsule before bedtime., Disp: , Rfl:     clotrimazole-betamethasone 1-0.05 % External Cream, Apply 1 Application topically 2 (two) times daily., Disp: 60 g, Rfl: 3    Multiple Vitamins-Minerals (CENTRUM SILVER ULTRA WOMENS) Oral Tab, Take 1 tablet by mouth daily., Disp: , Rfl:     atorvastatin 40 MG Oral Tab, Take 1 tablet (40 mg total) by mouth nightly., Disp: , Rfl:     Glucose Blood (ONETOUCH VERIO) In Vitro Strip, Check BG 2 times per day. DX:E11.8 without insulin use., Disp: 200 strip, Rfl: 1    OneTouch Delica Lancets 33G Does not apply Misc, Check BG 2 times per day. DX: E11.8 without insulin use., Disp: 200 each, Rfl: 1    Pimecrolimus 1 % External Cream, as needed. APPLY TO AFFECTED AREA OF THE FOLDS OF THE ABDOMEN TWICE DAILY , Disp: , Rfl: 1    Polyethyl Glycol-Propyl Glycol (SYSTANE ULTRA) 0.4-0.3 % Ophthalmic Solution, Place 1 drop into both eyes as needed., Disp: , Rfl:     aspirin 81 MG Oral Tab, Take 1 tablet (81 mg total) by mouth at bedtime., Disp: , Rfl:      Allergies:   Allergies    Allergen Reactions    Januvia [Sitagliptin] ITCHING       Social History:   Social History     Socioeconomic History    Marital status:    Tobacco Use    Smoking status: Never     Passive exposure: Past    Smokeless tobacco: Never   Vaping Use    Vaping status: Never Used   Substance and Sexual Activity    Alcohol use: No    Drug use: No   Other Topics Concern    Caffeine Concern No       Medical History:   Past Medical History:    Age-related nuclear cataract of both eyes    Atherosclerosis of coronary artery    Benign neoplasm of skin of left eyelid    Blepharitis of both eyes    Cancer (HCC)    Breast    Chronic allergic conjunctivitis    Coronary atherosclerosis    Edema of right lower eyelid    Gallstone    NG: Asymptomatic     Gastroenteritis    High blood pressure    High cholesterol    Lipid screening    Obesity    Osteoarthritis    Osteoporosis    Osteoporosis screening    Other and unspecified hyperlipidemia    Restless leg syndrome    Per Dr. Rice    Type II or unspecified type diabetes mellitus without mention of complication, not stated as uncontrolled    Unspecified essential hypertension    Visual impairment    glasses for reading       Surgical history:   Past Surgical History:   Procedure Laterality Date    Breast biopsy Right 2013    Carpal tunnel release Right     Cataract      Cataract extraction Left 12/12/2018    SN6AT3 21.00 D LENSX WITH ORA    Cataract extraction w/  intraocular lens implant Right 11/14/2018    Lensx Toric IOL    model:[id number]     Power 19.0D   3.75CYL    Cath cartotid stent  11/2017    Cath drug eluting stent       3 total    Colonoscopy  11/2018    Colonoscopy N/A 11/12/2018    Procedure: COLONOSCOPY, POSSIBLE BIOPSY, POSSIBLE POLYPECTOMY 36268;  Surgeon: Teofilo Guerrero MD;  Location: [facility]    Hysterectomy  1997    Lumpectomy left Left 02/2021    Mina biopsy stereo nodule 1 site right (cpt=19081) Right 2008    Mina biopsy stereo nodule 1 site right  (cpt=19081) Right 2013    Needle biopsy left Left 01/06/2021    Needle biopsy right Right     Other surgical history  11/29/2017    stent placement    Radiation left Left 2021    Finshied April 13, 2021    Tonsillectomy      Total knee replacement Right 03/21/2024         PHYSICAL EXAM  /51   Pulse 82   Wt 193 lb (87.5 kg)   LMP  (LMP Unknown)   BMI 32.12 kg/m²     General Appearance:  alert, well developed, in no acute distress  Eyes:  normal conjunctivae, sclera., normal sclera and normal pupils  Ears/Nose/Mouth/Throat/Neck:  no palpable thyroid nodules or cervical lymphadenopathy  Back: no kyphosis or back tenderness  Musculoskeletal:  normal muscle strength and tone  Skin:  normal moisture and skin texture  Hair & Nails:  normal scalp hair     Hematologic:  no excessive bruising  Neuro:  sensory grossly intact and motor grossly intact  Psychiatric:  oriented to time, self, and place  Nutritional:  no abnormal weight gain or loss  Bilateral barefoot skin diabetic exam is normal, visualized feet and the appearance is normal.  Bilateral monofilament/sensation of both feet is normal.  Pulsation pedal pulse exam of both lower legs/feet is normal as well.      ASSESSMENT/PLAN:      1. Diabetes Mellitus Type 2, controlled  -controlled, HgA1c 7.5% -->improved   -Discussed importance of glycemic control to prevent complications of diabetes  -Discussed complications of diabetes include retinopathy, neuropathy, nephropathy and cardiovascular disease  -Discussed importance of SBGM  -Discussed importance of low CHO diet, recommend 45gm per meal or 135gm per day  -Continue Metformin and Glipizide   -Continue Jardiance 25mg PO daily, verbalized understanding of risks and benefits  -Continue Ozempic 0.5mg SQ weekly, verbalized understanding of risks and benefits  -Normotensive  -Normal Microalbumin 10/2023 - recheck   -normal foot exam performed today     DM quality measures:  A1C/Blood pressure: as reported above    Nephropathy screening: Ordered    LIPID screenin2024 .  statin rx.   Last dilated eye exam: 2023 Exam shows retinopathy? No  Last diabetic foot exam: today  Dentist : recommend every 6m      RTC 6 months     10/10/2024  Justine Jones MD

## 2024-10-11 ENCOUNTER — CARDPULM VISIT (OUTPATIENT)
Dept: CARDIAC REHAB | Facility: HOSPITAL | Age: 84
End: 2024-10-11
Attending: INTERNAL MEDICINE
Payer: MEDICARE

## 2024-10-11 PROCEDURE — 93798 PHYS/QHP OP CAR RHAB W/ECG: CPT

## 2024-10-14 ENCOUNTER — CARDPULM VISIT (OUTPATIENT)
Dept: CARDIAC REHAB | Facility: HOSPITAL | Age: 84
End: 2024-10-14
Attending: INTERNAL MEDICINE
Payer: MEDICARE

## 2024-10-14 PROCEDURE — 93798 PHYS/QHP OP CAR RHAB W/ECG: CPT

## 2024-10-16 ENCOUNTER — CARDPULM VISIT (OUTPATIENT)
Dept: CARDIAC REHAB | Facility: HOSPITAL | Age: 84
End: 2024-10-16
Attending: INTERNAL MEDICINE
Payer: MEDICARE

## 2024-10-16 PROCEDURE — 93798 PHYS/QHP OP CAR RHAB W/ECG: CPT

## 2024-10-18 ENCOUNTER — CARDPULM VISIT (OUTPATIENT)
Dept: CARDIAC REHAB | Facility: HOSPITAL | Age: 84
End: 2024-10-18
Attending: INTERNAL MEDICINE
Payer: MEDICARE

## 2024-10-18 PROCEDURE — 93798 PHYS/QHP OP CAR RHAB W/ECG: CPT

## 2024-10-20 ENCOUNTER — HOSPITAL ENCOUNTER (OUTPATIENT)
Age: 84
Discharge: HOME | End: 2024-10-20
Attending: EMERGENCY MEDICINE
Payer: MEDICARE

## 2024-10-20 ENCOUNTER — APPOINTMENT (OUTPATIENT)
Dept: GENERAL RADIOLOGY | Age: 84
End: 2024-10-20
Attending: EMERGENCY MEDICINE
Payer: MEDICARE

## 2024-10-20 VITALS
OXYGEN SATURATION: 98 % | TEMPERATURE: 98 F | WEIGHT: 190 LBS | DIASTOLIC BLOOD PRESSURE: 52 MMHG | HEART RATE: 84 BPM | BODY MASS INDEX: 31.65 KG/M2 | SYSTOLIC BLOOD PRESSURE: 146 MMHG | HEIGHT: 65 IN | RESPIRATION RATE: 18 BRPM

## 2024-10-20 DIAGNOSIS — S50.812A ABRASION OF LEFT FOREARM, INITIAL ENCOUNTER: ICD-10-CM

## 2024-10-20 DIAGNOSIS — S80.12XA HEMATOMA OF LEFT LOWER EXTREMITY, INITIAL ENCOUNTER: Primary | ICD-10-CM

## 2024-10-20 PROCEDURE — 99204 OFFICE O/P NEW MOD 45 MIN: CPT

## 2024-10-20 PROCEDURE — 99213 OFFICE O/P EST LOW 20 MIN: CPT

## 2024-10-20 PROCEDURE — 73590 X-RAY EXAM OF LOWER LEG: CPT | Performed by: EMERGENCY MEDICINE

## 2024-10-20 NOTE — ED INITIAL ASSESSMENT (HPI)
Pt fell this am down 3 stairs into the garage, pt has skin tear on L arm, bruising and swelling to L fron le and reddness to L face, no loc

## 2024-10-21 NOTE — ED PROVIDER NOTES
Patient Seen in: [facility]      History     Chief Complaint   Patient presents with    Fall     Stated Complaint: Fell/Lt leg pain    Subjective:   HPI      84 yo female fell this morning down three stairs into the garage. Was walking down the stairs with her hands full. Presents with abrasion to the left forearm and swelling and bruising to the left leg. Did hit her face. Her glasses flew off. No headache. No LOC.     Objective:     Past Medical History:    Age-related nuclear cataract of both eyes    Atherosclerosis of coronary artery    Benign neoplasm of skin of left eyelid    Blepharitis of both eyes    Cancer (HCC)    Breast    Chronic allergic conjunctivitis    Coronary atherosclerosis    Edema of right lower eyelid    Gallstone    NG: Asymptomatic     Gastroenteritis    High blood pressure    High cholesterol    Lipid screening    Obesity    Osteoarthritis    Osteoporosis    Osteoporosis screening    Other and unspecified hyperlipidemia    Restless leg syndrome    Per Dr. Rice    Type II or unspecified type diabetes mellitus without mention of complication, not stated as uncontrolled    Unspecified essential hypertension    Visual impairment    glasses for reading              Past Surgical History:   Procedure Laterality Date    Breast biopsy Right 2013    Carpal tunnel release Right     Cataract      Cataract extraction Left 12/12/2018    SN6AT3 21.00 D LENSX WITH ORA    Cataract extraction w/  intraocular lens implant Right 11/14/2018    Lensx Toric IOL    model:SN6AT6     Power 19.0D   3.75CYL    Cath cartotid stent  11/2017    Cath drug eluting stent       3 total    Colonoscopy  11/2018    Colonoscopy N/A 11/12/2018    Procedure: COLONOSCOPY, POSSIBLE BIOPSY, POSSIBLE POLYPECTOMY 57888;  Surgeon: Teofilo Guerrero MD;  [facility]    Hysterectomy  1997    Lumpectomy left Left 02/2021    Mina biopsy stereo nodule 1 site right (cpt=19081) Right 2008    Mina biopsy  stereo nodule 1 site right (cpt=19081) Right 2013    Needle biopsy left Left 01/06/2021    Needle biopsy right Right     Other surgical history  11/29/2017    stent placement    Radiation left Left 2021    Finshied April 13, 2021    Tonsillectomy      Total knee replacement Right 03/21/2024                Social History     Socioeconomic History    Marital status:    Tobacco Use    Smoking status: Never     Passive exposure: Past    Smokeless tobacco: Never   Vaping Use    Vaping status: Never Used   Substance and Sexual Activity    Alcohol use: No    Drug use: No   Other Topics Concern    Caffeine Concern No              Review of Systems    Positive for stated complaint: Fell/Lt leg pain  Other systems are as noted in HPI.  Constitutional and vital signs reviewed.      All other systems reviewed and negative except as noted above.    Physical Exam     ED Triage Vitals [10/20/24 1423]   /52   Pulse 84   Resp 18   Temp 97.7 °F (36.5 °C)   Temp src Oral   SpO2 98 %   O2 Device None (Room air)       Current Vitals:   Vital Signs  BP: 146/52  Pulse: 84  Resp: 18  Temp: 97.7 °F (36.5 °C)  Temp src: Oral    Oxygen Therapy  SpO2: 98 %  O2 Device: None (Room air)        Physical Exam  Vitals and nursing note reviewed.   Constitutional:       Appearance: Normal appearance. She is well-developed.   HENT:      Head: Normocephalic.      Comments: Small abrasion left infraorbital at the site of the rim of her glasses. No tenderness.   Cardiovascular:      Rate and Rhythm: Normal rate and regular rhythm.      Heart sounds: Normal heart sounds.   Pulmonary:      Effort: Pulmonary effort is normal. No respiratory distress.      Breath sounds: Normal breath sounds.   Musculoskeletal:      Cervical back: Neck supple. No tenderness.      Comments: Left lower leg with swelling and ecchymosis and tenderness. Full range of motion at the knee and ankle. Neurovascular intact.    Skin:     General: Skin is warm and dry.       Capillary Refill: Capillary refill takes less than 2 seconds.      Comments: Abrasion/skin tear to the left forearm.    Neurological:      General: No focal deficit present.      Mental Status: She is alert.      Sensory: No sensory deficit.   Psychiatric:         Mood and Affect: Mood normal.         Behavior: Behavior normal.            ED Course   Labs Reviewed - No data to display                MDM           Medical Decision Making  Hematoma, abrasion, fracture. Xray images reviewed by myself. No fracture noted. Ice and elevate. Bacitracin to abrasion. Tylenol for pain.     Disposition and Plan     Clinical Impression:  1. Hematoma of left lower extremity, initial encounter    2. Abrasion of left forearm, initial encounter         Disposition:  Discharge  10/20/2024  3:48 pm    Follow-up:  No follow-up provider specified.        Medications Prescribed:  Discharge Medication List as of 10/20/2024  3:47 PM              Supplementary Documentation:

## 2024-10-23 ENCOUNTER — CARDPULM VISIT (OUTPATIENT)
Dept: CARDIAC REHAB | Facility: HOSPITAL | Age: 84
End: 2024-10-23
Attending: INTERNAL MEDICINE
Payer: MEDICARE

## 2024-10-23 PROCEDURE — 93798 PHYS/QHP OP CAR RHAB W/ECG: CPT

## 2024-10-25 ENCOUNTER — CARDPULM VISIT (OUTPATIENT)
Dept: CARDIAC REHAB | Facility: HOSPITAL | Age: 84
End: 2024-10-25
Attending: INTERNAL MEDICINE
Payer: MEDICARE

## 2024-10-25 PROCEDURE — 93798 PHYS/QHP OP CAR RHAB W/ECG: CPT

## 2024-10-28 ENCOUNTER — CARDPULM VISIT (OUTPATIENT)
Dept: CARDIAC REHAB | Facility: HOSPITAL | Age: 84
End: 2024-10-28
Attending: INTERNAL MEDICINE
Payer: MEDICARE

## 2024-10-28 PROCEDURE — 93798 PHYS/QHP OP CAR RHAB W/ECG: CPT

## 2024-10-30 ENCOUNTER — CARDPULM VISIT (OUTPATIENT)
Dept: CARDIAC REHAB | Facility: HOSPITAL | Age: 84
End: 2024-10-30
Attending: INTERNAL MEDICINE
Payer: MEDICARE

## 2024-10-30 PROCEDURE — 93798 PHYS/QHP OP CAR RHAB W/ECG: CPT

## 2024-10-31 ENCOUNTER — OFFICE VISIT (OUTPATIENT)
Dept: INTERNAL MEDICINE CLINIC | Facility: CLINIC | Age: 84
End: 2024-10-31

## 2024-10-31 VITALS
HEIGHT: 65 IN | RESPIRATION RATE: 16 BRPM | BODY MASS INDEX: 32.32 KG/M2 | HEART RATE: 75 BPM | TEMPERATURE: 98 F | SYSTOLIC BLOOD PRESSURE: 130 MMHG | DIASTOLIC BLOOD PRESSURE: 70 MMHG | WEIGHT: 194 LBS | OXYGEN SATURATION: 97 %

## 2024-10-31 DIAGNOSIS — L03.116 CELLULITIS OF LEFT LOWER EXTREMITY: Primary | ICD-10-CM

## 2024-10-31 DIAGNOSIS — E11.9 TYPE 2 DIABETES MELLITUS WITHOUT COMPLICATION, WITHOUT LONG-TERM CURRENT USE OF INSULIN (HCC): ICD-10-CM

## 2024-10-31 DIAGNOSIS — I10 HYPERTENSION, BENIGN: ICD-10-CM

## 2024-10-31 DIAGNOSIS — E78.5 HYPERLIPIDEMIA, UNSPECIFIED HYPERLIPIDEMIA TYPE: ICD-10-CM

## 2024-10-31 PROCEDURE — G2211 COMPLEX E/M VISIT ADD ON: HCPCS | Performed by: INTERNAL MEDICINE

## 2024-10-31 PROCEDURE — 1159F MED LIST DOCD IN RCRD: CPT | Performed by: INTERNAL MEDICINE

## 2024-10-31 PROCEDURE — 1160F RVW MEDS BY RX/DR IN RCRD: CPT | Performed by: INTERNAL MEDICINE

## 2024-10-31 PROCEDURE — 99214 OFFICE O/P EST MOD 30 MIN: CPT | Performed by: INTERNAL MEDICINE

## 2024-10-31 PROCEDURE — 1125F AMNT PAIN NOTED PAIN PRSNT: CPT | Performed by: INTERNAL MEDICINE

## 2024-10-31 RX ORDER — MIRABEGRON 50 MG/1
50 TABLET, FILM COATED, EXTENDED RELEASE ORAL DAILY
COMMUNITY
Start: 2024-10-22

## 2024-10-31 RX ORDER — CEPHALEXIN 500 MG/1
500 CAPSULE ORAL 4 TIMES DAILY
Qty: 40 CAPSULE | Refills: 0 | Status: SHIPPED | OUTPATIENT
Start: 2024-10-31

## 2024-10-31 NOTE — PROGRESS NOTES
Bijal Perez is a 83 year old female.    HPI:     Chief Complaint   Patient presents with    Checkup     States fell 12 days ago, has edema and pain extending to ankle and back of legs which were not present during injury. Appeared 1 week after falling.      [facility]en [facility] on [facility]20 Xrays taken and was told no fx and iced area.        84 y/o F who fell 12 d ago; had knot to left shin \"within minutes\"; went to urgent care 10/20/24; XR neg fracture; has had worsening erythema and warmth to pretibial LLE: no F/C      HISTORY:  Past Medical History:    Age-related nuclear cataract of both eyes    Atherosclerosis of coronary artery    Benign neoplasm of skin of left eyelid    Blepharitis of both eyes    Cancer (HCC)    Breast    Chronic allergic conjunctivitis    Coronary atherosclerosis    Edema of right lower eyelid    Gallstone    NG: Asymptomatic     Gastroenteritis    High blood pressure    High cholesterol    Lipid screening    Obesity    Osteoarthritis    Osteoporosis    Osteoporosis screening    Other and unspecified hyperlipidemia    Restless leg syndrome    Per Dr. Rice    Type II or unspecified type diabetes mellitus without mention of complication, not stated as uncontrolled    Unspecified essential hypertension    Visual impairment    glasses for reading      Past Surgical History:   Procedure Laterality Date    Breast biopsy Right 2013    Carpal tunnel release Right     Cataract      Cataract extraction Left 12/12/2018    SN6AT3 21.00 D LENSX WITH ORA    Cataract extraction w/  intraocular lens implant Right 11/14/2018    Lensx Toric IOL    model:SN6AT6     Power 19.0D   3.75CYL    Cath cartotid stent  11/2017    Cath drug eluting stent       3 total    Colonoscopy  11/2018    Colonoscopy N/A 11/12/2018    Procedure: COLONOSCOPY, POSSIBLE BIOPSY, POSSIBLE POLYPECTOMY 54044;  Surgeon: Teofilo Guerrero MD;  [facility] [place] [facility]    Hysterectomy  1997    Lumpectomy left Left 02/2021    [facility]  biopsy stereo nodule 1 site right (cpt=19081) Right 2008    Mina biopsy stereo nodule 1 site right (cpt=19081) Right 2013    Needle biopsy left Left 01/06/2021    Needle biopsy right Right     Other surgical history  11/29/2017    stent placement    Radiation left Left 2021    Finshied April 13, 2021    Tonsillectomy      Total knee replacement Right 03/21/2024      Family History   Problem Relation Age of Onset    Neurological Disorder Father 76        NG: Parkinson's disease, 76 (cause of death)    Diabetes Father     Other (Old age) Mother 92        NG    Other (Lupus) Mother         NG    Cataracts Other     Breast Cancer Self 79    Ovarian Cancer Neg       Social History:   Social History     Socioeconomic History    Marital status:    Tobacco Use    Smoking status: Never     Passive exposure: Past    Smokeless tobacco: Never   Vaping Use    Vaping status: Never Used   Substance and Sexual Activity    Alcohol use: No    Drug use: No   Other Topics Concern    Caffeine Concern No        Medications (Active prior to today's visit):  Current Outpatient Medications   Medication Sig Dispense Refill    Mirabegron ER 50 MG Oral Tablet 24 Hr Take 1 tablet (50 mg total) by mouth daily.      cephALEXin 500 MG Oral Cap Take 1 capsule (500 mg total) by mouth 4 (four) times daily. 40 capsule 0    semaglutide (OZEMPIC, 0.25 OR 0.5 MG/DOSE,) 2 MG/3ML Subcutaneous Solution Pen-injector Inject 0.5 mg into the skin once a week. 9 mL 1    glipiZIDE 10 MG Oral Tab Take 1 tablet (10 mg total) by mouth 2 (two) times daily before meals. 180 tablet 3    empagliflozin (JARDIANCE) 25 MG Oral Tab Take 1 tablet (25 mg total) by mouth daily. 90 tablet 3    estradiol 0.1 MG/GM Vaginal Cream Place 1 g vaginally Every Monday, Wednesday, and Friday.      ALPRAZolam 0.25 MG Oral Tab Take 1 tablet (0.25 mg total) by mouth 3 (three) times daily as needed. 90 tablet 2    METFORMIN  MG Oral Tablet 24 Hr TAKE 2 TABLETS BY MOUTH TWICE A   tablet 1    ticagrelor 90 MG Oral Tab Take 1 tablet (90 mg total) by mouth every 12 (twelve) hours. 180 tablet 3    letrozole 2.5 MG Oral Tab Take 1 tablet (2.5 mg total) by mouth daily. 90 tablet 3    LOSARTAN-HYDROCHLOROTHIAZIDE 50-12.5 MG Oral Tab TAKE 1 TABLET BY MOUTH EVERY DAY 90 tablet 3    DOCUSATE SODIUM OR Take 1 capsule by mouth in the morning and 1 capsule before bedtime.      clotrimazole-betamethasone 1-0.05 % External Cream Apply 1 Application topically 2 (two) times daily. 60 g 3    Multiple Vitamins-Minerals (CENTRUM SILVER ULTRA WOMENS) Oral Tab Take 1 tablet by mouth daily.      atorvastatin 40 MG Oral Tab Take 1 tablet (40 mg total) by mouth nightly.      Glucose Blood (ONETOUCH VERIO) In Vitro Strip Check BG 2 times per day. DX:E11.8 without insulin use. 200 strip 1    OneTouch Delica Lancets 33G Does not apply Misc Check BG 2 times per day. DX: E11.8 without insulin use. 200 each 1    Pimecrolimus 1 % External Cream as needed. APPLY TO AFFECTED AREA OF THE FOLDS OF THE ABDOMEN TWICE DAILY   1    Polyethyl Glycol-Propyl Glycol (SYSTANE ULTRA) 0.4-0.3 % Ophthalmic Solution Place 1 drop into both eyes as needed.      aspirin 81 MG Oral Tab Take 1 tablet (81 mg total) by mouth at bedtime.      fosfomycin 3 g Oral Powd Pack Take 3 g by mouth once. (Patient not taking: Reported on 10/31/2024)      fluticasone propionate 50 MCG/ACT Nasal Suspension 2 sprays by Each Nare route daily. (Patient not taking: Reported on 10/31/2024) 1 each 5    benzonatate 200 MG Oral Cap Take 1 capsule (200 mg total) by mouth 3 (three) times daily as needed for cough. (Patient not taking: Reported on 10/31/2024) 60 capsule 0       Allergies:  Allergies[1]              ROS:   Constitutional: no weight loss; no fatigue  Cardiovascular:  Negative for chest pain; negative palpitations  Respiratory:  Negative for cough, dyspnea and wheezing  Gastrointestinal:  Negative for abdominal pain, constipation, decreased  appetite, diarrhea and vomiting; no melena or hematochezia  All other review of systems are negative.        PHYSICAL EXAM:   Blood pressure 130/70, pulse 75, temperature 98.1 °F (36.7 °C), temperature source Oral, resp. rate 16, height 5' 5\" (1.651 m), weight 194 lb (88 kg), SpO2 97%.  Constitutional: alert and oriented x3 in no acute distress  HEENT- EOMI, PERRL  Nose/Mouth/Throat: pharynx without erythema; no oral lesions  Neck/Thyroid: neck supple; no thyromegaly  Cardiovascular: RRR, S1, S2, no S3 or murmur  Respiratory: lungs without crackles or wheezes  Abdomen: normoactive bowel sounds, soft, non-tender and non-distended  Extremities: no clubbing, cyanosis or edema  MS: +erythema and +Warmth greg pretibial LLE         ASSESSMENT/PLAN:   Left leg cellultis  Leg swelling  -has resolving hematoma  -Keflex 500 mg po QID x 10d for +pretibial cellulitis of LLE     Hypertension, benign  On losartan hematocrit 50/12.5 mg po qD     DM   On Ozempic 0.5 mg subcutaneous qWeek, glipizide 10 mg po BID, metformin  mg two tabs po BID   - off Jiardiance in view of frequent UTIs; Rx per Dr Jones     History of knee replacement    hypercholesterolemia  On atorvastatin 40 mg po qHS        Spent 30 minutes obtaining history, evaluating patient, discussing treatment options, diet, exercise, review of available labs and radiology reports, and completing documentation.               Orders This Visit:  No orders of the defined types were placed in this encounter.      Meds This Visit:  Requested Prescriptions     Signed Prescriptions Disp Refills    cephALEXin 500 MG Oral Cap 40 capsule 0     Sig: Take 1 capsule (500 mg total) by mouth 4 (four) times daily.       Imaging & Referrals:  None     10/31/2024  Horacio Finch MD                 [1]   Allergies  Allergen Reactions    Januvia [Sitagliptin] ITCHING

## 2024-11-01 ENCOUNTER — CARDPULM VISIT (OUTPATIENT)
Dept: CARDIAC REHAB | Facility: HOSPITAL | Age: 84
End: 2024-11-01
Attending: INTERNAL MEDICINE
Payer: MEDICARE

## 2024-11-01 PROCEDURE — 93798 PHYS/QHP OP CAR RHAB W/ECG: CPT

## 2024-11-04 ENCOUNTER — APPOINTMENT (OUTPATIENT)
Dept: CARDIAC REHAB | Facility: HOSPITAL | Age: 84
End: 2024-11-04
Attending: INTERNAL MEDICINE
Payer: MEDICARE

## 2024-11-04 PROCEDURE — 93798 PHYS/QHP OP CAR RHAB W/ECG: CPT

## 2024-11-06 ENCOUNTER — CARDPULM VISIT (OUTPATIENT)
Dept: CARDIAC REHAB | Facility: HOSPITAL | Age: 84
End: 2024-11-06
Attending: INTERNAL MEDICINE
Payer: MEDICARE

## 2024-11-06 PROCEDURE — 93798 PHYS/QHP OP CAR RHAB W/ECG: CPT

## 2024-11-08 ENCOUNTER — CARDPULM VISIT (OUTPATIENT)
Dept: CARDIAC REHAB | Facility: HOSPITAL | Age: 84
End: 2024-11-08
Attending: INTERNAL MEDICINE
Payer: MEDICARE

## 2024-11-08 PROCEDURE — 93798 PHYS/QHP OP CAR RHAB W/ECG: CPT

## 2024-11-11 ENCOUNTER — CARDPULM VISIT (OUTPATIENT)
Dept: CARDIAC REHAB | Facility: HOSPITAL | Age: 84
End: 2024-11-11
Attending: INTERNAL MEDICINE
Payer: MEDICARE

## 2024-11-11 ENCOUNTER — TELEPHONE (OUTPATIENT)
Dept: INTERNAL MEDICINE CLINIC | Facility: CLINIC | Age: 84
End: 2024-11-11

## 2024-11-11 PROCEDURE — 93798 PHYS/QHP OP CAR RHAB W/ECG: CPT

## 2024-11-11 NOTE — TELEPHONE ENCOUNTER
Patient is calling she completed the antibiotic  Her leg is not swollen she does still have a hard lump on the side of the leg, it is peeling, and the heat comes and goes but not nearly as bad as it was.    What does Dr Finch recommend?  Please call patient [phone]

## 2024-11-11 NOTE — TELEPHONE ENCOUNTER
Patient saw  on 10/31/24 and was prescribed Keflex for 10 days. Per office visit note, \"Left leg cellultis  Leg swelling  -has resolving hematoma  -Keflex 500 mg po QID x 10d for +pretibial cellulitis of LLE\"    Patient contacted, states that she finished the antibiotics yesterday and the swelling and warmth has improved but she still has a \"hard knot\" on the left shin (\"not as big but still there\"). The skin on the LLE is peeling, \"like it is sunburned\".    To  to please advise     Per patient, she will not be home after 9AM therefore call her on her cell phone but cannot leave a message.

## 2024-11-11 NOTE — TELEPHONE ENCOUNTER
Left leg cellultis  Leg swelling  -has resolving hematoma  -s/p Keflex 500 mg po QID x 10d for +pretibial cellulitis of LLE   On 10/31/24     Advise observation for now; no add'l Abx; pt called

## 2024-11-11 NOTE — TELEPHONE ENCOUNTER
Patient is calling to check on the status of her message patient states after rehab her leg was very hot

## 2024-11-12 ENCOUNTER — APPOINTMENT (OUTPATIENT)
Dept: HEMATOLOGY/ONCOLOGY | Facility: HOSPITAL | Age: 84
End: 2024-11-12
Payer: MEDICARE

## 2024-11-12 ENCOUNTER — OFFICE VISIT (OUTPATIENT)
Dept: HEMATOLOGY/ONCOLOGY | Facility: HOSPITAL | Age: 84
End: 2024-11-12
Attending: INTERNAL MEDICINE
Payer: MEDICARE

## 2024-11-12 VITALS
BODY MASS INDEX: 32.46 KG/M2 | HEART RATE: 76 BPM | HEIGHT: 65 IN | DIASTOLIC BLOOD PRESSURE: 46 MMHG | RESPIRATION RATE: 18 BRPM | OXYGEN SATURATION: 99 % | WEIGHT: 194.81 LBS | SYSTOLIC BLOOD PRESSURE: 129 MMHG | TEMPERATURE: 99 F

## 2024-11-12 DIAGNOSIS — Z51.81 ENCOUNTER FOR MONITORING AROMATASE INHIBITOR THERAPY: ICD-10-CM

## 2024-11-12 DIAGNOSIS — M81.0 OSTEOPOROSIS, POST-MENOPAUSAL: ICD-10-CM

## 2024-11-12 DIAGNOSIS — Z85.3 ENCOUNTER FOR FOLLOW-UP SURVEILLANCE OF BREAST CANCER: ICD-10-CM

## 2024-11-12 DIAGNOSIS — Z08 ENCOUNTER FOR FOLLOW-UP SURVEILLANCE OF BREAST CANCER: ICD-10-CM

## 2024-11-12 DIAGNOSIS — Z79.811 ENCOUNTER FOR MONITORING AROMATASE INHIBITOR THERAPY: ICD-10-CM

## 2024-11-12 DIAGNOSIS — C50.912 MALIGNANT NEOPLASM OF LEFT FEMALE BREAST, UNSPECIFIED ESTROGEN RECEPTOR STATUS, UNSPECIFIED SITE OF BREAST (HCC): Primary | ICD-10-CM

## 2024-11-12 PROCEDURE — G2211 COMPLEX E/M VISIT ADD ON: HCPCS | Performed by: INTERNAL MEDICINE

## 2024-11-12 PROCEDURE — 99214 OFFICE O/P EST MOD 30 MIN: CPT | Performed by: INTERNAL MEDICINE

## 2024-11-12 RX ORDER — LETROZOLE 2.5 MG/1
2.5 TABLET, FILM COATED ORAL DAILY
Qty: 90 TABLET | Refills: 3 | Status: SHIPPED | OUTPATIENT
Start: 2024-11-12

## 2024-11-12 NOTE — PROGRESS NOTES
HPI   Bijal Peerz is a 84 year old female here for follow up of Malignant neoplasm of left female breast, unspecified estrogen receptor status, unspecified site of breast (HCC)    Encounter for monitoring aromatase inhibitor therapy    Encounter for follow-up surveillance of breast cancer    Osteoporosis, post-menopausal.    Estrogen receptor-positive left breast cancer.  Diagnosis was made based on ultrasound-guided biopsy with Dr. Thang Zamudio on 01/06/2021 with 2 separate sites showing invasive ductal carcinoma, grade 1, estrogen and progesterone receptors both greater than 90%, HER2 negative, Ki-67 of 5% and 8%.  The left breast mass was palpated on exam by her primary care physician.  She went on to have mammography with a left breast diagnostic mammogram 01/06/2021 that showed left breast masses at 12:30, 1 cm from the nipple, and 12:30, 4 cm from the nipple.     Underwent partial mastectomy with Dr. Gonzalez 2/4/2021.  She completed adjuvant radiation April 2021    Compliance with SBE: Yes. Changes on SBE: No.       Compliance with letrozole:  compliance all of the time      Side effects from letrozole: No hot flashes, No arthralgias or myalgias.      Using vaginal estrogen due recurrent UTIs.        ECOG PS: 1      Review of Systems:     Review of Systems   Constitutional:  Negative for appetite change, fatigue and unexpected weight change.   Respiratory:  Negative for cough and shortness of breath.    Cardiovascular:  Positive for leg swelling. Negative for chest pain.   Gastrointestinal:  Negative for abdominal pain.   Endocrine: Negative for hot flashes.   Genitourinary:  Positive for nocturia. Negative for dysuria and frequency.    Musculoskeletal:  Positive for arthralgias. Negative for back pain and neck pain.   Neurological:  Negative for dizziness and headaches.   Hematological:  Negative for adenopathy.   Psychiatric/Behavioral:  Positive for sleep disturbance.          Current  Outpatient Medications   Medication Sig Dispense Refill    Mirabegron ER 50 MG Oral Tablet 24 Hr Take 1 tablet (50 mg total) by mouth daily.      semaglutide (OZEMPIC, 0.25 OR 0.5 MG/DOSE,) 2 MG/3ML Subcutaneous Solution Pen-injector Inject 0.5 mg into the skin once a week. 9 mL 1    glipiZIDE 10 MG Oral Tab Take 1 tablet (10 mg total) by mouth 2 (two) times daily before meals. 180 tablet 3    empagliflozin (JARDIANCE) 25 MG Oral Tab Take 1 tablet (25 mg total) by mouth daily. 90 tablet 3    estradiol 0.1 MG/GM Vaginal Cream Place 1 g vaginally Every Monday, Wednesday, and Friday.      ALPRAZolam 0.25 MG Oral Tab Take 1 tablet (0.25 mg total) by mouth 3 (three) times daily as needed. 90 tablet 2    METFORMIN  MG Oral Tablet 24 Hr TAKE 2 TABLETS BY MOUTH TWICE A  tablet 1    ticagrelor 90 MG Oral Tab Take 1 tablet (90 mg total) by mouth every 12 (twelve) hours. 180 tablet 3    letrozole 2.5 MG Oral Tab Take 1 tablet (2.5 mg total) by mouth daily. 90 tablet 3    benzonatate 200 MG Oral Cap Take 1 capsule (200 mg total) by mouth 3 (three) times daily as needed for cough. 60 capsule 0    LOSARTAN-HYDROCHLOROTHIAZIDE 50-12.5 MG Oral Tab TAKE 1 TABLET BY MOUTH EVERY DAY 90 tablet 3    DOCUSATE SODIUM OR Take 1 capsule by mouth in the morning and 1 capsule before bedtime.      clotrimazole-betamethasone 1-0.05 % External Cream Apply 1 Application topically 2 (two) times daily. 60 g 3    Multiple Vitamins-Minerals (CENTRUM SILVER ULTRA WOMENS) Oral Tab Take 1 tablet by mouth daily.      atorvastatin 40 MG Oral Tab Take 1 tablet (40 mg total) by mouth nightly.      Glucose Blood (ONETOUCH VERIO) In Vitro Strip Check BG 2 times per day. DX:E11.8 without insulin use. 200 strip 1    OneTouch Delica Lancets 33G Does not apply Misc Check BG 2 times per day. DX: E11.8 without insulin use. 200 each 1    Pimecrolimus 1 % External Cream as needed. APPLY TO AFFECTED AREA OF THE FOLDS OF THE ABDOMEN TWICE DAILY   1     Polyethyl Glycol-Propyl Glycol (SYSTANE ULTRA) 0.4-0.3 % Ophthalmic Solution Place 1 drop into both eyes as needed.      aspirin 81 MG Oral Tab Take 1 tablet (81 mg total) by mouth at bedtime.      fosfomycin 3 g Oral Powd Pack Take 3 g by mouth once. (Patient not taking: Reported on 11/12/2024)       Allergies:   Allergies[1]    Past Medical History:    Age-related nuclear cataract of both eyes    Atherosclerosis of coronary artery    Benign neoplasm of skin of left eyelid    Blepharitis of both eyes    Cancer (HCC)    Breast    Chronic allergic conjunctivitis    Coronary atherosclerosis    Edema of right lower eyelid    Gallstone    NG: Asymptomatic     Gastroenteritis    High blood pressure    High cholesterol    Lipid screening    Obesity    Osteoarthritis    Osteoporosis    Osteoporosis screening    Other and unspecified hyperlipidemia    Restless leg syndrome    Per Dr. Rice    Type II or unspecified type diabetes mellitus without mention of complication, not stated as uncontrolled    Unspecified essential hypertension    Visual impairment    glasses for reading     Past Surgical History:   Procedure Laterality Date    Breast biopsy Right 2013    Carpal tunnel release Right     Cataract      Cataract extraction Left 12/12/2018    [id number] 21.00 D LENSX WITH ORA    Cataract extraction w/  intraocular lens implant Right 11/14/2018    Lensx Toric IOL    model:[id number]     Power 19.0D   3.75CYL    Cath cartotid stent  11/2017    Cath drug eluting stent       3 total    Colonoscopy  11/2018    Colonoscopy N/A 11/12/2018    Procedure: COLONOSCOPY, POSSIBLE BIOPSY, POSSIBLE POLYPECTOMY 12078;  Surgeon: Teofilo Guerrero MD;  [facility]    Hysterectomy  1997    Lumpectomy left Left 02/2021    Mina biopsy stereo nodule 1 site right (cpt=19081) Right 2008    Mina biopsy stereo nodule 1 site right (cpt=19081) Right 2013    Needle biopsy left Left 01/06/2021    Needle biopsy right Right     Other surgical  history  11/29/2017    stent placement    Radiation left Left 2021    Finshied April 13, 2021    Tonsillectomy      Total knee replacement Right 03/21/2024     Social History     Socioeconomic History    Marital status:      Spouse name: Not on file    Number of children: Not on file    Years of education: Not on file    Highest education level: Not on file   Occupational History    Not on file   Tobacco Use    Smoking status: Never     Passive exposure: Past    Smokeless tobacco: Never   Vaping Use    Vaping status: Never Used   Substance and Sexual Activity    Alcohol use: No    Drug use: No    Sexual activity: Not on file   Other Topics Concern     Service Not Asked    Blood Transfusions Not Asked    Caffeine Concern No    Occupational Exposure Not Asked    Hobby Hazards Not Asked    Sleep Concern Not Asked    Stress Concern Not Asked    Weight Concern Not Asked    Special Diet Not Asked    Back Care Not Asked    Exercise Not Asked    Bike Helmet Not Asked    Seat Belt Not Asked    Self-Exams Not Asked   Social History Narrative    Not on file     Social Drivers of Health     Financial Resource Strain: Not on file   Food Insecurity: Not on file   Transportation Needs: Not on file   Physical Activity: Not on file   Stress: Not on file   Social Connections: Not on file   Housing Stability: Not on file     Family History   Problem Relation Age of Onset    Neurological Disorder Father 76        NG: Parkinson's disease, 76 (cause of death)    Diabetes Father     Other (Old age) Mother 92        NG    Other (Lupus) Mother         NG    Cataracts Other     Breast Cancer Self 79    Ovarian Cancer Neg          PHYSICAL EXAM:    /46 (BP Location: Left arm, Patient Position: Sitting, Cuff Size: large)   Pulse 76   Temp 98.5 °F (36.9 °C) (Oral)   Resp 18   Ht 1.651 m (5' 5\")   Wt 88.4 kg (194 lb 12.8 oz)   LMP  (LMP Unknown)   SpO2 99%   BMI 32.42 kg/m²   Wt Readings from Last 6 Encounters:    11/12/24 88.4 kg (194 lb 12.8 oz)   10/31/24 88 kg (194 lb)   10/20/24 86.2 kg (190 lb)   10/10/24 87.5 kg (193 lb)   09/17/24 86.2 kg (190 lb)   09/16/24 86.6 kg (191 lb)     Physical Exam  General: Patient is alert, not in acute distress.  HEENT: EOMs intact. PERRL. Oropharynx is clear.   Neck: No JVD. No palpable lymphadenopathy. Neck is supple.  Chest: Clear to auscultation.  Breasts:  R breast no masses.  L breast surgical and RT changes, no masses.   Heart: Regular rate and rhythm.   Abdomen: Soft, non tender with good bowel sounds.  Extremities: No edema.  Neurological: Grossly intact.   Lymphatics: There is no palpable lymphadenopathy throughout in the cervical, supraclavicular, axillary, or inguinal regions.  Psych/Depression: nl        ASSESSMENT/PLAN:     1. Malignant neoplasm of left female breast, unspecified estrogen receptor status, unspecified site of breast (HCC)    2. Encounter for monitoring aromatase inhibitor therapy    3. Encounter for follow-up surveillance of breast cancer    4. Osteoporosis, post-menopausal      Postmenopausal female being evaluated by Medical Oncology for estrogen receptor-positive, HER2-negative left breast cancer, diagnosed 01/06/2021 as outlined above.  She is strongly hormone receptor positive and HER2 negative.  She underwent surgery with Dr. Gonzalez with pathology as outlined above 2/4/2021. DCIS present.  -completed adjuvant radiation  -Adjuvant letrozole. Tolerating.   Plan 5-7 years  -history of osteoporosis per her report.  Most recent bone densities were adequate summer 2020 repeat 2023 in setting of stress fracture showed no evidence of osteopenia or osteoporosis.  Normal bone density adequate. Repeat 2025    Would like to avoid vaginal estrogen as with AI may increase risk of recurrence, per published data.     MDM moderate  Continuity of complex medical care.  No orders of the defined types were placed in this encounter.      Results From Past 48 Hours:  No  results found for this or any previous visit (from the past 48 hours).    Meds This Visit:        Imaging & Referrals:  None   No orders of the defined types were placed in this encounter.    PROCEDURE: [facility] HOWIE 2D+3D DIAGNOSTIC CAITLYN BILAT (CPT=77066/22535)     COMPARISON: [facility], CAITLYN PF DIGITAL SCREEN W CAD, 1/22/2013, 12:12 PM.  [facility], [facility] PF DIGITAL SCREEN W CAD, 3/19/2015, 1:32 PM.  [facility], [facility] DIGITAL SCREEN W  CAD PF, 4/26/2016, 1:07 PM.  [facility], [facility] SCREENING BILAT (CPT=77067), 2/28/2018, 1:58 PM.  [facility], CAITLYN HOWIE 2D+3D SCREENING BILAT ([id number]), 4/29/2019, 3:59 PM.  [facility], [facility] HOWIE 2D+3D SCREENING BILAT ([id number]), 6/26/2020, 12:51 PM.  [facility], [facility] HOWIE 2D+3D DIAGNOSTIC CAITLYN LEFT (CPT=77065/90193), 1/06/2021, 9:31 AM.  [facility],  CAITLYN POST PROCEDURAL IMAGE LEFT (CPT=77065), 1/22/2021, 4:13 PM.  [facility], [facility] HOWIE 2D+3D DIAGNOSTIC CAITLYN BILAT (VCL=[id number]), 8/16/2021, 10:42 AM.  [facility], [facility] HOWIE 2D+3D DIAGNOSTIC CAITLYN LEFT  (CPT=77065/56970), 2/18/2022, 1:52 PM.  [facility], [facility] HOWIE 2D+3D DIAGNOSTIC CAITLYN BILAT (INP=[id number]), 8/29/2022, 9:54 AM.  [facility], [facility] HOWIE 2D+3D DIAGNOSTIC CAITLYN LEFT (CPT=77065/18761), 3/13/2023,   10:54 AM.  [facility] HOWIE 2D+3D DIAGNOSTIC [facility] BILAT (NMQ=[id number]), 9/05/2023, 10:17 AM.     INDICATIONS: Z17.0 Malignant neoplasm of upper-outer quadrant of left breast in female, estrogen receptor positive (HCC) C50.412 Malignant neoplasm of upper-outer quadrant * 83-year-old patient with history of left breast malignancy diagnosed at age 79  status post treatment for annual  mammogram     TECHNIQUE: Digital diagnostic mammography was performed and images were reviewed with the [employer] ALYSSA 1.5.1.5 CAD device.  3D tomosynthesis was performed and reviewed.        BREAST COMPOSITION:   Category c-Heterogeneously dense, which may obscure small masses.        FINDINGS: Stable post surgical changes are seen in the left breast.  Biopsy clips are seen bilaterally.  The parenchyma pattern is stable with no new suspicious asymmetry, mass, architectural distortion, or microcalcifications identified in either  breast.        CONCLUSION:   Benign findings.  No mammographic evidence for breast malignancy.  As long as the patient's clinical breast exam remains unchanged, annual screening mammogram is recommended.     The density of the patient's breast tissue reduces mammographic sensitivity for detecting breast cancer. In the setting of a normal screening mammogram, supplemental screening with screening breast MRI, used as an adjunct to mammography, can detect  breast cancers that might be obscured by dense breast tissue on mammography.  Because of the density of the patient's breasts on mammography and personal history of breast cancer, the patient may benefit from supplemental screening with screening breast  MRI.       BI-RADS CATEGORY:    DIAGNOSTIC CATEGORY 2 - BENIGN FINDING:       RECOMMENDATIONS:  ROUTINE MAMMOGRAM AND CLINICAL EVALUATION IN 12 MONTHS.               PLEASE NOTE: NORMAL MAMMOGRAM DOES NOT EXCLUDE THE POSSIBILITY OF BREAST CANCER.  A CLINICALLY SUSPICIOUS PALPABLE LUMP SHOULD BE BIOPSIED.       For patients over the age of 40, the target due date for the patient's next mammogram has been entered into a reminder system.       Patient received a discharge summary from the technologist after completion of exam.     Breast marker legend used on images    Triangle = Palpable lump  [place] = Skin tag or mole  BB = Nipple  Linear ashtyn = Scar  Square = Pain           Finalized by (CST):  Jigna Clarke MD on 9/12/2024 at 10:06 AM                [address] [place], [place] [address]  [phone]         [1]   Allergies  Allergen Reactions    Januvia [Sitagliptin] ITCHING

## 2024-11-13 ENCOUNTER — CARDPULM VISIT (OUTPATIENT)
Dept: CARDIAC REHAB | Facility: HOSPITAL | Age: 84
End: 2024-11-13
Attending: INTERNAL MEDICINE
Payer: MEDICARE

## 2024-11-13 PROCEDURE — 93798 PHYS/QHP OP CAR RHAB W/ECG: CPT

## 2024-11-15 ENCOUNTER — CARDPULM VISIT (OUTPATIENT)
Dept: CARDIAC REHAB | Facility: HOSPITAL | Age: 84
End: 2024-11-15
Attending: INTERNAL MEDICINE
Payer: MEDICARE

## 2024-11-15 PROCEDURE — 93798 PHYS/QHP OP CAR RHAB W/ECG: CPT

## 2024-11-18 ENCOUNTER — APPOINTMENT (OUTPATIENT)
Dept: CARDIAC REHAB | Facility: HOSPITAL | Age: 84
End: 2024-11-18
Attending: INTERNAL MEDICINE
Payer: MEDICARE

## 2024-11-20 ENCOUNTER — CARDPULM VISIT (OUTPATIENT)
Dept: CARDIAC REHAB | Facility: HOSPITAL | Age: 84
End: 2024-11-20
Attending: INTERNAL MEDICINE
Payer: MEDICARE

## 2024-11-20 PROCEDURE — 93798 PHYS/QHP OP CAR RHAB W/ECG: CPT

## 2024-11-22 ENCOUNTER — CARDPULM VISIT (OUTPATIENT)
Dept: CARDIAC REHAB | Facility: HOSPITAL | Age: 84
End: 2024-11-22
Attending: INTERNAL MEDICINE
Payer: MEDICARE

## 2024-11-22 PROCEDURE — 93798 PHYS/QHP OP CAR RHAB W/ECG: CPT

## 2024-12-19 ENCOUNTER — LAB ENCOUNTER (OUTPATIENT)
Dept: LAB | Age: 84
End: 2024-12-19
Attending: INTERNAL MEDICINE
Payer: MEDICARE

## 2024-12-19 ENCOUNTER — TELEPHONE (OUTPATIENT)
Dept: INTERNAL MEDICINE CLINIC | Facility: CLINIC | Age: 84
End: 2024-12-19

## 2024-12-19 ENCOUNTER — HOSPITAL ENCOUNTER (OUTPATIENT)
Dept: GENERAL RADIOLOGY | Age: 84
Discharge: HOME | End: 2024-12-19
Attending: NURSE PRACTITIONER
Payer: MEDICARE

## 2024-12-19 ENCOUNTER — OFFICE VISIT (OUTPATIENT)
Dept: FAMILY MEDICINE CLINIC | Facility: CLINIC | Age: 84
End: 2024-12-19
Payer: MEDICARE

## 2024-12-19 VITALS
HEART RATE: 87 BPM | WEIGHT: 192 LBS | RESPIRATION RATE: 16 BRPM | SYSTOLIC BLOOD PRESSURE: 130 MMHG | TEMPERATURE: 98 F | BODY MASS INDEX: 32 KG/M2 | OXYGEN SATURATION: 98 % | DIASTOLIC BLOOD PRESSURE: 52 MMHG

## 2024-12-19 DIAGNOSIS — R50.9 FEVER, UNSPECIFIED FEVER CAUSE: ICD-10-CM

## 2024-12-19 DIAGNOSIS — M79.675 TOE PAIN, LEFT: ICD-10-CM

## 2024-12-19 DIAGNOSIS — R50.9 FEVER, UNSPECIFIED FEVER CAUSE: Primary | ICD-10-CM

## 2024-12-19 DIAGNOSIS — S99.922A TOE INJURY, LEFT, INITIAL ENCOUNTER: ICD-10-CM

## 2024-12-19 DIAGNOSIS — S99.922A TOE INJURY, LEFT, INITIAL ENCOUNTER: Primary | ICD-10-CM

## 2024-12-19 PROCEDURE — 87637 SARSCOV2&INF A&B&RSV AMP PRB: CPT

## 2024-12-19 PROCEDURE — 73660 X-RAY EXAM OF TOE(S): CPT | Performed by: NURSE PRACTITIONER

## 2024-12-19 PROCEDURE — 1159F MED LIST DOCD IN RCRD: CPT | Performed by: NURSE PRACTITIONER

## 2024-12-19 PROCEDURE — 99214 OFFICE O/P EST MOD 30 MIN: CPT | Performed by: NURSE PRACTITIONER

## 2024-12-19 PROCEDURE — 1160F RVW MEDS BY RX/DR IN RCRD: CPT | Performed by: NURSE PRACTITIONER

## 2024-12-19 RX ORDER — VIBEGRON 75 MG/1
TABLET, FILM COATED ORAL
COMMUNITY
Start: 2024-12-12

## 2024-12-19 NOTE — TELEPHONE ENCOUNTER
Patient is calling and states her cold is turning into something more now. Patient has a very drippy nose, bad cough and sneezing.  Patient has sinus issues.  This all started around 5th of December and getting worse.    Please call and advise

## 2024-12-19 NOTE — TELEPHONE ENCOUNTER
Patient contacted, notified of Dr.Damico message below. Patient scheduled for virtual visit with Dr.Damico tomorrow morning.   Patient would like to do the triple swab testing at the [facility] center today.    To Dr.Damico for the orders

## 2024-12-19 NOTE — TELEPHONE ENCOUNTER
Noted, give her the instructions...    [facility] drive-through testing site  Address: [address], [place] [address]  Phone: [phone]

## 2024-12-19 NOTE — PROGRESS NOTES
Subjective:   Patient ID: Bijal Perez is a 84 year old female.    Patient is an 84 year old female who presents today with complaints of stubbing her left 4th toe on a vacuum last night. Was not wearing shoes when it happened. It is red, swollen and painful. Denies open wounds, decreased range of motion, loss of sensation, fevers, numbness or tingling. Attempted treatment prior to arrival = Tylenol last night.        History/Other:   Review of Systems   Constitutional:  Negative for fever.   Musculoskeletal:  Positive for joint swelling.   Skin:  Positive for color change. Negative for wound.   Neurological:  Negative for numbness.     Current Outpatient Medications   Medication Sig Dispense Refill    GEMTESA 75 MG Oral Tab Gemtesa 75 mg tablet, [RxNorm: 9899353]      letrozole 2.5 MG Oral Tab Take 1 tablet (2.5 mg total) by mouth daily. 90 tablet 3    semaglutide (OZEMPIC, 0.25 OR 0.5 MG/DOSE,) 2 MG/3ML Subcutaneous Solution Pen-injector Inject 0.5 mg into the skin once a week. 9 mL 1    glipiZIDE 10 MG Oral Tab Take 1 tablet (10 mg total) by mouth 2 (two) times daily before meals. 180 tablet 3    empagliflozin (JARDIANCE) 25 MG Oral Tab Take 1 tablet (25 mg total) by mouth daily. 90 tablet 3    ALPRAZolam 0.25 MG Oral Tab Take 1 tablet (0.25 mg total) by mouth 3 (three) times daily as needed. 90 tablet 2    METFORMIN  MG Oral Tablet 24 Hr TAKE 2 TABLETS BY MOUTH TWICE A  tablet 1    ticagrelor 90 MG Oral Tab Take 1 tablet (90 mg total) by mouth every 12 (twelve) hours. 180 tablet 3    benzonatate 200 MG Oral Cap Take 1 capsule (200 mg total) by mouth 3 (three) times daily as needed for cough. 60 capsule 0    LOSARTAN-HYDROCHLOROTHIAZIDE 50-12.5 MG Oral Tab TAKE 1 TABLET BY MOUTH EVERY DAY 90 tablet 3    DOCUSATE SODIUM OR Take 1 capsule by mouth in the morning and 1 capsule before bedtime.      clotrimazole-betamethasone 1-0.05 % External Cream Apply 1 Application topically 2 (two) times daily.  60 g 3    Multiple Vitamins-Minerals (CENTRUM SILVER ULTRA WOMENS) Oral Tab Take 1 tablet by mouth daily.      atorvastatin 40 MG Oral Tab Take 1 tablet (40 mg total) by mouth nightly.      Glucose Blood (ONETOUCH VERIO) In Vitro Strip Check BG 2 times per day. DX:E11.8 without insulin use. 200 strip 1    OneTouch Delica Lancets 33G Does not apply Misc Check BG 2 times per day. DX: E11.8 without insulin use. 200 each 1    Pimecrolimus 1 % External Cream as needed. APPLY TO AFFECTED AREA OF THE FOLDS OF THE ABDOMEN TWICE DAILY   1    Polyethyl Glycol-Propyl Glycol (SYSTANE ULTRA) 0.4-0.3 % Ophthalmic Solution Place 1 drop into both eyes as needed.      aspirin 81 MG Oral Tab Take 1 tablet (81 mg total) by mouth at bedtime.      fosfomycin 3 g Oral Powd Pack Take 3 g by mouth once. (Patient not taking: Reported on 10/31/2024)      estradiol 0.1 MG/GM Vaginal Cream Place 1 g vaginally Every Monday, Wednesday, and Friday.       Allergies:Allergies[1]    /52   Pulse 87   Temp 97.8 °F (36.6 °C) (Oral)   Resp 16   Wt 192 lb (87.1 kg)   LMP  (LMP Unknown)   SpO2 98%   BMI 31.95 kg/m²     Objective:   Physical Exam  Vitals reviewed.   Constitutional:       General: She is awake. She is not in acute distress.     Appearance: Normal appearance. She is well-developed and well-groomed. She is not ill-appearing, toxic-appearing or diaphoretic.   HENT:      Head: Normocephalic and atraumatic.   Cardiovascular:      Rate and Rhythm: Normal rate and regular rhythm.      Pulses:           Dorsalis pedis pulses are 2+ on the left side.   Pulmonary:      Effort: Pulmonary effort is normal. No respiratory distress.      Breath sounds: Normal breath sounds and air entry. No decreased breath sounds, wheezing, rhonchi or rales.   Musculoskeletal:        Feet:    Feet:      Left foot:      Protective Sensation: 2 sites tested.  2 sites sensed.      Skin integrity: Erythema and dry skin present.      Toenail Condition: Left  toenails are normal.   Skin:     General: Skin is warm and dry.      Capillary Refill: Capillary refill takes less than 2 seconds.   Neurological:      Mental Status: She is alert and oriented to person, place, and time.   Psychiatric:         Behavior: Behavior is cooperative.         Assessment & Plan:   1. Toe injury, left, initial encounter    2. Toe pain, left        No orders of the defined types were placed in this encounter.      Meds This Visit:  Requested Prescriptions      No prescriptions requested or ordered in this encounter       Imaging & Referrals:  None    Xray ordered to r/o fx. Will notify of results.  Discussed buddy taping, RICE and Tylenol as needed.  Patient v/u and is comfortable with this plan.    X-ray read as: Findings suspicious for subtle nondisplaced fracture of the fourth distal phalanx.  Consider follow-up radiographs.  No evidence of dislocation.  There is normal bone mineral density.       3:35pm: called and spoke with patient regarding toe xray results. Advised RICE, Tylenol. To call and schedule follow up appt with Dr. Perry. Will send contact info via Register My InfoÂ®. Patient v/u and has no further questions/concerns at this time.    Patient Instructions   Tylenol as needed  Rest, ice and elevate  Buddy tape the injured toe to the middle toe  Call Dr. Perry's office in the morning to schedule follow up appointment    Dr. Stephanie Perry  [phone]         [1]   Allergies  Allergen Reactions    Januvia [Sitagliptin] ITCHING

## 2024-12-19 NOTE — TELEPHONE ENCOUNTER
Patient contacted, complaining of cough (sometimes productive, clear phlegm), runny nose (clear mucus), sneezing, and fatigue. Symptoms started about 2 weeks ago and not getting better. Has been taking Benzonatate (old Rx from ) but it has not been helping. Patient states that she took a Covid test when symptoms first started and it was negative. Patient denies fever, chills, sore throat, or any other symptoms.   Uses [facility] pharmacy in [place].    To Dr.Damico to please advise

## 2024-12-19 NOTE — PATIENT INSTRUCTIONS
Tylenol as needed  Rest, ice and elevate  Buddy tape the injured toe to the middle toe  Call Dr. Perry's office in the morning to schedule follow up appointment    Dr. Stephanie Perry  [phone]

## 2024-12-19 NOTE — TELEPHONE ENCOUNTER
Lets offer her a respiratory virtual visit with me tomorrow morning, and continue using her over-the-counter current medications until she speaks with me.  If she is interested in in triple swab testing, let me know.  She can do this through the drive-through center today

## 2024-12-20 ENCOUNTER — TELEPHONE (OUTPATIENT)
Dept: INTERNAL MEDICINE CLINIC | Facility: CLINIC | Age: 84
End: 2024-12-20

## 2024-12-20 ENCOUNTER — VIRTUAL PHONE E/M (OUTPATIENT)
Dept: INTERNAL MEDICINE CLINIC | Facility: CLINIC | Age: 84
End: 2024-12-20

## 2024-12-20 DIAGNOSIS — E11.9 TYPE 2 DIABETES MELLITUS WITHOUT COMPLICATION, WITHOUT LONG-TERM CURRENT USE OF INSULIN (HCC): ICD-10-CM

## 2024-12-20 DIAGNOSIS — R05.1 ACUTE COUGH: ICD-10-CM

## 2024-12-20 DIAGNOSIS — J01.00 ACUTE NON-RECURRENT MAXILLARY SINUSITIS: Primary | ICD-10-CM

## 2024-12-20 PROCEDURE — 99443 PHONE E/M BY PHYS 21-30 MIN: CPT | Performed by: INTERNAL MEDICINE

## 2024-12-20 PROCEDURE — 1159F MED LIST DOCD IN RCRD: CPT | Performed by: INTERNAL MEDICINE

## 2024-12-20 PROCEDURE — 1160F RVW MEDS BY RX/DR IN RCRD: CPT | Performed by: INTERNAL MEDICINE

## 2024-12-20 RX ORDER — AZITHROMYCIN 250 MG/1
TABLET, FILM COATED ORAL
Qty: 6 TABLET | Refills: 0 | Status: SHIPPED | OUTPATIENT
Start: 2024-12-20 | End: 2024-12-25

## 2024-12-20 NOTE — TELEPHONE ENCOUNTER
Patient contacted and relayed Dr.Damico's message below regarding the Covid/influenza/RSV swab results. Patient verbalized understanding.

## 2024-12-20 NOTE — TELEPHONE ENCOUNTER
----- Message from Jeff Anthony D'Amico sent at 12/20/2024 12:39 PM CST -----  Nursing if you can, lets reach out to Bijal, the recent triple swab done looks like it is negative for COVID, RSV, influenza, nonetheless I want her to follow the plan we discussed this morning on the phone with the prescription medications, it just means she has nothing at this time that is likely seriously contagious.  She still may be able to spread a cold others if she still having symptoms, so I recommend she does wash her hands very well, strict hand hygiene, and follow-up plan we discussed today.

## 2024-12-20 NOTE — TELEPHONE ENCOUNTER
----- Message from Jeff Anthony D'Amico sent at 12/20/2024 11:58 AM CST -----  nursing call them, I left this message on mychart, try to reiterate-  Some x-ray results, it does appear you have a toe fracture here.  Typically there is not much to do with these besides pancho tape them, maybe wear a touch for protective shoe, and put up with the pain for a few weeks.  Make sure were using ice on the area, only mild anti-inflammatories if we need them, and we can discuss repeating the x-rays at a later date and time, I am not so sure this is necessary unless the pain does not resolve.  Let me know if you have questions.

## 2024-12-20 NOTE — TELEPHONE ENCOUNTER
Patient contacted and relayed Dr.Damico's message below regarding the Xray results and toe fracture. Patient verbalized understanding and agreement with the plan.

## 2024-12-20 NOTE — PROGRESS NOTES
Virtual Visit/Telephone Note    Bijal Perez verbally consents to a Virtual/Telephone Check-In service on 24  Patient understands and accepts financial responsibility for any deductible, co-insurance and/or co-pays associated with this service.    Duration/time spent of the service: 20 Minutes of direct patient contact.  10 Minutes of chart review, documentation, medical decision making.    HPI:   Bijal Perez is a 84 year old female who presents for complains of:   Chief Complaint   Patient presents with    Cough     Cough starting on the .     Cough: Patient claims to have cough, and sinus congestion for approximately 1 week, started off as a tickle of a cough, then blossomed into nasal congestion, she does not feel like it is down in her chest, but stuck in her sinuses.  She has had sinus infections in the past that have cleared with antibiotics.  She is a touch of a diabetic, went for triple swab testing yesterday, results still pending.  She does not know of any sick contacts but is leaving for [place] in 1 week.  She does have Tessalon Perles that she has used in the past and she is currently using now, they do not seem to be very effective she is only taking 100 mg a dose.    Physical exam:   Telephone visit only, no obvious pain no obvious shortness of breath, nasal congested sounding voice    ASSESSMENT AND PLAN:   Bijal Perez is a 84 year old female who presents with the followin. Acute non-recurrent maxillary sinusitis  Lets go on some antibiotics, 2 pills of that he did start a Z-Lexa today, then follow the packaging for the rest, continue current Tessalon Perles, but realize you can go up on the dosing of that, up to 200 mg every 4-6 hours with the Tessalon Perles.  Okay to use over-the-counter cough syrup as well if we need, either Delsym DM, or Robitussin DM  Let me know if something is not turning the corner by next week I am here Thursday and Friday, if we  have to put you on a touch of steroids to get you through your plane trip to [place], let me know  - azithromycin (ZITHROMAX Z-ROGER) 250 MG Oral Tab; Take 2 tablets (500 mg total) by mouth daily for 1 day, THEN 1 tablet (250 mg total) daily for 4 days.  Dispense: 6 tablet; Refill: 0    2. Acute cough  As above, using Tessalon Perles, but you can use 200 mg at a time that means taking 2 of the current Tessalon Perles you have every 4-6 hours.    3. Type 2 diabetes mellitus without complication, without long-term current use of insulin (HCC)  Continue current blood sugar management and medications.    Jeff Anthony D'Amico, DO  12/20/2024  9:23 AM    Spent 30 minutes obtaining history, evaluating patient, discussing treatment options, diet, exercise, review of available labs and radiology reports, and completing documentation.

## 2025-01-15 ENCOUNTER — APPOINTMENT (OUTPATIENT)
Dept: URBAN - METROPOLITAN AREA CLINIC 244 | Age: 85
Setting detail: DERMATOLOGY
End: 2025-01-15

## 2025-01-15 DIAGNOSIS — L81.4 OTHER MELANIN HYPERPIGMENTATION: ICD-10-CM

## 2025-01-15 DIAGNOSIS — Z12.83 ENCOUNTER FOR SCREENING FOR MALIGNANT NEOPLASM OF SKIN: ICD-10-CM

## 2025-01-15 DIAGNOSIS — Z85.828 PERSONAL HISTORY OF OTHER MALIGNANT NEOPLASM OF SKIN: ICD-10-CM

## 2025-01-15 DIAGNOSIS — L30.4 ERYTHEMA INTERTRIGO: ICD-10-CM

## 2025-01-15 DIAGNOSIS — L70.8 OTHER ACNE: ICD-10-CM

## 2025-01-15 DIAGNOSIS — D18.0 HEMANGIOMA: ICD-10-CM

## 2025-01-15 DIAGNOSIS — D22 MELANOCYTIC NEVI: ICD-10-CM

## 2025-01-15 DIAGNOSIS — L40.0 PSORIASIS VULGARIS: ICD-10-CM

## 2025-01-15 DIAGNOSIS — L82.1 OTHER SEBORRHEIC KERATOSIS: ICD-10-CM

## 2025-01-15 PROBLEM — D18.01 HEMANGIOMA OF SKIN AND SUBCUTANEOUS TISSUE: Status: ACTIVE | Noted: 2025-01-15

## 2025-01-15 PROBLEM — D22.9 MELANOCYTIC NEVI, UNSPECIFIED: Status: ACTIVE | Noted: 2025-01-15

## 2025-01-15 PROCEDURE — 99214 OFFICE O/P EST MOD 30 MIN: CPT

## 2025-01-15 PROCEDURE — OTHER ADDITIONAL NOTES: OTHER

## 2025-01-15 PROCEDURE — OTHER PRESCRIPTION MEDICATION MANAGEMENT: OTHER

## 2025-01-15 PROCEDURE — OTHER PRESCRIPTION: OTHER

## 2025-01-15 PROCEDURE — OTHER COUNSELING: OTHER

## 2025-01-15 RX ORDER — CLOBETASOL PROPIONATE 0.5 MG/ML
SOLUTION TOPICAL QHS
Qty: 50 | Refills: 0 | Status: ERX | COMMUNITY
Start: 2025-01-15

## 2025-01-15 RX ORDER — CLOTRIMAZOLE AND BETAMETHASONE DIPROPIONATE 10; .5 MG/G; MG/G
CREAM TOPICAL
Qty: 15 | Refills: 0 | Status: ERX | COMMUNITY
Start: 2025-01-15

## 2025-01-15 ASSESSMENT — LOCATION ZONE DERM
LOCATION ZONE: NECK
LOCATION ZONE: SCALP
LOCATION ZONE: TRUNK

## 2025-01-15 ASSESSMENT — LOCATION DETAILED DESCRIPTION DERM
LOCATION DETAILED: RIGHT SUPRAPUBIC SKIN
LOCATION DETAILED: SACRAL SPINE
LOCATION DETAILED: POSTERIOR MID-PARIETAL SCALP
LOCATION DETAILED: RIGHT INFERIOR POSTERIOR NECK
LOCATION DETAILED: SUPERIOR THORACIC SPINE

## 2025-01-15 ASSESSMENT — LOCATION SIMPLE DESCRIPTION DERM
LOCATION SIMPLE: UPPER BACK
LOCATION SIMPLE: LOWER BACK
LOCATION SIMPLE: POSTERIOR NECK
LOCATION SIMPLE: GROIN
LOCATION SIMPLE: POSTERIOR SCALP

## 2025-01-15 ASSESSMENT — BSA PSORIASIS: % BODY COVERED IN PSORIASIS: 5

## 2025-01-15 NOTE — PROCEDURE: ADDITIONAL NOTES
Additional Notes: Recc to keep area dry with corn starch or cut up cotton tshirts. Pt states she has interdry at home
Detail Level: Simple
Render Risk Assessment In Note?: no

## 2025-01-15 NOTE — PROCEDURE: PRESCRIPTION MEDICATION MANAGEMENT
Continue Regimen: pimecrolimus 1 % topical cream and clobetasol solution on scalp prn
Detail Level: Zone
Render In Strict Bullet Format?: No
Continue Regimen: ketoconazole 2 % topical cream and clotrimazole-betamethasone as needed

## 2025-01-16 ENCOUNTER — OFFICE VISIT (OUTPATIENT)
Dept: INTERNAL MEDICINE CLINIC | Facility: CLINIC | Age: 85
End: 2025-01-16
Payer: MEDICARE

## 2025-01-16 VITALS
WEIGHT: 192 LBS | HEIGHT: 65 IN | DIASTOLIC BLOOD PRESSURE: 68 MMHG | HEART RATE: 72 BPM | TEMPERATURE: 98 F | SYSTOLIC BLOOD PRESSURE: 130 MMHG | BODY MASS INDEX: 31.99 KG/M2

## 2025-01-16 DIAGNOSIS — I10 HYPERTENSION, BENIGN: ICD-10-CM

## 2025-01-16 DIAGNOSIS — E78.5 HYPERLIPIDEMIA, UNSPECIFIED HYPERLIPIDEMIA TYPE: ICD-10-CM

## 2025-01-16 DIAGNOSIS — Z95.5 HISTORY OF HEART ARTERY STENT: ICD-10-CM

## 2025-01-16 DIAGNOSIS — N18.31 STAGE 3A CHRONIC KIDNEY DISEASE (HCC): ICD-10-CM

## 2025-01-16 DIAGNOSIS — E53.8 B12 DEFICIENCY: ICD-10-CM

## 2025-01-16 DIAGNOSIS — E11.9 TYPE 2 DIABETES MELLITUS WITHOUT COMPLICATION, WITHOUT LONG-TERM CURRENT USE OF INSULIN (HCC): ICD-10-CM

## 2025-01-16 DIAGNOSIS — Z91.81 HISTORY OF FALL: Primary | ICD-10-CM

## 2025-01-16 DIAGNOSIS — Z87.2 HISTORY OF CELLULITIS: ICD-10-CM

## 2025-01-16 PROBLEM — L03.211 CELLULITIS OF FACE: Status: RESOLVED | Noted: 2024-09-17 | Resolved: 2025-01-16

## 2025-01-16 PROBLEM — F48.9 TENSION: Status: RESOLVED | Noted: 2024-01-24 | Resolved: 2025-01-16

## 2025-01-16 PROBLEM — E66.812 CLASS 2 SEVERE OBESITY WITH BODY MASS INDEX (BMI) OF 35 TO 39.9 WITH SERIOUS COMORBIDITY (HCC): Chronic | Status: RESOLVED | Noted: 2017-07-05 | Resolved: 2025-01-16

## 2025-01-16 PROBLEM — R53.83 FATIGUE: Status: RESOLVED | Noted: 2024-09-17 | Resolved: 2025-01-16

## 2025-01-16 PROBLEM — J32.1 FRONTAL SINUSITIS: Status: RESOLVED | Noted: 2024-02-13 | Resolved: 2025-01-16

## 2025-01-16 PROBLEM — E66.01 CLASS 2 SEVERE OBESITY WITH BODY MASS INDEX (BMI) OF 35 TO 39.9 WITH SERIOUS COMORBIDITY (HCC): Chronic | Status: RESOLVED | Noted: 2017-07-05 | Resolved: 2025-01-16

## 2025-01-16 PROBLEM — Z85.3 HISTORY OF BREAST CANCER: Status: ACTIVE | Noted: 2023-09-14

## 2025-01-16 PROBLEM — Z01.818 PREOP EXAMINATION: Status: RESOLVED | Noted: 2024-03-12 | Resolved: 2025-01-16

## 2025-01-16 PROBLEM — C50.412 CARCINOMA OF UPPER-OUTER QUADRANT OF LEFT BREAST IN FEMALE, ESTROGEN RECEPTOR POSITIVE (HCC): Status: RESOLVED | Noted: 2021-01-08 | Resolved: 2025-01-16

## 2025-01-16 PROBLEM — Z17.0 CARCINOMA OF UPPER-OUTER QUADRANT OF LEFT BREAST IN FEMALE, ESTROGEN RECEPTOR POSITIVE (HCC): Status: RESOLVED | Noted: 2021-01-08 | Resolved: 2025-01-16

## 2025-01-16 RX ORDER — CYANOCOBALAMIN 1000 UG/ML
1000 INJECTION, SOLUTION INTRAMUSCULAR; SUBCUTANEOUS ONCE
Status: COMPLETED | OUTPATIENT
Start: 2025-01-16 | End: 2025-01-16

## 2025-01-16 RX ADMIN — CYANOCOBALAMIN 1000 MCG: 1000 INJECTION, SOLUTION INTRAMUSCULAR; SUBCUTANEOUS at 15:44:00

## 2025-01-16 NOTE — PROGRESS NOTES
HPI:   Bijal Perez is a 84 year old female who presents for complains of:   Chief Complaint   Patient presents with    Checkup   Gait instability: Patient claims she has had some gait instability over the past few months, has fallen a few times, nothing that is caused her some major injury, but she has hit her head on a few different occasions, last being 2 weeks ago as she was in [facility].  She did not get examined by a physician or a medical personnel, only her family members, seem to be stable, but claims she does have a lump on the back of her head, she did recover and claims she has no issues now.  She is wondering why over the past few months she has been having increased falls, she has not gone through physical therapy, but did have heart trouble last year, did complete some cardiac rehab.  We did discuss being evaluated by physical therapy personnel, engaging visits, and working on her gait and balance, fall precautions described and discussed at length.    History of cellulitis: Patient did have cellulitis left lower extremity, was treated and full, left lower extremity seems to be much improved    EXAM:   /68   Pulse 72   Temp 98.2 °F (36.8 °C) (Oral)   Ht 5' 5\" (1.651 m)   Wt 192 lb (87.1 kg)   LMP  (LMP Unknown)   BMI 31.95 kg/m²   Body mass index is 31.95 kg/m².   Gen. exam: Alert and oriented, in no acute distress   HEENT: Pupils equal and reactive to light and accommodation, moist mucous membranes  Neck exam:  Supple.  Normal thyroid trachea midline, no JVD  Heart exam: Regular rate and rhythm no murmurs no S3 no S4   Lung exam: No rales no rhonchi no wheezes  Abdominal exam: Soft, nontender, nondistended, positive bowel sounds are normoactive  Extremities exam: no clubbing, no cyanosis, no edema  Skin exam: No obvious wounds, no rashes, evidence of scarring on the left lower shin, dry skin  Neurological exam: Cranial nerves II through XII intact, no gross deficits  Musculoskeletal  exam: Moderate bilateral generalized hand arthritis appreciated, no obvious deformity    ASSESSMENT AND PLAN:   Bijal Perez is a 84 year old female who presents with the followin. History of fall  I do like the idea of the physical therapy, doing this especially in the fall times before we need some gait and balance help, if there is some concerns from the therapist that we need to look into further, you would have to reach out to me  - Physical Therapy Referral - [facility]    2. History of cellulitis  Nice job here, let me know if you see any recurrence with the cellulitis, we should be quick to treat this if it does recur    3. Stage 3a chronic kidney disease (HCC)  Stable continue current monitoring management, lab work in the next 2 to 3 months, March at the latest I will notify with results once completed    4. Type 2 diabetes mellitus without complication, without long-term current use of insulin (HCC)  Stable continue current monitor management medications here, following up with specialists    5. Hypertension, benign  Stable continue current monitoring management    6. History of heart artery stent  Stable, continue current monitor management, home medications    7. Hyperlipidemia, unspecified hyperlipidemia type  Stable, continue current monitor management, home medications  - Lipid Panel    8. B12 deficiency  Lets try a B12 shot you will have to see how long these last if they do make you feel good or improved gait and balance, we can always repeat these based on how effective they are  - cyanocobalamin (Vitamin B12) 1000 MCG/ML injection 1,000 mcg        Spent 30 minutes obtaining history, evaluating patient, discussing treatment options, diet, exercise, review of available labs and radiology reports, and completing documentation.    Jeff Anthony D'Amico, DO  2025  3:28 PM

## 2025-01-16 NOTE — PATIENT INSTRUCTIONS
1. History of fall  I do like the idea of the physical therapy, doing this especially in the fall times before we need some gait and balance help, if there is some concerns from the therapist that we need to look into further, you would have to reach out to me  - Physical Therapy Referral - [facility]    2. History of cellulitis  Nice job here, let me know if you see any recurrence with the cellulitis, we should be quick to treat this if it does recur    3. Stage 3a chronic kidney disease (HCC)  Stable continue current monitoring management, lab work in the next 2 to 3 months, March at the latest I will notify with results once completed    4. Type 2 diabetes mellitus without complication, without long-term current use of insulin (HCC)  Stable continue current monitor management medications here, following up with specialists    5. Hypertension, benign  Stable continue current monitoring management    6. History of heart artery stent  Stable, continue current monitor management, home medications    7. Hyperlipidemia, unspecified hyperlipidemia type  Stable, continue current monitor management, home medications  - Lipid Panel    8. B12 deficiency  Lets try a B12 shot you will have to see how long these last if they do make you feel good or improved gait and balance, we can always repeat these based on how effective they are  - cyanocobalamin (Vitamin B12) 1000 MCG/ML injection 1,000 mcg

## 2025-01-17 DIAGNOSIS — E11.9 TYPE 2 DIABETES MELLITUS WITHOUT COMPLICATION, WITHOUT LONG-TERM CURRENT USE OF INSULIN (HCC): ICD-10-CM

## 2025-01-17 RX ORDER — METFORMIN HYDROCHLORIDE 500 MG/1
1000 TABLET, EXTENDED RELEASE ORAL 2 TIMES DAILY
Qty: 360 TABLET | Refills: 1 | Status: SHIPPED | OUTPATIENT
Start: 2025-01-17

## 2025-01-17 NOTE — TELEPHONE ENCOUNTER
Endocrine Refill protocol for metformin    Protocol Criteria:  PASSED  Reason: N/A    If all below requirements are met, send a 90-day supply with 1 refill per provider protocol.     Verify appointment with Endocrinology completed in the last 6 months or scheduled in the next 3 months.  Verify A1C has been completed within the last 6 months and is below 8.5%  Verify last GFR is greater than or equal to 40 in the past 12 months    Last completed office visit:10/10/2024 Justine Jones MD   Next scheduled Follow up:   Future Appointments   Date Time Provider Department Center   4/29/2025  1:00 PM D'Amico, Jeff Anthony,  [facility]   5/20/2025 12:00 PM Ashley Bonner MD [facility]       Last GFR result:    Lab Results   Component Value Date    EGFRCR 64 09/17/2024     Last A1c result: Last A1C result: 7.5% done 10/10/2024.

## 2025-01-28 ENCOUNTER — TELEPHONE (OUTPATIENT)
Dept: INTERNAL MEDICINE CLINIC | Facility: CLINIC | Age: 85
End: 2025-01-28

## 2025-01-28 ENCOUNTER — NURSE ONLY (OUTPATIENT)
Dept: INTERNAL MEDICINE CLINIC | Facility: CLINIC | Age: 85
End: 2025-01-28

## 2025-01-28 DIAGNOSIS — E53.8 B12 DEFICIENCY: Primary | ICD-10-CM

## 2025-01-28 DIAGNOSIS — E11.9 TYPE 2 DIABETES MELLITUS WITHOUT COMPLICATION, WITHOUT LONG-TERM CURRENT USE OF INSULIN (HCC): ICD-10-CM

## 2025-01-28 PROCEDURE — 96372 THER/PROPH/DIAG INJ SC/IM: CPT | Performed by: INTERNAL MEDICINE

## 2025-01-28 RX ORDER — CYANOCOBALAMIN 1000 UG/ML
1000 INJECTION, SOLUTION INTRAMUSCULAR; SUBCUTANEOUS WEEKLY
Status: DISCONTINUED | OUTPATIENT
Start: 2025-02-04 | End: 2025-03-18

## 2025-01-28 RX ORDER — CYANOCOBALAMIN 1000 UG/ML
1000 INJECTION, SOLUTION INTRAMUSCULAR; SUBCUTANEOUS
Status: SHIPPED | OUTPATIENT
Start: 2025-04-08 | End: 2026-03-04

## 2025-01-28 RX ORDER — CYANOCOBALAMIN 1000 UG/ML
1000 INJECTION, SOLUTION INTRAMUSCULAR; SUBCUTANEOUS ONCE
Status: COMPLETED | OUTPATIENT
Start: 2025-01-28 | End: 2025-01-28

## 2025-01-28 RX ADMIN — CYANOCOBALAMIN 1000 MCG: 1000 INJECTION, SOLUTION INTRAMUSCULAR; SUBCUTANEOUS at 12:15:00

## 2025-01-28 NOTE — PROGRESS NOTES
Pt presents for weekly B12 injection. Pt name and  verified. Patient able to verbalize reason for injection. Last admin date 25. Pt tolerated injection well. Injection drawn up by this RN. Patient reminded to schedule next B12 injection in one month.     Patient unsure of how many weekly injections she is required to have. Message to Dr. D'Amico to confirm.

## 2025-01-28 NOTE — TELEPHONE ENCOUNTER
Patient seen today for weekly B12 injection #2. Patient states she was told to start weekly B12 injections but unsure of how many she needs.    Per 1/16/25 office visit note patient to start B12 injections and report how she feels, as well as improvement in balance and gait.    Patient states she is not sure if her balance has improved but she has not fallen so maybe it's helping. She also notes improved sleep since first injection.     To Dr. D'Amico please advise on how many weekly B12 injections she should have and when to start monthly.    B12 weekly and monthly order pended, please review and sign if appropriate.

## 2025-01-28 NOTE — TELEPHONE ENCOUNTER
Excellent, nursing if you could coordinate weekly injections for the next 6 to 7 weeks before going to monthly after that, orders placed, let me know if something needs to be adjusted

## 2025-01-29 ENCOUNTER — TELEPHONE (OUTPATIENT)
Dept: PHYSICAL THERAPY | Facility: HOSPITAL | Age: 85
End: 2025-01-29

## 2025-01-30 ENCOUNTER — TELEPHONE (OUTPATIENT)
Dept: PHYSICAL THERAPY | Facility: HOSPITAL | Age: 85
End: 2025-01-30

## 2025-02-04 ENCOUNTER — NURSE ONLY (OUTPATIENT)
Dept: INTERNAL MEDICINE CLINIC | Facility: CLINIC | Age: 85
End: 2025-02-04

## 2025-02-04 DIAGNOSIS — E53.8 B12 DEFICIENCY: Primary | ICD-10-CM

## 2025-02-04 DIAGNOSIS — E11.9 TYPE 2 DIABETES MELLITUS WITHOUT COMPLICATION, WITHOUT LONG-TERM CURRENT USE OF INSULIN (HCC): ICD-10-CM

## 2025-02-04 PROCEDURE — 96372 THER/PROPH/DIAG INJ SC/IM: CPT | Performed by: INTERNAL MEDICINE

## 2025-02-04 RX ADMIN — CYANOCOBALAMIN 1000 MCG: 1000 INJECTION, SOLUTION INTRAMUSCULAR; SUBCUTANEOUS at 11:34:00

## 2025-02-06 ENCOUNTER — OFFICE VISIT (OUTPATIENT)
Dept: PHYSICAL THERAPY | Age: 85
End: 2025-02-06
Attending: INTERNAL MEDICINE
Payer: MEDICARE

## 2025-02-06 DIAGNOSIS — E11.9 TYPE 2 DIABETES MELLITUS WITHOUT COMPLICATION, WITHOUT LONG-TERM CURRENT USE OF INSULIN (HCC): ICD-10-CM

## 2025-02-06 DIAGNOSIS — Z91.81 HISTORY OF FALL: Primary | ICD-10-CM

## 2025-02-06 PROCEDURE — 97161 PT EVAL LOW COMPLEX 20 MIN: CPT

## 2025-02-06 PROCEDURE — 97110 THERAPEUTIC EXERCISES: CPT

## 2025-02-06 NOTE — PROGRESS NOTES
FALL SCREEN EVALUATION     Diagnosis:    Gait instability       Referring Provider: Jeff Anthony D'Amico  Date of Evaluation:    2/6/2025    Precautions:   fall risk  Next MD visit:   none scheduled  Date of Surgery: n/a     PATIENT SUMMARY   Bijal Perez is a 84 year old female who presents to therapy today with complaints of gait instability: Patient claims she has had some gait instability over the past few months, has fallen a few times, nothing that is caused her some major injury, but she has hit her head ., most recently in January at [employer] when she slipped off a seat.   In October, she fell going down stairs twice at her home , about 2 weeks apart. when carrying something.  Hurt her L leg and ended up with cellulitis.  She completed a course of cardiac rehab 7/2024 after a stent .   Notes her R knee is good now since TKR.  She had a  knee injury and undiagnosed 7/23 stress fracture and then   TKR in 3/2024   Notes she leans R in standing.  Goes down stairs one at a time, has to pull herself up the stairs.   Positive History of Falls: yes  Number of falls 4 most recent fall  1/2025, cause of fall  going down stairs  also going to sit on a low stool, slipped off.   Pain: none  Current functional limitations include walking on levels more than 3 blocks,  reciprocating stairs  stairs .     Bijal describes prior level of function :   no falls prior to a year ago. . Pt goals include improve balance .  Past medical history was reviewed with Bijal. Significant findings include  DM Type II ,  T TKR 3/2024    History of L hip ligament tear and pain.    has a past medical history of Age-related nuclear cataract of both eyes, Atherosclerosis of coronary artery, Benign neoplasm of skin of left eyelid, Blepharitis of both eyes, Cancer (HCC) (01/2021), Chronic allergic conjunctivitis, Coronary atherosclerosis, Edema of right lower eyelid, Gallstone (1/2010), Gastroenteritis, High blood pressure, High  cholesterol, Lipid screening (12/11/2013), Obesity, Osteoarthritis, Osteoporosis, Osteoporosis screening (1/22/2013), Other and unspecified hyperlipidemia, Restless leg syndrome (2015), Type II or unspecified type diabetes mellitus without mention of complication, not stated as uncontrolled (8/2007), Unspecified essential hypertension, and Visual impairment.   Pt denies diplopia, dysarthria, dysphasia, dizziness, drop attacks, bowel/bladder changes, saddle anesthesia, and NAERN LE N/T.    Falls Efficacy Scale-International: FES Score  Score: (Patient-Rptd) 35.94 % (2/6/2025  2:51 PM)    Score and level of concern: (Patient-Rptd) 23 - medium concern. (2/6/2025  2:51 PM)        Fall Risk: yes  [>23 indicates higher concern for falling]    ASSESSMENT  Bijal presents to physical therapy evaluation with primary c/o gait instability. . The results of the objective tests and measures show decreased single leg stance, weakness at L hip abductors and R hip flexors and DGI and 30 SSTS score that puts patient at increased risk to fall. .  Functional deficits include but are not limited to sit to stand from chair, getting out of a car, walking on levels and stairs .  Signs and symptoms are consistent with diagnosis of gait instability with LE weakness and decreased dynamic and static standing balance. . Pt and PT discussed evaluation findings, pathology, POC and HEP.  Pt voiced understanding and performs HEP correctly without reported pain. Skilled Physical Therapy is medically necessary to address the above impairments and reach functional goals.     OBJECTIVE:   Physical Exam:  Posture/Observation: WDWF,  L shoulder higher than R, hip heigh level     ROM : R knee flexion =   0-110 deg    L=0-125 deg       LE Strength:    R hip abd= 4+/5 , R hip flex = 4-/5,   L hip    flex= 4/5  abd = 4-/5   LE Flexibility: wnl               Postural Control:  4-Stage Balance Test:   - Feet together: 15 sec   - Modified Tandem:  8 sec   -  Tandem Stance: 6 sec Fall Risk: yes   - SLS: R 4 sec, L 3 sec Fall Risk: yes  [Full tandem stance <10 sec indicates increased risk of falling]  Age appropriate norms for SLS: 60-70 y/o mean = 27.0 sec      70-78 y/o mean = 17.2 sec      80-98 y/o mean = 8.5 sec     Functional Mobility:  30 sec sit<>stand:  0 without UE support    Fall Risk: yes  [Below average score indicates high risk for falls; norms by age > or = to...   60-65 y/o Men: 14, Women: 12   65-70 y/o Men: 12, Women: 11   70-73 y/o Men: 12, Women: 10   75-78 y/o Men: 11, Women: 10   80-85 y/o Men: 10, Women: 9   85-90 y/o Men: 8, Women: 8   90-93 y/o Men 7, Women: 4]    Gait: pt ambulates on level ground with decreased arm swing, narrow KERVIN, decreased trunk rotation, no device.     TUG (AD, time): 11 sec    Fall Risk: no  [Performance exceeding the upper limit of confidence intervals are considered increased risk for falls; Melissa, 2006...   60-70 y/o mean 8.1s (7.1-9.0s)   70-78 y/o mean 9.2s (8.2-10.2s)   80-98 y/o mean 11.3s (10.0-12.7s)]    4 Item Dynamic Gait Index Score: 9/12 Fall Risk: yes  [Scores of 10 or less indicate increased risk for falls]  Gait level surface: 2  Grading: Preet the lowest category that applies.  (3)  Normal: Walks 20’, no assistive devices, good speed, no evidence of imbalance, normal gait pattern.  (2)  Mild Impairment: Walks 20’, uses assistive devices, slower speed, mild gait deviations.  (1)  Moderate Impairment: Walks 20’, slow speed, abnormal gait pattern, evidence of imbalance.  (0)  Severe Impairment: Cannot walk 20’ without assistance, severe gait deviations or imbalance.     Change in gait speed: 3  Grading: Preet the lowest category that applies.  (3)  Normal: Able to smoothly change walking speed without loss of balance or gait deviation. Shows a significant difference in walking speed between normal, fast and slow speeds.   (2)  Mild Impairment: Is able to change speed but demonstrates mild gait deviations, or  no gait deviations but unable to achieve a significant change in velocity, or uses an assistive device.   (1)  Moderate Impairment: Makes only minor adjustments to walking speed, or accomplishes a change in speed with significant gait deviations, or changes speed but has significant gait deviations, or changes speed but loses balance but is able to recover and continue walking.  (0)  Severe Impairment: Cannot change speeds, or loses balance and has to reach for wall or be caught.    Gait with horizontal head turns: 2  (3)  Normal: Performs head turns smoothly with no change in gait.  (2)  Mild Impairment: Preforms head turns smoothly with slight change in gait velocity, i.e., minor disruption to smooth gait path or uses walking aid.  (1) Moderate Impairment: Preforms head turns with moderate change in gait velocity, slows down, staggers but recovers, can continue to walk.  (0)  Severe Impairment: Preform task with severe disruption of gait, i.e., staggers outside 15” path, loses balance, stops, reaches for wall.    Gait with vertical head turns: 2  Grading: Preet the lowest category that applies.  (3) Normal: Preforms head turns smoothly with no change in gait.  (2) Mild Impairment: Preforms head turns smoothly with slight change in gait velocity, i.e., minor disruption to smooth gait path or uses walking aid.  (1) Moderate Impairment: Preforms head turns with moderate change in gait velocity, slows down, staggers but recovers, can continue to walk.  (0) Severe Impairment: Preforms task with severe disruption of gait, i.e., staggers outside 15” path, loses balance, stops, reaches for wall.    Today’s Treatment and Response:   Pt education was provided on exam findings, treatment diagnosis, treatment plan, expectations, and prognosis. Pt was also provided recommendations for strategies to reduce fall risk at home.   Patient was instructed in and issued a HEP for:   SLS x3 R/L, standing march x10 R/L , hip abd with RTB  2x10     Charges: PT Eval Low Complexity, ex      Total Timed Treatment:  15 min     Total Treatment Time: 50 min       PLAN OF CARE:    Goals: (to be met in 10 visits)  Pt will demonstrate improved SLS to > 10 seconds NAREN to promote safety and decrease risk of falls on uneven surfaces such as grass  Improve LE strength 1 grade to allow patient to transfer sit to stand smoothly without LE support   Improve L hip abd strength 1 grade to allow patient to transfer in and out of car safely   Pt will perform 4-Item DGI with score of 10/12 or greater with least restrictive AD to demonstrate ability to ambulate safely in crowded and busy environments  Pt will be able to squat to  light objects around the house without loss of stability  Pt will improve functional hip strength to demonstrate ability to ascend/descend 1 flight of stairs reciprocally with use of handrail  Pt will be independent and compliant with comprehensive HEP to maintain progress achieved in PT    Frequency / Duration: Patient will be seen for 2 x/week or a total of 10 visits over a 90 day period. Treatment will include: Gait training, Neuromuscular Re-education, Therapeutic Exercise, and Home Exercise Program instruction    Education or treatment limitation: None  Rehab Potential:good    FES Score  Score: (Patient-Rptd) 35.94 % (2/6/2025  2:51 PM)    Score and level of concern: (Patient-Rptd) 23 - medium concern. (2/6/2025  2:51 PM)      Patient/Family/Caregiver was advised of these findings, precautions, and treatment options and has agreed to actively participate in planning and for this course of care.    Thank you for your referral. Please co-sign or sign and return this letter via fax as soon as possible to [phone]. If you have any questions, please contact me at Dept: [phone]    Sincerely,  Electronically signed by therapist: Delgado Swan, CONNIE  Physician's certification required: Yes  I certify the need for these services  furnished under this plan of treatment and while under my care.    X___________________________________________________ Date____________________    Certification From: 2/6/2025  To:5/7/2025

## 2025-02-11 ENCOUNTER — NURSE ONLY (OUTPATIENT)
Dept: INTERNAL MEDICINE CLINIC | Facility: CLINIC | Age: 85
End: 2025-02-11

## 2025-02-11 DIAGNOSIS — E53.8 B12 DEFICIENCY: Primary | ICD-10-CM

## 2025-02-11 PROCEDURE — 96372 THER/PROPH/DIAG INJ SC/IM: CPT | Performed by: INTERNAL MEDICINE

## 2025-02-11 RX ADMIN — CYANOCOBALAMIN 1000 MCG: 1000 INJECTION, SOLUTION INTRAMUSCULAR; SUBCUTANEOUS at 11:38:00

## 2025-02-11 NOTE — PROGRESS NOTES
Patient present for Vitamin B12 injection. Patient's name, , and order verified. Injection well tolerated to right deltoid with no adverse reactions noted.

## 2025-02-18 ENCOUNTER — NURSE ONLY (OUTPATIENT)
Dept: INTERNAL MEDICINE CLINIC | Facility: CLINIC | Age: 85
End: 2025-02-18

## 2025-02-18 DIAGNOSIS — E53.8 B12 DEFICIENCY: Primary | ICD-10-CM

## 2025-02-18 PROCEDURE — 96372 THER/PROPH/DIAG INJ SC/IM: CPT | Performed by: INTERNAL MEDICINE

## 2025-02-18 RX ADMIN — CYANOCOBALAMIN 1000 MCG: 1000 INJECTION, SOLUTION INTRAMUSCULAR; SUBCUTANEOUS at 11:24:00

## 2025-02-18 NOTE — PROGRESS NOTES
Patient is here today for Vitamin B12 Injection. Patient has verified purpose of visit, their name and . Injection was drawn up and administered by LG,KEKE. Patient tolerated IM injection (deltoid muscle) well. Pt is aware they are to return in 1 month for next injection. See MAR Additional Details ie. NDC, LOT & EXP of single dose vial.

## 2025-02-20 ENCOUNTER — OFFICE VISIT (OUTPATIENT)
Dept: PHYSICAL THERAPY | Age: 85
End: 2025-02-20
Attending: INTERNAL MEDICINE
Payer: MEDICARE

## 2025-02-20 PROCEDURE — 97112 NEUROMUSCULAR REEDUCATION: CPT

## 2025-02-20 PROCEDURE — 97110 THERAPEUTIC EXERCISES: CPT

## 2025-02-20 NOTE — PROGRESS NOTES
Insurance Primary/Secondary: [employer] MCR / N/A     # Auth Visits: [id number]          Diagnosis:    Gait instability       Referring Provider: Jeff Anthony D'Amico  Date of Evaluation:    2/6/2025    Precautions:  fall risk   PMH: DM Type II , T TKR 3/2024 History of L hip ligament tear and pain. Next MD visit:   none scheduled  Date of Surgery: n/a   FES Score  Score: (Patient-Rptd) 35.94 % (2/6/2025  2:51 PM)    From initial eval: . The results of the objective tests and measures show decreased single leg stance, weakness at L hip abductors and R hip flexors and DGI and 30 SSTS score that puts patient at increased risk to fall. .  Functional deficits include but are not limited to sit to stand from chair, getting out of a car, walking on levels and stairs    Subjective: Feels her single leg stance is better.  Not able to put her shoes on standing up anymore.   Has trouble getting out of the car using her L leg.     Pain: 0/10      Objective:    SLS R= 4 sec   L= 7 sec 2/14/25                               R= 12 sec L= 15 sec 2/20/2025       Assessment:  SLS is decreased R versus L but improved bilaterally. Patient is challenged by by stance with head turns R/L       Goals: (to be met in 10 visits)    Pt will demonstrate improved SLS to > 10 seconds NAREN to promote safety and decrease risk of falls on uneven surfaces such as grass  Improve LE strength 1 grade to allow patient to transfer sit to stand smoothly without LE support   Improve L hip abd strength 1 grade to allow patient to transfer in and out of car safely   Pt will perform 4-Item DGI with score of 10/12 or greater with least restrictive AD to demonstrate ability to ambulate safely in crowded and busy environments  Pt will be able to squat to  light objects around the house without loss of stability  Pt will improve functional hip strength to demonstrate ability to ascend/descend 1 flight of stairs reciprocally with use of handrail  Pt will be independent  and compliant with comprehensive HEP to maintain progress achieved in PT  Improve LE strength to allow patient to complete sit to stand  x5 w/out UE support     Plan: Cont balance and gait training, LE strengthening   Th 2/27. Cont with braiding, static and dynamic with C rot R/L  Date: 2/14/2025  TX#: 2/6 Date:      2/20/2025            TX#: 3/6 Date:                 TX#: 4/ Date:                 TX#: 5/ Date:   Tx#: 6/   Nustep st 7 L5  5 mins   Decr from L5 to L3 due to R knee pain    While instructing on transfer in and out of car   - Walk 1/10 of mile cueing for environmental barriers Nustep 5 mins L4 ( no knee pain )           Sit to stand x 4 w/out UE support  Sit to stand 2x 5       - seated march x10 R/L  Seated march x15 R/L      Neuroreed    15 mins   SLS  R/L 4 mins   - balance board: A/P and hold level  - side step R/L 10' x5  - standing march w/ and w/out UE support   X15 R/L  - SLS with c rot R/L x10  Neuroreed    20 mins   SLS  R/L 4 mins   - balance board: A/P  m/L and hold level  - standing march with with UE support , cued for decreased jessica   - tandem stance with C rot R/L   Narrow KERVIN with C rot R/L   - braiding R/L 10 x4      Supine march x15 R/L  Supine hip abd x30 with RTB   - hip add with ball x20  Supine march x15 R/L  Supine hip abd x30 with  progression to GTB   - hip add with ball x30       HEP: SLS x3 R/L, standing march x10 R/L , hip abd with RTB 2x10   2/14/2025   sit to stand x5 , supine march x15 R/L     Charges:  NMR, 20  mins  EX2 (25)       Total Timed Treatment: 45 min  Total Treatment Time: 45 min

## 2025-02-25 ENCOUNTER — NURSE ONLY (OUTPATIENT)
Dept: INTERNAL MEDICINE CLINIC | Facility: CLINIC | Age: 85
End: 2025-02-25

## 2025-02-25 DIAGNOSIS — E53.8 B12 DEFICIENCY: Primary | ICD-10-CM

## 2025-02-25 PROCEDURE — 96372 THER/PROPH/DIAG INJ SC/IM: CPT | Performed by: INTERNAL MEDICINE

## 2025-02-25 RX ADMIN — CYANOCOBALAMIN 1000 MCG: 1000 INJECTION, SOLUTION INTRAMUSCULAR; SUBCUTANEOUS at 10:10:00

## 2025-02-25 NOTE — PROGRESS NOTES
Patient presents for weekly vitamin B12 IM injection #6/7. Patient has verbalized purpose of visit, name and . Order verified. Vitamin B12 1000 mcg administered IM to deltoid muscle. Patient tolerated injection well. Pt is aware they are to return to clinic in 1 week for next scheduled dose.   Physician order found in 25 TE.

## 2025-02-27 ENCOUNTER — OFFICE VISIT (OUTPATIENT)
Dept: PHYSICAL THERAPY | Age: 85
End: 2025-02-27
Attending: INTERNAL MEDICINE
Payer: MEDICARE

## 2025-02-27 PROCEDURE — 97112 NEUROMUSCULAR REEDUCATION: CPT

## 2025-02-27 PROCEDURE — 97110 THERAPEUTIC EXERCISES: CPT

## 2025-02-27 NOTE — PROGRESS NOTES
Insurance Primary/Secondary: [employer] MCR / N/A     # Auth Visits: [id number]          Diagnosis:    Gait instability       Referring Provider: Jeff Anthony D'Amico  Date of Evaluation:    2/6/2025    Precautions:  fall risk   PMH: DM Type II , T TKR 3/2024 History of L hip ligament tear and pain. Next MD visit:   none scheduled  Date of Surgery: n/a   FES Score  Score: (Patient-Rptd) 35.94 % (2/6/2025  2:51 PM)    From initial eval: . The results of the objective tests and measures show decreased single leg stance, weakness at L hip abductors and R hip flexors and DGI and 30 SSTS score that puts patient at increased risk to fall. .  Functional deficits include but are not limited to sit to stand from chair, getting out of a car, walking on levels and stairs    Subjective: Feels her single leg stance is better. Feels  her balance is improved overall.   Not able to put her shoes on standing up anymore.   Doing better  getting out of the car , lifting her L leg.   Belongs to  with [id number] but hasn't been back since her knee surgery.     Pain: 0/10      Objective:    SLS R= 4 sec   L= 7 sec 2/14/25                               R= 12 sec L= 15 sec 2/20/2025                                 R/L= 20 secs    2/27/2025       Assessment:  SLS is  improved bilaterally. Patient is challenged by by  modifications in stance ( narrower KERVIN)  with head turns R/L . R hip flexor weakness limits transfers into car but improving.       Goals: (to be met in 10 visits)    Pt will demonstrate improved SLS to > 10 seconds NAREN to promote safety and decrease risk of falls on uneven surfaces such as grass  MET   Improve LE strength 1 grade to allow patient to transfer sit to stand smoothly without LE support   MET   Improve L hip abd strength 1 grade to allow patient to transfer in and out of car safely   Pt will perform 4-Item DGI with score of 10/12 or greater with least restrictive AD to demonstrate ability to ambulate safely in crowded  and busy environments  Pt will be able to squat to  light objects around the house without loss of stability  Pt will improve functional hip strength to demonstrate ability to ascend/descend 1 flight of stairs reciprocally with use of handrail    PROGRESS needs 2 railings   Pt will be independent and compliant with comprehensive HEP to maintain progress achieved in PT  Improve LE strength to allow patient to complete sit to stand  x5 w/out UE support   MET    Plan: Cont balance and gait training, LE strengthening   Cont with braiding, static and dynamic with C rot R/L  Cont 3/4 and 3/6. Updated FES  Date: 2/14/2025  TX#: 2/6 Date:      2/20/2025            TX#: 3/6 Date:    2/27/2025              TX#: 4/6 Date:                 TX#: 5/ Date:   Tx#: 6/   Nustep st 7 L5  5 mins   Decr from L5 to L3 due to R knee pain    While instructing on transfer in and out of car   - Walk 1/10 of mile cueing for environmental barriers Nustep 5 mins L4 ( no knee pain )      Progressed to 7 mins on Nustep , L4 , no pain   Shuttle:   DLPL5: x20  L4  x 20      Sit to stand x 4 w/out UE support  Sit to stand 2x 5  Sit to stand x8     - seated march x10 R/L  Seated march x15 R/L Seated march x10 R/L  - Seated fig 4  stretch R/L ( difficulty R- needs UE for assist     Neuroreed    15 mins   SLS  R/L 4 mins   - balance board: A/P and hold level  - side step R/L 10' x5  - standing march w/ and w/out UE support   X15 R/L  - SLS with c rot R/L x10  Neuroreed    20 mins   SLS  R/L 4 mins   - balance board: A/P  m/L and hold level  - standing march with with UE support , cued for decreased jessica   - tandem stance with C rot R/L   Narrow KERVIN with C rot R/L   - braiding R/L 10 x4 Neuroreed    20 mins   SLS  R/L 4 mins   - balance board: A/P  m/L and hold level  - standing march with with UE support , cued for decreased jessica   - modified tandem stance with C rot R/L   Narrow KERVIN with C rot R/L   Balance board: a/p     Supine march x15  R/L  Supine hip abd x30 with RTB   - hip add with ball x20  Supine march x15 R/L  Supine hip abd x30 with  progression to GTB   - hip add with ball x30  Supine march x 20 R/L  Supine hip abd x30 with  progression to GTB   - hip add with ball x30      HEP: SLS x3 R/L, standing march x10 R/L , hip abd with RTB 2x10   2/14/2025   sit to stand x5 , supine march x15 R/L     Charges:  NMR, 20  mins  EX2 (25)       Total Timed Treatment: 45 min  Total Treatment Time: 45 min

## 2025-03-04 ENCOUNTER — NURSE ONLY (OUTPATIENT)
Dept: INTERNAL MEDICINE CLINIC | Facility: CLINIC | Age: 85
End: 2025-03-04

## 2025-03-04 ENCOUNTER — OFFICE VISIT (OUTPATIENT)
Dept: PHYSICAL THERAPY | Age: 85
End: 2025-03-04
Attending: INTERNAL MEDICINE
Payer: MEDICARE

## 2025-03-04 DIAGNOSIS — E53.8 B12 DEFICIENCY: Primary | ICD-10-CM

## 2025-03-04 PROCEDURE — 96372 THER/PROPH/DIAG INJ SC/IM: CPT | Performed by: INTERNAL MEDICINE

## 2025-03-04 PROCEDURE — 97110 THERAPEUTIC EXERCISES: CPT

## 2025-03-04 PROCEDURE — 97112 NEUROMUSCULAR REEDUCATION: CPT

## 2025-03-04 RX ADMIN — CYANOCOBALAMIN 1000 MCG: 1000 INJECTION, SOLUTION INTRAMUSCULAR; SUBCUTANEOUS at 11:10:00

## 2025-03-04 NOTE — PROGRESS NOTES
Insurance Primary/Secondary: [employer] MCR / N/A     # Auth Visits: [id number]          Diagnosis:    Gait instability       Referring Provider: Jeff Anthony D'Amico  Date of Evaluation:    2/6/2025    Precautions:  fall risk   PMH: DM Type II , T TKR 3/2024 History of L hip ligament tear and pain. Next MD visit:   none scheduled  Date of Surgery: n/a   FES Score  Score: (Patient-Rptd) 35.94 % (2/6/2025  2:51 PM)  Post FES Score  Post Score: 35.94 % (3/4/2025  1:24 PM)    Score and level of concern (post): 23 - medium concern. (3/4/2025  1:24 PM)    0 % improvement         From initial eval: . The results of the objective tests and measures show decreased single leg stance, weakness at L hip abductors and R hip flexors and DGI and 30 SSTS score that puts patient at increased risk to fall. .  Functional deficits include but are not limited to sit to stand from chair, getting out of a car, walking on levels and stairs    Subjective:  No near falls or loss of balance.  Feels her single leg stance is better. Feels  her balance is improved overall.   Not able to put her shoes on standing up w/out difficulty.   Doing better  getting out of the car , lifting her L leg.   Belongs to  with [employer] but hasn't been back since her knee surgery.   Still concerned about doing the 2 stairs getting out of her home but notes she feels more confidence walking.     Pain: 0/10      Objective:    SLS R= 4 sec   L= 7 sec 2/14/25                               R= 12 sec L= 15 sec 2/20/2025                                 R/L= 20 secs    2/27/2025       Assessment:  SLS is  improved bilaterally. Patient is challenged by by  modifications in stance ( narrower KERVIN)  with head turns R/L . R hip flexor weakness limits transfers into car but improving. Demonstrates ability to ascend and descend stairs with UE support but prefers to go down one step at a time.       Goals: (to be met in 10 visits)    Pt will demonstrate improved SLS to > 10 seconds  NAREN to promote safety and decrease risk of falls on uneven surfaces such as grass  MET   Improve LE strength 1 grade to allow patient to transfer sit to stand smoothly without LE support   MET   Improve L hip abd strength 1 grade to allow patient to transfer in and out of car safely  IN PROCESS   Pt will perform 4-Item DGI with score of 10/12 or greater with least restrictive AD to demonstrate ability to ambulate safely in crowded and busy environments  Pt will be able to squat to  light objects around the house without loss of stability  Pt will improve functional hip strength to demonstrate ability to ascend/descend 1 flight of stairs reciprocally with use of handrail    PROGRESS needs 2 railings   Pt will be independent and compliant with comprehensive HEP to maintain progress achieved in PT  Improve LE strength to allow patient to complete sit to stand  x5 w/out UE support   MET    Plan: Cont balance and gait training, LE strengthening   Cont with braiding, static and dynamic with C rot R/L  Cont  3/6 before discharge . Updated FES  Date: 2/14/2025  TX#: 2/6 Date:      2/20/2025            TX#: 3/6 Date:    2/27/2025              TX#: 4/6 Date:  3/4/2025                TX#: 5/6 Date:   Tx#: 6/   Nustep st 7 L5  5 mins   Decr from L5 to L3 due to R knee pain    While instructing on transfer in and out of car   - Walk 1/10 of mile cueing for environmental barriers Nustep 5 mins L4 ( no knee pain )      Progressed to 7 mins on Nustep , L4 , no pain   Shuttle:   DLPL5: x20  L4  x 20  Nustep: 8 mins w/u  - Encouraged return to [employer].   - updated FESI    Sit to stand 2x5     Sit to stand x 4 w/out UE support  Sit to stand 2x 5  Sit to stand x8 Walked 1/10 of mile, practicing environmental barriers   - practiced up and down 3 steps with UE support , reciprocating    - seated march x10 R/L  Seated march x15 R/L Seated march x10 R/L  - Seated fig 4  stretch R/L ( difficulty R- needs UE for assist Seated  march x20 R/L  - Seated fig 4  stretch R/L w/ strap 30 sec x3     Neuroreed    15 mins   SLS  R/L 4 mins   - balance board: A/P and hold level  - side step R/L 10' x5  - standing march w/ and w/out UE support   X15 R/L  - SLS with c rot R/L x10  Neuroreed    20 mins   SLS  R/L 4 mins   - balance board: A/P  m/L and hold level  - standing march with with UE support , cued for decreased jessica   - tandem stance with C rot R/L   Narrow KERVIN with C rot R/L   - braiding R/L 10 x4 Neuroreed    20 mins   SLS  R/L 4 mins   - balance board: A/P  m/L and hold level  - standing march with with UE support , cued for decreased jessica   - modified tandem stance with C rot R/L   Narrow KERVIN with C rot R/L   Balance board: a/p Neuroreed    20 mins   SLS  R/L   4 mins   - balance board: A/P  m/L and hold level  - standing march with with UE support , cued for decreased jessica   - modified tandem stance with C rot R/L   Narrow KERVIN with C rot R/L   - tandem walking 10' x4     Supine march x15 R/L  Supine hip abd x30 with RTB   - hip add with ball x20  Supine march x15 R/L  Supine hip abd x30 with  progression to GTB   - hip add with ball x30  Supine march x 20 R/L  Supine hip abd x30 with  progression to GTB   - hip add with ball x30  Seated march x20 R/L  - seated KE with 2# wts x15 R/L     HEP: SLS x3 R/L, standing march x10 R/L , hip abd with RTB 2x10   2/14/2025   sit to stand x5 , supine march x15 R/L     Charges:  NMR, 20  mins  EX2 (25)       Total Timed Treatment: 45 min  Total Treatment Time: 45 min

## 2025-03-06 ENCOUNTER — OFFICE VISIT (OUTPATIENT)
Dept: PHYSICAL THERAPY | Age: 85
End: 2025-03-06
Attending: INTERNAL MEDICINE
Payer: MEDICARE

## 2025-03-06 PROCEDURE — 97112 NEUROMUSCULAR REEDUCATION: CPT

## 2025-03-07 NOTE — PROGRESS NOTES
Insurance Primary/Secondary: [employer] MCR / N/A     # Auth Visits: [id number]          Diagnosis:    Gait instability       Referring Provider: Jeff Anthony D'Amico  Date of Evaluation:    2/6/2025    Precautions:  fall risk   PMH: DM Type II , T TKR 3/2024 History of L hip ligament tear and pain. Next MD visit:   none scheduled  Date of Surgery: n/a   '  DISCHARGE SUMMARY    FES Score  Score: (Patient-Rptd) 35.94 % (2/6/2025  2:51 PM)  Post FES Score  Post Score: 35.94 % (3/4/2025  1:24 PM)    Score and level of concern (post): 23 - medium concern. (3/4/2025  1:24 PM)          Subjective:  No near falls or loss of balance.  Feels her single leg stance is better. Feels  her balance is improved overall.   Not able to put her shoes on standing up w/out difficulty.   Doing better  getting out of the car , lifting her L leg.   Belongs to  with [employer] but hasn't been back since her knee surgery.   Still concerned about doing the 2 stairs getting out of her home but notes she feels more confidence walking.     Pain: 0/10      Objective:    SLS R= 4 sec   L= 7 sec 2/14/25                               R= 12 sec L= 15 sec 2/20/2025                                 R/L= 20 secs    2/27/2025     30 SSTS= 11 reps      Assessment:  SLS is  improved bilaterally. Patient is challenged by by  modifications in stance ( narrower KERVIN)  with head turns R/L . R hip flexor weakness limits transfers into car but improving. Demonstrates ability to ascend and descend stairs with UE support but prefers to go down one step at a time.  Patient is IND with HEP and goals have been met      Goals: (to be met in 10 visits)    Pt will demonstrate improved SLS to > 10 seconds NAREN to promote safety and decrease risk of falls on uneven surfaces such as grass  MET   Improve LE strength 1 grade to allow patient to transfer sit to stand smoothly without LE support   MET   Improve L hip abd strength 1 grade to allow patient to transfer in and out of car  safely  MET  Pt will perform 4-Item DGI with score of 10/12 or greater with least restrictive AD to demonstrate ability to ambulate safely in crowded and busy environments MET  Pt will be able to squat to  light objects around the house without loss of stability   MET  Pt will improve functional hip strength to demonstrate ability to ascend/descend 1 flight of stairs reciprocally with use of handrail    PROGRESS needs 2 railings   Pt will be independent and compliant with comprehensive HEP to maintain progress achieved in PT  MET  Improve LE strength to allow patient to complete sit to stand  x5 w/out UE support   MET    Plan: Discharge PT   Date: 2/14/2025  TX#: 2/6 Date:      2/20/2025            TX#: 3/6 Date:    2/27/2025              TX#: 4/6 Date:  3/4/2025                TX#: 5/6 Date: 3/6/2025   Tx#: 6/6   Nustep st 7 L5  5 mins   Decr from L5 to L3 due to R knee pain    While instructing on transfer in and out of car   - Walk 1/10 of mile cueing for environmental barriers Nustep 5 mins L4 ( no knee pain )      Progressed to 7 mins on Nustep , L4 , no pain   Shuttle:   DLPL5: x20  L4  x 20  Nustep: 8 mins w/u  - Encouraged return to silver [employer].   - updated FESI    Sit to stand 2x5     Sit to stand x 4 w/out UE support  Sit to stand 2x 5  Sit to stand x8 Walked 1/10 of mile, practicing environmental barriers   - practiced up and down 3 steps with UE support , reciprocating Walked 1/10 of mile, practicing environmental scan, varied pace   - practiced up and down 3 steps x2  with UE support , reciprocating   - seated march x10 R/L  Seated march x15 R/L Seated march x10 R/L  - Seated fig 4  stretch R/L ( difficulty R- needs UE for assist Seated march x20 R/L  - Seated fig 4  stretch R/L w/ strap 30 sec x3   Side step in Pbs, braiding in Pbs, UE support  -  Alt foot tap on cones x10 R/L on and off airex    Neuroreed    15 mins   SLS  R/L 4 mins   - balance board: A/P and hold level  - side step R/L  10' x5  - standing march w/ and w/out UE support   X15 R/L  - SLS with c rot R/L x10  Neuroreed    20 mins   SLS  R/L 4 mins   - balance board: A/P  m/L and hold level  - standing march with with UE support , cued for decreased jessica   - tandem stance with C rot R/L   Narrow KERVIN with C rot R/L   - braiding R/L 10 x4 Neuroreed    20 mins   SLS  R/L 4 mins   - balance board: A/P  m/L and hold level  - standing march with with UE support , cued for decreased jessica   - modified tandem stance with C rot R/L   Narrow KERVIN with C rot R/L   Balance board: a/p Neuroreed    20 mins   SLS  R/L   4 mins   - balance board: A/P  m/L and hold level  - standing march with with UE support , cued for decreased jessica   - modified tandem stance with C rot R/L   Narrow KERVIN with C rot R/L   - tandem walking 10' x4     SLS  R/L   4 mins   - balance board: A/P  m/L and hold level  - standing march with with UE support , cued for decreased jessica   - modified tandem stance with C rot R/L   Narrow KERVIN with C rot R/L   - tandem walking 10' x4    Supine march x15 R/L  Supine hip abd x30 with RTB   - hip add with ball x20  Supine march x15 R/L  Supine hip abd x30 with  progression to GTB   - hip add with ball x30  Supine march x 20 R/L  Supine hip abd x30 with  progression to GTB   - hip add with ball x30  Seated march x20 R/L  - seated KE with 2# wts x15 R/L     HEP: SLS x3 R/L, standing march x10 R/L , hip abd with RTB 2x10   2/14/2025   sit to stand x5 , supine march x15 R/L     Charges:  NMR 3       Total Timed Treatment: 45 min  Total Treatment Time: 45 min

## 2025-04-07 ENCOUNTER — HOSPITAL ENCOUNTER (INPATIENT)
Facility: HOSPITAL | Age: 85
LOS: 1 days | Discharge: HOME | End: 2025-04-09
Attending: EMERGENCY MEDICINE | Admitting: HOSPITALIST
Payer: MEDICARE

## 2025-04-07 ENCOUNTER — APPOINTMENT (OUTPATIENT)
Dept: GENERAL RADIOLOGY | Facility: HOSPITAL | Age: 85
End: 2025-04-07
Attending: EMERGENCY MEDICINE
Payer: MEDICARE

## 2025-04-07 DIAGNOSIS — R07.89 CHEST HEAVINESS: Primary | ICD-10-CM

## 2025-04-07 DIAGNOSIS — R42 DIZZINESS: ICD-10-CM

## 2025-04-07 PROBLEM — I20.0 UNSTABLE ANGINA (HCC): Status: ACTIVE | Noted: 2025-04-07

## 2025-04-07 LAB
ANION GAP SERPL CALC-SCNC: 11 MMOL/L (ref 0–18)
ATRIAL RATE: 77 BPM
BASOPHILS # BLD AUTO: 0.02 X10(3) UL (ref 0–0.2)
BASOPHILS NFR BLD AUTO: 0.2 %
BUN BLD-MCNC: 18 MG/DL (ref 9–23)
BUN/CREAT SERPL: 21.7 (ref 10–20)
CALCIUM BLD-MCNC: 10.1 MG/DL (ref 8.7–10.4)
CHLORIDE SERPL-SCNC: 103 MMOL/L (ref 98–112)
CO2 SERPL-SCNC: 23 MMOL/L (ref 21–32)
CREAT BLD-MCNC: 0.83 MG/DL
DEPRECATED RDW RBC AUTO: 48.6 FL (ref 35.1–46.3)
EGFRCR SERPLBLD CKD-EPI 2021: 69 ML/MIN/1.73M2 (ref 60–?)
EOSINOPHIL # BLD AUTO: 0.07 X10(3) UL (ref 0–0.7)
EOSINOPHIL NFR BLD AUTO: 0.6 %
ERYTHROCYTE [DISTWIDTH] IN BLOOD BY AUTOMATED COUNT: 14.5 % (ref 11–15)
EST. AVERAGE GLUCOSE BLD GHB EST-MCNC: 206 MG/DL (ref 68–126)
GLUCOSE BLD-MCNC: 187 MG/DL (ref 70–99)
GLUCOSE BLDC GLUCOMTR-MCNC: 139 MG/DL (ref 70–99)
GLUCOSE BLDC GLUCOMTR-MCNC: 186 MG/DL (ref 70–99)
HBA1C MFR BLD: 8.8 % (ref ?–5.7)
HCT VFR BLD AUTO: 42 %
HGB BLD-MCNC: 14.1 G/DL
IMM GRANULOCYTES # BLD AUTO: 0.08 X10(3) UL (ref 0–1)
IMM GRANULOCYTES NFR BLD: 0.7 %
LYMPHOCYTES # BLD AUTO: 2.41 X10(3) UL (ref 1–4)
LYMPHOCYTES NFR BLD AUTO: 21.3 %
MCH RBC QN AUTO: 30.7 PG (ref 26–34)
MCHC RBC AUTO-ENTMCNC: 33.6 G/DL (ref 31–37)
MCV RBC AUTO: 91.5 FL
MONOCYTES # BLD AUTO: 0.82 X10(3) UL (ref 0.1–1)
MONOCYTES NFR BLD AUTO: 7.3 %
NEUTROPHILS # BLD AUTO: 7.91 X10 (3) UL (ref 1.5–7.7)
NEUTROPHILS # BLD AUTO: 7.91 X10(3) UL (ref 1.5–7.7)
NEUTROPHILS NFR BLD AUTO: 69.9 %
OSMOLALITY SERPL CALC.SUM OF ELEC: 291 MOSM/KG (ref 275–295)
P AXIS: -15 DEGREES
P-R INTERVAL: 162 MS
PLATELET # BLD AUTO: 285 10(3)UL (ref 150–450)
POTASSIUM SERPL-SCNC: 3.9 MMOL/L (ref 3.5–5.1)
Q-T INTERVAL: 372 MS
QRS DURATION: 76 MS
QTC CALCULATION (BEZET): 420 MS
R AXIS: -23 DEGREES
RBC # BLD AUTO: 4.59 X10(6)UL
SODIUM SERPL-SCNC: 137 MMOL/L (ref 136–145)
T AXIS: 35 DEGREES
TROPONIN I SERPL HS-MCNC: 5 NG/L
TROPONIN I SERPL HS-MCNC: 8 NG/L
TROPONIN I SERPL HS-MCNC: 8 NG/L
TROPONIN I SERPL HS-MCNC: 9 NG/L
VENTRICULAR RATE: 77 BPM
WBC # BLD AUTO: 11.3 X10(3) UL (ref 4–11)

## 2025-04-07 PROCEDURE — 99223 1ST HOSP IP/OBS HIGH 75: CPT | Performed by: HOSPITALIST

## 2025-04-07 PROCEDURE — 71045 X-RAY EXAM CHEST 1 VIEW: CPT | Performed by: EMERGENCY MEDICINE

## 2025-04-07 RX ORDER — NITROGLYCERIN 0.4 MG/1
0.4 TABLET SUBLINGUAL EVERY 5 MIN PRN
Status: DISCONTINUED | OUTPATIENT
Start: 2025-04-07 | End: 2025-04-09

## 2025-04-07 RX ORDER — NICOTINE POLACRILEX 4 MG
30 LOZENGE BUCCAL
Status: DISCONTINUED | OUTPATIENT
Start: 2025-04-07 | End: 2025-04-09

## 2025-04-07 RX ORDER — DOCUSATE SODIUM 100 MG/1
100 CAPSULE, LIQUID FILLED ORAL 2 TIMES DAILY
Status: DISCONTINUED | OUTPATIENT
Start: 2025-04-07 | End: 2025-04-09

## 2025-04-07 RX ORDER — VIBEGRON 75 MG/1
1 TABLET, FILM COATED ORAL NIGHTLY
COMMUNITY

## 2025-04-07 RX ORDER — LETROZOLE 2.5 MG/1
2.5 TABLET, FILM COATED ORAL DAILY
Status: DISCONTINUED | OUTPATIENT
Start: 2025-04-08 | End: 2025-04-09

## 2025-04-07 RX ORDER — DEXTROSE MONOHYDRATE 25 G/50ML
50 INJECTION, SOLUTION INTRAVENOUS
Status: DISCONTINUED | OUTPATIENT
Start: 2025-04-07 | End: 2025-04-09

## 2025-04-07 RX ORDER — METOCLOPRAMIDE HYDROCHLORIDE 5 MG/ML
5 INJECTION INTRAMUSCULAR; INTRAVENOUS EVERY 8 HOURS PRN
Status: DISCONTINUED | OUTPATIENT
Start: 2025-04-07 | End: 2025-04-09

## 2025-04-07 RX ORDER — NICOTINE POLACRILEX 4 MG
15 LOZENGE BUCCAL
Status: DISCONTINUED | OUTPATIENT
Start: 2025-04-07 | End: 2025-04-09

## 2025-04-07 RX ORDER — ACETAMINOPHEN 500 MG
500 TABLET ORAL EVERY 4 HOURS PRN
Status: DISCONTINUED | OUTPATIENT
Start: 2025-04-07 | End: 2025-04-09

## 2025-04-07 RX ORDER — DOCUSATE SODIUM 100 MG/1
100 CAPSULE, LIQUID FILLED ORAL 2 TIMES DAILY
COMMUNITY

## 2025-04-07 RX ORDER — ONDANSETRON 2 MG/ML
4 INJECTION INTRAMUSCULAR; INTRAVENOUS EVERY 6 HOURS PRN
Status: DISCONTINUED | OUTPATIENT
Start: 2025-04-07 | End: 2025-04-09

## 2025-04-07 RX ORDER — ASPIRIN 81 MG/1
81 TABLET ORAL NIGHTLY
Status: DISCONTINUED | OUTPATIENT
Start: 2025-04-07 | End: 2025-04-08

## 2025-04-07 RX ORDER — TEMAZEPAM 15 MG/1
15 CAPSULE ORAL NIGHTLY PRN
Status: DISCONTINUED | OUTPATIENT
Start: 2025-04-07 | End: 2025-04-09

## 2025-04-07 RX ORDER — HEPARIN SODIUM 5000 [USP'U]/ML
5000 INJECTION, SOLUTION INTRAVENOUS; SUBCUTANEOUS EVERY 12 HOURS SCHEDULED
Status: DISCONTINUED | OUTPATIENT
Start: 2025-04-07 | End: 2025-04-09

## 2025-04-07 RX ORDER — ALPRAZOLAM 0.25 MG
0.25 TABLET ORAL 3 TIMES DAILY PRN
Status: DISCONTINUED | OUTPATIENT
Start: 2025-04-07 | End: 2025-04-09

## 2025-04-07 RX ORDER — ATORVASTATIN CALCIUM 40 MG/1
40 TABLET, FILM COATED ORAL NIGHTLY
Status: DISCONTINUED | OUTPATIENT
Start: 2025-04-07 | End: 2025-04-08

## 2025-04-07 NOTE — ED QUICK NOTES
Orders for admission, patient is aware of plan and ready to go upstairs. Any questions, please call ED RN patrick at extension [phone].     Patient Covid vaccination status: Fully vaccinated     COVID Test Ordered in ED: None    COVID Suspicion at Admission: N/A    Running Infusions:  None    Mental Status/LOC at time of transport: [id number]    Other pertinent information:   CIWA score: N/A   NIH score:  N/A

## 2025-04-07 NOTE — HISTORICAL OFFICE NOTE
Continuity of Care Document  11/18/2024  Bijal Perez - 84 y.o. Female; born [birth year]Summary of episode note, generated on Apr. 07, 2025April 07, 2025   CHIEF COMPLAINT    CHIEF COMPLAINT  Reason for Visit/Chief Complaint   f/u   84-year-old female presents for new patient evaluation, being followed for CAD status post PCI LAD-diagonal bifurcation June 2020, stress test June 2023 with normal perfusion, recent echocardiogram Dec 2023 unremarkable. PCI LAD July 2024, with improvement in TONG and exertional capacity.     PROBLEMS  Reconcile with Patient's ChartPROBLEMS  Problem Effective Dates Date resolved Problem Status   Left leg cellulitis, [SNOMED-CT: 42810673732278517] 11/18/2024 - Active   S/P drug eluting coronary stent placement, [SNOMED-CT: 763276667] 6/25/2020 - Active   Carotid artery stenosis, bilateral, [SNOMED-CT: 265695857] 6/25/2020 - Active   Type 2 diabetes mellitus with circulatory disorder, without long-term current use of insulin, [SNOMED-CT: 02968013] 6/25/2020 - Active   Abnormal EKG, [SNOMED-CT: 737172687] 6/17/2019 - Active   Coronary artery disease involving native coronary artery, [SNOMED-CT: 766240720] 6/11/2019 - Active   Hypertension (HTN), primary, [SNOMED-CT: 43492958] 6/11/2019 - Active   Pure hypercholesterolemia, [SNOMED-CT: 873351693] 6/11/2019 - Active     ENCOUNTER    ENCOUNTER  Problem Effective Dates Date resolved Problem Status   Left leg cellulitis, [SNOMED-CT: 51015009710990475] 11/18/2024 - Active   Cardiac Risk Assessment, pre-operative, [SNOMED-CT: 087845426] 3/12/2024 - Active   Type 2 diabetes mellitus with circulatory disorder, without long-term current use of insulin, [SNOMED-CT: 79769720] 6/25/2020 - Active   Abnormal EKG, [SNOMED-CT: 586443251] 6/17/2019 - Active   Coronary artery disease involving native coronary artery, [SNOMED-CT: 842581521] 6/11/2019 - Active   Hypertension (HTN), primary, [SNOMED-CT: 84101409] 6/11/2019 - Active   Pure  hypercholesterolemia, [SNOMED-CT: 737254020] 6/11/2019 - Active     VITAL SIGNS    VITAL SIGNS  Date / Time: 11/18/2024   BP Systolic 142 mmHg   BP Diastolic 62 mmHg   Height 65 inches   Weight 190 lbs   Pulse Rate 52 bpm   BSA (Body Surface Area) 2 cc/m2   BMI (Body Mass Index) 31.6 cc/m2   Blood Pressure 142 / 62 mmHg     PHYSICAL EXAMINATION    PHYSICAL EXAMINATION  Header Details   Constitutional 97%o2   Vitals Left Arm Sitting  / 62 mmHg, Pulse rate 52 bpm, Height in 5' 5\", BMI: 31.6, Weight in 189.6 lbs (or) 86 kgs, BSA : 2.02 cc/m²   General Appearance No Acute Distress, Well groomed   Cardiovascular      ALLERGIES, ADVERSE REACTIONS, ALERTS    ALLERGIES, ADVERSE REACTIONS, ALERTS  Type Substance Reaction Severity Status   Allergies Trulicity - Drug Class itch Mild Active     MEDICATIONS ADMINISTERED DURING VISIT    No data available    MEDICATIONS    MEDICATIONS  Medication Start Date Route/Frequency Status   ALPRAZolam (XANAX) 0.25 MG tablet, [RxNorm: 617023] 10/21/2021 as needed up to three times a day Active   aspirin (ASPIRIN 81) 81 MG EC tablet, [RxNorm: 753765] 10/21/2021 Take 81 mg by mouth daily. Active   atorvastatin 40 mg tablet, [RxNorm: 192056] 7/18/2024 Take 1 tablet orally once a day for 90 days. Active   Brilinta 90 mg tablet, [RxNorm: 0297081] 7/18/2024 Take 1 tablet orally 2 times a day. Active   clotrimazole-betamethasone (LOTRISONE) 1-0.05 % cream, [RxNorm: 270412] 10/21/2021 - Active   glimepiride (AMARYL) 4 MG tablet, [RxNorm: 202117] 10/21/2021 Take 4 mg by mouth 2 times daily. Active   Jardiance 25 mg tablet, [RxNorm: 0315603] 7/18/2024 Take 1 tablet orally once a day. Active   losartan 50 mg-hydrochlorothiazide 12.5 mg tablet, [RxNorm: 987912] 7/18/2024 Take 1 tablet orally once a day. Active   metformin (GLUCOPHAGE-XR) 500 MG 24 hr tablet, [RxNorm: 858467] 10/21/2021 2 twice daily Active   Multiple Vitamins-Minerals (Centrum Silver Ultra Womens) tablet, [RxNorm: 0] 10/21/2021  Take 1 tablet by mouth daily. Active   Ozempic, 0.25 or 0.5 MG/DOSE, 2 MG/1.5ML injection, [RxNorm: 6263095] 10/21/2021 Inject 0.5 mLs into the skin 1 day a week. Active   sitaGLIPtin (JANUVIA) 25 MG tablet, [RxNorm: 314318] 10/21/2021 Take 25 mg by mouth daily. Active   atorvastatin 40 mg tablet, [RxNorm: 253863] 11/18/2024 Take 1 tablet orally once a day for 90 days. Active   Jardiance 25 mg tablet, [RxNorm: 1614853] 11/18/2024 Take 1 tablet orally once a day. Active     ASSESSMENT    Coronary disease: Previous stenting of the diagonal and then LAD diagonal bifurcation in June 2020, PET stress June 2023 with normal perfusion. Worsening TONG, normal echo Dec 2023 unremarkable. s/p PCI LAD July 2024 and doing well.Murmur: Systolic murmur on exam, echo Dec 2023 unremarkable.Hypertension: Controlled on current regimen, no changesLeft leg cellulitis: slow healing wound and cellulitis after trauma, likely venous insufficiency related  -bilateral lower extremity venous reflux US  -bilateral compression socksHyperlipidemia: On high intensity statin for secondary prevention, no changes.Follow-up: Please give information regarding out portal access.  Clinic: 1-2 weeks after testing [facility]  Testing/intervention: vein USRecommend low sodium diet, daily exercise and maintain a normal BMI. Recommend monitor blood pressure at home.     FAMILY HISTORY    FAMILY HISTORY  Relationship Age Diagnosis   Father 0 Heart problem     GENERAL STATUS    No data available    PAST MEDICAL HISTORY    PAST MEDICAL HISTORY  Problem Date diagonsed Date resolved Status   Left leg cellulitis, [SNOMED-CT: 93541515564849848] 11/18/2024 - Active   S/P drug eluting coronary stent placement, [SNOMED-CT: 970652105] 6/25/2020 - Active   Carotid artery stenosis, bilateral, [SNOMED-CT: 920409633] 6/25/2020 - Active   Type 2 diabetes mellitus with circulatory disorder, without long-term current use of insulin, [SNOMED-CT: 44848270] 6/25/2020 - Active    Abnormal EKG, [SNOMED-CT: 343975163] 6/17/2019 - Active   Coronary artery disease involving native coronary artery, [SNOMED-CT: 908319746] 6/11/2019 - Active   Hypertension (HTN), primary, [SNOMED-CT: 41532519] 6/11/2019 - Active   Pure hypercholesterolemia, [SNOMED-CT: 439973130] 6/11/2019 - Active     HISTORY OF PRESENT ILLNESS    84-year-old female presents for new patient evaluation, being followed for CAD status post PCI LAD-diagonal bifurcation June 2020, stress test June 2023 with normal perfusion, recent echocardiogram Dec 2023 unremarkable. PCI LAD July 2024, with improvement in TONG and exertional capacity.     IMMUNIZATIONS    No data available    PLAN OF CARE    PLAN OF CARE  Planned Care Date   Venous (Lower Bilateral) duplex study to assess for venous insufficiency 1/1/1900   Follow up visit - Maday Ann APRN 1/1/1900     PROCEDURES    No data available    RESULTS    RESULTS  Name Result Date Location - Ordered By   POCT GLUCOSE [LOINC: 26051-1] 150 mg/dL [High] 08/09/2024 10:43:00 AM [facility] ([facility])  Address: [address] [place] [address]  [address]  tel:   WBC [LOINC: 6690-2] 8.9 x10(3) uL 07/03/2024 11:24:00 AM [facility] ([facility])  Address: [address] [place] [address]  [address]  tel:   RED BLOOD COUNT [LOINC: 789-8] 4.46 x10(6)uL 07/03/2024 11:24:00 AM [facility] ([facility])  Address: [address] [place] [address]  [address]  tel:   HGB [LOINC: 718-7] 13.4 g/dL 07/03/2024 11:24:00 AM [facility] ([facility])  Address: [address] [place] [address]  [address]  tel:   HCT [LOINC: 4544-3] 41.8 % 07/03/2024 11:24:00 AM [facility] ([facility])  Address: [address] [place] [address]  [address]  tel:   PLATELETS [LOINC: 777-3] 278.0 103uL 07/03/2024 11:24:00 AM [facility] ([facility])  Address: [address] [place] [address]  [facility]  [address]  tel:   MEAN CELL VOLUME [LOINC: 787-2] 93.7 fL 07/03/2024 11:24:00 AM [facility] ([facility])  Address: [address] [place] [address]  [address]  tel:   MEAN CORPUSCULAR HEMOGLOBIN [LOINC: 785-6] 30.0 pg 07/03/2024 11:24:00 AM [facility] ([facility])  Address: [address] [place] [address]  [address]  tel:   MEAN CORPUSCULAR HGB CONC [LOINC: 786-4] 32.1 g/dL 07/03/2024 11:24:00 AM [facility] ([facility])  Address: [address] [place] [address]  [address]  tel:   RED CELL DISTRIBUTION WIDTH CV [LOINC: 788-0] 13.8 % 07/03/2024 11:24:00 AM [facility] ([facility])  Address: [address] [place] [address]  [address]  tel:   NEUTROPHIL ABS PRELIM [LOINC: 751-8] 5.30 x10 (3) uL 07/03/2024 11:24:00 AM [facility] ([facility])  Address: [address] [place] [address]  [address]  tel:   NEUTROPHIL ABSOLUTE [LOINC: 751-8] 5.30 x10(3) uL 07/03/2024 11:24:00 AM [facility] ([facility])  Address: [address] [place] [address]  [address]  tel:   LYMPHOCYTE ABSOLUTE [LOINC: 731-0] 2.68 x10(3) uL 07/03/2024 11:24:00 AM [facility] ([facility])  Address: [address] [place] [address]  [address]  tel:   MONOCYTE ABSOLUTE [LOINC: 742-7] 0.73 x10(3) uL 07/03/2024 11:24:00 AM [facility] ([facility])  Address: [address] [place] [address]  [address]  tel:   EOSINOPHIL ABSOLUTE [LOINC: 711-2] 0.10 x10(3) uL 07/03/2024 11:24:00 AM [facility] ([facility])  Address: [address] [place] [address]  [address]  tel:   BASOPHIL ABSOLUTE [LOINC: 704-7] 0.03 x10(3) uL 07/03/2024 11:24:00 AM [facility] ([facility])  Address: [address] [place] [address]  [address]  tel:   IMMATURE GRANULOCYTE COUNT [LOINC: 09512-2] 0.04 x10(3) uL 07/03/2024 11:24:00 AM [facility]  ([facility])  Address: [address] [place] [address]  [address]  tel:   NEUTROPHIL % [LOINC: 770-8] 59.7 % 07/03/2024 11:24:00 AM [facility] ([facility])  Address: [address] [place] [address]  tel:   LYMPHOCYTE % [LOINC: 736-9] 30.2 % 07/03/2024 11:24:00 AM [facility] ([facility])  Address: [address] [place] [address]  tel:   MONOCYTE % [LOINC: 5905-5] 8.2 % 07/03/2024 11:24:00 AM [facility] ([facility])  Address: [address] [place] [address]  tel:   EOSINOPHIL % [LOINC: 713-8] 1.1 % 07/03/2024 11:24:00 AM [facility] ([facility])  Address: [address] [place] [address]  tel:   BASOPHIL % [LOINC: 706-2] 0.3 % 07/03/2024 11:24:00 AM [facility] ([facility])  Address: [address] [place] [address]  tel:   IMMATURE GRANULOCYTE RATIO % [LOINC: 87818-2] 0.5 % 07/03/2024 11:24:00 AM [facility] ([facility])  Address: [address] [place] [address]  tel:   GLUCOSE [LOINC: 2339-0] 160 mg/dL [High] 07/03/2024 11:24:00 AM [facility] ([facility])  Address: [address] [place] [address]  tel:   SODIUM [LOINC: 2951-2] 141 mmol/L 07/03/2024 11:24:00 AM [facility] ([facility])  Address: [address] [place] [address]  tel:   POTASSIUM [LOINC: 2823-3] 3.5 mmol/L 07/03/2024 11:24:00 AM [facility] ([facility])  Address: [address] [place] [address]  [address]  tel:   CHLORIDE [LOINC: 2075-0] 104 mmol/L 07/03/2024 11:24:00 AM [facility] ([facility])  Address: [address] [place] [address]  [address]  tel:   CO2 [LOINC: 2028-9] 26.0 mmol/L 07/03/2024 11:24:00 AM [facility] ([facility])  Address: [address] [place] [address]  [address]  tel:   ANION GAP [LOINC:  33037-3] 11 mmol/L 07/03/2024 11:24:00 AM [facility] ([facility])  Address: [address] [place] [address]  tel:   BUN [LOINC: 6299-2] 15 mg/dL 07/03/2024 11:24:00 AM [facility] ([facility])  Address: [address] [place] [address]  tel:   CREATININE [LOINC: 88569-7] 0.76 mg/dL 07/03/2024 11:24:00 AM [facility] ([facility])  Address: [address] [place] [address]  tel:   CALCIUM [LOINC: 05076-1] 9.5 mg/dL 07/03/2024 11:24:00 AM [facility] ([facility])  Address: [address] [place] [address]  tel:   OSMOLALITY CALCULATED [LOINC: 90893-4] 296 mOsm/kg [High] 07/03/2024 11:24:00 AM [facility] ([facility])  Address: [address] [place] [address]  tel:   E GFR CR [LOINC: 35336-3] 78 mL/min/1.73m2 07/03/2024 11:24:00 AM [facility] ([facility])  Address: [address] [place] [address]  tel:   FASTING PATIENT BMP ANSWER [LOINC: 65130-4] No 07/03/2024 11:24:00 AM [facility] ([facility])  Address: [address] [place] [address]  tel:   Nuclear PET 1.Stress EKG is normal. 2.Pt denied chest pain.3.Rare PVC's.1.This is probably an abnormal perfusion study. Moderate LCA calcium.2.This scan is suggestive of moderate risk for future cardiovascular events. 3.Small reversible perfusion abnormality of moderate intensity in the apical segment. Medium reversible perfusion abnormality of moderate intensity in the inferior region. 4.The left ventricular cavity is noted to be normal on the stress studies. The stress left ventricular ejection fraction was calculated to be 93% and left ventricular global function is hyperkinetic. This finding may be secondary to small ventricular size. The rest left ventricular cavity is noted to be normal. The rest left ventricular ejection fraction was calculated  to be 90% and rest left ventricular global function is hyperkinetic. 5.When compared to the resting ejection fraction (90%), the stress ejection fraction (93%) has increased. 6.The study quality is excellent. 6/17/2024 9:30:00 AM Silvino Bray MD   Trans Thoracic Echocardiogram 1.The study quality is average. 2.The left ventricle is normal in size, wall thickness, and global systolic function. The left ventricular ejection fraction is 66%. The left ventricle diastolic function is impaired (Grade I) with normal left atrial pressure. No regional wall motion abnormalities noted.3.The right ventricle is normal in size. Right ventricular systolic function is normal.4.No significant valvular regurgitation or stenosis noted. 12/26/2023 12:30:00 PM Silvino Bray MD     REVIEW OF SYSTEMS    REVIEW OF SYSTEMS  Header Details   Cardiovascular No history of Chest pain, TONG, Palpitations, Syncope, PND, Orthopnea, Edema, Claudication   Respiratory No history of SOB, Wheezing, Sputum   Hem/Lymphatic No history of Easy bruising, Blood clots, Hx of blood transfusion, Anemia, Bleeding problems     SOCIAL HISTORY    SOCIAL HISTORY  Social History Element Description Effective Dates   Smoking status Never smoked -     FUNCTIONAL STATUS    No data available    INSTRUCTIONS    INSTRUCTIONS  NAME TYPE DATE   Recommend low sodium diet, daily exercise and maintain a normal BMI. Recommend monitor blood pressure at home. Goals 11/18/2024     MEDICAL EQUIPMENT    No data available    Goals Sections    No data available    REASON FOR REFERRAL               Health Concerns Section    No data available    COGNITIVE/MENTAL STATUS    No data available    Patient Demographics    Patient Demographics  Patient Address Patient Name Communication   [address] [place] [address] Bijal Perez [phone] (Home)     Patient Demographics  Language Race / Ethnicity Marital Status   English - Spoken (Preferred) White / Not  or        Document Information    Primary Care Provider Other Service Providers Document Coverage Dates   Silvino Bray  NPI: [id number]  [phone] (Work)  [address], [address]  [place] [address]  [place] [address]  Interpreting Physicians  [facility]  [phone] (Work)  [address] [place] [address] Katherine Pope  NPI: [id number]  [phone] (Work)  [address], [address]  [place] [address]  [place] [address]  Nurses     Zara Neri  NPI: [id number]  [phone] (Work)  [address], [address]  [place] [address]  [place] [address]  Nurses     Scarlett Cooper  NPI: [id number]  [phone] (Work)  [address], [address]  [place] [address]  [place] [address]  Nurses Nov. 18, 2024November 18, 2024      Organization   [facility]  [phone] (Work)  [address], [address]  [place] [address]  [place] [address]     Encounter Providers Encounter Date    Nov. 18, 2024November 18, 2024     Legal Authenticator    Silvino Bray  NPI: [id number]  [phone] (Work)  [address], [address]  [place] [address]  [place] [address]

## 2025-04-07 NOTE — ED INITIAL ASSESSMENT (HPI)
[facility] To ED with c/o chest heaviness for the past 4 days. Patient states heaviness comes when she bends over and when she is done eating. Denies shortness of breath.   (Oral)   Resp (!) 23   Ht 5' 10.08\" (1.78 m)   Wt 135.4 kg (298 lb 9.6 oz)   SpO2 95%   BMI 42.75 kg/m²       I/O's    Intake/Output Summary (Last 24 hours) at 11/22/2020 1349  Last data filed at 11/22/2020 1100  Gross per 24 hour   Intake 760 ml   Output 1675 ml   Net -915 ml     Physical Exam  Constitutional:       Appearance: Normal appearance.      Comments: BiPAP   Cardiovascular:      Rate and Rhythm: Normal rate and regular rhythm.      Pulses: Normal pulses.      Heart sounds: Normal heart sounds. No murmur. No friction rub. No gallop.    Pulmonary:      Breath sounds: Normal breath sounds. No wheezing, rhonchi or rales.   Abdominal:      General: Abdomen is flat. Bowel sounds are normal. There is no distension.      Palpations: Abdomen is soft.      Tenderness: There is no abdominal tenderness. There is no guarding.   Musculoskeletal: Normal range of motion.         General: No tenderness or deformity.   Skin:     General: Skin is warm and dry.      Capillary Refill: Capillary refill takes less than 2 seconds.      Findings: No rash.   Neurological:      General: No focal deficit present.      Mental Status: He is alert and oriented to person, place, and time.      Sensory: No sensory deficit.      Motor: No weakness.   Psychiatric:         Mood and Affect: Mood normal.         Behavior: Behavior normal.         Thought Content: Thought content normal.         Judgment: Judgment normal.         Labs     Recent Results (from the past 24 hour(s))   Metered blood glucose    Collection Time: 11/21/20  4:53 PM   Result Value Ref Range    Glucose Bedside  (H) 70 - 99 mg/dL   Arterial Blood Gas with Cooximetry    Collection Time: 11/22/20  5:20 AM   Result Value Ref Range    PH RC Arterial 7.44 7.35 - 7.45 Units    PCO2 RC Arterial 34 (L) 35 - 48 mm Hg    PO2 RC Arterial 87 83 - 108 mm Hg    HCO3 RC Arterial 23 22 - 28 mmol/L    Base Deficit RC Arterial 0 0 - 2 mmol/L    O2 SAT RC Arterial 97 95 - 99 %     Intensivist  ONE TIME     Provider:  Alexx Wilson MD    11/14/20 1508                Primary Care Physician:  MD CHITRA Escobar MD  [facility] Hospitalist

## 2025-04-07 NOTE — H&P
[facility]    PATIENT'S NAME: ZAKIA CARDOZA   ATTENDING PHYSICIAN: Gonzalo Carrillo MD   PATIENT ACCOUNT#:   [id number]    LOCATION:  [address]  MEDICAL RECORD #:   [id number]       YOB: 1940  ADMISSION DATE:       04/07/2025    HISTORY AND PHYSICAL EXAMINATION    CHIEF COMPLAINT:  Chest pain, possible unstable angina.    HISTORY OF PRESENT ILLNESS:  Patient is an 84-year-old  female with multiple prior LAD PCI stents.  Came in today to the emergency department for evaluation of 3 to 4 days of intermittent midsternal chest tightness that comes with activity and goes away with rest.  CBC, chemistry, and troponin were unremarkable.  EKG showed normal sinus rhythm with no acute ST-T changes.  Chest x-ray still pending.  Patient reports multiple episodes of midsternal chest tightness that comes with activity and goes away with rest.  Lasts around 15 to 20 minutes at a time.  Last episode was yesterday, started around 5 p.m. and ended around 10 p.m. without activity.  Patient came in today for evaluation.  Currently chest pain-free.  CBC showed white blood cell count of 11.3 with left shift.  Chemistry and troponin were negative.     PAST MEDICAL HISTORY:  Coronary artery disease, status post multiple LAD PCI stents, latest in July 2024; hypertension; hyperlipidemia; diabetes mellitus type 2; generalized osteoarthritis; osteoporosis.    PAST SURGICAL HISTORY:  Right carpal tunnel release, cataract procedure, hysterectomy, left breast lumpectomy for breast cancer plus radiation therapy, tonsillectomy, and right total knee arthroplasty.    MEDICATIONS:  Please see medication reconciliation list.     ALLERGIES:  Januvia.    FAMILY HISTORY:  Mother had lupus.  Father had Parkinson disease and diabetes mellitus type 2.    SOCIAL HISTORY:  No tobacco, alcohol, or drug use.  Independent for basic activities of daily living.     REVIEW OF SYSTEMS:  As mentioned above, multiple  episodes of midsternal chest tightness.  Comes with activity.  Goes away with rest.  Yesterday, she had 2 to 3 hours of episode of midsternal chest tightness without radiation to her left shoulder and left jaw.      PHYSICAL EXAMINATION:    GENERAL:  Alert and oriented to time, place and person.  No acute distress.   VITAL SIGNS:  Temperature 98.1, pulse 81, respiratory rate 18, blood pressure 131/64, pulse ox 97% on room air.  HEENT:  Atraumatic.  Oropharynx clear.  Moist mucous membranes.  Ears and nose normal.  Eyes:  Anicteric sclerae.   NECK:  Supple.  No lymphadenopathy.  Trachea midline.  Full range of motion.   LUNGS:  Clear to auscultation bilaterally.  Normal respiratory effort.   HEART:  Regular rate and rhythm.  S1 and S2 auscultated.  No murmur.    ABDOMEN:  Soft, nondistended.  No tenderness.  Positive bowel sounds.   EXTREMITIES:  No peripheral edema, clubbing or cyanosis.   NEUROLOGIC:  Motor and sensory intact.    ASSESSMENT:    1.   Unstable angina.  2.   Coronary artery disease.  3.   Diabetes mellitus type 2.  4.   Essential hypertension.    PLAN:  Patient will be admitted to telemetry floor.  Continue to trend troponin level.  Obtain cardiology consult.  Probably, patient will need another cardiac angiogram.  Monitor Accu-Cheks.  Further recommendations to follow.     Dictated By Radha Goodman MD  d: 04/07/2025 13:51:49  t: 04/07/2025 14:27:47  Job [id number]  FB/

## 2025-04-07 NOTE — CONSULTS
Cardiology (consult dictated)    Assessment:  1.  Chest pain, similar to previous anginal pain.  Troponin normal, no acute EKG abnormality.    2.  CAD, status post multivessel PCI, most recently mid LAD July, 2024.      Plan:  1.  Admit for observation.    2.  Trend troponins.    3.  If troponins remain negative would do a nuclear stress test tomorrow.      Thank you.

## 2025-04-07 NOTE — ED PROVIDER NOTES
Patient Seen in: [facility] Emergency Department    History     Chief Complaint   Patient presents with    Chest Pain       HPI    History is provided by patient/independent historian: patient  84 year old female with history of CAD, hypertension, hyperlipidemia, previous stents, on Brilinta, here with complaints of intermittent chest heaviness and dizziness episodes for the last 4 days.  She is currently asymptomatic.  Unrelated to eating, not necessarily always related to exertion.  No fevers, no cough cold symptoms.  It feels similar to the last time she required stents.    History reviewed.   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 Rice    Type II or unspecified type diabetes mellitus without mention of complication, not stated as uncontrolled    Unspecified essential hypertension    Visual impairment    glasses for reading         History reviewed.   Past Surgical History:   Procedure Laterality Date    Breast biopsy Right 2013    Carpal tunnel release Right     Cataract      Cataract extraction Left 12/12/2018    SN6AT3 21.00 D LENSX WITH ORA    Cataract extraction w/  intraocular lens implant Right 11/14/2018    Lensx Toric IOL    model:[id number]     Power 19.0D   3.75CYL    Cath cartotid stent  11/2017    Cath drug eluting stent       3 total    Colonoscopy  11/2018    Colonoscopy N/A 11/12/2018    Procedure: COLONOSCOPY, POSSIBLE BIOPSY, POSSIBLE POLYPECTOMY 52431;  Surgeon: Teofilo Guerrero MD;   [place] [facility] [place] [facility]    Hysterectomy  1997    Lumpectomy left Left 02/2021    Mina biopsy stereo nodule 1 site right (cpt=19081) Right 2008    Mina biopsy stereo nodule 1 site right (cpt=19081) Right 2013    Needle biopsy left Left 01/06/2021    Needle biopsy right Right     Other surgical history  11/29/2017    stent placement    Radiation left Left 2021    Finshied April 13, 2021    Tonsillectomy      Total knee replacement Right 03/21/2024         Home Medications reviewed :  Prescriptions Prior to Admission[1]      History reviewed.   Social History     Socioeconomic History    Marital status:    Tobacco Use    Smoking status: Never     Passive exposure: Past    Smokeless tobacco: Never   Vaping Use    Vaping status: Never Used   Substance and Sexual Activity    Alcohol use: No    Drug use: No   Other Topics Concern    Caffeine Concern No         ROS  Review of Systems   Respiratory:  Positive for chest tightness.    Cardiovascular:  Negative for chest pain.   Neurological:  Positive for dizziness.   All other systems reviewed and are negative.     All other pertinent organ systems are reviewed and are negative.      Physical Exam     ED Triage Vitals [04/07/25 1124]   /64   Pulse 81   Resp 18   Temp 98.1 °F (36.7 °C)   Temp src Temporal   SpO2 97 %   O2 Device None (Room air)     Vital signs reviewed.      Physical Exam  Vitals and nursing note reviewed.   Cardiovascular:      Pulses: Normal pulses.   Pulmonary:      Effort: No respiratory distress.   Chest:      Chest wall: No tenderness.   Abdominal:      General: There is no distension.   Neurological:      Mental Status: She is alert.         ED Course       Labs:     Labs Reviewed   CBC WITH DIFFERENTIAL WITH PLATELET - Abnormal; Notable for the following components:       Result Value    WBC 11.3 (*)     RDW-SD 48.6 (*)     Neutrophil Absolute Prelim 7.91 (*)     Neutrophil Absolute 7.91 (*)     All other components within normal  limits   BASIC METABOLIC PANEL (8) - Abnormal; Notable for the following components:    Glucose 187 (*)     BUN/CREA Ratio 21.7 (*)     All other components within normal limits   TROPONIN I HIGH SENSITIVITY - Normal   TROPONIN I HIGH SENSITIVITY         My EKG Interpretation: EKG    Rate, intervals and axes as noted on EKG Report.  Rate: 77  Rhythm: Sinus Rhythm  Reading: normal for rate, normal for rhythm, no acute ST elevation           As reviewed and Interpreted by me      Imaging Results Available and Reviewed while in ED:   No results found.  My review and independent interpretation of XR images: no infiltrate. Radiology report corroborates this in addition to other details as reported by them.      Decision rules/scores evaluated: none      Diagnostic labs/tests considered but not ordered: none    ED Medications Administered: Medications - No data to display             MDM       Medical Decision Making      Differential Diagnosis: After obtaining the patient's history, performing the physical exam and reviewing the diagnostics, multiple initial diagnoses were considered based on the presenting problem including ACS, angina, pneumonia, fluid overload    External document review: I personally reviewed available external medical records for any recent pertinent discharge summaries, testing, and procedures - the findings are as follows: 3/31/25 visit with DAYSI Corrales for f/u TKA    Complicating Factors: The patient already  has a past medical history of Age-related nuclear cataract of both eyes, Atherosclerosis of coronary artery, Benign neoplasm of skin of left eyelid, Blepharitis of both eyes, Cancer (HCC) (01/2021), Chronic allergic conjunctivitis, Coronary atherosclerosis, Edema of right lower eyelid, Gallstone (1/2010), Gastroenteritis, High blood pressure, High cholesterol, Lipid screening (12/11/2013), Obesity, Osteoarthritis, Osteoporosis, Osteoporosis screening (1/22/2013), Other and unspecified  hyperlipidemia, Restless leg syndrome (2015), Type II or unspecified type diabetes mellitus without mention of complication, not stated as uncontrolled (8/2007), Unspecified essential hypertension, and Visual impairment. to contribute to the complexity of this ED evaluation.    Procedures performed: none    Discussed management with physician/appropriate source: Ambreen Dent for [facility]    Considered admission/deescalation of care for: chest pain    Social determinants of health affecting patient care: none    Prescription medications considered: discussed continuing current medication regimen    The patient requires continuous monitoring for: chest pain    Shared decision making: discussed possible admission          Disposition and Plan     Clinical Impression:  1. Chest heaviness    2. Dizziness        Disposition:  Admit    Follow-up:  No follow-up provider specified.    Medications Prescribed:  Current Discharge Medication List          Hospital Problems       Present on Admission  Date Reviewed: 1/16/2025            ICD-10-CM Noted POA    * (Principal) Chest heaviness R07.89 4/7/2025 Unknown                     [1] (Not in a hospital admission)

## 2025-04-08 ENCOUNTER — APPOINTMENT (OUTPATIENT)
Dept: INTERVENTIONAL RADIOLOGY/VASCULAR | Facility: HOSPITAL | Age: 85
End: 2025-04-08
Attending: NURSE PRACTITIONER
Payer: MEDICARE

## 2025-04-08 ENCOUNTER — APPOINTMENT (OUTPATIENT)
Dept: CV DIAGNOSTICS | Facility: HOSPITAL | Age: 85
End: 2025-04-08
Attending: INTERNAL MEDICINE
Payer: MEDICARE

## 2025-04-08 ENCOUNTER — APPOINTMENT (OUTPATIENT)
Dept: NUCLEAR MEDICINE | Facility: HOSPITAL | Age: 85
End: 2025-04-08
Attending: INTERNAL MEDICINE
Payer: MEDICARE

## 2025-04-08 LAB
ANION GAP SERPL CALC-SCNC: 10 MMOL/L (ref 0–18)
BASOPHILS # BLD AUTO: 0.03 X10(3) UL (ref 0–0.2)
BASOPHILS NFR BLD AUTO: 0.3 %
BUN BLD-MCNC: 20 MG/DL (ref 9–23)
BUN/CREAT SERPL: 24.1 (ref 10–20)
CALCIUM BLD-MCNC: 9.6 MG/DL (ref 8.7–10.4)
CHLORIDE SERPL-SCNC: 102 MMOL/L (ref 98–112)
CHOLEST SERPL-MCNC: 113 MG/DL (ref ?–200)
CO2 SERPL-SCNC: 25 MMOL/L (ref 21–32)
CREAT BLD-MCNC: 0.83 MG/DL
DEPRECATED RDW RBC AUTO: 48.4 FL (ref 35.1–46.3)
EGFRCR SERPLBLD CKD-EPI 2021: 69 ML/MIN/1.73M2 (ref 60–?)
EOSINOPHIL # BLD AUTO: 0.06 X10(3) UL (ref 0–0.7)
EOSINOPHIL NFR BLD AUTO: 0.5 %
ERYTHROCYTE [DISTWIDTH] IN BLOOD BY AUTOMATED COUNT: 14.4 % (ref 11–15)
GLUCOSE BLD-MCNC: 209 MG/DL (ref 70–99)
GLUCOSE BLDC GLUCOMTR-MCNC: 141 MG/DL (ref 70–99)
GLUCOSE BLDC GLUCOMTR-MCNC: 180 MG/DL (ref 70–99)
GLUCOSE BLDC GLUCOMTR-MCNC: 236 MG/DL (ref 70–99)
GLUCOSE BLDC GLUCOMTR-MCNC: 270 MG/DL (ref 70–99)
HCT VFR BLD AUTO: 44.9 %
HDLC SERPL-MCNC: 41 MG/DL (ref 40–59)
HGB BLD-MCNC: 14.6 G/DL
IMM GRANULOCYTES # BLD AUTO: 0.06 X10(3) UL (ref 0–1)
IMM GRANULOCYTES NFR BLD: 0.5 %
ISTAT ACTIVATED CLOTTING TIME: 354 SECONDS (ref 125–137)
LDLC SERPL CALC-MCNC: 36 MG/DL (ref ?–100)
LYMPHOCYTES # BLD AUTO: 2.26 X10(3) UL (ref 1–4)
LYMPHOCYTES NFR BLD AUTO: 20.3 %
MCH RBC QN AUTO: 29.9 PG (ref 26–34)
MCHC RBC AUTO-ENTMCNC: 32.5 G/DL (ref 31–37)
MCV RBC AUTO: 91.8 FL
MONOCYTES # BLD AUTO: 0.85 X10(3) UL (ref 0.1–1)
MONOCYTES NFR BLD AUTO: 7.7 %
NEUTROPHILS # BLD AUTO: 7.85 X10 (3) UL (ref 1.5–7.7)
NEUTROPHILS # BLD AUTO: 7.85 X10(3) UL (ref 1.5–7.7)
NEUTROPHILS NFR BLD AUTO: 70.7 %
NONHDLC SERPL-MCNC: 72 MG/DL (ref ?–130)
OSMOLALITY SERPL CALC.SUM OF ELEC: 293 MOSM/KG (ref 275–295)
PLATELET # BLD AUTO: 294 10(3)UL (ref 150–450)
POTASSIUM SERPL-SCNC: 3.7 MMOL/L (ref 3.5–5.1)
RBC # BLD AUTO: 4.89 X10(6)UL
SODIUM SERPL-SCNC: 137 MMOL/L (ref 136–145)
TRIGL SERPL-MCNC: 230 MG/DL (ref 30–149)
TROPONIN I SERPL HS-MCNC: 5 NG/L
VLDLC SERPL CALC-MCNC: 31 MG/DL (ref 0–30)
WBC # BLD AUTO: 11.1 X10(3) UL (ref 4–11)

## 2025-04-08 PROCEDURE — 99233 SBSQ HOSP IP/OBS HIGH 50: CPT | Performed by: HOSPITALIST

## 2025-04-08 PROCEDURE — 4A023N7 MEASUREMENT OF CARDIAC SAMPLING AND PRESSURE, LEFT HEART, PERCUTANEOUS APPROACH: ICD-10-PCS | Performed by: INTERNAL MEDICINE

## 2025-04-08 PROCEDURE — B241ZZ3 ULTRASONOGRAPHY OF MULTIPLE CORONARY ARTERIES, INTRAVASCULAR: ICD-10-PCS | Performed by: INTERNAL MEDICINE

## 2025-04-08 PROCEDURE — 027135Z DILATION OF CORONARY ARTERY, TWO ARTERIES WITH TWO DRUG-ELUTING INTRALUMINAL DEVICES, PERCUTANEOUS APPROACH: ICD-10-PCS | Performed by: INTERNAL MEDICINE

## 2025-04-08 PROCEDURE — 93017 CV STRESS TEST TRACING ONLY: CPT | Performed by: INTERNAL MEDICINE

## 2025-04-08 PROCEDURE — B2111ZZ FLUOROSCOPY OF MULTIPLE CORONARY ARTERIES USING LOW OSMOLAR CONTRAST: ICD-10-PCS | Performed by: INTERNAL MEDICINE

## 2025-04-08 PROCEDURE — 78452 HT MUSCLE IMAGE SPECT MULT: CPT | Performed by: INTERNAL MEDICINE

## 2025-04-08 RX ORDER — IOPAMIDOL 612 MG/ML
65 INJECTION, SOLUTION INTRAVASCULAR
Status: COMPLETED | OUTPATIENT
Start: 2025-04-08 | End: 2025-04-08

## 2025-04-08 RX ORDER — ASPIRIN 81 MG/1
81 TABLET ORAL DAILY
Status: DISCONTINUED | OUTPATIENT
Start: 2025-04-09 | End: 2025-04-09

## 2025-04-08 RX ORDER — SODIUM CHLORIDE 9 MG/ML
INJECTION, SOLUTION INTRAVENOUS
Status: ACTIVE | OUTPATIENT
Start: 2025-04-09 | End: 2025-04-09

## 2025-04-08 RX ORDER — SODIUM CHLORIDE 9 MG/ML
INJECTION, SOLUTION INTRAVENOUS CONTINUOUS
Status: ACTIVE | OUTPATIENT
Start: 2025-04-08 | End: 2025-04-08

## 2025-04-08 RX ORDER — HEPARIN SODIUM 1000 [USP'U]/ML
INJECTION, SOLUTION INTRAVENOUS; SUBCUTANEOUS
Status: COMPLETED
Start: 2025-04-08 | End: 2025-04-08

## 2025-04-08 RX ORDER — NITROGLYCERIN 20 MG/100ML
INJECTION INTRAVENOUS
Status: COMPLETED
Start: 2025-04-08 | End: 2025-04-08

## 2025-04-08 RX ORDER — ASPIRIN 81 MG/1
243 TABLET, CHEWABLE ORAL ONCE
Status: COMPLETED | OUTPATIENT
Start: 2025-04-08 | End: 2025-04-08

## 2025-04-08 RX ORDER — REGADENOSON 0.08 MG/ML
INJECTION, SOLUTION INTRAVENOUS
Status: COMPLETED
Start: 2025-04-08 | End: 2025-04-08

## 2025-04-08 RX ORDER — MIDAZOLAM HYDROCHLORIDE 1 MG/ML
INJECTION INTRAMUSCULAR; INTRAVENOUS
Status: COMPLETED
Start: 2025-04-08 | End: 2025-04-08

## 2025-04-08 RX ORDER — CHLORHEXIDINE GLUCONATE 40 MG/ML
SOLUTION TOPICAL
Status: ACTIVE | OUTPATIENT
Start: 2025-04-09 | End: 2025-04-09

## 2025-04-08 RX ORDER — VERAPAMIL HYDROCHLORIDE 2.5 MG/ML
INJECTION, SOLUTION INTRAVENOUS
Status: COMPLETED
Start: 2025-04-08 | End: 2025-04-08

## 2025-04-08 RX ORDER — ATORVASTATIN CALCIUM 80 MG/1
80 TABLET, FILM COATED ORAL NIGHTLY
Status: DISCONTINUED | OUTPATIENT
Start: 2025-04-08 | End: 2025-04-09

## 2025-04-08 RX ORDER — LIDOCAINE HYDROCHLORIDE 20 MG/ML
INJECTION, SOLUTION EPIDURAL; INFILTRATION; INTRACAUDAL; PERINEURAL
Status: COMPLETED
Start: 2025-04-08 | End: 2025-04-08

## 2025-04-08 NOTE — PLAN OF CARE
Patient received from ED alert and oriented X4, vitals stable. Denies pain. Cardiac/Carb controlled diet. Accucheck completed. Able to ambulate independently in room. Bed locked and in lowest position, call light within reach, calls appropriately assistance.    Problem: CARDIOVASCULAR - ADULT  Goal: Maintains optimal cardiac output and hemodynamic stability  Description: INTERVENTIONS:- Monitor vital signs, rhythm, and trends- Monitor for bleeding, hypotension and signs of decreased cardiac output- Evaluate effectiveness of vasoactive medications to optimize hemodynamic stability- Monitor arterial and/or venous puncture sites for bleeding and/or hematoma- Assess quality of pulses, skin color and temperature- Assess for signs of decreased coronary artery perfusion - ex. Angina- Evaluate fluid balance, assess for edema, trend weights  Outcome: Progressing  Goal: Absence of cardiac arrhythmias or at baseline  Description: INTERVENTIONS:- Continuous cardiac monitoring, monitor vital signs, obtain 12 lead EKG if indicated- Evaluate effectiveness of antiarrhythmic and heart rate control medications as ordered- Initiate emergency measures for life threatening arrhythmias- Monitor electrolytes and administer replacement therapy as ordered  Outcome: Progressing     Problem: PAIN - ADULT  Goal: Verbalizes/displays adequate comfort level or patient's stated pain goal  Description: INTERVENTIONS:- Encourage pt to monitor pain and request assistance- Assess pain using appropriate pain scale- Administer analgesics based on type and severity of pain and evaluate response- Implement non-pharmacological measures as appropriate and evaluate response- Consider cultural and social influences on pain and pain management- Manage/alleviate anxiety- Utilize distraction and/or relaxation techniques- Monitor for opioid side effects- Notify MD/LIP if interventions unsuccessful or patient reports new pain- Anticipate increased pain with  activity and pre-medicate as appropriate  Outcome: Progressing     Problem: SAFETY ADULT - FALL  Goal: Free from fall injury  Description: INTERVENTIONS:- Assess pt frequently for physical needs- Identify cognitive and physical deficits and behaviors that affect risk of falls.- [place] fall precautions as indicated by assessment.- Educate pt/family on patient safety including physical limitations- Instruct pt to call for assistance with activity based on assessment- Modify environment to reduce risk of injury- Provide assistive devices as appropriate- Consider OT/PT consult to assist with strengthening/mobility- Encourage toileting schedule  Outcome: Progressing     Problem: Patient Centered Care  Goal: Patient preferences are identified and integrated in the patient's plan of care  Description: Interventions:- What would you like us to know as we care for you? - Provide timely, complete, and accurate information to patient/family- Incorporate patient and family knowledge, values, beliefs, and cultural backgrounds into the planning and delivery of care- Encourage patient/family to participate in care and decision-making at the level they choose- Honor patient and family perspectives and choices  Outcome: Progressing     Problem: Diabetes/Glucose Control  Goal: Glucose maintained within prescribed range  Description: INTERVENTIONS:- Monitor Blood Glucose as ordered- Assess for signs and symptoms of hyperglycemia and hypoglycemia- Administer ordered medications to maintain glucose within target range- Assess barriers to adequate nutritional intake and initiate nutrition consult as needed- Instruct patient on self management of diabetes  Outcome: Progressing

## 2025-04-08 NOTE — PLAN OF CARE
Patient is A&O4, RA, Up Independently, denies pain and discomfort. Plan-Stress test.  Problem: CARDIOVASCULAR - ADULT  Goal: Maintains optimal cardiac output and hemodynamic stability  Description: INTERVENTIONS:- Monitor vital signs, rhythm, and trends- Monitor for bleeding, hypotension and signs of decreased cardiac output- Evaluate effectiveness of vasoactive medications to optimize hemodynamic stability- Monitor arterial and/or venous puncture sites for bleeding and/or hematoma- Assess quality of pulses, skin color and temperature- Assess for signs of decreased coronary artery perfusion - ex. Angina- Evaluate fluid balance, assess for edema, trend weights  Outcome: Progressing  Goal: Absence of cardiac arrhythmias or at baseline  Description: INTERVENTIONS:- Continuous cardiac monitoring, monitor vital signs, obtain 12 lead EKG if indicated- Evaluate effectiveness of antiarrhythmic and heart rate control medications as ordered- Initiate emergency measures for life threatening arrhythmias- Monitor electrolytes and administer replacement therapy as ordered  Outcome: Progressing     Problem: SAFETY ADULT - FALL  Goal: Free from fall injury  Description: INTERVENTIONS:- Assess pt frequently for physical needs- Identify cognitive and physical deficits and behaviors that affect risk of falls.- [place] fall precautions as indicated by assessment.- Educate pt/family on patient safety including physical limitations- Instruct pt to call for assistance with activity based on assessment- Modify environment to reduce risk of injury- Provide assistive devices as appropriate- Consider OT/PT consult to assist with strengthening/mobility- Encourage toileting schedule  Outcome: Progressing     Problem: Patient Centered Care  Goal: Patient preferences are identified and integrated in the patient's plan of care  Description: Interventions:- What would you like us to know as we care for you? Lives alone- Provide timely, complete,  and accurate information to patient/family- Incorporate patient and family knowledge, values, beliefs, and cultural backgrounds into the planning and delivery of care- Encourage patient/family to participate in care and decision-making at the level they choose- Honor patient and family perspectives and choices  Outcome: Progressing     Problem: Diabetes/Glucose Control  Goal: Glucose maintained within prescribed range  Description: INTERVENTIONS:- Monitor Blood Glucose as ordered- Assess for signs and symptoms of hyperglycemia and hypoglycemia- Administer ordered medications to maintain glucose within target range- Assess barriers to adequate nutritional intake and initiate nutrition consult as needed- Instruct patient on self management of diabetes  Outcome: Progressing     Problem: PAIN - ADULT  Goal: Verbalizes/displays adequate comfort level or patient's stated pain goal  Description: INTERVENTIONS:- Encourage pt to monitor pain and request assistance- Assess pain using appropriate pain scale- Administer analgesics based on type and severity of pain and evaluate response- Implement non-pharmacological measures as appropriate and evaluate response- Consider cultural and social influences on pain and pain management- Manage/alleviate anxiety- Utilize distraction and/or relaxation techniques- Monitor for opioid side effects- Notify MD/LIP if interventions unsuccessful or patient reports new pain- Anticipate increased pain with activity and pre-medicate as appropriate  Outcome: Progressing

## 2025-04-08 NOTE — PROGRESS NOTES
Progress Note     Bijal Perez Patient Status:  Observation    1940 MRN [id number]   Location [facility] 3W/SW Attending Brandon Kidd MD   Hosp Day # 0 PCP Jeff Anthony D'Amico, DO     Chief Complaint: unstable angina    Subjective:   S: Patient here with chest heaviness  Currently comfortable  Discussed + stress test and plans for angiogram    Review of Systems:   10 point ROS completed and was negative, except for pertinent positive and negatives stated in subjective.    Objective:   Vital signs:  Temp:  [98.1 °F (36.7 °C)-98.5 °F (36.9 °C)] 98.5 °F (36.9 °C)  Pulse:  [77-84] 81  Resp:  [14-23] 18  BP: ()/(48-70) 133/48  SpO2:  [95 %-99 %] 99 %    Wt Readings from Last 6 Encounters:   25 190 lb 3.2 oz (86.3 kg)   25 192 lb (87.1 kg)   24 192 lb (87.1 kg)   24 194 lb 12.8 oz (88.4 kg)   10/31/24 194 lb (88 kg)   10/20/24 190 lb (86.2 kg)         Physical Exam:    General: No acute distress. Alert ,         Respiratory: Clear to auscultation bilaterally. No wheezes. No rhonchi.  Cardiovascular: S1, S2. Regular rate and rhythm. No murmurs, rubs or gallops.   Abdomen: Soft, nontender, nondistended.  Positive bowel sounds. No rebound or guarding.  Neurologic: No focal neurological deficits.   Musculoskeletal: Moves all extremities.  Extremities: No edema.    Results:   Diagnostic Data:      Labs:    Labs Last 24 Hours:   BMP     CBC    Other     Na 137 Cl 102 BUN 20 Glu 209   Hb 14.6   PTT - Procal -   K 3.7 CO2 25.0 Cr 0.83   WBC 11.1 >< .0  INR - CRP -   Renal Lytes Endo    Hct 44.9   Trop - D dim -   eGFR - Ca 9.6 POC Gluc  270    LFT   pBNP - Lactic -   eGFR AA - PO4 - A1c -   AST - APk - Prot -  LDL -     Mg - TSH -   ALT - T vincent - Alb -        COVID-19 Lab Results    COVID-19  Lab Results   Component Value Date    COVID19 Not Detected 2024    COVID19 Not Detected 2024    COVID19 Not Detected 2022       Pro-Calcitonin  No results for  input(s): \"PCT\" in the last 168 hours.    Cardiac  No results for input(s): \"TROP\", \"PBNP\" in the last 168 hours.    Creatinine Kinase  No results for input(s): \"CK\" in the last 168 hours.    Inflammatory Markers  No results for input(s): \"CRP\", \"DALTON\", \"LDH\", \"DDIMER\" in the last 168 hours.    Imaging: Imaging data reviewed in Epic.    Medications:    [START ON 4/9/2025] chlorhexidine   Topical On Call    [START ON 4/9/2025] sodium chloride   Intravenous On Call    heparin  5,000 Units Subcutaneous 2 times per day    insulin aspart  1-7 Units Subcutaneous TID CC    ticagrelor  90 mg Oral BID    aspirin  81 mg Oral Nightly    atorvastatin  40 mg Oral Nightly    docusate sodium  100 mg Oral BID    letrozole  2.5 mg Oral Daily    losartan (Cozaar) 50 mg, hydroCHLOROthiazide 12.5 mg for Hyzaar 50/12.5 ([facility] only)   Oral Daily    [Held by provider] Vibegron  1 tablet Oral Nightly       Assessment & Plan:   ASSESSMENT / PLAN:     Unstable Angina  CAD  - Nuclear stress test today with abnormal myocardial perfusion study with medium sized mild intensity reversible defect  -Plan angiogram today  -History of CAD with history of PCI in 2024 in 2018  -Continue aspirin, Brilinta, statin    DMII  - Hemoglobin A1c 8.8  -Home medications include Jardiance 25 mg daily, glipizide 10 mg twice daily, metformin 500 twice daily along with Ozempic  -Plan outpatient follow-up for further titration of medications    HTN  - Well-controlled      Quality:  DVT Prophylaxis: Heparin  CODE status: Full code  Carmela:    Central line: n/a  Dispo: further recs pending clinical course      Will the patient be referred to TCC on discharge?: yes  Estimated date of discharge: TBD  Discharge is dependent on: post cath results  At this point Ms. Perez is expected to be discharge to: home    Plan of care discussed with patient, nursinh    Outpatient records or previous hospital records reviewed.   Patient and/or patient's family given opportunity to ask  questions and note understanding and agreeing with therapeutic plan as outlined     Coordinated care with providers and counseling re: treatment plan and workup    MDM: High complexity     ÁLVARO LLANOS MD    Supplementary Documentation:         **Certification      PHYSICIAN Certification of Need for Inpatient Hospitalization - Initial Certification    Patient will require inpatient services that will reasonably be expected to span two midnight's based on the clinical documentation in H+P.   Based on patients current state of illness, I anticipate that, after discharge, patient will require TBD.

## 2025-04-08 NOTE — CONSULTS
[facility]    PATIENT'S NAME: ZAKIA CARDOZA   ATTENDING PHYSICIAN: Gonzalo Carrillo MD   CONSULTING PHYSICIAN: Nino Short MD   PATIENT ACCOUNT#:   [id number]    LOCATION:  [address]  MEDICAL RECORD #:   [id number]       YOB: 1940  ADMISSION DATE:       04/07/2025      CONSULT DATE:  04/07/2025    REPORT OF CONSULTATION    HISTORY OF PRESENT ILLNESS:  The patient is an 84-year-old female who has been having midsternal chest tightness over the last 4 days.  It can occur when she is going up stairs, walking around the house, or sometimes when bending over.  They last for several minutes until this morning when it lasted 5 hours.  There were no associated symptoms.  She says that they are similar to symptoms that she has had preceding previous coronary intervention.    PAST MEDICAL HISTORY:  Coronary artery disease, status post previous coronary intervention including the LAD/diagonal bifurcation in June 2020 and the mid LAD in July 2024.  Hypertension, diabetes, dyslipidemia.  Breast cancer, status post left breast lumpectomy, followed by radiation therapy and now oral medication.  Lumpectomy was in 2021.    PAST SURGICAL HISTORY:  Other surgical history includes a total abdominal hysterectomy with bilateral salpingo-oophorectomy and right total knee arthroplasty.    MEDICATIONS:  Home medications:  Alprazolam, aspirin, atorvastatin, Brilinta, glimepiride, Jardiance, losartan, hydrochlorothiazide, metformin, and vitamins.  Also, Ozempic, sitagliptin.    SOCIAL HISTORY:  She is  and has no children.  She is a retired  at [employer].  She does not drink alcohol or smoke tobacco.    REVIEW OF SYSTEMS:  A 10-point review of systems is negative except for the above.    PHYSICAL EXAMINATION:  GENERAL:  Well-developed, well-nourished female, no acute distress.  Alert and oriented x3.  VITAL SIGNS:  Blood pressure 148/69; respirations 16, breathing unlabored;  pulse 81, regular rhythm; afebrile; saturation 98% on room air.  HEENT:  Unremarkable.  NECK:  Supple.  Jugular venous pressure normal.  Carotids brisk without bruits.  No thyromegaly or adenopathy.  LUNGS:  Clear.  HEART:  S1, S2 normal.  No gallop, murmur, rub, or click.  ABDOMEN:  Soft, nontender.  Bowel sounds present.  No organomegaly, masses, bruits, or pulsations.  EXTREMITIES:  Warm and dry.  Pulses strong and equal.  No cyanosis, clubbing, or edema.  No calf, thigh, or popliteal tenderness.  NEUROLOGIC:  Normal.  SKIN:  Normal.    LABORATORY DATA:  Labs unremarkable.  Troponin normal.  WBC 11.3.    EKG:  Sinus rhythm with a rate of 75.  No other acute changes.    Chest x-ray appears to be clear.    ASSESSMENT:  1.   Chest pain, similar to previous anginal pain.  Troponin and EKG are normal.  2.   Known coronary artery disease, status post multivessel intervention.  Intermediate concern regarding ischemic origin of her symptoms.    PLAN:  1.   Admit for observation.  2.   Trend troponins.  3.   If troponins remain negative, we will do a nuclear stress test in the morning.    Thank you for this consultation.    Dictated By Nino Short MD  d: 04/07/2025 14:19:38  t: 04/07/2025 14:49:07  [facility] [id number]  [facility] [place] [facility] the [facility]/

## 2025-04-08 NOTE — PLAN OF CARE
Patient is alert and oriented times 4. On RA. No complains of pain at this time. Awaiting Stress Test results and cardiology recommendations.     Problem: CARDIOVASCULAR - ADULT  Goal: Maintains optimal cardiac output and hemodynamic stability  Description: INTERVENTIONS:- Monitor vital signs, rhythm, and trends- Monitor for bleeding, hypotension and signs of decreased cardiac output- Evaluate effectiveness of vasoactive medications to optimize hemodynamic stability- Monitor arterial and/or venous puncture sites for bleeding and/or hematoma- Assess quality of pulses, skin color and temperature- Assess for signs of decreased coronary artery perfusion - ex. Angina- Evaluate fluid balance, assess for edema, trend weights  Outcome: Progressing  Goal: Absence of cardiac arrhythmias or at baseline  Description: INTERVENTIONS:- Continuous cardiac monitoring, monitor vital signs, obtain 12 lead EKG if indicated- Evaluate effectiveness of antiarrhythmic and heart rate control medications as ordered- Initiate emergency measures for life threatening arrhythmias- Monitor electrolytes and administer replacement therapy as ordered  Outcome: Progressing

## 2025-04-08 NOTE — PROGRESS NOTES
Progress Note  Bijal Perez Patient Status:  Observation    1940 MRN [id number]   Location [facility] 3W/SW Attending Brandon Kidd MD   Hosp Day # 0 PCP Jeff Anthony D'Amico,      Subjective:  Pt stated she had some chest pressure during the stress test this am and after.     Objective:  /62 (BP Location: Left arm)   Pulse 77   Temp 98.3 °F (36.8 °C) (Oral)   Resp 18   Ht 5' 5\" (1.651 m)   Wt 190 lb 3.2 oz (86.3 kg)   LMP  (LMP Unknown)   SpO2 99%   BMI 31.65 kg/m²     Telemetry: NSR       Intake/Output:  No intake or output data in the 24 hours ending 25 1029    Last 3 Weights   25 0515 190 lb 3.2 oz (86.3 kg)   25 1846 191 lb 3.2 oz (86.7 kg)   25 1124 189 lb (85.7 kg)   25 1420 192 lb (87.1 kg)   24 1359 192 lb (87.1 kg)       Labs:  Recent Labs   Lab 25  1305   *   BUN 18   CREATSERUM 0.83   EGFRCR 69   CA 10.1      K 3.9      CO2 23.0     Recent Labs   Lab 25  1305   RBC 4.59   HGB 14.1   HCT 42.0   MCV 91.5   MCH 30.7   MCHC 33.6   RDW 14.5   NEPRELIM 7.91*   WBC 11.3*   .0         Recent Labs   Lab 25  1825 25  2031 25  0047   TROPHS 8 5 5     No results found for: \"PT\", \"INR\"    Diagnostics:     Review of Systems   Respiratory: Negative.     Cardiovascular: Negative.          Physical Exam:    Physical Exam  Vitals reviewed.   Constitutional:       General: She is not in acute distress.     Appearance: She is obese. She is not ill-appearing.   Neck:      Vascular: No JVD.   Cardiovascular:      Rate and Rhythm: Normal rate and regular rhythm.      Pulses: Normal pulses.      Heart sounds: Normal heart sounds. No murmur heard.     No friction rub. No gallop.   Pulmonary:      Effort: Pulmonary effort is normal. No respiratory distress.      Breath sounds: No stridor. Rales present. No wheezing or rhonchi.   Chest:      Chest wall: No tenderness.   Musculoskeletal:      Cervical  back: Normal range of motion.      Right lower leg: No edema.      Left lower leg: No edema.   Neurological:      Mental Status: She is oriented to person, place, and time.   Psychiatric:         Mood and Affect: Mood normal.         Medications:   heparin  5,000 Units Subcutaneous 2 times per day    insulin aspart  1-7 Units Subcutaneous TID CC    ticagrelor  90 mg Oral BID    aspirin  81 mg Oral Nightly    atorvastatin  40 mg Oral Nightly    docusate sodium  100 mg Oral BID    letrozole  2.5 mg Oral Daily    losartan (Cozaar) 50 mg, hydroCHLOROthiazide 12.5 mg for Hyzaar 50/12.5 ([facility] only)   Oral Daily    [Held by provider] Vibegron  1 tablet Oral Nightly         Assessment/Plan:    Chest pain  - EKG without acute ischemic changes  - trop negative  - s/p stress test today: nuclear -with abnormal myocardial perfusion study with small medium sized mild intensity reversible defect involving apical anteroseptal segment and apex    H/o CAD   -  s/p LHC, COR, LV and IVUS guided shock wave and PCI and prox LAD 7/2024  - s/p PCI of LAD and diagonal bifurcation 2020/2018  - on aspirin, brilinta and atorvastatin      HLD  - LDL 47 9/2024  - on statin    HTN  - well controlled on losartan/hydrochlorothiazide    DM 2  - A1c 8.8    Plan;  - continue current medications   - check lipid panel  - will discuss the stress test result and pt's symptoms with rounding cardiologist.     ANGELITA Miller  [facility] [place]  4/8/2025  10:29 AM    Stress test result reviewed with Dr Bray and pt. Plan for [facility] today      PM rounds addendum:      Clinical summary:  Presented with unstable angina, abnormal stress test  Angiogram as above    Agree with the above findings and the complexity of problems addressed.  I take responsibility for potential risks and complications.  I approve the plan documented by above APN/physician assistant.      Silvino Bray MD  Interventional Cardiology  [facility]  [place]

## 2025-04-08 NOTE — PROCEDURES
[facility]  part of [facility]    Cardiac Cath Procedure Note  Bijal Perez Patient Status:  Observation    1940 MRN [id number]   Location [facility] 3W/SW Attending Brandon Kidd MD   Hosp Day # 0 PCP Jeff Anthony D'Amico, DO       Cardiologist: Silvino Bray MD   Primary Proceduralist: Silvino Bray MD  Procedure Performed: LHC, LV, and   IVUS guided PCI proximal LAD and RCA  Date of Procedure: 2025   Indication: Abnormal stress test    Summary of procedure:    Successful IVUS guided PCI 99% proximal LAD with TRUONG x 1  Successful IVUS guided PCI 95% ostial RCA with TRUONG x 1      Recommendations:  Antiplatelet: ASA and Brilinta  Statin: On high dose dose statin, increase atorvastatin 80 mg daily  Discharge hopefully tomorrow  Cardiac rehab as outpatient 1 to 2 weeks      Left Ventriculography and hemodynamics:   LV EF not done  LV EDP 12 mmHg  No gradient across aortic valve        Coronary Angiography  RCA:  Dominant, significant dampening with diagnostic 6 Sami catheter engagement, PDA and PL unremarkable.    Left main:  Free of obstructive disease    Left anterior descendin% proximal stenosis just prior to the stented segment.  LAD-diagonal bifurcation stented segment with 30 to 40% in-stent restenosis.  Remainder of the artery is free of obstructive disease, supplies multiple diagonals which are non-obstructive    Circumflex:  Free of obstructive disease, supplies multiple OM branches which are patent    Ramus: Free of obstructive disease      LAD intervention  Lesion Characteristics-not torturous, not calcified.  Type non-C lesion.  Pre-intervention stenosis 99%, Post intervention stenosis 0%.  Pre ARMEN 2, Post ARMEN 3.      Guide Catheter: EBU 3.75  Wire: Duyen blue  Pre-dil: None  Stent: 4 x 12 mm Spearman TRUONG  Post-dil: None  IVUS demonstrates adequately sizing expanded stents without obstruction or protrusion into the left main or covering the ramus.      RCA  intervention  Lesion Characteristics-moderately torturous, not calcified.  Type non-C lesion.  Pre-intervention stenosis 95%, Post intervention stenosis 0%.  Pre ARMEN 3, Post ARMEN 3.      Guide Catheter: JR4  Wire: Duyen blue  IVUS demonstrates slitlike appearance of the ostium of the RCA  Pre-dil: None  Stent: 4 x 12 mm Silvestre TRUONG  Post-dil: 4.5 x 12 mm NC balloon  IVUS demonstrates adequately sizing expanded stent with 1 ring the stent struts in the aorta        Summary of Case: After written informed consent was obtained from the patient, patient was brought to the cardiac catheterization laboratory.  Patient was prepped and draped in the usual sterile fashion. Lidocaine 1% was used to infiltrate the right radial artery for local anesthesia and a 6 Greenlandic introducer sheath was inserted into the right radial artery.      Selective coronary angiography performed with JR4 catheter for RCA and JL3.5 catheter for LCA.  Angiography performed in standard projections.      6 Haitian JR4 catheter placed in LV for hemodynamics.    Specimen sent to: No specimen collected  Estimated blood loss: 10 cc  Closure:  TR band      IV was maintained by RN and moderate conscious sedation of versed and fentanyl was given.  Patient was assessed and monitoring of oxygen, heart rate and blood pressure by nurse and myself during the exam 35 minutes.      Silvino Bray MD  04/08/25

## 2025-04-09 VITALS
TEMPERATURE: 97 F | HEIGHT: 65 IN | RESPIRATION RATE: 18 BRPM | BODY MASS INDEX: 31.1 KG/M2 | DIASTOLIC BLOOD PRESSURE: 55 MMHG | WEIGHT: 186.69 LBS | HEART RATE: 93 BPM | OXYGEN SATURATION: 98 % | SYSTOLIC BLOOD PRESSURE: 135 MMHG

## 2025-04-09 LAB
% OF MAX PREDICTED HR: 100 %
ANION GAP SERPL CALC-SCNC: 11 MMOL/L (ref 0–18)
ATRIAL RATE: 72 BPM
BASOPHILS # BLD AUTO: 0.04 X10(3) UL (ref 0–0.2)
BASOPHILS NFR BLD AUTO: 0.4 %
BUN BLD-MCNC: 24 MG/DL (ref 9–23)
BUN/CREAT SERPL: 28.9 (ref 10–20)
CALCIUM BLD-MCNC: 9.5 MG/DL (ref 8.7–10.4)
CHLORIDE SERPL-SCNC: 101 MMOL/L (ref 98–112)
CO2 SERPL-SCNC: 25 MMOL/L (ref 21–32)
CREAT BLD-MCNC: 0.83 MG/DL (ref 0.55–1.02)
DEPRECATED RDW RBC AUTO: 48.1 FL (ref 35.1–46.3)
EGFRCR SERPLBLD CKD-EPI 2021: 69 ML/MIN/1.73M2 (ref 60–?)
EOSINOPHIL # BLD AUTO: 0.09 X10(3) UL (ref 0–0.7)
EOSINOPHIL NFR BLD AUTO: 0.9 %
ERYTHROCYTE [DISTWIDTH] IN BLOOD BY AUTOMATED COUNT: 14.4 % (ref 11–15)
GLUCOSE BLD-MCNC: 184 MG/DL (ref 70–99)
GLUCOSE BLDC GLUCOMTR-MCNC: 181 MG/DL (ref 70–99)
GLUCOSE BLDC GLUCOMTR-MCNC: 248 MG/DL (ref 70–99)
HCT VFR BLD AUTO: 40.4 % (ref 35–48)
HGB BLD-MCNC: 13.5 G/DL (ref 12–16)
IMM GRANULOCYTES # BLD AUTO: 0.06 X10(3) UL (ref 0–1)
IMM GRANULOCYTES NFR BLD: 0.6 %
LYMPHOCYTES # BLD AUTO: 2.56 X10(3) UL (ref 1–4)
LYMPHOCYTES NFR BLD AUTO: 24.2 %
MAGNESIUM SERPL-MCNC: 1.8 MG/DL (ref 1.6–2.6)
MAX DIASTOLIC BP: 53 MMHG
MAX HEART RATE: 97 BPM
MAX PREDICTED HEART RATE: 136 BPM
MAX SYSTOLIC BP: 160 MMHG
MAX WORK LOAD: 10
MCH RBC QN AUTO: 30.8 PG (ref 26–34)
MCHC RBC AUTO-ENTMCNC: 33.4 G/DL (ref 31–37)
MCV RBC AUTO: 92 FL (ref 80–100)
MONOCYTES # BLD AUTO: 0.97 X10(3) UL (ref 0.1–1)
MONOCYTES NFR BLD AUTO: 9.2 %
NEUTROPHILS # BLD AUTO: 6.86 X10 (3) UL (ref 1.5–7.7)
NEUTROPHILS # BLD AUTO: 6.86 X10(3) UL (ref 1.5–7.7)
NEUTROPHILS NFR BLD AUTO: 64.7 %
OSMOLALITY SERPL CALC.SUM OF ELEC: 293 MOSM/KG (ref 275–295)
P AXIS: 36 DEGREES
P-R INTERVAL: 176 MS
PLATELET # BLD AUTO: 264 10(3)UL (ref 150–450)
POTASSIUM SERPL-SCNC: 3.7 MMOL/L (ref 3.5–5.1)
Q-T INTERVAL: 342 MS
QRS DURATION: 78 MS
QTC CALCULATION (BEZET): 374 MS
R AXIS: -25 DEGREES
RBC # BLD AUTO: 4.39 X10(6)UL (ref 3.8–5.3)
SODIUM SERPL-SCNC: 137 MMOL/L (ref 136–145)
T AXIS: 60 DEGREES
VENTRICULAR RATE: 72 BPM
WBC # BLD AUTO: 10.6 X10(3) UL (ref 4–11)

## 2025-04-09 PROCEDURE — 99239 HOSP IP/OBS DSCHRG MGMT >30: CPT | Performed by: HOSPITALIST

## 2025-04-09 RX ORDER — ATORVASTATIN CALCIUM 80 MG/1
80 TABLET, FILM COATED ORAL NIGHTLY
Qty: 30 TABLET | Refills: 1 | Status: SHIPPED | OUTPATIENT
Start: 2025-04-09

## 2025-04-09 RX ORDER — MAGNESIUM OXIDE 400 MG/1
400 TABLET ORAL ONCE
Status: COMPLETED | OUTPATIENT
Start: 2025-04-09 | End: 2025-04-09

## 2025-04-09 NOTE — PROGRESS NOTES
[facility] Progress Note    Bijal Perez Patient Status:  Inpatient    1940 MRN [id number]   Location [facility] 3W/SW Attending Milton Cristina MD   Hosp Day # 1 PCP Jeff Anthony D'Amico, DO     Subjective:  No CV concerns    Objective:  /55 (BP Location: Left arm)   Pulse 93   Temp 97.3 °F (36.3 °C) (Oral)   Resp 18   Ht 165.1 cm (5' 5\")   Wt 186 lb 11.2 oz (84.7 kg)   LMP  (LMP Unknown)   SpO2 98%   BMI 31.07 kg/m²     Telemetry: NSR , 93 bpm       Intake/Output:    Intake/Output Summary (Last 24 hours) at 2025 0927  Last data filed at 2025 0807  Gross per 24 hour   Intake 210 ml   Output 800 ml   Net -590 ml       Last 3 Weights   25 0543 186 lb 11.2 oz (84.7 kg)   25 0515 190 lb 3.2 oz (86.3 kg)   25 1846 191 lb 3.2 oz (86.7 kg)   25 1124 189 lb (85.7 kg)   25 1420 192 lb (87.1 kg)   24 1359 192 lb (87.1 kg)       Labs:  Recent Labs   Lab 25  1305 25  1032 25  0644   * 209* 184*   BUN 18 20 24*   CREATSERUM 0.83 0.83 0.83   EGFRCR 69 69 69   CA 10.1 9.6 9.5    137 137   K 3.9 3.7 3.7    102 101   CO2 23.0 25.0 25.0     Recent Labs   Lab 25  1305 25  1032 25  0644   RBC 4.59 4.89 4.39   HGB 14.1 14.6 13.5   HCT 42.0 44.9 40.4   MCV 91.5 91.8 92.0   MCH 30.7 29.9 30.8   MCHC 33.6 32.5 33.4   RDW 14.5 14.4 14.4   NEPRELIM 7.91* 7.85* 6.86   WBC 11.3* 11.1* 10.6   .0 294.0 264.0         Recent Labs   Lab 25  1825 25  0047   TROPHS 8 5 5       Diagnostics:     25 [facility]   Cardiologist: Silvino Bray MD   Primary Proceduralist: Silvino Bray MD  Procedure Performed: [facility], , and   IVUS guided PCI proximal LAD and RCA  Date of Procedure: 2025   Indication: Abnormal stress test     Summary of procedure:    Successful IVUS guided PCI 99% proximal LAD with TRUONG x 1  Successful IVUS guided PCI 95% ostial RCA with TRUONG x 1         Recommendations:  Antiplatelet: ASA and Brilinta  Statin: On high dose dose statin, increase atorvastatin 80 mg daily  Discharge hopefully tomorrow  Cardiac rehab as outpatient 1 to 2 weeks    Review of Systems   Respiratory: Negative.     Cardiovascular: Negative.      Physical Exam:    General: Alert and oriented x 3. No apparent distress.   HEENT: Normocephalic, anicteric sclera, neck supple, no thyromegaly or adenopathy.  Neck: No JVD, carotids 2+, no bruits.  Cardiac: Regular rate & rhythm. S1, S2 normal. No murmur, pericardial rub, S3, or extra cardiac sounds.  Lungs: Clear without wheezes, rales, rhonchi or dullness.  Normal excursions and effort.  Abdomen: Soft, non-tender. No organosplenomegally, mass or rebound, BS-present.  Extremities: Without clubbing or cyanosis.  No left lower extremity edema, no right lower extremity edema.  Neurologic: Alert and oriented, normal affect. No focal defects  Skin: Warm and dry.   Right wrist: C/D/I. Soft. Non-tender. No signs of bruising, bleeding,  hematoma, or infection noted       Medications:  Scheduled Medications[1]  Medication Infusions[2]    Assessment:     Coronary artery disease  - s/p PCI of LAD and diagonal bifurcation 2020/2018  - s/p LHC, COR, LV and IVUS guided shock wave and PCI and prox LAD 7/2024  - Presented with chest pain, negative troponin, EKG with no acute ischemic changes  - Lexiscan abnormal with small to medium sized mild intensity reversible defect involving the apical and anteroseptal segment and apex.  LVEF of 67%  - S/p successful PCI/TRUONG x 1 to pLAD & ostial RCA   - on aspirin, brilinta and high intensity atorvastatin (increased this admission)      HLD  - LDL 47 9/2024  - on atorvastatin as above     HTN  - well controlled on losartan/hydrochlorothiazide     T2DM  - A1c 8.8, on insulin per PMD    Plan:    Hemodynamically stable post procedure.  No CV concerns.  Right wrist WNL  Labs and routine post procedural EKG reviewed  Recommend  phase 2 cardiac rehab as outpatient  Continue aspirin, Brilinta, atorvastatin, losartan/hydrochlorothiazide.  Importance of DAPT compliance discussed with patient who verbalized understanding  Recommend rechecking LFTs in 2 months  Ambulate patient this a.m. if asymptomatic, may be discharged home today from cardiology standpoint    SOPHIA Real  4/9/2025  8:30 AM  Ph [phone] ([place])  Ph [phone] ([place])         [1]    chlorhexidine   Topical On Call    sodium chloride   Intravenous On Call    atorvastatin  80 mg Oral Nightly    ticagrelor  90 mg Oral Q12H    aspirin  81 mg Oral Daily    heparin  5,000 Units Subcutaneous 2 times per day    insulin aspart  1-7 Units Subcutaneous TID CC    docusate sodium  100 mg Oral BID    letrozole  2.5 mg Oral Daily    losartan (Cozaar) 50 mg, hydroCHLOROthiazide 12.5 mg for Hyzaar 50/12.5 ([facility] only)   Oral Daily    [Held by provider] Vibegron  1 tablet Oral Nightly   [2]

## 2025-04-09 NOTE — DIETARY NOTE
NUTRITION EDUCATION NOTE     Received consult for cardiac nutrition education. Verbally reviewed basic cardiac diet restrictions. Provided with Eating Heart-Healthy handout to reinforce. Receptive to instruction. May benefit from outpt f/u. Expect fair compliance. RD contact information provided to ptNaima Rowland MS, BLAIR, RDN, LDN  Clinical Dietitian  P: [phone]

## 2025-04-09 NOTE — DISCHARGE SUMMARY
[facility]  part of [facility]    Discharge Summary    Biajl Perez Patient Status:  Inpatient    1940 MRN [id number]   Location [facility] 3W/SW Attending Milton Cristina MD   Hosp Day # 1 PCP Jeff Anthony D'Amico, DO     Date of Admission: 2025 Disposition:   Home or Self Care     Date of Discharge:   2025     Admitting Diagnosis:   Dizziness [R42]  Chest heaviness [R07.89]    Hospital Discharge Diagnoses:    Unstable Angina  CAD  Abnormal stress test  History of CAD with history of PCI in  in   S/p C 2025 with TRUONG to LAD and TRUONG to RCA  Hx of HTN  Hx of DM2      Problem List:     Problem List[1]    Physical Exam:      /55 (BP Location: Left arm)   Pulse 93   Temp 97.3 °F (36.3 °C) (Oral)   Resp 18   Ht 65\"   Wt 186 lb 11.2 oz (84.7 kg)   LMP  (LMP Unknown)   SpO2 98%   BMI 31.07 kg/m²     Gen:  NAD.  A and O x  3  CV:   RRR.  No m/g/r  Pulm:   CTA bilat  Abd:   +bs, soft, NT, ND  LE:  No c/c/e  Neuro:  nonfocal      Reason for Admission:   Chest pain, possible unstable angina.     HISTORY OF PRESENT ILLNESS:  Patient is an 84-year-old  female with multiple prior LAD PCI stents.  Came in today to the emergency department for evaluation of 3 to 4 days of intermittent midsternal chest tightness that comes with activity and goes away with rest.  CBC, chemistry, and troponin were unremarkable.  EKG showed normal sinus rhythm with no acute ST-T changes.  Chest x-ray still pending.  Patient reports multiple episodes of midsternal chest tightness that comes with activity and goes away with rest.  Lasts around 15 to 20 minutes at a time.  Last episode was yesterday, started around 5 p.m. and ended around 10 p.m. without activity.  Patient came in today for evaluation.  Currently chest pain-free.  CBC showed white blood cell count of 11.3 with left shift.  Chemistry and troponin were negative.     Hospital Course:     Unstable  Angina  CAD  History of CAD with history of PCI in 2024 in 2018  - Nuclear stress test this admit with abnormal myocardial perfusion study with medium sized mild intensity reversible defect  Pt went for angiogram on 4/8/25 with TRUONG to LAD and TRUONG to RCA.  Pt tolerated procedure well.  Cards and PCP f/u.  -Continue aspirin, Brilinta, statin     DMII  - Hemoglobin A1c 8.8  Cont home meds  -Plan outpatient follow-up for further titration of medications     HTN  Controlled.  Cont meds     Quality:  DVT Prophylaxis: Heparin  CODE status: Full code         Consultations:   Cardiology     Discharge Condition:  Good    Discharge Medications:      Discharge Medications        START taking these medications        Instructions Prescription details   atorvastatin 80 MG Tabs  Commonly known as: Lipitor  Replaces: atorvastatin 40 MG Tabs      Take 1 tablet (80 mg total) by mouth nightly.   Quantity: 30 tablet  Refills: 1            CONTINUE taking these medications        Instructions Prescription details   ALPRAZolam 0.25 MG Tabs  Commonly known as: Xanax      Take 1 tablet (0.25 mg total) by mouth 3 (three) times daily as needed.   Quantity: 90 tablet  Refills: 2     aspirin 81 MG Tabs      Take 1 tablet (81 mg total) by mouth at bedtime.   Refills: 0     Centrum Silver Ultra Womens Tabs      Take 1 tablet by mouth daily.   Refills: 0     docusate sodium 100 MG Caps  Commonly known as: Colace      Take 1 capsule (100 mg total) by mouth 2 (two) times daily.   Refills: 0     empagliflozin 25 MG Tabs  Commonly known as: Jardiance      Take 1 tablet (25 mg total) by mouth daily.   Quantity: 90 tablet  Refills: 3     Gemtesa 75 MG Tabs  Generic drug: Vibegron      Take 1 tablet by mouth at bedtime.   Refills: 0     glipiZIDE 10 MG Tabs  Commonly known as: Glucotrol      Take 1 tablet (10 mg total) by mouth 2 (two) times daily before meals.   Quantity: 180 tablet  Refills: 3     letrozole 2.5 MG Tabs  Commonly known as: Femara       Take 1 tablet (2.5 mg total) by mouth daily.   Quantity: 90 tablet  Refills: 3     losartan-hydroCHLOROthiazide 50-12.5 MG Tabs  Commonly known as: Hyzaar      TAKE 1 TABLET BY MOUTH EVERY DAY   Quantity: 90 tablet  Refills: 3     metFORMIN  MG Tb24  Commonly known as: Glucophage XR      TAKE 2 TABLETS BY MOUTH TWICE A DAY   Quantity: 360 tablet  Refills: 1     Ozempic (0.25 or 0.5 MG/DOSE) 2 MG/3ML Sopn  Generic drug: semaglutide      Inject 0.5 mg into the skin once a week.   Quantity: 9 mL  Refills: 1     ticagrelor 90 MG Tabs  Commonly known as: Brilinta      Take 1 tablet (90 mg total) by mouth every 12 (twelve) hours.   Quantity: 180 tablet  Refills: 3            STOP taking these medications      atorvastatin 40 MG Tabs  Commonly known as: Lipitor  Replaced by: atorvastatin 80 MG Tabs                  Where to Get Your Medications        These medications were sent to [facility]/pharmacy #[id number] [facility] [address] [facility] AT [facility], [phone], [phone]  [address] [place] [address]      Phone: [phone]   atorvastatin 80 MG Tabs         Follow up Visits:     Follow-up Information       Justine Jones MD. Schedule an appointment as soon as possible for a visit.    Specialty: ENDOCRINOLOGY  Why: Diabetes follow up within 1-3 weeks  Contact information:  [address]  [address]  [place] [id number] [phone]               D'Amico, Jeff Anthony, DO. Schedule an appointment as soon as possible for a visit in 1 week(s).    Specialty: Internal Medicine  Contact information:  [address] [facility] [address] [phone]               Silvino Bray MD Follow up in 1 week(s).    Specialties: Interventional, Cardiology, CARDIOLOGY  Why: Office will call to schedule  Contact information:  [address]  [address]  [place] [id number] [phone]                             Hospital Discharge Diagnoses:  CAD    Lace+ Score: 55  59-90 High Risk  29-58 Medium  Risk  0-28   Low Risk.    TCM Follow-Up Recommendation:  LACE > 58: High Risk of readmission after discharge from the hospital.    >35 minutes spent preparing this discharge.    Milton Callejas MD  4/9/2025  12:03 PM        [1]   Patient Active Problem List  Diagnosis    Type 2 diabetes mellitus without complication, without long-term current use of insulin ([facility])    Hypertension, benign    Hyperlipidemia    CAD (coronary artery disease)    History of heart artery stent    Primary osteoarthritis involving multiple joints    B12 deficiency    Achilles tendinitis of right lower extremity    Sternum pain    Neck pain    CKD (chronic kidney disease) stage 3, GFR 30-59 ml/min ([facility])    History of breast cancer    Chronic pain of right knee    Frequent UTI    History of cellulitis    History of fall    Chest heaviness    Dizziness    Unstable angina ([facility])

## 2025-04-09 NOTE — PLAN OF CARE
Problem: CARDIOVASCULAR - ADULT  Goal: Maintains optimal cardiac output and hemodynamic stability  Description: INTERVENTIONS:- Monitor vital signs, rhythm, and trends- Monitor for bleeding, hypotension and signs of decreased cardiac output- Evaluate effectiveness of vasoactive medications to optimize hemodynamic stability- Monitor arterial and/or venous puncture sites for bleeding and/or hematoma- Assess quality of pulses, skin color and temperature- Assess for signs of decreased coronary artery perfusion - ex. Angina- Evaluate fluid balance, assess for edema, trend weights  Outcome: Progressing  Goal: Absence of cardiac arrhythmias or at baseline  Description: INTERVENTIONS:- Continuous cardiac monitoring, monitor vital signs, obtain 12 lead EKG if indicated- Evaluate effectiveness of antiarrhythmic and heart rate control medications as ordered- Initiate emergency measures for life threatening arrhythmias- Monitor electrolytes and administer replacement therapy as ordered  Outcome: Progressing     Problem: PAIN - ADULT  Goal: Verbalizes/displays adequate comfort level or patient's stated pain goal  Description: INTERVENTIONS:- Encourage pt to monitor pain and request assistance- Assess pain using appropriate pain scale- Administer analgesics based on type and severity of pain and evaluate response- Implement non-pharmacological measures as appropriate and evaluate response- Consider cultural and social influences on pain and pain management- Manage/alleviate anxiety- Utilize distraction and/or relaxation techniques- Monitor for opioid side effects- Notify MD/LIP if interventions unsuccessful or patient reports new pain- Anticipate increased pain with activity and pre-medicate as appropriate  Outcome: Progressing     Problem: SAFETY ADULT - FALL  Goal: Free from fall injury  Description: INTERVENTIONS:- Assess pt frequently for physical needs- Identify cognitive and physical deficits and behaviors that affect  risk of falls.- Dulce fall precautions as indicated by assessment.- Educate pt/family on patient safety including physical limitations- Instruct pt to call for assistance with activity based on assessment- Modify environment to reduce risk of injury- Provide assistive devices as appropriate- Consider OT/PT consult to assist with strengthening/mobility- Encourage toileting schedule  Outcome: Progressing     Problem: Diabetes/Glucose Control  Goal: Glucose maintained within prescribed range  Description: INTERVENTIONS:- Monitor Blood Glucose as ordered- Assess for signs and symptoms of hyperglycemia and hypoglycemia- Administer ordered medications to maintain glucose within target range- Assess barriers to adequate nutritional intake and initiate nutrition consult as needed- Instruct patient on self management of diabetes  Outcome: Progressing       Received awake,oriented. Right radial cath site-no swelling noted. Cardiac rehab and dietitian on consult. No complaint of pain during the night.Voiding freely.

## 2025-04-09 NOTE — PAYOR COMM NOTE
--------------  ADMISSION REVIEW     Payor: [employer]  Subscriber #:  [id number]  Authorization Number: [id number]    Admit date: 4/8/25  Admit time:  5:59 PM       Admit Orders        Start        04/08/25 1800  Admit to inpatient               04/07/25 1845  Place in observation Once                                 ED Provider Notes        History   HPI  History is provided by patient/independent historian: patient  84 year old female with history of CAD, hypertension, hyperlipidemia, previous stents, on Brilinta, here with complaints of intermittent chest heaviness and dizziness episodes for the last 4 days.  She is currently asymptomatic.  Unrelated to eating, not necessarily always related to exertion.  No fevers, no cough cold symptoms.  It feels similar to the last time she required stents.  Respiratory:  Positive for chest tightness.    Cardiovascular:  Negative for chest pain.   Neurological:  Positive for dizziness.     ED Triage Vitals [04/07/25 1124]   /64   Pulse 81   Resp 18   Temp 98.1 °F (36.7 °C)   Temp src Temporal   SpO2 97 %   O2 Device None (Room air)     Chest:      Chest wall: No tenderness.   Abdominal:      General: There is no distension.   Neurological:      Mental Status: She is alert.     Labs Reviewed   CBC WITH DIFFERENTIAL WITH PLATELET - Abnormal; Notable for the following components:       Result Value    WBC 11.3 (*)     RDW-SD 48.6 (*)     Neutrophil Absolute Prelim 7.91 (*)     Neutrophil Absolute 7.91 (*)     All other components within normal limits   BASIC METABOLIC PANEL (8) - Abnormal; Notable for the following components:    Glucose 187 (*)     BUN/CREA Ratio 21.7 (*)      Rate, intervals and axes as noted on EKG Report.  Rate: 77  Rhythm: Sinus Rhythm  Reading: normal for rate, normal for rhythm, no acute ST elevation      Disposition and Plan     Clinical Impression:  1. Chest heaviness    2. Dizziness      Disposition:  Admit           HISTORY AND PHYSICAL  EXAMINATION     CHIEF COMPLAINT:  Chest pain, possible unstable angina.     HISTORY OF PRESENT ILLNESS:  Patient is an 84-year-old  female with multiple prior LAD PCI stents.  Came in today to the emergency department for evaluation of 3 to 4 days of intermittent midsternal chest tightness that comes with activity and goes away with rest.  CBC, chemistry, and troponin were unremarkable.  EKG showed normal sinus rhythm with no acute ST-T changes.  Chest x-ray still pending.  Patient reports multiple episodes of midsternal chest tightness that comes with activity and goes away with rest.  Lasts around 15 to 20 minutes at a time.  Last episode was yesterday, started around 5 p.m. and ended around 10 p.m. without activity.  Patient came in today for evaluation.  Currently chest pain-free.  CBC showed white blood cell count of 11.3 with left shift.  Chemistry and troponin were negative.      PAST MEDICAL HISTORY:  Coronary artery disease, status post multiple LAD PCI stents, latest in July 2024; hypertension; hyperlipidemia; diabetes mellitus type 2; generalized osteoarthritis; osteoporosis.     PAST SURGICAL HISTORY:  Right carpal tunnel release, cataract procedure, hysterectomy, left breast lumpectomy for breast cancer plus radiation therapy, tonsillectomy, and right total knee arthroplasty.      REVIEW OF SYSTEMS:  As mentioned above, multiple episodes of midsternal chest tightness.  Comes with activity.  Goes away with rest.  Yesterday, she had 2 to 3 hours of episode of midsternal chest tightness without radiation to her left shoulder and left jaw.       PHYSICAL EXAMINATION:    GENERAL:  Alert and oriented to time, place and person.  No acute distress.   VITAL SIGNS:  Temperature 98.1, pulse 81, respiratory rate 18, blood pressure 131/64, pulse ox 97% on room air.  HEENT:  Atraumatic.  Oropharynx clear.  Moist mucous membranes.  Ears and nose normal.  Eyes:  Anicteric sclerae.   NECK:  Supple.  No  lymphadenopathy.  Trachea midline.  Full range of motion.   LUNGS:  Clear to auscultation bilaterally.  Normal respiratory effort.   HEART:  Regular rate and rhythm.  S1 and S2 auscultated.  No murmur.    ABDOMEN:  Soft, nondistended.  No tenderness.  Positive bowel sounds.   EXTREMITIES:  No peripheral edema, clubbing or cyanosis.   NEUROLOGIC:  Motor and sensory intact.     ASSESSMENT:    1.       Unstable angina.  2.       Coronary artery disease.  3.       Diabetes mellitus type 2.  4.       Essential hypertension.     PLAN:  Patient will be admitted to telemetry floor.  Continue to trend troponin level.  Obtain cardiology consult.  Probably, patient will need another cardiac angiogram.  Monitor Accu-Cheks.               4/8/25     S: Patient here with chest heaviness  Discussed + stress test and plans for angiogram     Temp:  [98.1 °F (36.7 °C)-98.5 °F (36.9 °C)] 98.5 °F (36.9 °C)  Pulse:  [77-84] 81  Resp:  [14-23] 18  BP: ()/(48-70) 133/48  SpO2:  [95 %-99 %] 99 %     Physical Exam:    General: Alert          Respiratory: Clear to auscultation bilaterally. No wheezes. No rhonchi.  Cardiovascular: S1, S2. Regular rate and rhythm. No murmurs, rubs or gallops.   Abdomen: Soft, nontender, nondistended.  Positive bowel sounds. No rebound or guarding.  Neurologic: No focal neurological deficits.   Musculoskeletal: Moves all extremities.  Extremities: No edema.     Labs Last 24 Hours:                 BMP         CBC     Na 137 Cl 102 BUN 20 Glu 209     Hb 14.6     K 3.7 CO2 25.0 Cr 0.83     WBC 11.1 >< .0   Renal Lytes Endo       Hct 44.9     eGFR - Ca 9.6 POC Gluc  270       LFT        Assessment & Plan:      Unstable Angina  CAD  - Nuclear stress test today with abnormal myocardial perfusion study with medium sized mild intensity reversible defect  -Plan angiogram today  -History of CAD with history of PCI in 2024 in 2018  -Continue aspirin, Brilinta, statin     DMII  - Hemoglobin A1c 8.8  -Home  medications include Jardiance 25 mg daily, glipizide 10 mg twice daily, metformin 500 twice daily along with Ozempic  -Plan outpatient follow-up for further titration of medications     HTN  - Well-controlled      Quality:  DVT Prophylaxis: Heparin                   CARDIOLOGY  Subjective:  Pt stated she had some chest pressure during the stress test this am and after.   Assessment/Plan:     Chest pain  - EKG without acute ischemic changes  - trop negative  - s/p stress test today: nuclear -with abnormal myocardial perfusion study with small medium sized mild intensity reversible defect involving apical anteroseptal segment and apex     H/o CAD   -  s/p LHC, COR, LV and IVUS guided shock wave and PCI and prox LAD 7/2024  - s/p PCI of LAD and diagonal bifurcation 2020/2018  - on aspirin, brilinta and atorvastatin      HLD  - LDL 47 9/2024  - on statin     HTN  - well controlled on losartan/hydrochlorothiazide     DM 2  - A1c 8.8     Plan;  - continue current medications   - check lipid panel    Stress test result reviewed with Dr Bray and pt. Plan for LHC today      PM rounds addendum:       Clinical summary:  Presented with unstable angina, abnormal stress test  Angiogram as above      MEDICATIONS ADMINISTERED IN LAST 1 DAY:  aspirin chewable tab 243 mg       Date Action Dose Route User    4/8/2025 1156 Given 243 mg Oral Reyes Soto, Oscar, RN          atorvastatin (Lipitor) tab 80 mg       Date Action Dose Route User    4/8/2025 2128 Given 80 mg Oral Miracle Mcrae RN          docusate sodium (Colace) cap 100 mg       Date Action Dose Route User    4/8/2025 2129 Given 100 mg Oral Miracle Mcrae RN    4/8/2025 0951 Given 100 mg Oral Reyes Soto, Oscar, RN          losartan (Cozaar) 50 mg, hydroCHLOROthiazide 12.5 mg for Hyzaar 50/12.5 ([facility] only)       Date Action Dose Route User    4/8/2025 0951 Given (none) Oral Reyes Soto, Oscar, RN          heparin (Porcine) 5000 UNIT/ML injection 5,000 Units       Date Action  Dose Route User    4/8/2025 0951 Given 5,000 Units Subcutaneous (Left Upper Abdomen) Reyes Soto, Oscar, RN          insulin aspart (NovoLOG) 100 Units/mL FlexPen 1-7 Units       Date Action Dose Route User    4/8/2025 1200 Given 4 Units Subcutaneous (Left Lower Abdomen) Reyes Soto, Oscar, RN    4/8/2025 0800 Given 1 Units Subcutaneous (Left Upper Arm) Reyes Soto, Oscar, RN     letrozole (Femara) tab 2.5 mg       Date Action Dose Route User    4/8/2025 0951 Given 2.5 mg Oral Reyes Soto, Oscar, RN       ticagrelor (Brilinta) tab 90 mg       Date Action Dose Route User    4/8/2025 0951 Given 90 mg Oral Reyes Soto, Oscar, RN          ticagrelor (Brilinta) tab 90 mg       Date Action Dose Route User    4/9/2025 0550 Given 90 mg Oral Miracle Mcrae RN            Vitals (last day)       Date/Time Temp Pulse Resp BP SpO2 Weight O2 Device O2 Flow Rate (L/min) [facility]    04/09/25 0543 98.2 °F (36.8 °C) 79 18 130/64 97 % 186 lb 11.2 oz (84.7 kg) None (Room air) -- MM    04/08/25 2015 98.2 °F (36.8 °C) 81 18 117/50 97 % -- None (Room air) -- MM    04/08/25 1845 -- 81 18 109/95 98 % -- None (Room air) -- OR    04/08/25 1815 -- 76 18 127/57 98 % -- None (Room air) -- OR    04/08/25 1800 -- 76 22 113/63 95 % -- None (Room air) -- OR    04/08/25 1555 98.3 °F (36.8 °C) 82 18 131/64 98 % -- None (Room air) -- OR    04/08/25 1155 98.5 °F (36.9 °C) 81 18 133/48 99 % -- None (Room air) -- OR    04/08/25 0943 98.3 °F (36.8 °C) 77 18 131/62 99 % -- None (Room air) -- OR    04/08/25 0515 -- -- -- -- -- 190 lb 3.2 oz (86.3 kg) -- -- ZA    04/08/25 0515 98.4 °F (36.9 °C) 77 20 149/59 98 % -- None (Room air) -- MI

## 2025-04-09 NOTE — PLAN OF CARE
Patient feels good, ambulating in the black-asymptomatic. R wrist is clean dry and intact no signs or symptoms of a hematoma. Patient medically cleared for discharge. Tele and IV removed. Discharge education completed.   Problem: CARDIOVASCULAR - ADULT  Goal: Maintains optimal cardiac output and hemodynamic stability  Description: INTERVENTIONS:- Monitor vital signs, rhythm, and trends- Monitor for bleeding, hypotension and signs of decreased cardiac output- Evaluate effectiveness of vasoactive medications to optimize hemodynamic stability- Monitor arterial and/or venous puncture sites for bleeding and/or hematoma- Assess quality of pulses, skin color and temperature- Assess for signs of decreased coronary artery perfusion - ex. Angina- Evaluate fluid balance, assess for edema, trend weights  Outcome: Adequate for Discharge  Goal: Absence of cardiac arrhythmias or at baseline  Description: INTERVENTIONS:- Continuous cardiac monitoring, monitor vital signs, obtain 12 lead EKG if indicated- Evaluate effectiveness of antiarrhythmic and heart rate control medications as ordered- Initiate emergency measures for life threatening arrhythmias- Monitor electrolytes and administer replacement therapy as ordered  Outcome: Adequate for Discharge     Problem: PAIN - ADULT  Goal: Verbalizes/displays adequate comfort level or patient's stated pain goal  Description: INTERVENTIONS:- Encourage pt to monitor pain and request assistance- Assess pain using appropriate pain scale- Administer analgesics based on type and severity of pain and evaluate response- Implement non-pharmacological measures as appropriate and evaluate response- Consider cultural and social influences on pain and pain management- Manage/alleviate anxiety- Utilize distraction and/or relaxation techniques- Monitor for opioid side effects- Notify MD/LIP if interventions unsuccessful or patient reports new pain- Anticipate increased pain with activity and pre-medicate  as appropriate  Outcome: Adequate for Discharge     Problem: SAFETY ADULT - FALL  Goal: Free from fall injury  Description: INTERVENTIONS:- Assess pt frequently for physical needs- Identify cognitive and physical deficits and behaviors that affect risk of falls.- [place] fall precautions as indicated by assessment.- Educate pt/family on patient safety including physical limitations- Instruct pt to call for assistance with activity based on assessment- Modify environment to reduce risk of injury- Provide assistive devices as appropriate- Consider OT/PT consult to assist with strengthening/mobility- Encourage toileting schedule  Outcome: Adequate for Discharge     Problem: Patient Centered Care  Goal: Patient preferences are identified and integrated in the patient's plan of care  Description: Interventions:- What would you like us to know as we care for you? - Provide timely, complete, and accurate information to patient/family- Incorporate patient and family knowledge, values, beliefs, and cultural backgrounds into the planning and delivery of care- Encourage patient/family to participate in care and decision-making at the level they choose- Honor patient and family perspectives and choices  Outcome: Adequate for Discharge     Problem: Diabetes/Glucose Control  Goal: Glucose maintained within prescribed range  Description: INTERVENTIONS:- Monitor Blood Glucose as ordered- Assess for signs and symptoms of hyperglycemia and hypoglycemia- Administer ordered medications to maintain glucose within target range- Assess barriers to adequate nutritional intake and initiate nutrition consult as needed- Instruct patient on self management of diabetes  Outcome: Adequate for Discharge

## 2025-04-10 ENCOUNTER — PATIENT OUTREACH (OUTPATIENT)
Dept: CASE MANAGEMENT | Age: 85
End: 2025-04-10

## 2025-04-10 NOTE — PROGRESS NOTES
Hospital Follow up for diabetes (Discharge 4/9 elm)   Last A1C Value: 8.8% Date: [4/7/2025]     PCP  D'Amico, Jeff Anthony  [address]   [facility] [address]   [facility] [id number] [phone]   Endocrinology   Justine Jones ; follow up 1-3w  [address]   [facility] [address]   [facility] [id number] [phone]   Attempt #1:  Left message on voicemail for patient to call transitions specialist back to schedule follow up appointments. Provided Transitions specialist scheduling phone number [phone].

## 2025-04-11 ENCOUNTER — TELEPHONE (OUTPATIENT)
Dept: INTERNAL MEDICINE CLINIC | Facility: CLINIC | Age: 85
End: 2025-04-11

## 2025-04-11 ENCOUNTER — TELEPHONE (OUTPATIENT)
Dept: ENDOCRINOLOGY CLINIC | Facility: CLINIC | Age: 85
End: 2025-04-11

## 2025-04-11 NOTE — PROGRESS NOTES
Hospital Follow up for diabetes (Discharge 4/9 elm)   Last A1C Value: 8.8% Date: [4/7/2025]      PCP  D'Amico, Jeff Anthony  [address] [place] [id number]  [phone]  Dr. Jeff D'Amico's office will contact the pt directly to schedule due to no availability within needed time frame  (pt does have an existing appt Tuesday 4/29 @1pm w/ Dr. Jeff D'Amico)      Endocrinology   Justine Jones ; follow up 1-3w  [address] [facility] [address]   [facility] [address]   [phone]   Dr. Justine Jones's office will contact the pt directly to schedule due to no availability within needed time frame      Called pt to inform that both MD's office's will reach out to her directly to schedule her HFU appts.      No further assistance needed      Closing encounter

## 2025-04-11 NOTE — PROGRESS NOTES
Transitions of Care Navigation  Discharge Date: 25  Contact Date: 2025    Transitions of Care Assessment:  DULCE Initial Assessment    General:  Assessment completed with: Patient  Patient Subjective: NCM spoke with patient state sis feeling better. Patient has been sleeping more than usual. She feels tired as well. Patient denies any chest pain, shortness of breath, fevers, chills, nausea, vomiting, dizziness, feeling lightheaded or any other symptoms at this time. Patient states has cardiology appointment scheduled for 25. She denies any questions or concerns at this time.  Chief Complaint: chest heaviness  Verify patient name and  with patient/ caregiver: Yes    Hospital Stay/Discharge:  Tell me what you understand of why you were in the hospital or emergency department: Patient reports to ED with chest heaviness and dizziness.  Prior to leaving the hospital were your Discharge Instructions reviewed with you?: Yes  Did you receive a copy of your written Discharge Instructions?: Yes  What questions do you have about your Discharge Instructions?: none  Do you feel better or worse since you left the hospital or emergency department?: Better    Follow - Up Appointment:  Do you have a follow-up appointment?: Yes  Date: 25  Physician: PCP  Are there any barriers to getting to your follow-up appointment?: No    Home Health/DME:  Prior to leaving the hospital was Home Health (HH) arranged for you?: N/A  Are HH needs identified by staff during the assessment?: No     Prior to leaving the hospital or emergency department was Durable Medical Equipment (DME), medical supplies, or infusions arranged for you?: N/A  Are DME/medical supply/infusions needs identified by staff during this assessment?: No     Medications/Diet:  Did any of your medications change, during or after your hospital stay or ED visit?: Yes  Do you have your new or updated medications?: Yes  Do you understand what your medications are  for and possible side effects?: Yes  Are there any reasons that keep you from taking your medication as prescribed?: No  Any concerns about medication refills?: No    Were you given a different diet per your Discharge Instructions?: No  Reason: n/a     Questions/Concerns:  Do you have any questions or concerns that have not been discussed?: No   DULCE Follow-up Assessment    General:  Assessment completed with: Patient  Patient Subjective: NCM spoke with patient state sis feeling better. Patient has been sleeping more than usual. She feels tired as well. Patient denies any chest pain, shortness of breath, fevers, chills, nausea, vomiting, dizziness, feeling lightheaded or any other symptoms at this time. Patient states has cardiology appointment scheduled for 4/14/25. She denies any questions or concerns at this time.  Chief Complaint: chest heaviness    Nursing Interventions:  All discharge instructions reviewed with the patient. Reviewed when to call MD vs when to call 911 or go the ED. Educated patient on the importance of taking all meds as prescribed as well as close f/u with PCP/specialists. Pt verbalized understanding and will contact the office with any further questions or concerns. Patient denies fevers, chills, nausea, vomiting, shortness of breath, chest pain, or any other symptoms at this time.  NCM attempted to schedule HFU, patient declined will call PCP/TCC office directly. NC sent TE to PCP office re: assistance in scheduling HFU appt. NCM provided contact information for any further questions/concerns. Patient verbalized understanding and agreeable.         Medications:  Medication Reconciliation:  I am aware of an inpatient discharge within the last 30 days.  The discharge medication list has been reconciled with the patient's current medication list and reviewed by me. See medication list for additions of new medication, and changes to current doses of medications and discontinued  medications.  Current Medications[1]        Follow-up Appointments:  Your appointments       Date & Time Appointment Department ([place])    Apr 15, 2025 11:15 AM CDT Injection with EMA LAB [facility] ([facility])        Apr 29, 2025 1:00 PM CDT MA Supervisit with D'Amico, Jeff Anthony, DO [facility] ([facility])        May 05, 2025 11:00 AM CDT [place] [facility] Orientation with [facility] CARD ORIENTATION [facility]iop[facility] Rehab ([facility])        May 20, 2025 12:00 PM CDT HEMATOLOGY ONCOLOGY FOLLOW UP with Ashley Bonner MD [facility] Hematology Oncology [place] ([facility])        Sep 09, 2025 11:20 AM CDT DIAGNOSTIC MAMMOGRAM with [address] Mammography [facility] ([facility])    Please bring a copy of all your prior outside breast imaging at least 72 hours prior to your appointment. You may drop off your prior images at the location of your upcoming mammogram. If we do not have your prior outside breast images at the time of your appointment, your appointment may be rescheduled.    You may be subject to a fee if you do not show up for your appointment or you cancel within 24 hours of your appointment.    Please arrive 15 minutes prior to your appointment. If you are late to your appointment, you will be rescheduled.    If breastfeeding, pump or breastfeed one hour prior to your appointment time.    Continuous glucose monitors must be removed so the appointment should be scheduled near the normal replacement date.    Do NOT use perfumes, deodorant, talcum powder, lotions, or creams on your breasts or underarms. They leave a coating that may be picked up by the x-rays, thereby distorting the mammogram.    Wear a two piece outfit the day  of the exam. This allows you to be more comfortable during the exam.    There are no eating or activity restrictions for this exam.    The estimated duration of this exam could take up to 2 hours if an ultrasound is required. If you have questions regarding this exam, please contact a mammography technologist at [facility] at [phone] or [facility] at [phone].            [facility] Card[facility] Rehab  [facility]  [address] [facility] [id number] [phone] [facility] Mammography [facility]  [address] [facility] [address]  [phone] [facility], [facility]  [address] [facility] [phone]    Tatyana MARCO ANTONIONaima [facility] Hematology Oncology [facility]  [address] [facility] [address]  [facility] [address]  [phone]            [facility]  Was TCC Ordered: No      Primary Care Provider (If no TCC appointment)  Does patient already have a PCP appointment scheduled? No  Nurse Care Manager Attempted to schedule PCP office ER Follow-up appointment with patient   -If no appointment scheduled: Explain : pt declined appt.     Specialist  Does the patient have any other follow-up appointment(s) need to be scheduled? Yes   -If yes: Nurse Care Manager reviewed upcoming specialist appointments with patient: Yes   -Does the patient need assistance scheduling appointment(s): No        Book By Date: 4/16/25           [1]   Current Outpatient Medications   Medication Sig Dispense Refill    ATORVASTATIN 80 MG Oral Tab Take 1 tablet (80 mg total) by mouth nightly. 30 tablet 1    Vibegron (GEMTESA) 75 MG Oral Tab Take 1 tablet by mouth at bedtime.      docusate sodium 100 MG Oral Cap Take 1 capsule (100 mg total) by mouth in the morning and 1 capsule (100 mg total)  before bedtime.      METFORMIN  MG Oral Tablet 24 Hr TAKE 2 TABLETS BY MOUTH TWICE A  tablet 1    letrozole 2.5 MG Oral Tab Take 1 tablet (2.5 mg total) by mouth daily. 90 tablet 3    semaglutide (OZEMPIC, 0.25 OR 0.5 MG/DOSE,) 2 MG/3ML Subcutaneous Solution Pen-injector Inject 0.5 mg into the skin once a week. (Patient taking differently: Inject 0.5 mg into the skin once a week. Per pt injecting dose on Mondays) 9 mL 1    glipiZIDE 10 MG Oral Tab Take 1 tablet (10 mg total) by mouth 2 (two) times daily before meals. 180 tablet 3    empagliflozin (JARDIANCE) 25 MG Oral Tab Take 1 tablet (25 mg total) by mouth daily. 90 tablet 3    ALPRAZolam 0.25 MG Oral Tab Take 1 tablet (0.25 mg total) by mouth 3 (three) times daily as needed. 90 tablet 2    ticagrelor 90 MG Oral Tab Take 1 tablet (90 mg total) by mouth every 12 (twelve) hours. 180 tablet 3    LOSARTAN-HYDROCHLOROTHIAZIDE 50-12.5 MG Oral Tab TAKE 1 TABLET BY MOUTH EVERY DAY 90 tablet 3    Multiple Vitamins-Minerals (CENTRUM SILVER ULTRA WOMENS) Oral Tab Take 1 tablet by mouth in the morning.      aspirin 81 MG Oral Tab Take 1 tablet (81 mg total) by mouth at bedtime.

## 2025-04-11 NOTE — PAYOR COMM NOTE
--------------  DISCHARGE REVIEW    Payor: [employer]  Subscriber #:  [id number]  Authorization Number: [id number]    Admit date: 25  Admit time:   5:59 PM  Discharge Date: 2025  1:08 PM     Admitting Physician: Radha Goodman MD  Attending Physician:  No att. providers found  Primary Care Physician: D'Amico, Jeff Anthony, DO          Discharge Summary Notes        Discharge Summary signed by Milton Cristina MD at 2025 12:06 PM       Author: Milton Cristina MD Specialty: HOSPITALIST, HOSPITALIST Author Type: Physician    Filed: 2025 12:06 PM Date of Service: 2025 12:03 PM Status: Signed    : Milton Cristina MD (Physician)           [facility]  part of [facility]    Discharge Summary    Bijal Perez Patient Status:  Inpatient    1940 MRN [id number]   Location [facility] 3W/ Attending Milton Cristina MD   Hosp Day # 1 PCP Jeff Anthony D'Amico, DO     Date of Admission: 2025 Disposition:   Home or Self Care     Date of Discharge:   2025     Admitting Diagnosis:   Dizziness [R42]  Chest heaviness [R07.89]    Hospital Discharge Diagnoses:    Unstable Angina  CAD  Abnormal stress test  History of CAD with history of PCI in  in   S/p C 2025 with TRUONG to LAD and TRUONG to RCA  Hx of HTN  Hx of DM2      Problem List:     Problem List[1]    Physical Exam:      /55 (BP Location: Left arm)   Pulse 93   Temp 97.3 °F (36.3 °C) (Oral)   Resp 18   Ht 65\"   Wt 186 lb 11.2 oz (84.7 kg)   LMP  (LMP Unknown)   SpO2 98%   BMI 31.07 kg/m²     Gen:  NAD.  A and O x  3  CV:   RRR.  No m/g/r  Pulm:   CTA bilat  Abd:   +bs, soft, NT, ND  LE:  No c/c/e  Neuro:  nonfocal      Reason for Admission:   Chest pain, possible unstable angina.     HISTORY OF PRESENT ILLNESS:  Patient is an 84-year-old  female with multiple prior LAD PCI stents.  Came in today to the emergency department for evaluation of 3 to 4 days of  intermittent midsternal chest tightness that comes with activity and goes away with rest.  CBC, chemistry, and troponin were unremarkable.  EKG showed normal sinus rhythm with no acute ST-T changes.  Chest x-ray still pending.  Patient reports multiple episodes of midsternal chest tightness that comes with activity and goes away with rest.  Lasts around 15 to 20 minutes at a time.  Last episode was yesterday, started around 5 p.m. and ended around 10 p.m. without activity.  Patient came in today for evaluation.  Currently chest pain-free.  CBC showed white blood cell count of 11.3 with left shift.  Chemistry and troponin were negative.     Hospital Course:     Unstable Angina  CAD  History of CAD with history of PCI in 2024 in 2018  - Nuclear stress test this admit with abnormal myocardial perfusion study with medium sized mild intensity reversible defect  Pt went for angiogram on 4/8/25 with TRUONG to LAD and TRUONG to RCA.  Pt tolerated procedure well.  Cards and PCP f/u.  -Continue aspirin, Brilinta, statin     DMII  - Hemoglobin A1c 8.8  Cont home meds  -Plan outpatient follow-up for further titration of medications     HTN  Controlled.  Cont meds     Quality:  DVT Prophylaxis: Heparin  CODE status: Full code         Consultations:   Cardiology     Discharge Condition:  Good    Discharge Medications:      Discharge Medications        START taking these medications        Instructions Prescription details   atorvastatin 80 MG Tabs  Commonly known as: Lipitor  Replaces: atorvastatin 40 MG Tabs      Take 1 tablet (80 mg total) by mouth nightly.   Quantity: 30 tablet  Refills: 1            CONTINUE taking these medications        Instructions Prescription details   ALPRAZolam 0.25 MG Tabs  Commonly known as: Xanax      Take 1 tablet (0.25 mg total) by mouth 3 (three) times daily as needed.   Quantity: 90 tablet  Refills: 2     aspirin 81 MG Tabs      Take 1 tablet (81 mg total) by mouth at bedtime.   Refills: 0      Centrum Silver Ultra Womens Tabs      Take 1 tablet by mouth daily.   Refills: 0     docusate sodium 100 MG Caps  Commonly known as: Colace      Take 1 capsule (100 mg total) by mouth 2 (two) times daily.   Refills: 0     empagliflozin 25 MG Tabs  Commonly known as: Jardiance      Take 1 tablet (25 mg total) by mouth daily.   Quantity: 90 tablet  Refills: 3     Gemtesa 75 MG Tabs  Generic drug: Vibegron      Take 1 tablet by mouth at bedtime.   Refills: 0     glipiZIDE 10 MG Tabs  Commonly known as: Glucotrol      Take 1 tablet (10 mg total) by mouth 2 (two) times daily before meals.   Quantity: 180 tablet  Refills: 3     letrozole 2.5 MG Tabs  Commonly known as: Femara      Take 1 tablet (2.5 mg total) by mouth daily.   Quantity: 90 tablet  Refills: 3     losartan-hydroCHLOROthiazide 50-12.5 MG Tabs  Commonly known as: Hyzaar      TAKE 1 TABLET BY MOUTH EVERY DAY   Quantity: 90 tablet  Refills: 3     metFORMIN  MG Tb24  Commonly known as: Glucophage XR      TAKE 2 TABLETS BY MOUTH TWICE A DAY   Quantity: 360 tablet  Refills: 1     Ozempic (0.25 or 0.5 MG/DOSE) 2 MG/3ML Sopn  Generic drug: semaglutide      Inject 0.5 mg into the skin once a week.   Quantity: 9 mL  Refills: 1     ticagrelor 90 MG Tabs  Commonly known as: Brilinta      Take 1 tablet (90 mg total) by mouth every 12 (twelve) hours.   Quantity: 180 tablet  Refills: 3            STOP taking these medications      atorvastatin 40 MG Tabs  Commonly known as: Lipitor  Replaced by: atorvastatin 80 MG Tabs                  Where to Get Your Medications        These medications were sent to [facility]/pharmacy #[id number] - [address] AT [facility], [phone], [phone] [address] [place] [id number]      Phone: [phone]   atorvastatin 80 MG Tabs         Follow up Visits:     Follow-up Information       Justine Jones MD. Schedule an appointment as soon as possible for a visit.    Specialty: ENDOCRINOLOGY  Why:  Diabetes follow up within 1-3 weeks  Contact information:  [address]  [address]  [facility] [address]  [phone]               D'Amico, Jeff Anthony, DO. Schedule an appointment as soon as possible for a visit in 1 week(s).    Specialty: Internal Medicine  Contact information:  [address] [facility] [address]  [phone]               Silvino Bray MD Follow up in 1 week(s).    Specialties: Interventional, Cardiology, CARDIOLOGY  Why: Office will call to schedule  Contact information:  [address]  [address]  [facility] [address]  [phone]                             Hospital Discharge Diagnoses:  CAD    Lace+ Score: 55  59-90 High Risk  29-58 Medium Risk  0-28   Low Risk.    TCM Follow-Up Recommendation:  LACE > 58: High Risk of readmission after discharge from the hospital.    >35 minutes spent preparing this discharge.    Milton Callejas MD  4/9/2025  12:03 PM       Electronically signed by Milton Cristina MD on 4/9/2025 12:06 PM         REVIEWER COMMENTS         [1]   Patient Active Problem List  Diagnosis    Type 2 diabetes mellitus without complication, without long-term current use of insulin ([facility])    Hypertension, benign    Hyperlipidemia    CAD (coronary artery disease)    History of heart artery stent    Primary osteoarthritis involving multiple joints    B12 deficiency    Achilles tendinitis of right lower extremity    Sternum pain    Neck pain    CKD (chronic kidney disease) stage 3, GFR 30-59 ml/min ([facility])    History of breast cancer    Chronic pain of right knee    Frequent UTI    History of cellulitis    History of fall    Chest heaviness    Dizziness    Unstable angina ([facility])

## 2025-04-11 NOTE — TELEPHONE ENCOUNTER
[facility] Scheduling called to schedule hospital follow up appointment for patient.  No appointments available.  Please call patient to schedule.

## 2025-04-11 NOTE — TELEPHONE ENCOUNTER
[facility] scheduling department called to assist patient with making post hospital visit.  Patient discharged 4/9/2025.    Patient has appointment 4/29/2025 for her Medicare Physical.  Would Dr. Damico like to see patient sooner for hospital follow up.    No open appointments sooner at this time.   Please advise where to schedule patient.    Call patient back at [phone]

## 2025-04-11 NOTE — TELEPHONE ENCOUNTER
Spoke to patient for Transitions of Care call today.  Patient does not have an appointment scheduled at this time.  ER Follow-up appointment needed by 4/16/25.  Please advise.    BOOK BY DATE: 4/16/25    Clinical staff:  Please follow-up with patient and try to get them to schedule as patient would greatly benefit from ER Follow-up.  Thank you!       Pt declined appointment. JOSHI.

## 2025-04-14 NOTE — TELEPHONE ENCOUNTER
We can add on a 330 tomorrow  on 4/15 to my schedule.  Offer her this, let me know if she cannot make it.

## 2025-04-15 ENCOUNTER — OFFICE VISIT (OUTPATIENT)
Dept: INTERNAL MEDICINE CLINIC | Facility: CLINIC | Age: 85
End: 2025-04-15

## 2025-04-15 VITALS
BODY MASS INDEX: 31.99 KG/M2 | TEMPERATURE: 98 F | SYSTOLIC BLOOD PRESSURE: 130 MMHG | HEIGHT: 65 IN | HEART RATE: 83 BPM | DIASTOLIC BLOOD PRESSURE: 62 MMHG | WEIGHT: 192 LBS | OXYGEN SATURATION: 98 %

## 2025-04-15 DIAGNOSIS — I20.0 UNSTABLE ANGINA (HCC): Primary | ICD-10-CM

## 2025-04-15 DIAGNOSIS — Z95.5 HISTORY OF HEART ARTERY STENT: ICD-10-CM

## 2025-04-15 DIAGNOSIS — E11.9 TYPE 2 DIABETES MELLITUS WITHOUT COMPLICATION, WITHOUT LONG-TERM CURRENT USE OF INSULIN (HCC): ICD-10-CM

## 2025-04-15 DIAGNOSIS — N18.31 STAGE 3A CHRONIC KIDNEY DISEASE (HCC): Chronic | ICD-10-CM

## 2025-04-15 DIAGNOSIS — R42 DIZZINESS: ICD-10-CM

## 2025-04-15 PROBLEM — R07.89 CHEST HEAVINESS: Status: RESOLVED | Noted: 2025-04-07 | Resolved: 2025-04-15

## 2025-04-15 PROCEDURE — 1160F RVW MEDS BY RX/DR IN RCRD: CPT | Performed by: INTERNAL MEDICINE

## 2025-04-15 PROCEDURE — 1111F DSCHRG MED/CURRENT MED MERGE: CPT | Performed by: INTERNAL MEDICINE

## 2025-04-15 PROCEDURE — 1126F AMNT PAIN NOTED NONE PRSNT: CPT | Performed by: INTERNAL MEDICINE

## 2025-04-15 PROCEDURE — 96372 THER/PROPH/DIAG INJ SC/IM: CPT | Performed by: INTERNAL MEDICINE

## 2025-04-15 PROCEDURE — 1159F MED LIST DOCD IN RCRD: CPT | Performed by: INTERNAL MEDICINE

## 2025-04-15 PROCEDURE — 99214 OFFICE O/P EST MOD 30 MIN: CPT | Performed by: INTERNAL MEDICINE

## 2025-04-15 RX ADMIN — CYANOCOBALAMIN 1000 MCG: 1000 INJECTION, SOLUTION INTRAMUSCULAR; SUBCUTANEOUS at 16:10:00

## 2025-04-15 NOTE — PROGRESS NOTES
monthly b12 injection:  Patient name and  verified. Order active in Epic, last admin date 3/4/2025. Patient tolerated well.

## 2025-04-15 NOTE — PATIENT INSTRUCTIONS
1. Unstable angina (HCC)  Stable, cont monitoring and management, followup with dr willard.      2. Dizziness  Stable cont monitoring and management    3. History of heart artery stent  Completed intervention, 2 stents and cont medications    4. Type 2 diabetes mellitus without complication, without long-term current use of insulin (HCC)  Stable cont monitoring and management, let talk to dr. Jones, I do like the idea of the tirzepatide advantage over the Ozempic, but you can certainly talk to her about this, and find with the probiotic and proprietary blends that you do want to take here, and lets stay on the other medications for now, I am hoping you agree to getting a higher level lori here.  That certainly can help    5. Stage 3a chronic kidney disease (HCC)  Stable cont monitoring and management

## 2025-04-15 NOTE — PROGRESS NOTES
HPI:   Bijal Perez is a 84 year old female who presents for complains of:   Chief Complaint   Patient presents with    Hospital F/U     Still feels very tired, Headaches (getting relief with Tylenol)      Coronary artery disease: Patient recently and chest pain, dizziness symptoms, and repeated over the weekend, she eventually presented to the hospital after she called her cardiologist team, was directed to present, she underwent testing, was noted to have a acute coronary event, and she was taken to the Cath Lab shortly after, there she underwent PTCA and stent x 2, stabilized improved and was discharged home in stable condition.  She claims her symptoms are resolved, her medications have been adjusted, she is wondering about her blood sugars.    Diabetes mellitus type 2: Patient did have a recently elevated hemoglobin A1c of 8.8, we did discuss her diabetic control, and she does currently see an endocrinologist, she does not have a great explanation on why she lost control of her diabetic numbers, claims she does not have a blood sugar test her at home, we did discuss options there, she does have a visit with her endocrinologist on Friday.    EXAM:   /62   Pulse 83   Temp 97.9 °F (36.6 °C) (Oral)   Ht 5' 5\" (1.651 m)   Wt 192 lb (87.1 kg)   LMP  (LMP Unknown)   SpO2 98%   BMI 31.95 kg/m²   Body mass index is 31.95 kg/m².   Gen. exam: Alert and oriented, in no acute distress   HEENT: Pupils equal and reactive to light and accommodation, moist mucous membranes  Neck exam:  Supple.  Normal thyroid trachea midline, no JVD  Heart exam: Regular rate and rhythm no murmurs no S3 no S4   Lung exam: No rales no rhonchi no wheezes  Abdominal exam: Soft, nontender, nondistended, positive bowel sounds are normoactive  Extremities exam: no clubbing, no cyanosis, no edema  Skin exam: No obvious wounds, no rashes  Neurological exam: Cranial nerves II through XII intact, no gross deficits  Musculoskeletal exam:  Moderate bilateral generalized hand arthritis appreciated, no obvious deformity    ASSESSMENT AND PLAN:   Bijal Perez is a 84 year old female who presents with the followin. Unstable angina (HCC)  Stable, cont monitoring and management, followup with dr willard.      2. Dizziness  Stable cont monitoring and management    3. History of heart artery stent  Completed intervention, 2 stents and cont medications    4. Type 2 diabetes mellitus without complication, without long-term current use of insulin (HCC)  Stable cont monitoring and management, let talk to dr. Jones, I do like the idea of the tirzepatide advantage over the Ozempic, but you can certainly talk to her about this, and find with the probiotic and proprietary blends that you do want to take here, and lets stay on the other medications for now, I am hoping you agree to getting a higher level lori here.  That certainly can help    5. Stage 3a chronic kidney disease (HCC)  Stable cont monitoring and management        Spent 30 minutes obtaining history, evaluating patient, discussing treatment options, diet, exercise, review of available labs and radiology reports, and completing documentation.    Jeff Anthony D'Amico,   4/15/2025  4:55 PM

## 2025-04-29 ENCOUNTER — OFFICE VISIT (OUTPATIENT)
Dept: INTERNAL MEDICINE CLINIC | Facility: CLINIC | Age: 85
End: 2025-04-29

## 2025-04-29 VITALS
OXYGEN SATURATION: 99 % | WEIGHT: 191 LBS | TEMPERATURE: 98 F | HEART RATE: 80 BPM | SYSTOLIC BLOOD PRESSURE: 126 MMHG | DIASTOLIC BLOOD PRESSURE: 64 MMHG | BODY MASS INDEX: 31.82 KG/M2 | HEIGHT: 65 IN

## 2025-04-29 DIAGNOSIS — G89.29 CHRONIC PAIN OF RIGHT KNEE: ICD-10-CM

## 2025-04-29 DIAGNOSIS — Z87.2 HISTORY OF CELLULITIS: ICD-10-CM

## 2025-04-29 DIAGNOSIS — N39.0 FREQUENT UTI: ICD-10-CM

## 2025-04-29 DIAGNOSIS — M15.0 PRIMARY OSTEOARTHRITIS INVOLVING MULTIPLE JOINTS: ICD-10-CM

## 2025-04-29 DIAGNOSIS — R42 DIZZINESS: ICD-10-CM

## 2025-04-29 DIAGNOSIS — N18.31 STAGE 3A CHRONIC KIDNEY DISEASE (HCC): ICD-10-CM

## 2025-04-29 DIAGNOSIS — E11.9 TYPE 2 DIABETES MELLITUS WITHOUT COMPLICATION, WITHOUT LONG-TERM CURRENT USE OF INSULIN (HCC): ICD-10-CM

## 2025-04-29 DIAGNOSIS — E78.5 HYPERLIPIDEMIA, UNSPECIFIED HYPERLIPIDEMIA TYPE: ICD-10-CM

## 2025-04-29 DIAGNOSIS — Z95.5 HISTORY OF HEART ARTERY STENT: ICD-10-CM

## 2025-04-29 DIAGNOSIS — H61.21 IMPACTED CERUMEN OF RIGHT EAR: ICD-10-CM

## 2025-04-29 DIAGNOSIS — Z00.00 ENCOUNTER FOR ANNUAL HEALTH EXAMINATION: Primary | ICD-10-CM

## 2025-04-29 DIAGNOSIS — R53.83 OTHER FATIGUE: ICD-10-CM

## 2025-04-29 DIAGNOSIS — R05.1 ACUTE COUGH: ICD-10-CM

## 2025-04-29 DIAGNOSIS — E53.8 B12 DEFICIENCY: ICD-10-CM

## 2025-04-29 DIAGNOSIS — Z91.81 HISTORY OF FALL: ICD-10-CM

## 2025-04-29 DIAGNOSIS — I20.0 UNSTABLE ANGINA (HCC): ICD-10-CM

## 2025-04-29 DIAGNOSIS — F48.9 TENSION: ICD-10-CM

## 2025-04-29 DIAGNOSIS — M25.561 CHRONIC PAIN OF RIGHT KNEE: ICD-10-CM

## 2025-04-29 DIAGNOSIS — I10 HYPERTENSION, BENIGN: ICD-10-CM

## 2025-04-29 DIAGNOSIS — Z85.3 HISTORY OF BREAST CANCER: ICD-10-CM

## 2025-04-29 DIAGNOSIS — I25.110 CORONARY ARTERY DISEASE INVOLVING NATIVE CORONARY ARTERY OF NATIVE HEART WITH UNSTABLE ANGINA PECTORIS (HCC): ICD-10-CM

## 2025-04-29 PROBLEM — M54.2 NECK PAIN: Status: RESOLVED | Noted: 2022-07-25 | Resolved: 2025-04-29

## 2025-04-29 PROBLEM — M76.61 ACHILLES TENDINITIS OF RIGHT LOWER EXTREMITY: Status: RESOLVED | Noted: 2021-08-25 | Resolved: 2025-04-29

## 2025-04-29 PROBLEM — R07.89 STERNUM PAIN: Status: RESOLVED | Noted: 2022-04-08 | Resolved: 2025-04-29

## 2025-04-29 RX ORDER — ALPRAZOLAM 0.25 MG
0.25 TABLET ORAL 3 TIMES DAILY PRN
Qty: 90 TABLET | Refills: 2 | Status: SHIPPED | OUTPATIENT
Start: 2025-04-29

## 2025-04-29 RX ORDER — BENZONATATE 100 MG/1
100 CAPSULE ORAL 3 TIMES DAILY PRN
Qty: 30 CAPSULE | Refills: 2 | Status: SHIPPED | OUTPATIENT
Start: 2025-04-29

## 2025-04-29 NOTE — PATIENT INSTRUCTIONS
1. Encounter for annual health examination  Physical exam instruction: Improve diet and exercise    2. History of heart artery stent  Stable cont monitoring and management  - Detailed, Mod Complex (29979)    3. Type 2 diabetes mellitus without complication, without long-term current use of insulin (HCC)  Stable cont monitoring and management  - Detailed, Mod Complex (94846)    4. Tension  Stable cont monitoring and management  - ALPRAZolam 0.25 MG Oral Tab; Take 1 tablet (0.25 mg total) by mouth 3 (three) times daily as needed.  Dispense: 90 tablet; Refill: 2  - Detailed, Mod Complex (57324)    5. Stage 3a chronic kidney disease (HCC)  Stable cont monitoring and management  - Detailed, Mod Complex (94214)    6. Hypertension, benign  Stable cont monitoring and management  - Detailed, Mod Complex (72901)    7. Acute cough  Stable cont monitoring and management  - benzonatate 100 MG Oral Cap; Take 1 capsule (100 mg total) by mouth 3 (three) times daily as needed for cough.  Dispense: 30 capsule; Refill: 2  - Detailed, Mod Complex (31018)    8. Other fatigue  Stable cont monitoring and management  I like the idea of reducing back down to 40 mg of atorvastatin daily, and starting co-Q10 100 mg over-the-counter capsules daily.  This should minimize any side effects if your fatigue is due to the increase in medication, then lets start the cardiac rehab Monday or Tuesday, let me know if symptoms or not lifting.  The other medications should remain the same  - Detailed, Mod Complex (90245)    9. Impacted cerumen of right ear  Stable cont monitoring and management, resolved today, okay to use Q-tips at times as long as were careful making sure this is not more than once every few days or once a week.  - Removal Impacted Cerumen Requiring Instrumentation, Unilateral  - Detailed, Mod Complex (03232)    10. Hyperlipidemia, unspecified hyperlipidemia type  Stable cont monitoring and management  - Detailed, Mod Complex  (38443)    11. Coronary artery disease involving native coronary artery of native heart with unstable angina pectoris (HCC)  Stable cont monitoring and management  - Detailed, Mod Complex (58090)    12. Primary osteoarthritis involving multiple joints  Stable cont monitoring and management  - Detailed, Mod Complex (75607)    13. B12 deficiency  Stable cont monitoring and management  - Detailed, Mod Complex (81299)    14. History of breast cancer  Stable cont monitoring and management  - Detailed, Mod Complex (56060)    15. Chronic pain of right knee  Stable cont monitoring and management  - Detailed, Mod Complex (56172)    16. Frequent UTI  Stable cont monitoring and management  - Detailed, Mod Complex (44021)    17. History of cellulitis  Stable cont monitoring and management  - Detailed, Mod Complex (43471)    18. History of fall  Stable cont monitoring and management  - Detailed, Mod Complex (26280)    19. Dizziness  Stable cont monitoring and management  - Detailed, Mod Complex (77680)    20. Unstable angina (HCC)  Stable cont monitoring and management  - Detailed, Mod Complex (28603)

## 2025-04-29 NOTE — PROGRESS NOTES
Subjective:   Bijal Perez is a 84 year old female who presents for a MA AHA (Medicare Advantage Annual Health Assessment) and scheduled follow up of multiple significant but stable problems.   History of Present Illness            Patient presents for a Medicare Subsequent Annual Wellness visit and  followup regarding chronic medical problems and/or refills as listed below in the assessment and plan    Patient is here today for physical exam, patient is up-to-date with age-related standard of care screening and vaccination recommendations, this was discussed at length and they verbalized understanding of any deficiencies.    History of coronary artery disease: Patient has had multiple stents in her lifetime, the last being a few months ago as she came down with symptoms, underwent workup and treatment, underwent uneventful stenting, she does have cardiac rehab planned for next week, but claims she is still feeling fatigued, she is not having chest pain, but is not feeling the same after this set of stents.  She in the past claims she has recovered well after her coronary interventions but this she seems different.  She does not have any issues with her breathing, it is just her energy levels that she has complaints about.  She has been compliant with medications, in which she has taken before, they have restarted with the new coronary intervention.    Cerumen impaction: Patient claims she is have some eustachian tube type dysfunction sounds in the right ear, is feeling some crackling, some clicking at times, claims she is feeling this in the morning at times, and it seems to resolve throughout the day.  She does have a mild history of allergies, has retained cerumen in the right ear today.    History/Other:   Fall Risk Assessment:   She has been screened for Falls and is low risk.      Cognitive Assessment:   She had a completely normal cognitive assessment - see flowsheet entries     Functional  Ability/Status:   Bijal Perez has a completely normal functional assessment. See flowsheet for details.      Depression Screening (PHQ):  PHQ-2 SCORE: 0  , done 4/29/2025   Last [place] Suicide Screening on 4/29/2025 was No Risk.          Advanced Directives:   She does have a Living Will but we do NOT have it on file in Epic.    She does have a POA but we do NOT have it on file in Epic.    Discussed Advance Care Planning with patient (and family/surrogate if present). Standard forms made available to patient in After Visit Summary.      Problem List[1]  Allergies:  She is allergic to januvia [sitagliptin].    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  Surgical History:  She  has a past surgical history that includes hysterectomy (1997); Breast biopsy (Right, 2013); carpal tunnel release (Right); tonsillectomy; other surgical history (11/29/2017); cath cartotid stent (11/2017); colonoscopy (11/2018); colonoscopy (N/A, 11/12/2018); Cataract extraction w/  intraocular lens implant (Right, 11/14/2018); Cataract extraction (Left, 12/12/2018); cataract; cath drug eluting stent; radiation left (Left, 2021); nina biopsy stereo nodule 1 site right (cpt=19081) (Right, 2008); nina biopsy  stereo nodule 1 site right (cpt=19081) (Right, 2013); needle biopsy right (Right); needle biopsy left (Left, 01/06/2021); lumpectomy left (Left, 02/2021); total knee replacement (Right, 03/21/2024); and cath drug eluting stent (04/08/2025).   Family History:  Her family history includes Breast Cancer (age of onset: 79) in her self; Cataracts in an other family member; Diabetes in her father; Lupus in her mother; Neurological Disorder (age of onset: 76) in her father; Old age (age of onset: 92) in her mother.  Social History:  She  reports that she has never smoked. She has been exposed to tobacco smoke. She has never used smokeless tobacco. She reports that she does not drink alcohol and does not use drugs.    Tobacco:  She has never smoked tobacco.    CAGE Alcohol Screen:   CAGE screening score of 0 on 4/29/2025, showing low risk of alcohol abuse.      Patient Care Team:  D'Amico, Jeff Anthony, DO as PCP - General (Internal Medicine)  Wadas, Marie A, MD (Cardiovascular Diseases)  Justine Jones MD (ENDOCRINOLOGY)  Joyce Vasquez MD as Referring Physician (SURGERY, ORTHOPEDIC)  Savanna Rice MD (PULMONARY DISEASES)  Yanique Turner PTA as Physical Therapy Assistant (Physical Therapy)  Brenden Solo MD (Radiation Oncology)  Lloyd Jacobs MD (Radiation Oncology)  Delgado Swan PT as Physical Therapist (Physical Therapy)    Review of Systems  Constitutional: Negative for Chills, fatigue, fever, malaise, weight gain and weight loss.  ENMT: Negative for Nasal drainage and sinus pressure.  Eyes: Negative for Vision changes.  Respiratory: Negative for Cough, dyspnea and wheezing.  Cardio: Negative Chest pain and irregular heartbeat/palpitations.  GI: Negative for Abdominal pain, constipation, diarrhea, heartburn, nausea and vomiting.  : Negative for Dysuria and urinary frequency.  Endocrine: Negative for Cold intolerance and heat intolerance.  Neuro: Negative for Gait disturbance and memory impairment.  Psych:  Negative for Anxiety and depression.  Integumentary: Negative for Hives and rash.  MS: Negative muscle weakness. pos for joint pain  Hema/Lymph: Negative Easy bleeding and easy bruising.  Allergic/Immuno: Negative Environmental allergies and food allergies.        Objective:   Physical Exam    PHYSICAL EXAMINATION:  Vital signs: See chart   Gen. exam: Alert and oriented, in no acute distress   HEENT: Pupils equal and reactive to light and accommodation, moist mucous membranes, bilateral retained fluid, tympanic membranes  Neck exam:  Supple with baseline range of motion.  Normal thyroid trachea midline, no JVD  Heart exam: Regular rate and rhythm no murmurs no S3 no S4   Lung exam: No rales no rhonchi no wheezes  Abdominal exam: Soft nontender, nondistended positive bowel sounds are normoactive  Extremities exam: no clubbing no cyanosis no edema  Skin exam: see wound notes for details, no rashes  Neurological exam: Cranial nerves II through XII intact, no gross deficits  Musculoskeletal exam: Moderate bilateral generalized hand arthritis appreciated, no obvious deformity  : deferred to urology    Cerumen procedure note: Right ear cerumen impaction was removed using the irrigation system, lighted curette and fine foreceps, tolerated procedure well, impaction completely removed and TM was intact post procedure      /64   Pulse 80   Temp 98.3 °F (36.8 °C)   Ht 5' 5\" (1.651 m)   Wt 191 lb (86.6 kg)   LMP  (LMP Unknown)   SpO2 99%   BMI 31.78 kg/m²  Estimated body mass index is 31.78 kg/m² as calculated from the following:    Height as of this encounter: 5' 5\" (1.651 m).    Weight as of this encounter: 191 lb (86.6 kg).    Medicare Hearing Assessment:   Hearing Screening    Time taken: 4/29/2025  1:42 PM  Screening Method: Questionnaire  I have a problem hearing over the telephone: No I have trouble following the conversations when two or more people are talking at the same time: No   I have trouble  understanding things on the TV: No I have to strain to understand conversations: No   I have to worry about missing the telephone ring or doorbell: No I have trouble hearing conversations in a noisy background such as a crowded room or restaurant: No   I get confused about where sounds come from: No I misunderstand some words in a sentence and need to ask people to repeat themselves: No   I especially have trouble understanding the speech of women and children: No I have trouble understanding the speaker in a large room such as at a meeting or place of Taoism: No   Many people I talk to seem to mumble (or don't speak clearly): No People get annoyed because I misunderstand what they say: No   I misunderstand what others are saying and make inappropriate responses: No I avoid social activities because I cannot hear well and fear I will reply improperly: No   Family members and friends have told me they think I may have hearing loss: No                   Assessment & Plan:   Bijal Perez is a 84 year old female who presents for a Medicare Assessment.     1. Encounter for annual health examination (Primary)  2. History of heart artery stent  -     Detailed, Mod Complex (19718)  3. Type 2 diabetes mellitus without complication, without long-term current use of insulin (HCC)  -     Detailed, Mod Complex (95063)  4. Tension  -     ALPRAZolam; Take 1 tablet (0.25 mg total) by mouth 3 (three) times daily as needed.  Dispense: 90 tablet; Refill: 2  -     Detailed, Mod Complex (63961)  5. Stage 3a chronic kidney disease (HCC)  -     Detailed, Mod Complex (78071)  6. Hypertension, benign  -     Detailed, Mod Complex (97710)  7. Acute cough  -     Benzonatate; Take 1 capsule (100 mg total) by mouth 3 (three) times daily as needed for cough.  Dispense: 30 capsule; Refill: 2  -     Detailed, Mod Complex (22599)  8. Other fatigue  -     Detailed, Mod Complex (13172)  9. Impacted cerumen of right ear  -     Removal Impacted  Cerumen Requiring Instrumentation, Unilateral  -     Detailed, Mod Complex (41099)  10. Hyperlipidemia, unspecified hyperlipidemia type  -     Detailed, Mod Complex (94904)  11. Coronary artery disease involving native coronary artery of native heart with unstable angina pectoris (HCC)  -     Detailed, Mod Complex (88267)  12. Primary osteoarthritis involving multiple joints  -     Detailed, Mod Complex (50434)  13. B12 deficiency  -     Detailed, Mod Complex (42059)  14. History of breast cancer  -     Detailed, Mod Complex (75015)  15. Chronic pain of right knee  -     Detailed, Mod Complex (30556)  16. Frequent UTI  -     Detailed, Mod Complex (53221)  17. History of cellulitis  -     Detailed, Mod Complex (35696)  18. History of fall  -     Detailed, Mod Complex (20006)  19. Dizziness  -     Detailed, Mod Complex (99214)  20. Unstable angina (HCC)  -     Detailed, Mod Complex (99214)  [unfilled]      1. Encounter for annual health examination  Physical exam instruction: Improve diet and exercise    2. History of heart artery stent  Stable cont monitoring and management  - Detailed, Mod Complex (92820)    3. Type 2 diabetes mellitus without complication, without long-term current use of insulin ([facility])  Stable cont monitoring and management  - Detailed, Mod Complex (16904)    4. Tension  Stable cont monitoring and management  - ALPRAZolam 0.25 MG Oral Tab; Take 1 tablet (0.25 mg total) by mouth 3 (three) times daily as needed.  Dispense: 90 tablet; Refill: 2  - Detailed, Mod Complex (72864)    5. Stage 3a chronic kidney disease (HCC)  Stable cont monitoring and management  - Detailed, Mod Complex (54825)    6. Hypertension, benign  Stable cont monitoring and management  - Detailed, Mod Complex (53206)    7. Acute cough  Stable cont monitoring and management  - benzonatate 100 MG Oral Cap; Take 1 capsule (100 mg total) by mouth 3 (three) times daily as needed for cough.  Dispense: 30 capsule; Refill: 2  -  Detailed, Mod Complex (79128)    8. Other fatigue  Stable cont monitoring and management  I like the idea of reducing back down to 40 mg of atorvastatin daily, and starting co-Q10 100 mg over-the-counter capsules daily.  This should minimize any side effects if your fatigue is due to the increase in medication, then lets start the cardiac rehab Monday or Tuesday, let me know if symptoms or not lifting.  The other medications should remain the same  - Detailed, Mod Complex (69078)    9. Impacted cerumen of right ear  Stable cont monitoring and management, resolved today, okay to use Q-tips at times as long as were careful making sure this is not more than once every few days or once a week.  - Removal Impacted Cerumen Requiring Instrumentation, Unilateral  - Detailed, Mod Complex (17056)    10. Hyperlipidemia, unspecified hyperlipidemia type  Stable cont monitoring and management  - Detailed, Mod Complex (84322)    11. Coronary artery disease involving native coronary artery of native heart with unstable angina pectoris (HCC)  Stable cont monitoring and management  - Detailed, Mod Complex (81705)    12. Primary osteoarthritis involving multiple joints  Stable cont monitoring and management  - Detailed, Mod Complex (31311)    13. B12 deficiency  Stable cont monitoring and management  - Detailed, Mod Complex (13433)    14. History of breast cancer  Stable cont monitoring and management  - Detailed, Mod Complex (12066)    15. Chronic pain of right knee  Stable cont monitoring and management  - Detailed, Mod Complex (12378)    16. Frequent UTI  Stable cont monitoring and management  - Detailed, Mod Complex (21617)    17. History of cellulitis  Stable cont monitoring and management  - Detailed, Mod Complex (78530)    18. History of fall  Stable cont monitoring and management  - Detailed, Mod Complex (22253)    19. Dizziness  Stable cont monitoring and management  - Detailed, Mod Complex (06462)    20. Unstable angina  (HCC)  Stable cont monitoring and management  - Detailed, Mod Complex (35804)        The patient indicates understanding of these issues and agrees to the plan.  Reinforced healthy diet, lifestyle, and exercise.      Return in about 6 months (around 10/29/2025).     Jeff Anthony D'Amico, , 4/29/2025     Supplementary Documentation:   General Health:  In the past six months, have you lost more than 10 pounds without trying?: 2 - No  Has your appetite been poor?: No  Type of Diet: Balanced  How does the patient maintain a good energy level?: Appropriate Exercise  How would you describe your daily physical activity?: Moderate  How would you describe your current health state?: Good  How do you maintain positive mental well-being?: Social Interaction, Puzzles, Games, Visiting Friends  On a scale of 0 to 10, with 0 being no pain and 10 being severe pain, what is your pain level?: 0 - (None)  In the past six months, have you experienced urine leakage?: 1-Yes  At any time do you feel concerned for the safety/well-being of yourself and/or your children, in your home or elsewhere?: No  Have you had any immunizations at another office such as Influenza, Hepatitis B, Tetanus, or Pneumococcal?: Yes    Health Maintenance   Topic Date Due    Diabetes Care Dilated Eye Exam  12/23/2022    Annual Well Visit  01/01/2025    Diabetes Care: Foot Exam (Annual)  01/01/2025    Diabetes Care: Microalb/Creat Ratio (Annual)  01/01/2025    COVID-19 Vaccine (10 - 2024-25 season) 05/10/2025    Diabetes Care A1C  07/07/2025    Diabetes Care: GFR  04/09/2026    Influenza Vaccine  Completed    DEXA Scan  Completed    Annual Depression Screening  Completed    Fall Risk Screening (Annual)  Completed    Pneumococcal Vaccine: 50+ Years  Completed    Zoster Vaccines  Completed    Meningococcal B Vaccine  Aged Out            [1]   Patient Active Problem List  Diagnosis    Type 2 diabetes mellitus without complication, without long-term current use of  insulin (HCC)    Hypertension, benign    Hyperlipidemia    CAD (coronary artery disease)    History of heart artery stent    Primary osteoarthritis involving multiple joints    B12 deficiency    CKD (chronic kidney disease) stage 3, GFR 30-59 ml/min ([facility])    History of breast cancer    Chronic pain of right knee    Frequent UTI    History of cellulitis    History of fall    Dizziness    Unstable angina ([facility])   [2]   Outpatient Medications Marked as Taking for the 4/29/25 encounter (Office Visit) with D'Amico, Jeff Anthony, DO   Medication Sig    ALPRAZolam 0.25 MG Oral Tab Take 1 tablet (0.25 mg total) by mouth 3 (three) times daily as needed.    benzonatate 100 MG Oral Cap Take 1 capsule (100 mg total) by mouth 3 (three) times daily as needed for cough.    ATORVASTATIN 80 MG Oral Tab Take 1 tablet (80 mg total) by mouth nightly.    Vibegron (GEMTESA) 75 MG Oral Tab Take 1 tablet by mouth at bedtime.    docusate sodium 100 MG Oral Cap Take 1 capsule (100 mg total) by mouth in the morning and 1 capsule (100 mg total) before bedtime.    METFORMIN  MG Oral Tablet 24 Hr TAKE 2 TABLETS BY MOUTH TWICE A DAY    letrozole 2.5 MG Oral Tab Take 1 tablet (2.5 mg total) by mouth daily.    semaglutide (OZEMPIC, 0.25 OR 0.5 MG/DOSE,) 2 MG/3ML Subcutaneous Solution Pen-injector Inject 0.5 mg into the skin once a week.    glipiZIDE 10 MG Oral Tab Take 1 tablet (10 mg total) by mouth 2 (two) times daily before meals.    empagliflozin (JARDIANCE) 25 MG Oral Tab Take 1 tablet (25 mg total) by mouth daily.    ticagrelor 90 MG Oral Tab Take 1 tablet (90 mg total) by mouth every 12 (twelve) hours.    LOSARTAN-HYDROCHLOROTHIAZIDE 50-12.5 MG Oral Tab TAKE 1 TABLET BY MOUTH EVERY DAY    Multiple Vitamins-Minerals (CENTRUM SILVER ULTRA WOMENS) Oral Tab Take 1 tablet by mouth in the morning.    aspirin 81 MG Oral Tab Take 1 tablet (81 mg total) by mouth at bedtime.     Current Facility-Administered Medications for the 4/29/25 encounter  (Office Visit) with D'Amico, Jeff Anthony, DO   Medication    cyanocobalamin (Vitamin B12) 1000 MCG/ML injection 1,000 mcg

## 2025-05-02 ENCOUNTER — ORDER TRANSCRIPTION (OUTPATIENT)
Dept: CARDIAC REHAB | Facility: HOSPITAL | Age: 85
End: 2025-05-02

## 2025-05-02 DIAGNOSIS — Z95.820 S/P ANGIOPLASTY WITH STENT: Primary | ICD-10-CM

## 2025-05-05 ENCOUNTER — OFFICE VISIT (OUTPATIENT)
Dept: ENDOCRINOLOGY CLINIC | Facility: CLINIC | Age: 85
End: 2025-05-05

## 2025-05-05 ENCOUNTER — CARDPULM VISIT (OUTPATIENT)
Dept: CARDIAC REHAB | Facility: HOSPITAL | Age: 85
End: 2025-05-05
Attending: INTERNAL MEDICINE
Payer: MEDICARE

## 2025-05-05 VITALS
DIASTOLIC BLOOD PRESSURE: 64 MMHG | BODY MASS INDEX: 31 KG/M2 | HEART RATE: 80 BPM | SYSTOLIC BLOOD PRESSURE: 102 MMHG | WEIGHT: 189 LBS

## 2025-05-05 DIAGNOSIS — E11.65 UNCONTROLLED TYPE 2 DIABETES MELLITUS WITH HYPERGLYCEMIA (HCC): Primary | ICD-10-CM

## 2025-05-05 DIAGNOSIS — Z95.820 S/P ANGIOPLASTY WITH STENT: Primary | ICD-10-CM

## 2025-05-05 LAB
GLUCOSE BLOOD: 187
TEST STRIP LOT #: NORMAL NUMERIC

## 2025-05-05 PROCEDURE — 99214 OFFICE O/P EST MOD 30 MIN: CPT | Performed by: INTERNAL MEDICINE

## 2025-05-05 PROCEDURE — 1160F RVW MEDS BY RX/DR IN RCRD: CPT | Performed by: INTERNAL MEDICINE

## 2025-05-05 PROCEDURE — 1159F MED LIST DOCD IN RCRD: CPT | Performed by: INTERNAL MEDICINE

## 2025-05-05 PROCEDURE — 1111F DSCHRG MED/CURRENT MED MERGE: CPT | Performed by: INTERNAL MEDICINE

## 2025-05-05 PROCEDURE — 82947 ASSAY GLUCOSE BLOOD QUANT: CPT | Performed by: INTERNAL MEDICINE

## 2025-05-05 NOTE — PROGRESS NOTES
Name: Bijal Perez  Date: 5/5/2025    Referring Physician: No ref. provider found    HISTORY OF PRESENT ILLNESS   Bijal Perez is a 84 year old female who presents for diabetes mellitus.  Since last visit her  passed away in 2/2018.       Prior HbA, C or glycohemoglobin were 8.2% 6/2017; 7.6% 10/2017; 6.6% 1/2018; 7.5% 7/2018; 7.2% 8/2019; 9.0% 3/2020; 7.2% 6/2020; 7.8% 10/2020; 8.0% 2/2021; 7.4% 6/2021; 6.8% 10/2021; 6.5% 4/2022; 7.2% 10/2022; 7.1% 10/2023; 8.1% 3/2024; 7.5% 10/2024; 8.8% 4/2025     Dietary compliance: Good - some more cheating on diet over the past few months   Exercise: No   Polyuria/polydipsia: No  Blurred vision: No    Episodes of hypoglycemia: No  Blood Glucose:  Checking infrequently     Medications for DM   Glipizide 10mg PO BID   Metformin 1000mg PO BID  Jardiance 25mg PO daily    Ozempic 0.5mg subcutaneous weekly     Trulicity d/c'd due to side effects        REVIEW OF SYSTEMS  Eyes: Diabetic retinopathy present: No            Most recent visit to eye doctor in last 12 months: Yes, due for follow up     CV: Cardiovascular disease present: Yes, CAD s/p stent 11/2017; repeat stent 4/2025         Hypertension present: Yes         Hyperlipidemia present: Yes         Peripheral Vascular Disease present: No    : Nephropathy present: No    Neuro: Neuropathy present: No    Skin: Infection or ulceration: No    Osteoporosis: No    Thyroid disease: No      Medications:     Current Outpatient Medications:     ALPRAZolam 0.25 MG Oral Tab, Take 1 tablet (0.25 mg total) by mouth 3 (three) times daily as needed., Disp: 90 tablet, Rfl: 2    benzonatate 100 MG Oral Cap, Take 1 capsule (100 mg total) by mouth 3 (three) times daily as needed for cough., Disp: 30 capsule, Rfl: 2    ATORVASTATIN 80 MG Oral Tab, Take 1 tablet (80 mg total) by mouth nightly., Disp: 30 tablet, Rfl: 1    Vibegron (GEMTESA) 75 MG Oral Tab, Take 1 tablet by mouth at bedtime., Disp: , Rfl:     docusate sodium 100 MG  Oral Cap, Take 1 capsule (100 mg total) by mouth in the morning and 1 capsule (100 mg total) before bedtime., Disp: , Rfl:     METFORMIN  MG Oral Tablet 24 Hr, TAKE 2 TABLETS BY MOUTH TWICE A DAY, Disp: 360 tablet, Rfl: 1    letrozole 2.5 MG Oral Tab, Take 1 tablet (2.5 mg total) by mouth daily., Disp: 90 tablet, Rfl: 3    semaglutide (OZEMPIC, 0.25 OR 0.5 MG/DOSE,) 2 MG/3ML Subcutaneous Solution Pen-injector, Inject 0.5 mg into the skin once a week., Disp: 9 mL, Rfl: 1    glipiZIDE 10 MG Oral Tab, Take 1 tablet (10 mg total) by mouth 2 (two) times daily before meals., Disp: 180 tablet, Rfl: 3    empagliflozin (JARDIANCE) 25 MG Oral Tab, Take 1 tablet (25 mg total) by mouth daily., Disp: 90 tablet, Rfl: 3    ticagrelor 90 MG Oral Tab, Take 1 tablet (90 mg total) by mouth every 12 (twelve) hours., Disp: 180 tablet, Rfl: 3    LOSARTAN-HYDROCHLOROTHIAZIDE 50-12.5 MG Oral Tab, TAKE 1 TABLET BY MOUTH EVERY DAY, Disp: 90 tablet, Rfl: 3    Multiple Vitamins-Minerals (CENTRUM SILVER ULTRA WOMENS) Oral Tab, Take 1 tablet by mouth in the morning., Disp: , Rfl:     aspirin 81 MG Oral Tab, Take 1 tablet (81 mg total) by mouth at bedtime., Disp: , Rfl:      Allergies:   Allergies   Allergen Reactions    Januvia [Sitagliptin] ITCHING       Social History:   Social History     Socioeconomic History    Marital status:    Tobacco Use    Smoking status: Never     Passive exposure: Past    Smokeless tobacco: Never   Vaping Use    Vaping status: Never Used   Substance and Sexual Activity    Alcohol use: No    Drug use: No   Other Topics Concern    Caffeine Concern No       Medical History:   Past Medical History:    Age-related nuclear cataract of both eyes    Atherosclerosis of coronary artery    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 Rice    Type II or unspecified type diabetes mellitus without mention of complication, not stated as uncontrolled    Unspecified essential hypertension    Visual impairment    glasses for reading       Surgical history:   Past Surgical History:   Procedure Laterality Date    Breast biopsy Right 2013    Carpal tunnel release Right     Cataract      Cataract extraction Left 12/12/2018    SN6AT3 21.00 D LENSX WITH ORA    Cataract extraction w/  intraocular lens implant Right 11/14/2018    Lensx Toric IOL    model:[id number]     Power 19.0D   3.75CYL    Cath cartotid stent  11/2017    Cath drug eluting stent       3 total    Cath drug eluting stent  04/08/2025    2 stents    Colonoscopy  11/2018    Colonoscopy N/A 11/12/2018    Procedure: COLONOSCOPY, POSSIBLE BIOPSY, POSSIBLE POLYPECTOMY 06005;  Surgeon: Teofilo Guerrero MD;  [facility] [facility]    Hysterectomy  1997    Lumpectomy left Left 02/2021    Mina biopsy stereo nodule 1 site right (cpt=19081) Right 2008    Mina biopsy stereo nodule 1 site right (cpt=19081) Right 2013    Needle biopsy left Left 01/06/2021    Needle biopsy right Right     Other surgical history  11/29/2017    stent placement    Radiation left Left 2021    Finshied April 13, 2021    Tonsillectomy      Total knee replacement Right 03/21/2024         PHYSICAL EXAM  /64   Pulse 80   Wt 189 lb (85.7 kg)   LMP  (LMP Unknown)   BMI 31.45 kg/m²     General Appearance:  alert, well developed, in no acute distress  Eyes:  normal conjunctivae, sclera., normal sclera and normal pupils  Ears/Nose/Mouth/Throat/Neck:  no palpable thyroid nodules or cervical lymphadenopathy  Back: no kyphosis or back tenderness  Musculoskeletal:  normal muscle strength and tone  Skin:   normal moisture and skin texture  Hair & Nails:  normal scalp hair     Hematologic:  no excessive bruising  Neuro:  sensory grossly intact and motor grossly intact  Psychiatric:  oriented to time, self, and place  Nutritional:  no abnormal weight gain or loss      ASSESSMENT/PLAN:      1. Diabetes Mellitus Type 2, controlled  -controlled, HgA1c 8.8% -->significant increase  -she was recently hospitalized for CAD s/p stents and noted higher A1c   -Discussed importance of glycemic control to prevent complications of diabetes  -Discussed complications of diabetes include retinopathy, neuropathy, nephropathy and cardiovascular disease  -Discussed importance of SBGM  -Discussed importance of low CHO diet, recommend 45gm per meal or 135gm per day  -Continue Metformin and Glipizide   -Continue Jardiance 25mg PO daily, verbalized understanding of risks and benefits  -Continue Ozempic 0.5mg SQ weekly, verbalized understanding of risks and benefits -->discussed higher dose but she would like to evaluate with CGM   -Place Izabella to evaluate BG pattern, if elevated then plan to increase Ozempic   -Normotensive  -Normal Microalbumin -->recheck before next visit   -normal foot exam performed 2024     DM quality measures:  A1C/Blood pressure: as reported above   Nephropathy screening: Ordered    LIPID screenin2025 .  statin rx.   Last dilated eye exam: 2023 Exam shows retinopathy? No  Last diabetic foot exam: today  Dentist : recommend every 6m      RTC 2 weeks with CDE; 3 months with MD     2025  Justine Jones MD

## 2025-05-05 NOTE — PROGRESS NOTES
Sample continuous glucose monitor, Izabella 3 plus, placed on patient's arm. Patient understands guidelines and usage of sensor. Patient's sensor connected to clinic and aware of follow up.

## 2025-05-09 ENCOUNTER — CARDPULM VISIT (OUTPATIENT)
Dept: CARDIAC REHAB | Facility: HOSPITAL | Age: 85
End: 2025-05-09
Attending: INTERNAL MEDICINE
Payer: MEDICARE

## 2025-05-09 PROCEDURE — 93798 PHYS/QHP OP CAR RHAB W/ECG: CPT

## 2025-05-11 DIAGNOSIS — E11.65 UNCONTROLLED TYPE 2 DIABETES MELLITUS WITH HYPERGLYCEMIA (HCC): ICD-10-CM

## 2025-05-12 ENCOUNTER — CARDPULM VISIT (OUTPATIENT)
Dept: CARDIAC REHAB | Facility: HOSPITAL | Age: 85
End: 2025-05-12
Attending: INTERNAL MEDICINE
Payer: MEDICARE

## 2025-05-12 PROCEDURE — 93798 PHYS/QHP OP CAR RHAB W/ECG: CPT

## 2025-05-12 RX ORDER — SEMAGLUTIDE 0.68 MG/ML
0.5 INJECTION, SOLUTION SUBCUTANEOUS WEEKLY
Qty: 9 ML | Refills: 1 | Status: SHIPPED | OUTPATIENT
Start: 2025-05-12

## 2025-05-12 NOTE — TELEPHONE ENCOUNTER
Endocrine Refill protocol for oral and injectable diabetic medications    Protocol Criteria:  FAILED  Reason: Elevated A1C    If all below requirements are met, send a 90-day supply with 1 refill per provider protocol.    Verify appointment with Endocrinology completed in the last 6 months or scheduled in the next 3 months.  Verify A1C has been completed within the last 6 months and is below 8.5%     Last completed office visit: 5/5/2025 Justine Jones MD   Next scheduled Follow up:   Future Appointments   Date Time Provider Department Center   5/13/2025 11:15 AM [facility]   5/14/2025  9:45 AM [facility] CARD PHASE 2 RM [facility]   5/16/2025  9:45 AM [facility] CARD PHASE 2 RM [facility]   5/19/2025  9:45 AM [facility] CARD PHASE 2 RM [facility]   5/20/2025 12:00 PM Ashley Bonner MD [facility]   5/21/2025  9:45 AM [facility] CARD PHASE 2 RM [facility]   5/22/2025 10:30 AM [facility]   5/23/2025  9:45 AM [facility] CARD PHASE 2 RM [facility]   5/28/2025  9:45 AM [facility] CARD PHASE 2 RM [facility]   5/30/2025  9:45 AM [facility] CARD PHASE 2 RM [facility]   6/2/2025  9:45 AM [facility] CARD PHASE 2 RM [facility]   6/4/2025  9:45 AM [facility] CARD PHASE 2 RM [facility]   6/6/2025  9:45 AM [facility] CARD PHASE 2 RM [facility]   6/9/2025  9:45 AM [facility] CARD PHASE 2 RM [facility]   6/11/2025  9:45 AM [facility] CARD PHASE 2 RM [facility]   6/13/2025  9:45 AM [facility] CARD PHASE 2 RM [facility]   6/16/2025  9:45 AM [facility] CARD PHASE 2 RM [facility]   6/17/2025 11:15 AM [facility]   6/18/2025  9:45 AM [facility] CARD PHASE 2 RM [facility]   6/20/2025  9:45 AM [facility] CARD PHASE 2 RM [facility]   6/23/2025  9:45 AM [facility] CARD PHASE 2 RM [facility]   6/25/2025  9:45 AM [facility] CARD PHASE 2 RM [facility]   6/27/2025  9:45 AM [facility] CARD PHASE 2 RM [facility]   6/30/2025  9:45 AM  [facility] CARD PHASE 2 RM [facility]   7/2/2025  9:45 AM [facility] CARD PHASE 2 RM [facility]   7/7/2025  9:45 AM [facility] CARD PHASE 2 RM [facility]   7/9/2025  9:45 AM [facility] CARD PHASE 2 RM [facility]   7/11/2025  9:45 AM [facility] CARD PHASE 2 RM [facility]   7/14/2025  9:45 AM [facility] CARD PHASE 2 RM [facility] RE[facility]   7/16/2025  9:45 AM [facility] CARD PHASE 2 RM [facility]   7/18/2025  9:45 AM [facility] CARD PHASE 2 RM [facility]   7/21/2025  9:45 AM [facility] CARD PHASE 2 RM [facility]   7/23/2025  9:45 AM [facility] CARD PHASE 2 RM [facility] RE[facility]   7/25/2025  9:45 AM [facility] CARD PHASE 2 RM [facility]   7/28/2025  9:45 AM [facility] CARD PHASE 2 RM [facility] RE[facility]   7/30/2025  9:45 AM [facility] CARD PHASE 2 RM [facility]   8/1/2025  9:45 AM [facility] CARD PHASE 2 RM [facility]   8/4/2025  9:45 AM [facility] CARD PHASE 2 RM [facility]   8/6/2025  9:45 AM [facility] CARD PHASE 2 RM [facility]   8/8/2025  9:45 AM [facility] CARD PHASE 2 RM [facility]   9/9/2025 11:20 AM [facility] RM2 [facility]   9/22/2025 11:30 AM Justine Jones MD [facility]      Last A1c result: Last A1c value was 8.8% done 4/7/2025.

## 2025-05-13 ENCOUNTER — NURSE ONLY (OUTPATIENT)
Dept: INTERNAL MEDICINE CLINIC | Facility: CLINIC | Age: 85
End: 2025-05-13

## 2025-05-13 DIAGNOSIS — E53.8 B12 DEFICIENCY: Primary | ICD-10-CM

## 2025-05-13 PROCEDURE — 96372 THER/PROPH/DIAG INJ SC/IM: CPT | Performed by: INTERNAL MEDICINE

## 2025-05-13 RX ADMIN — CYANOCOBALAMIN 1000 MCG: 1000 INJECTION, SOLUTION INTRAMUSCULAR; SUBCUTANEOUS at 11:16:00

## 2025-05-14 ENCOUNTER — APPOINTMENT (OUTPATIENT)
Dept: CARDIAC REHAB | Facility: HOSPITAL | Age: 85
End: 2025-05-14
Attending: INTERNAL MEDICINE
Payer: MEDICARE

## 2025-05-16 ENCOUNTER — CARDPULM VISIT (OUTPATIENT)
Dept: CARDIAC REHAB | Facility: HOSPITAL | Age: 85
End: 2025-05-16
Attending: INTERNAL MEDICINE
Payer: MEDICARE

## 2025-05-16 PROCEDURE — 93798 PHYS/QHP OP CAR RHAB W/ECG: CPT

## 2025-05-19 ENCOUNTER — CARDPULM VISIT (OUTPATIENT)
Dept: CARDIAC REHAB | Facility: HOSPITAL | Age: 85
End: 2025-05-19
Attending: INTERNAL MEDICINE
Payer: MEDICARE

## 2025-05-19 LAB
GLUCOSE BLDC GLUCOMTR-MCNC: 174 MG/DL (ref 70–99)
GLUCOSE BLDC GLUCOMTR-MCNC: 191 MG/DL (ref 70–99)

## 2025-05-19 PROCEDURE — 93798 PHYS/QHP OP CAR RHAB W/ECG: CPT

## 2025-05-20 ENCOUNTER — OFFICE VISIT (OUTPATIENT)
Age: 85
End: 2025-05-20
Attending: INTERNAL MEDICINE
Payer: MEDICARE

## 2025-05-20 VITALS
RESPIRATION RATE: 18 BRPM | SYSTOLIC BLOOD PRESSURE: 112 MMHG | BODY MASS INDEX: 32.29 KG/M2 | WEIGHT: 193.81 LBS | OXYGEN SATURATION: 98 % | HEIGHT: 65 IN | DIASTOLIC BLOOD PRESSURE: 68 MMHG | HEART RATE: 82 BPM | TEMPERATURE: 97 F

## 2025-05-20 DIAGNOSIS — M81.0 OSTEOPOROSIS, POST-MENOPAUSAL: ICD-10-CM

## 2025-05-20 DIAGNOSIS — Z78.0 POST-MENOPAUSAL: ICD-10-CM

## 2025-05-20 DIAGNOSIS — Z51.81 ENCOUNTER FOR MONITORING AROMATASE INHIBITOR THERAPY: ICD-10-CM

## 2025-05-20 DIAGNOSIS — C50.912 MALIGNANT NEOPLASM OF LEFT FEMALE BREAST, UNSPECIFIED ESTROGEN RECEPTOR STATUS, UNSPECIFIED SITE OF BREAST (HCC): Primary | ICD-10-CM

## 2025-05-20 DIAGNOSIS — Z85.3 ENCOUNTER FOR FOLLOW-UP SURVEILLANCE OF BREAST CANCER: ICD-10-CM

## 2025-05-20 DIAGNOSIS — Z79.811 ENCOUNTER FOR MONITORING AROMATASE INHIBITOR THERAPY: ICD-10-CM

## 2025-05-20 DIAGNOSIS — Z08 ENCOUNTER FOR FOLLOW-UP SURVEILLANCE OF BREAST CANCER: ICD-10-CM

## 2025-05-20 NOTE — PROGRESS NOTES
HPI   Bijal Perez is a 84 year old female here for follow up of Malignant neoplasm of left female breast, unspecified estrogen receptor status, unspecified site of breast (HCC)    Encounter for monitoring aromatase inhibitor therapy    Encounter for follow-up surveillance of breast cancer    Osteoporosis, post-menopausal.    Estrogen receptor-positive left breast cancer.  Diagnosis was made based on ultrasound-guided biopsy with Dr. Thang Zamudio on 01/06/2021 with 2 separate sites showing invasive ductal carcinoma, grade 1, estrogen and progesterone receptors both greater than 90%, HER2 negative, Ki-67 of 5% and 8%.  The left breast mass was palpated on exam by her primary care physician.  She went on to have mammography with a left breast diagnostic mammogram 01/06/2021 that showed left breast masses at 12:30, 1 cm from the nipple, and 12:30, 4 cm from the nipple.     Underwent partial mastectomy with Dr. Gonzalez 2/4/2021.  She completed adjuvant radiation April 2021    Compliance with SBE: Yes. Changes on SBE: No.       Compliance with letrozole:  compliance all of the time      Side effects from letrozole: No hot flashes, No arthralgias or myalgias.      Using vaginal estrogen due recurrent UTIs.  States a pea size once a month, when it feels really dry.  No recurrent UTIs. Also taking mannose once a day.    States had another \"blockage in the heart since last visit.  I have 6 stents\".  Doing cardiac rehab.      ECOG PS: 1      Review of Systems:     Review of Systems   Constitutional:  Negative for appetite change, fatigue and unexpected weight change.   Respiratory:  Negative for cough and shortness of breath.    Cardiovascular:  Positive for leg swelling. Negative for chest pain.   Gastrointestinal:  Negative for abdominal pain.   Endocrine: Negative for hot flashes.   Genitourinary:  Positive for nocturia. Negative for dysuria and frequency.    Musculoskeletal:  Negative for arthralgias, back  pain and neck pain.   Neurological:  Negative for dizziness and headaches.   Hematological:  Negative for adenopathy.   Psychiatric/Behavioral:  Positive for sleep disturbance.          Current Outpatient Medications   Medication Sig Dispense Refill    semaglutide (OZEMPIC, 0.25 OR 0.5 MG/DOSE,) 2 MG/3ML Subcutaneous Solution Pen-injector INJECT 0.5 MG INTO THE SKIN ONE TIME PER WEEK 9 mL 1    ALPRAZolam 0.25 MG Oral Tab Take 1 tablet (0.25 mg total) by mouth 3 (three) times daily as needed. 90 tablet 2    benzonatate 100 MG Oral Cap Take 1 capsule (100 mg total) by mouth 3 (three) times daily as needed for cough. 30 capsule 2    ATORVASTATIN 80 MG Oral Tab Take 1 tablet (80 mg total) by mouth nightly. 30 tablet 1    Vibegron (GEMTESA) 75 MG Oral Tab Take 1 tablet by mouth at bedtime.      docusate sodium 100 MG Oral Cap Take 1 capsule (100 mg total) by mouth in the morning and 1 capsule (100 mg total) before bedtime.      METFORMIN  MG Oral Tablet 24 Hr TAKE 2 TABLETS BY MOUTH TWICE A  tablet 1    letrozole 2.5 MG Oral Tab Take 1 tablet (2.5 mg total) by mouth daily. 90 tablet 3    glipiZIDE 10 MG Oral Tab Take 1 tablet (10 mg total) by mouth 2 (two) times daily before meals. 180 tablet 3    empagliflozin (JARDIANCE) 25 MG Oral Tab Take 1 tablet (25 mg total) by mouth daily. 90 tablet 3    ticagrelor 90 MG Oral Tab Take 1 tablet (90 mg total) by mouth every 12 (twelve) hours. 180 tablet 3    LOSARTAN-HYDROCHLOROTHIAZIDE 50-12.5 MG Oral Tab TAKE 1 TABLET BY MOUTH EVERY DAY 90 tablet 3    Multiple Vitamins-Minerals (CENTRUM SILVER ULTRA WOMENS) Oral Tab Take 1 tablet by mouth in the morning.      aspirin 81 MG Oral Tab Take 1 tablet (81 mg total) by mouth at bedtime.       Allergies:   Allergies[1]    Past Medical History:    Age-related nuclear cataract of both eyes    Atherosclerosis of coronary artery    Benign neoplasm of skin of left eyelid    Blepharitis of both eyes    Cancer (HCC)    Breast     Chronic allergic conjunctivitis    Coronary atherosclerosis    Edema of right lower eyelid    Gallstone    NG: Asymptomatic     Gastroenteritis    High blood pressure    High cholesterol    Lipid screening    Obesity    Osteoarthritis    Osteoporosis    Osteoporosis screening    Other and unspecified hyperlipidemia    Restless leg syndrome    Per Dr. Rice    Type II or unspecified type diabetes mellitus without mention of complication, not stated as uncontrolled    Unspecified essential hypertension    Visual impairment    glasses for reading     Past Surgical History:   Procedure Laterality Date    Breast biopsy Right 2013    Carpal tunnel release Right     Cataract      Cataract extraction Left 12/12/2018    SN6AT3 21.00 D LENSX WITH ORA    Cataract extraction w/  intraocular lens implant Right 11/14/2018    Lensx Toric IOL    model:[id number]     Power 19.0D   3.75CYL    Cath cartotid stent  11/2017    Cath drug eluting stent       3 total    Cath drug eluting stent  04/08/2025    2 stents    Colonoscopy  11/2018    Colonoscopy N/A 11/12/2018    Procedure: COLONOSCOPY, POSSIBLE BIOPSY, POSSIBLE POLYPECTOMY 25917;  Surgeon: Teofilo Guerrero MD;  Location: [facility]    Hysterectomy  1997    Lumpectomy left Left 02/2021    Mina biopsy stereo nodule 1 site right (cpt=19081) Right 2008    Mina biopsy stereo nodule 1 site right (cpt=19081) Right 2013    Needle biopsy left Left 01/06/2021    Needle biopsy right Right     Other surgical history  11/29/2017    stent placement    Radiation left Left 2021    Finshied April 13, 2021    Tonsillectomy      Total knee replacement Right 03/21/2024     Social History     Socioeconomic History    Marital status:      Spouse name: Not on file    Number of children: Not on file    Years of education: Not on file    Highest education level: Not on file   Occupational History    Not on file   Tobacco Use    Smoking status: Never     Passive exposure: Past    Smokeless  tobacco: Never   Vaping Use    Vaping status: Never Used   Substance and Sexual Activity    Alcohol use: No    Drug use: No    Sexual activity: Not on file   Other Topics Concern     Service Not Asked    Blood Transfusions Not Asked    Caffeine Concern No    Occupational Exposure Not Asked    Hobby Hazards Not Asked    Sleep Concern Not Asked    Stress Concern Not Asked    Weight Concern Not Asked    Special Diet Not Asked    Back Care Not Asked    Exercise Not Asked    Bike Helmet Not Asked    Seat Belt Not Asked    Self-Exams Not Asked   Social History Narrative    Not on file     Social Drivers of Health     Food Insecurity: No Food Insecurity (4/29/2025)    NCSS - Food Insecurity     Worried About Running Out of Food in the Last Year: No     Ran Out of Food in the Last Year: No   Transportation Needs: No Transportation Needs (4/29/2025)    NCSS - Transportation     Lack of Transportation: No   Housing Stability: Not At Risk (4/29/2025)    NCSS - Housing/Utilities     Has Housing: Yes     Worried About Losing Housing: No     Unable to Get Utilities: No     Family History   Problem Relation Age of Onset    Neurological Disorder Father 76        NG: Parkinson's disease, 76 (cause of death)    Diabetes Father     Other (Old age) Mother 92        NG    Other (Lupus) Mother         NG    Cataracts Other     Breast Cancer Self 79    Ovarian Cancer Neg          PHYSICAL EXAM:    /68 (BP Location: Left arm, Patient Position: Sitting, Cuff Size: large)   Pulse 82   Temp 97.3 °F (36.3 °C) (Tympanic)   Resp 18   Ht 1.651 m (5' 5\")   Wt 87.9 kg (193 lb 12.8 oz)   LMP  (LMP Unknown)   SpO2 98%   BMI 32.25 kg/m²   Wt Readings from Last 6 Encounters:   05/20/25 87.9 kg (193 lb 12.8 oz)   05/05/25 85.7 kg (189 lb)   04/29/25 86.6 kg (191 lb)   04/15/25 87.1 kg (192 lb)   04/09/25 84.7 kg (186 lb 11.2 oz)   01/16/25 87.1 kg (192 lb)     Physical Exam  General: Patient is alert, not in acute distress.  HEENT:  EOMs intact. PERRL. Oropharynx is clear.   Neck: No JVD. No palpable lymphadenopathy. Neck is supple.  Chest: Clear to auscultation.  Breasts:  R breast no masses.  L breast surgical and RT changes, no masses.   Heart: Regular rate and rhythm.   Abdomen: Soft, non tender with good bowel sounds.  Extremities: No edema.  Neurological: Grossly intact.   Lymphatics: There is no palpable lymphadenopathy throughout in the cervical, supraclavicular, axillary, or inguinal regions.  Psych/Depression: nl        ASSESSMENT/PLAN:     1. Malignant neoplasm of left female breast, unspecified estrogen receptor status, unspecified site of breast (HCC)    2. Encounter for monitoring aromatase inhibitor therapy    3. Encounter for follow-up surveillance of breast cancer    4. Osteoporosis, post-menopausal      Postmenopausal female being evaluated by Medical Oncology for estrogen receptor-positive, HER2-negative left breast cancer, diagnosed 01/06/2021 as outlined above.  She is strongly hormone receptor positive and HER2 negative.  She underwent surgery with Dr. Gonzalez with pathology as outlined above 2/4/2021. DCIS present.  -completed adjuvant radiation  -Adjuvant letrozole. Tolerating.   Plan 5 years, given the patient's advanced age, would favor 5 years of treatment which will be completing treatment February of 2026.  -history of osteoporosis per her report.  Most recent bone densities were adequate summer 2020 repeat 2023 in setting of stress fracture showed no evidence of osteopenia or osteoporosis.  Normal bone density adequate. Repeat 2025  -- Last bilateral diagnostic mammogram on 9/12/2024 BI-RADS 2, she was given an order by Dr. Gonzalez for yearly mammogram which is scheduled for 9/9/2025.      MDM moderate  Continuity of complex medical care.  No orders of the defined types were placed in this encounter.      Results From Past 48 Hours:  Recent Results (from the past 48 hours)   POCT Glucose    Collection Time: 05/19/25   9:58 AM   Result Value Ref Range    POC Glucose  191 (H) 70 - 99 mg/dL   POCT Glucose    Collection Time: 05/19/25 10:29 AM   Result Value Ref Range    POC Glucose  174 (H) 70 - 99 mg/dL       Meds This Visit:        Imaging & Referrals:  None   No orders of the defined types were placed in this encounter.    PROCEDURE: [facility] HOWIE 2D+3D DIAGNOSTIC CAITLYN BILAT (CPT=77066/12629)     COMPARISON: [facility], CAITLYN PF DIGITAL SCREEN W CAD, 1/22/2013, 12:12 PM.  [facility], [facility] PF DIGITAL SCREEN W CAD, 3/19/2015, 1:32 PM.  [facility], [facility] DIGITAL SCREEN W  CAD PF, 4/26/2016, 1:07 PM.  [facility], CAITLYN SCREENING BILAT (CPT=77067), 2/28/2018, 1:58 PM.  [facility], CAITLYN HOWIE 2D+3D SCREENING BILAT (60851/13808), 4/29/2019, 3:59 PM.  [facility], CAITLYN HOWIE 2D+3D SCREENING BILAT (78034/57215), 6/26/2020, 12:51 PM.  [facility], [facility] HOWIE 2D+3D DIAGNOSTIC CAITLYN LEFT (CPT=77065/16762), 1/06/2021, 9:31 AM.  [facility],  [facility] POST PROCEDURAL IMAGE LEFT (CPT=77065), 1/22/2021, 4:13 PM.  [facility], [facility] HOWIE 2D+3D DIAGNOSTIC CAITLYN BILAT (UUJ=[id number]), 8/16/2021, 10:42 AM.  [facility], [facility] HOWIE 2D+3D DIAGNOSTIC CAITLYN LEFT  (CPT=77065/24377), 2/18/2022, 1:52 PM.  [facility], [facility] HOWIE 2D+3D DIAGNOSTIC CAITLYN BILAT (KVO=[id number]), 8/29/2022, 9:54 AM.  [facility], [facility] HOWIE 2D+3D DIAGNOSTIC CAITLYN LEFT (CPT=77065/55835), 3/13/2023,   10:54 AM.  [facility], [facility] HOWIE 2D+3D DIAGNOSTIC CAITLYN BILAT (RTJ=[id number]/14430), 9/05/2023, 10:17 AM.     INDICATIONS: Z17.0 Malignant neoplasm of upper-outer quadrant of left breast in female, estrogen receptor positive (HCC) C50.412 Malignant neoplasm of upper-outer quadrant *  83-year-old patient with history of left breast malignancy diagnosed at age 79  status post treatment for annual mammogram     TECHNIQUE: Digital diagnostic mammography was performed and images were reviewed with the [employer] 1.5.1.5 CAD device.  3D tomosynthesis was performed and reviewed.        BREAST COMPOSITION:   Category c-Heterogeneously dense, which may obscure small masses.        FINDINGS: Stable post surgical changes are seen in the left breast.  Biopsy clips are seen bilaterally.  The parenchyma pattern is stable with no new suspicious asymmetry, mass, architectural distortion, or microcalcifications identified in either  breast.        CONCLUSION:   Benign findings.  No mammographic evidence for breast malignancy.  As long as the patient's clinical breast exam remains unchanged, annual screening mammogram is recommended.     The density of the patient's breast tissue reduces mammographic sensitivity for detecting breast cancer. In the setting of a normal screening mammogram, supplemental screening with screening breast MRI, used as an adjunct to mammography, can detect  breast cancers that might be obscured by dense breast tissue on mammography.  Because of the density of the patient's breasts on mammography and personal history of breast cancer, the patient may benefit from supplemental screening with screening breast  MRI.       BI-RADS CATEGORY:    DIAGNOSTIC CATEGORY 2 - BENIGN FINDING:       RECOMMENDATIONS:  ROUTINE MAMMOGRAM AND CLINICAL EVALUATION IN 12 MONTHS.               PLEASE NOTE: NORMAL MAMMOGRAM DOES NOT EXCLUDE THE POSSIBILITY OF BREAST CANCER.  A CLINICALLY SUSPICIOUS PALPABLE LUMP SHOULD BE BIOPSIED.       For patients over the age of 40, the target due date for the patient's next mammogram has been entered into a reminder system.       Patient received a discharge summary from the technologist after completion of exam.     Breast marker legend used on images    Triangle = Palpable  lump  [place] = Skin tag or mole  BB = Nipple  Linear ashtyn = Scar  Square = Pain           Finalized by (CST): Jigna Clarke MD on 9/12/2024 at 10:06 AM                [address] [place], [place] [address]  [phone]         [1]   Allergies  Allergen Reactions    Januvia [Sitagliptin] ITCHING

## 2025-05-21 ENCOUNTER — APPOINTMENT (OUTPATIENT)
Dept: CARDIAC REHAB | Facility: HOSPITAL | Age: 85
End: 2025-05-21
Attending: INTERNAL MEDICINE
Payer: MEDICARE

## 2025-05-22 ENCOUNTER — NURSE ONLY (OUTPATIENT)
Dept: ENDOCRINOLOGY CLINIC | Facility: CLINIC | Age: 85
End: 2025-05-22

## 2025-05-22 DIAGNOSIS — E11.65 UNCONTROLLED TYPE 2 DIABETES MELLITUS WITH HYPERGLYCEMIA (HCC): Primary | ICD-10-CM

## 2025-05-22 PROCEDURE — 99211 OFF/OP EST MAY X REQ PHY/QHP: CPT | Performed by: INTERNAL MEDICINE

## 2025-05-22 RX ORDER — GLIPIZIDE 5 MG/1
5 TABLET ORAL
Qty: 90 TABLET | Refills: 1 | Status: SHIPPED | OUTPATIENT
Start: 2025-05-22

## 2025-05-22 RX ORDER — SEMAGLUTIDE 1.34 MG/ML
1 INJECTION, SOLUTION SUBCUTANEOUS WEEKLY
Qty: 9 ML | Refills: 1 | Status: SHIPPED | OUTPATIENT
Start: 2025-05-22

## 2025-05-22 NOTE — PATIENT INSTRUCTIONS
Metformin 1000 mg twice daily  Glipizide 5 mg daily before breakfast  Jardiance 25 mg daily in the morning  Ozempic 1 mg once weekly    Let us know if your blood glucose levels are low again < 80    Follow up with Dr. Jones 9/22/25 at 11:30 a.m.

## 2025-05-22 NOTE — PROGRESS NOTES
Continuous Glucose Monitor Download - Izabella 3 plus    Bijal Perez  11/6/1940      Medical Background        Lab Results   Component Value Date    A1C 8.8 (H) 04/07/2025    A1C 7.5 (A) 10/10/2024    A1C 7.7 (H) 05/24/2024    A1C 7.1 (A) 10/20/2023    A1C 8.2 (H) 08/18/2023         Patient has T2DM, seen today regarding glucose levels review for medication management. Possible to titrate ozempic.   Patient has had multiple cardiac stents placed since 2017. Patient on statin    Medication Review      DM Meds: empagliflozin Tabs - 25 MG; glipiZIDE Tabs - 10 MG; metFORMIN ER Tb24 - 500 MG; Ozempic (0.25 or 0.5 MG/DOSE) Sopn - 2 MG/3ML         Medications at last endocrinology appointment: 5/5/25  -Continue Metformin 1000 mg twice daily  - Glipizide 10 mg twice daily  -Continue Jardiance 25mg PO daily  -Continue Ozempic 0.5mg SQ weekly -->discussed higher dose but she would like to evaluate with CGM   -Place Izabella to evaluate BG pattern, if elevated then plan to increase Ozempic         Patient Currently Taking:   Steroids? No  Chemotherapy? On letrozole until February 2026. Had breast cancer 5 yrs ago  Metformin 1000 mg twice daily  Glipizide 10 mg twice daily, taking in the morning and at bedtime  Jardiance 25 mg daily in the morning  Ozempic 0.5 mg weekly      Blood Glucose Review      Patient doesn't know where her glucometer is. States it's very old and she hasn't been monitoring BG. We reviewed importance of checking. Gave patient Contour Plus Blue meter sample and she will call for refill on strips and lancets of new meter or old meter when she finds it.     Interpretation of Data:  Report shared with Dr. Jones via [employer].                     Patient Concerns      - None at this time. Denies any symptoms or side effects of medications. No blurry vision, no numbness to extremities, no frequent headaches, dizziness, polyuria nor polydipsia.     Healthy Lifestyle Review:    Time Food Notes    Breakfast Doesn't  like breakfast foods, doesn't drink coffee. Has tea, cox sandwiches sometimes.     Lunch [place] with roast beef, or toasted cheese sandwich Eats only when hungry   Dinner Goes out to eat often with a friend either to a restaurant or friend's house.  Meal sometimes at 1900   Snacks Doesn't eat snacks often      Drinks diet soda often. Unsweetened ice tea.   Doesn't feel hungry during the day. Dinner is largest meal. Patient lives alone.     Doing cardiac rehab 3 times per week X 12 wks. Is currently on week 5.  Likes to walk, work in the yard. Physical mobility is limited, has had knee replacement. Difficult to get up without help.     Wakes up around 0300 to go to the bathroom and feels hungry. Eats usually something. Now that patient is back on 40 mg of statin she is waking up rested and doesn't feel sleep all day anymore.     Does not smoke, does not drink alcohol.     Doesn't feel stressed out. Sometimes feels lonely as all her family has passed away, but has good friends who keep her company often.     Recommendations / Plan / Goals   - Consulted with Dr. Jones via [facility] who recommended the following medication adjustments.  Metformin 1000 mg twice daily  Glipizide 10 mg twice daily --> 5 mg daily before breakfast  Jardiance 25 mg daily in the morning  Ozempic 0.5 --> 1 mg weekly - next injection due on Monday, will start then. Will take 2 injections of 0.5 mg until she finishes current supply.   - Prescription for ozempic 1 mg and glipizide 5 mg sent to pharmacy per provider's recommendations.   - Notify provider of BG < 80        Next Follow up scheduled for 9/22/25      Routed to provider for review.      5/22/2025  Corrie BURKSN RN [facility]  Diabetes Care &      A total of 60 minutes was spent with the patient including chart review, discussion and education / advisement pertinent to patient and provider specified concerns as documented above.     Note to patient: The 21 Century  Cures Act makes medical notes like these available to patients in the interest of transparency. However, be advised this is a medical document. It is intended as peer to peer communication. It is written in medical language and may contain abbreviations or verbiage that are unfamiliar. It may appear blunt or direct. Medical documents are intended to carry relevant information, facts as evident, and the clinical opinion of the practitioner.

## 2025-05-23 ENCOUNTER — APPOINTMENT (OUTPATIENT)
Dept: CARDIAC REHAB | Facility: HOSPITAL | Age: 85
End: 2025-05-23
Attending: INTERNAL MEDICINE
Payer: MEDICARE

## 2025-05-23 LAB
GLUCOSE BLDC GLUCOMTR-MCNC: 168 MG/DL (ref 70–99)
GLUCOSE BLDC GLUCOMTR-MCNC: 186 MG/DL (ref 70–99)

## 2025-05-23 PROCEDURE — 93798 PHYS/QHP OP CAR RHAB W/ECG: CPT

## 2025-05-28 ENCOUNTER — CARDPULM VISIT (OUTPATIENT)
Dept: CARDIAC REHAB | Facility: HOSPITAL | Age: 85
End: 2025-05-28
Attending: INTERNAL MEDICINE
Payer: MEDICARE

## 2025-05-28 LAB — GLUCOSE BLDC GLUCOMTR-MCNC: 227 MG/DL (ref 70–99)

## 2025-05-28 PROCEDURE — 93798 PHYS/QHP OP CAR RHAB W/ECG: CPT

## 2025-05-30 ENCOUNTER — CARDPULM VISIT (OUTPATIENT)
Dept: CARDIAC REHAB | Facility: HOSPITAL | Age: 85
End: 2025-05-30
Attending: INTERNAL MEDICINE
Payer: MEDICARE

## 2025-05-30 PROCEDURE — 93798 PHYS/QHP OP CAR RHAB W/ECG: CPT

## 2025-06-02 ENCOUNTER — CARDPULM VISIT (OUTPATIENT)
Dept: CARDIAC REHAB | Facility: HOSPITAL | Age: 85
End: 2025-06-02
Attending: INTERNAL MEDICINE
Payer: MEDICARE

## 2025-06-02 PROCEDURE — 93798 PHYS/QHP OP CAR RHAB W/ECG: CPT

## 2025-06-04 ENCOUNTER — CARDPULM VISIT (OUTPATIENT)
Dept: CARDIAC REHAB | Facility: HOSPITAL | Age: 85
End: 2025-06-04
Attending: INTERNAL MEDICINE
Payer: MEDICARE

## 2025-06-04 PROCEDURE — 93798 PHYS/QHP OP CAR RHAB W/ECG: CPT

## 2025-06-06 ENCOUNTER — CARDPULM VISIT (OUTPATIENT)
Dept: CARDIAC REHAB | Facility: HOSPITAL | Age: 85
End: 2025-06-06
Attending: INTERNAL MEDICINE
Payer: MEDICARE

## 2025-06-06 PROCEDURE — 93798 PHYS/QHP OP CAR RHAB W/ECG: CPT

## 2025-06-09 ENCOUNTER — CARDPULM VISIT (OUTPATIENT)
Dept: CARDIAC REHAB | Facility: HOSPITAL | Age: 85
End: 2025-06-09
Attending: INTERNAL MEDICINE
Payer: MEDICARE

## 2025-06-09 PROCEDURE — 93798 PHYS/QHP OP CAR RHAB W/ECG: CPT

## 2025-06-11 ENCOUNTER — CARDPULM VISIT (OUTPATIENT)
Dept: CARDIAC REHAB | Facility: HOSPITAL | Age: 85
End: 2025-06-11
Attending: INTERNAL MEDICINE
Payer: MEDICARE

## 2025-06-11 PROCEDURE — 93798 PHYS/QHP OP CAR RHAB W/ECG: CPT

## 2025-06-13 ENCOUNTER — CARDPULM VISIT (OUTPATIENT)
Dept: CARDIAC REHAB | Facility: HOSPITAL | Age: 85
End: 2025-06-13
Attending: INTERNAL MEDICINE
Payer: MEDICARE

## 2025-06-13 PROCEDURE — 93798 PHYS/QHP OP CAR RHAB W/ECG: CPT

## 2025-06-16 ENCOUNTER — CARDPULM VISIT (OUTPATIENT)
Dept: CARDIAC REHAB | Facility: HOSPITAL | Age: 85
End: 2025-06-16
Attending: INTERNAL MEDICINE
Payer: MEDICARE

## 2025-06-16 PROCEDURE — 93798 PHYS/QHP OP CAR RHAB W/ECG: CPT

## 2025-06-17 ENCOUNTER — NURSE ONLY (OUTPATIENT)
Dept: INTERNAL MEDICINE CLINIC | Facility: CLINIC | Age: 85
End: 2025-06-17

## 2025-06-17 DIAGNOSIS — E53.8 B12 DEFICIENCY: Primary | ICD-10-CM

## 2025-06-18 ENCOUNTER — CARDPULM VISIT (OUTPATIENT)
Dept: CARDIAC REHAB | Facility: HOSPITAL | Age: 85
End: 2025-06-18
Attending: INTERNAL MEDICINE
Payer: MEDICARE

## 2025-06-18 PROCEDURE — 93798 PHYS/QHP OP CAR RHAB W/ECG: CPT

## 2025-06-20 ENCOUNTER — CARDPULM VISIT (OUTPATIENT)
Dept: CARDIAC REHAB | Facility: HOSPITAL | Age: 85
End: 2025-06-20
Attending: INTERNAL MEDICINE
Payer: MEDICARE

## 2025-06-20 PROCEDURE — 93798 PHYS/QHP OP CAR RHAB W/ECG: CPT

## 2025-06-23 ENCOUNTER — HOSPITAL ENCOUNTER (EMERGENCY)
Facility: HOSPITAL | Age: 85
Discharge: HOME | End: 2025-06-23
Attending: EMERGENCY MEDICINE
Payer: MEDICARE

## 2025-06-23 ENCOUNTER — APPOINTMENT (OUTPATIENT)
Dept: CT IMAGING | Facility: HOSPITAL | Age: 85
End: 2025-06-23
Payer: MEDICARE

## 2025-06-23 VITALS
OXYGEN SATURATION: 99 % | DIASTOLIC BLOOD PRESSURE: 52 MMHG | SYSTOLIC BLOOD PRESSURE: 131 MMHG | RESPIRATION RATE: 16 BRPM | TEMPERATURE: 98 F | HEART RATE: 80 BPM

## 2025-06-23 DIAGNOSIS — S09.90XA CLOSED HEAD INJURY, INITIAL ENCOUNTER: Primary | ICD-10-CM

## 2025-06-23 PROCEDURE — 70450 CT HEAD/BRAIN W/O DYE: CPT | Performed by: EMERGENCY MEDICINE

## 2025-06-23 PROCEDURE — 99284 EMERGENCY DEPT VISIT MOD MDM: CPT
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
## 2025-06-25 ENCOUNTER — CARDPULM VISIT (OUTPATIENT)
Dept: CARDIAC REHAB | Facility: HOSPITAL | Age: 85
End: 2025-06-25
Attending: INTERNAL MEDICINE
Payer: MEDICARE

## 2025-06-25 PROCEDURE — 93798 PHYS/QHP OP CAR RHAB W/ECG: CPT

## 2025-06-27 ENCOUNTER — CARDPULM VISIT (OUTPATIENT)
Dept: CARDIAC REHAB | Facility: HOSPITAL | Age: 85
End: 2025-06-27
Attending: INTERNAL MEDICINE
Payer: MEDICARE

## 2025-06-27 PROCEDURE — 93798 PHYS/QHP OP CAR RHAB W/ECG: CPT

## 2025-07-02 ENCOUNTER — CARDPULM VISIT (OUTPATIENT)
Dept: CARDIAC REHAB | Facility: HOSPITAL | Age: 85
End: 2025-07-02
Attending: INTERNAL MEDICINE
Payer: MEDICARE

## 2025-07-02 PROCEDURE — 93798 PHYS/QHP OP CAR RHAB W/ECG: CPT

## 2025-07-07 ENCOUNTER — APPOINTMENT (OUTPATIENT)
Dept: CARDIAC REHAB | Facility: HOSPITAL | Age: 85
End: 2025-07-07
Attending: INTERNAL MEDICINE
Payer: MEDICARE

## 2025-07-09 ENCOUNTER — APPOINTMENT (OUTPATIENT)
Dept: CARDIAC REHAB | Facility: HOSPITAL | Age: 85
End: 2025-07-09
Attending: INTERNAL MEDICINE
Payer: MEDICARE

## 2025-07-11 ENCOUNTER — CARDPULM VISIT (OUTPATIENT)
Dept: CARDIAC REHAB | Facility: HOSPITAL | Age: 85
End: 2025-07-11
Attending: INTERNAL MEDICINE
Payer: MEDICARE

## 2025-07-11 DIAGNOSIS — E11.9 TYPE 2 DIABETES MELLITUS WITHOUT COMPLICATION, WITHOUT LONG-TERM CURRENT USE OF INSULIN (HCC): ICD-10-CM

## 2025-07-11 PROCEDURE — 93798 PHYS/QHP OP CAR RHAB W/ECG: CPT

## 2025-07-11 RX ORDER — METFORMIN HYDROCHLORIDE 500 MG/1
1000 TABLET, EXTENDED RELEASE ORAL 2 TIMES DAILY
Qty: 360 TABLET | Refills: 1 | Status: SHIPPED | OUTPATIENT
Start: 2025-07-11

## 2025-07-11 NOTE — TELEPHONE ENCOUNTER
Endocrine Refill protocol for metformin    Protocol Criteria:  FAILED  Reason: Elevated A1C    If all below requirements are met, send a 90-day supply with 1 refill per provider protocol.     Verify appointment with Endocrinology completed in the last 6 months or scheduled in the next 3 months.  Verify A1C has been completed within the last 6 months and is below 8.5%  Verify last GFR is greater than or equal to 40 in the past 12 months    Last completed office visit:5/5/2025 Justine Jones MD   Last completed telemed visit: Visit date not found  Next scheduled Follow up:   Future Appointments   Date Time Provider Department Center   7/11/2025  9:45 AM [facility] CARD PHASE 2 RM [facility] CP REHAB EM [facility]   7/14/2025  9:45 AM [facility] CARD PHASE 2 RM [facility] CP REHAB [facility]   7/16/2025  9:45 AM [facility] CARD PHASE 2 RM [facility] CP REHAB [facility]   7/18/2025  9:45 AM [facility] CARD PHASE 2 RM [facility] CP REHAB [facility]   7/21/2025  9:45 AM [facility] CARD PHASE 2 RM [facility] CP REHAB [facility]   7/23/2025  9:45 AM [facility] CARD PHASE 2 RM [facility] CP REHAB [facility]   7/25/2025  9:45 AM [facility] CARD PHASE 2 RM [facility] CP REHAB [facility]   7/28/2025  9:45 AM [facility] CARD PHASE 2 RM [facility] CP REHAB [facility]   7/30/2025  9:45 AM [facility] CARD PHASE 2 RM [facility] CP REHAB [facility]   8/1/2025  9:45 AM [facility] CARD PHASE 2 RM [facility] CP REHAB [facility]   8/4/2025  9:45 AM [facility] CARD PHASE 2 RM [facility] CP REHAB EM [facility]   8/6/2025  9:45 AM [facility] CARD PHASE 2 RM [facility] CP REHAB EM [facility]   8/8/2025  9:45 AM [facility] CARD PHASE 2 RM [facility] CP REHAB EM [facility]   9/9/2025 11:20 AM [facility] CAITLYN RM2 [facility] CAITLYN EM [facility]   9/22/2025 11:30 AM Justine Jones MD [facility]   10/31/2025  9:20 AM WDR DEXA RM1 WDR DEXA ED[facility] [place]   11/19/2025 11:00 AM Ashley Bonner MD [facility] HemOnc [facility]       Last GFR result:    Lab Results   Component Value Date    EGFRCR 69 04/09/2025     Last A1c result: Last A1C result: 8.8% done 4/7/2025.

## 2025-07-14 ENCOUNTER — CARDPULM VISIT (OUTPATIENT)
Dept: CARDIAC REHAB | Facility: HOSPITAL | Age: 85
End: 2025-07-14
Attending: INTERNAL MEDICINE
Payer: MEDICARE

## 2025-07-14 PROCEDURE — 93798 PHYS/QHP OP CAR RHAB W/ECG: CPT

## 2025-07-16 ENCOUNTER — CARDPULM VISIT (OUTPATIENT)
Dept: CARDIAC REHAB | Facility: HOSPITAL | Age: 85
End: 2025-07-16
Attending: INTERNAL MEDICINE
Payer: MEDICARE

## 2025-07-16 ENCOUNTER — APPOINTMENT (OUTPATIENT)
Dept: URBAN - METROPOLITAN AREA CLINIC 244 | Age: 85
Setting detail: DERMATOLOGY
End: 2025-07-16

## 2025-07-16 DIAGNOSIS — L81.4 OTHER MELANIN HYPERPIGMENTATION: ICD-10-CM

## 2025-07-16 DIAGNOSIS — D22 MELANOCYTIC NEVI: ICD-10-CM

## 2025-07-16 DIAGNOSIS — L82.1 OTHER SEBORRHEIC KERATOSIS: ICD-10-CM

## 2025-07-16 DIAGNOSIS — L40.0 PSORIASIS VULGARIS: ICD-10-CM

## 2025-07-16 DIAGNOSIS — Z12.83 ENCOUNTER FOR SCREENING FOR MALIGNANT NEOPLASM OF SKIN: ICD-10-CM

## 2025-07-16 DIAGNOSIS — L30.4 ERYTHEMA INTERTRIGO: ICD-10-CM

## 2025-07-16 DIAGNOSIS — D18.0 HEMANGIOMA: ICD-10-CM

## 2025-07-16 DIAGNOSIS — B35.1 TINEA UNGUIUM: ICD-10-CM

## 2025-07-16 PROBLEM — D18.01 HEMANGIOMA OF SKIN AND SUBCUTANEOUS TISSUE: Status: ACTIVE | Noted: 2025-07-16

## 2025-07-16 PROBLEM — D22.9 MELANOCYTIC NEVI, UNSPECIFIED: Status: ACTIVE | Noted: 2025-07-16

## 2025-07-16 PROCEDURE — 93798 PHYS/QHP OP CAR RHAB W/ECG: CPT

## 2025-07-16 PROCEDURE — OTHER PRESCRIPTION MEDICATION MANAGEMENT: OTHER

## 2025-07-16 PROCEDURE — OTHER ADDITIONAL NOTES: OTHER

## 2025-07-16 PROCEDURE — 99213 OFFICE O/P EST LOW 20 MIN: CPT

## 2025-07-16 PROCEDURE — OTHER PRESCRIPTION: OTHER

## 2025-07-16 PROCEDURE — OTHER MIPS QUALITY: OTHER

## 2025-07-16 PROCEDURE — OTHER COUNSELING: OTHER

## 2025-07-16 RX ORDER — CLOBETASOL PROPIONATE 0.5 MG/ML
SOLUTION TOPICAL QHS
Qty: 50 | Refills: 1 | Status: ERX

## 2025-07-16 RX ORDER — KETOCONAZOLE 20 MG/G
CREAM TOPICAL
Qty: 60 | Refills: 4 | Status: ERX

## 2025-07-16 ASSESSMENT — LOCATION DETAILED DESCRIPTION DERM
LOCATION DETAILED: RIGHT 5TH TOENAIL
LOCATION DETAILED: POSTERIOR MID-PARIETAL SCALP
LOCATION DETAILED: RIGHT GREAT TOENAIL
LOCATION DETAILED: SACRAL SPINE
LOCATION DETAILED: RIGHT 2ND TOENAIL
LOCATION DETAILED: RIGHT 5TH TOENAIL
LOCATION DETAILED: RIGHT 3RD TOENAIL
LOCATION DETAILED: RIGHT 3RD TOENAIL
LOCATION DETAILED: RIGHT GREAT TOENAIL
LOCATION DETAILED: RIGHT 2ND TOENAIL
LOCATION DETAILED: EPIGASTRIC SKIN
LOCATION DETAILED: RIGHT SUPRAPUBIC SKIN
LOCATION DETAILED: RIGHT 4TH TOENAIL
LOCATION DETAILED: RIGHT 4TH TOENAIL

## 2025-07-16 ASSESSMENT — LOCATION ZONE DERM
LOCATION ZONE: SCALP
LOCATION ZONE: TRUNK
LOCATION ZONE: TOENAIL
LOCATION ZONE: TOENAIL

## 2025-07-16 ASSESSMENT — LOCATION SIMPLE DESCRIPTION DERM
LOCATION SIMPLE: RIGHT 2ND TOE
LOCATION SIMPLE: GROIN
LOCATION SIMPLE: RIGHT GREAT TOE
LOCATION SIMPLE: RIGHT 5TH TOE
LOCATION SIMPLE: RIGHT 5TH TOE
LOCATION SIMPLE: RIGHT 3RD TOE
LOCATION SIMPLE: RIGHT 2ND TOE
LOCATION SIMPLE: RIGHT 3RD TOE
LOCATION SIMPLE: POSTERIOR SCALP
LOCATION SIMPLE: LOWER BACK
LOCATION SIMPLE: RIGHT 4TH TOE
LOCATION SIMPLE: ABDOMEN
LOCATION SIMPLE: RIGHT 4TH TOE
LOCATION SIMPLE: RIGHT GREAT TOE

## 2025-07-18 ENCOUNTER — CARDPULM VISIT (OUTPATIENT)
Dept: CARDIAC REHAB | Facility: HOSPITAL | Age: 85
End: 2025-07-18
Attending: INTERNAL MEDICINE
Payer: MEDICARE

## 2025-07-18 PROCEDURE — 93798 PHYS/QHP OP CAR RHAB W/ECG: CPT

## 2025-07-21 ENCOUNTER — CARDPULM VISIT (OUTPATIENT)
Dept: CARDIAC REHAB | Facility: HOSPITAL | Age: 85
End: 2025-07-21
Attending: INTERNAL MEDICINE
Payer: MEDICARE

## 2025-07-21 PROCEDURE — 93798 PHYS/QHP OP CAR RHAB W/ECG: CPT

## 2025-07-23 ENCOUNTER — CARDPULM VISIT (OUTPATIENT)
Dept: CARDIAC REHAB | Facility: HOSPITAL | Age: 85
End: 2025-07-23
Attending: INTERNAL MEDICINE
Payer: MEDICARE

## 2025-07-23 PROCEDURE — 93798 PHYS/QHP OP CAR RHAB W/ECG: CPT

## 2025-07-25 ENCOUNTER — CARDPULM VISIT (OUTPATIENT)
Dept: CARDIAC REHAB | Facility: HOSPITAL | Age: 85
End: 2025-07-25
Attending: INTERNAL MEDICINE
Payer: MEDICARE

## 2025-07-25 PROCEDURE — 93798 PHYS/QHP OP CAR RHAB W/ECG: CPT

## 2025-07-28 ENCOUNTER — CARDPULM VISIT (OUTPATIENT)
Dept: CARDIAC REHAB | Facility: HOSPITAL | Age: 85
End: 2025-07-28
Attending: INTERNAL MEDICINE
Payer: MEDICARE

## 2025-07-28 PROCEDURE — 93798 PHYS/QHP OP CAR RHAB W/ECG: CPT

## 2025-07-30 ENCOUNTER — CARDPULM VISIT (OUTPATIENT)
Dept: CARDIAC REHAB | Facility: HOSPITAL | Age: 85
End: 2025-07-30
Attending: INTERNAL MEDICINE
Payer: MEDICARE

## 2025-07-30 PROCEDURE — 93798 PHYS/QHP OP CAR RHAB W/ECG: CPT

## 2025-08-01 ENCOUNTER — APPOINTMENT (OUTPATIENT)
Dept: CARDIAC REHAB | Facility: HOSPITAL | Age: 85
End: 2025-08-01
Attending: INTERNAL MEDICINE

## 2025-08-02 ENCOUNTER — TELEPHONE (OUTPATIENT)
Dept: INTERNAL MEDICINE CLINIC | Facility: CLINIC | Age: 85
End: 2025-08-02

## 2025-08-04 ENCOUNTER — APPOINTMENT (OUTPATIENT)
Dept: CARDIAC REHAB | Facility: HOSPITAL | Age: 85
End: 2025-08-04
Attending: INTERNAL MEDICINE

## 2025-08-06 ENCOUNTER — APPOINTMENT (OUTPATIENT)
Dept: CARDIAC REHAB | Facility: HOSPITAL | Age: 85
End: 2025-08-06
Attending: INTERNAL MEDICINE

## 2025-08-07 ENCOUNTER — MED REC SCAN ONLY (OUTPATIENT)
Dept: INTERNAL MEDICINE CLINIC | Facility: CLINIC | Age: 85
End: 2025-08-07

## 2025-08-08 ENCOUNTER — APPOINTMENT (OUTPATIENT)
Dept: CARDIAC REHAB | Facility: HOSPITAL | Age: 85
End: 2025-08-08
Attending: INTERNAL MEDICINE

## 2025-08-14 ENCOUNTER — OFFICE VISIT (OUTPATIENT)
Dept: INTERNAL MEDICINE CLINIC | Facility: CLINIC | Age: 85
End: 2025-08-14

## 2025-08-14 VITALS
WEIGHT: 157 LBS | DIASTOLIC BLOOD PRESSURE: 60 MMHG | TEMPERATURE: 98 F | RESPIRATION RATE: 16 BRPM | BODY MASS INDEX: 26.16 KG/M2 | OXYGEN SATURATION: 98 % | HEART RATE: 84 BPM | HEIGHT: 65 IN | SYSTOLIC BLOOD PRESSURE: 124 MMHG

## 2025-08-14 DIAGNOSIS — Z00.00 ANNUAL PHYSICAL EXAM: ICD-10-CM

## 2025-08-14 DIAGNOSIS — E55.9 VITAMIN D DEFICIENCY: ICD-10-CM

## 2025-08-14 DIAGNOSIS — E11.9 TYPE 2 DIABETES MELLITUS WITHOUT COMPLICATION, WITHOUT LONG-TERM CURRENT USE OF INSULIN (HCC): Primary | ICD-10-CM

## 2025-08-14 DIAGNOSIS — E53.8 B12 DEFICIENCY: ICD-10-CM

## 2025-08-14 PROBLEM — I20.0 UNSTABLE ANGINA (HCC): Status: RESOLVED | Noted: 2025-04-07 | Resolved: 2025-08-14

## 2025-08-14 PROBLEM — R42 DIZZINESS: Status: RESOLVED | Noted: 2025-04-07 | Resolved: 2025-08-14

## 2025-08-14 PROBLEM — Z95.5 PRESENCE OF STENT IN LAD CORONARY ARTERY: Status: ACTIVE | Noted: 2025-08-14

## 2025-08-14 PROBLEM — Z87.2 HISTORY OF CELLULITIS: Status: RESOLVED | Noted: 2025-01-16 | Resolved: 2025-08-14

## 2025-08-14 PROCEDURE — 1126F AMNT PAIN NOTED NONE PRSNT: CPT | Performed by: INTERNAL MEDICINE

## 2025-08-14 PROCEDURE — 99214 OFFICE O/P EST MOD 30 MIN: CPT | Performed by: INTERNAL MEDICINE

## 2025-08-14 PROCEDURE — 1159F MED LIST DOCD IN RCRD: CPT | Performed by: INTERNAL MEDICINE

## 2025-08-14 RX ORDER — EVOLOCUMAB 140 MG/ML
INJECTION, SOLUTION SUBCUTANEOUS
COMMUNITY

## 2025-08-14 RX ORDER — CLOBETASOL PROPIONATE 0.5 MG/ML
SOLUTION TOPICAL
COMMUNITY
Start: 2025-07-16

## 2025-08-14 RX ORDER — KETOCONAZOLE 20 MG/G
1 CREAM TOPICAL DAILY
COMMUNITY
Start: 2025-07-16

## 2025-09-11 ENCOUNTER — APPOINTMENT (OUTPATIENT)
Age: 85
End: 2025-09-11

## (undated) DEVICE — ABDOMINAL PAD: Brand: DERMACEA

## (undated) DEVICE — SUTURE SILK 2-0 FS

## (undated) DEVICE — DRAPE,TAPE STRIPS,STERILE: Brand: MEDLINE

## (undated) DEVICE — KENDALL SCD EXPRESS SLEEVES, KNEE LENGTH, MEDIUM: Brand: KENDALL SCD

## (undated) DEVICE — GOWN,SIRUS,FABRIC-REINFORCED,LARGE: Brand: MEDLINE

## (undated) DEVICE — BREAST-HERNIA-PORT CDS-LF: Brand: MEDLINE INDUSTRIES, INC.

## (undated) DEVICE — 3M™ STERI-DRAPE™ INSTRUMENT POUCH 1018: Brand: STERI-DRAPE™

## (undated) DEVICE — SUTURE VICRYL 3-0 SH

## (undated) DEVICE — TOWEL OR BLU 16X26 STRL

## (undated) DEVICE — DRAPE PACK CHEST & U BAR

## (undated) DEVICE — UNDERPAD 23X36 LIGHT ASBORB

## (undated) DEVICE — STERILE POLYISOPRENE POWDER-FREE SURGICAL GLOVES: Brand: PROTEXIS

## (undated) DEVICE — HEMOCLIP HORIZON SM 001200

## (undated) DEVICE — SOL  .9 1000ML BTL

## (undated) DEVICE — HEMOCLIP HORIZON MED 002200

## (undated) DEVICE — Device

## (undated) DEVICE — SUTURE MONOCRYL 4-0 PS-2

## (undated) DEVICE — CAUTERY BLADE 2IN INS E1455

## (undated) NOTE — LETTER
[address], [place]  Authorization for Invasive Procedures  1.  I hereby authorize Dr. Roque Solano , my physician and whomever may be designated as the doctor's assistant, to perform the following operation and/or procedure:    CARDIAC C 5. I consent to the photographing of the operations or procedures to be performed for the purposes of advancing medicine, science, and/or education, provided my identity is not revealed.  If the procedure has been videotaped, the physician/surgeon will obta __________ Time: ___________    Statement of Physician  My signature below affirms that prior to the time of the procedure, I have explained to the patient and/or her legal representative, the risks and benefits involved in the proposed treatment and any r

## (undated) NOTE — LETTER
Hospital Discharge Documentation    From: [facility] Hospitalist's Office  Phone: [phone]    Patient discharged time/date: 2025    Patient discharge disposition:  Home or Self Care       Discharge Summary - D/C Summary        Discharge Summary signed by Milton Cristina MD at 2025 12:06 PM  Version 1 of 1      Author: Milton Cristina MD Service: Hospitalist Author Type: Physician    Filed: 2025 12:06 PM Date of Service: 2025 12:03 PM Status: Signed    : Milton Cristina MD (Physician)           [facility]  part of [facility]    Discharge Summary    Bijal Perez Patient Status:  Inpatient    1940 MRN [id number]   Location [facility] 3W/SW Attending Milton Cristina MD   Hosp Day # 1 PCP Jeff Anthony D'Amico,      Date of Admission: 2025 Disposition:   Home or Self Care     Date of Discharge:   2025     Admitting Diagnosis:   Dizziness [R42]  Chest heaviness [R07.89]    Hospital Discharge Diagnoses:    Unstable Angina  CAD  Abnormal stress test  History of CAD with history of PCI in  in   S/p C 2025 with TRUONG to LAD and TRUONG to RCA  Hx of HTN  Hx of DM2      Problem List:     Problem List[1]    Physical Exam:      /55 (BP Location: Left arm)   Pulse 93   Temp 97.3 °F (36.3 °C) (Oral)   Resp 18   Ht 65\"   Wt 186 lb 11.2 oz (84.7 kg)   LMP  (LMP Unknown)   SpO2 98%   BMI 31.07 kg/m²     Gen:  NAD.  A and O x  3  CV:   RRR.  No m/g/r  Pulm:   CTA bilat  Abd:   +bs, soft, NT, ND  LE:  No c/c/e  Neuro:  nonfocal      Reason for Admission:   Chest pain, possible unstable angina.     HISTORY OF PRESENT ILLNESS:  Patient is an 84-year-old  female with multiple prior LAD PCI stents.  Came in today to the emergency department for evaluation of 3 to 4 days of intermittent midsternal chest tightness that comes with activity and goes away with rest.  CBC, chemistry, and troponin were unremarkable.  EKG showed  normal sinus rhythm with no acute ST-T changes.  Chest x-ray still pending.  Patient reports multiple episodes of midsternal chest tightness that comes with activity and goes away with rest.  Lasts around 15 to 20 minutes at a time.  Last episode was yesterday, started around 5 p.m. and ended around 10 p.m. without activity.  Patient came in today for evaluation.  Currently chest pain-free.  CBC showed white blood cell count of 11.3 with left shift.  Chemistry and troponin were negative.     Hospital Course:     Unstable Angina  CAD  History of CAD with history of PCI in 2024 in 2018  - Nuclear stress test this admit with abnormal myocardial perfusion study with medium sized mild intensity reversible defect  Pt went for angiogram on 4/8/25 with TRUONG to LAD and TRUONG to RCA.  Pt tolerated procedure well.  Cards and PCP f/u.  -Continue aspirin, Brilinta, statin     DMII  - Hemoglobin A1c 8.8  Cont home meds  -Plan outpatient follow-up for further titration of medications     HTN  Controlled.  Cont meds     Quality:  DVT Prophylaxis: Heparin  CODE status: Full code         Consultations:   Cardiology     Discharge Condition:  Good    Discharge Medications:      Discharge Medications        START taking these medications        Instructions Prescription details   atorvastatin 80 MG Tabs  Commonly known as: Lipitor  Replaces: atorvastatin 40 MG Tabs      Take 1 tablet (80 mg total) by mouth nightly.   Quantity: 30 tablet  Refills: 1            CONTINUE taking these medications        Instructions Prescription details   ALPRAZolam 0.25 MG Tabs  Commonly known as: Xanax      Take 1 tablet (0.25 mg total) by mouth 3 (three) times daily as needed.   Quantity: 90 tablet  Refills: 2     aspirin 81 MG Tabs      Take 1 tablet (81 mg total) by mouth at bedtime.   Refills: 0     Centrum Silver Ultra Womens Tabs      Take 1 tablet by mouth daily.   Refills: 0     docusate sodium 100 MG Caps  Commonly known as: Colace      Take 1  capsule (100 mg total) by mouth 2 (two) times daily.   Refills: 0     empagliflozin 25 MG Tabs  Commonly known as: Jardiance      Take 1 tablet (25 mg total) by mouth daily.   Quantity: 90 tablet  Refills: 3     Gemtesa 75 MG Tabs  Generic drug: Vibegron      Take 1 tablet by mouth at bedtime.   Refills: 0     glipiZIDE 10 MG Tabs  Commonly known as: Glucotrol      Take 1 tablet (10 mg total) by mouth 2 (two) times daily before meals.   Quantity: 180 tablet  Refills: 3     letrozole 2.5 MG Tabs  Commonly known as: Femara      Take 1 tablet (2.5 mg total) by mouth daily.   Quantity: 90 tablet  Refills: 3     losartan-hydroCHLOROthiazide 50-12.5 MG Tabs  Commonly known as: Hyzaar      TAKE 1 TABLET BY MOUTH EVERY DAY   Quantity: 90 tablet  Refills: 3     metFORMIN  MG Tb24  Commonly known as: Glucophage XR      TAKE 2 TABLETS BY MOUTH TWICE A DAY   Quantity: 360 tablet  Refills: 1     Ozempic (0.25 or 0.5 MG/DOSE) 2 MG/3ML Sopn  Generic drug: semaglutide      Inject 0.5 mg into the skin once a week.   Quantity: 9 mL  Refills: 1     ticagrelor 90 MG Tabs  Commonly known as: Brilinta      Take 1 tablet (90 mg total) by mouth every 12 (twelve) hours.   Quantity: 180 tablet  Refills: 3            STOP taking these medications      atorvastatin 40 MG Tabs  Commonly known as: Lipitor  Replaced by: atorvastatin 80 MG Tabs                  Where to Get Your Medications        These medications were sent to [facility]/pharmacy #[id number] [place], [place] - [address] [facility] AT [facility], [phone], [phone]  [address] [place] [address]      Phone: [phone]   atorvastatin 80 MG Tabs         Follow up Visits:     Follow-up Information       Justine Jones MD. Schedule an appointment as soon as possible for a visit.    Specialty: ENDOCRINOLOGY  Why: Diabetes follow up within 1-3 weeks  Contact information:  [address]  [facility] [address]  [place] [address] [phone]               D'Amico, Jeff  DO Jonnathan. Schedule an appointment as soon as possible for a visit in 1 week(s).    Specialty: Internal Medicine  Contact information:  [address] [facility] [facility] [address]  [phone]               Silvino Bray MD Follow up in 1 week(s).    Specialties: Interventional, Cardiology, CARDIOLOGY  Why: Office will call to schedule  Contact information:  [address]  [address]  [facility] [id number] [phone]                             Hospital Discharge Diagnoses:  CAD    Lace+ Score: 55  59-90 High Risk  29-58 Medium Risk  0-28   Low Risk.    TCM Follow-Up Recommendation:  LACE > 58: High Risk of readmission after discharge from the hospital.    >35 minutes spent preparing this discharge.    Milton Callejas MD  4/9/2025  12:03 PM        [1]   Patient Active Problem List  Diagnosis    Type 2 diabetes mellitus without complication, without long-term current use of insulin (HCC)    Hypertension, benign    Hyperlipidemia    CAD (coronary artery disease)    History of heart artery stent    Primary osteoarthritis involving multiple joints    B12 deficiency    Achilles tendinitis of right lower extremity    Sternum pain    Neck pain    CKD (chronic kidney disease) stage 3, GFR 30-59 ml/min ([facility])    History of breast cancer    Chronic pain of right knee    Frequent UTI    History of cellulitis    History of fall    Chest heaviness    Dizziness    Unstable angina ([facility])       Electronically signed by Milton Cristina MD on 4/9/2025 12:06 PM

## (undated) NOTE — ED AVS SNAPSHOT
Gautam Marroquin   MRN: [id number]    Department:  [facility] Emergency Department   Date of Visit:  1/4/2018           Disclosure     Insurance plans vary and the physician(s) referred by the ER may not be covered by your plan.  Please contact yo tell this physician (or your personal doctor if your instructions are to return to your personal doctor) about any new or lasting problems. The primary care or specialist physician will see patients referred from the [facility] Emergency Department.  Jessica Rodriguez

## (undated) NOTE — ED AVS SNAPSHOT
Rudy Kumar   MRN: [id number]    Department:  [facility] Emergency Department   Date of Visit:  2/11/2020           Disclosure     Insurance plans vary and the physician(s) referred by the ER may not be covered by your plan.  Please contac within the next three months to obtain basic health screening including reassessment of your blood pressure.     IF THERE IS ANY CHANGE OR WORSENING OF YOUR CONDITION, CALL YOUR PRIMARY CARE PHYSICIAN AT ONCE OR RETURN IMMEDIATELY TO THE EMERGENCY DEPARTMEN

## (undated) NOTE — LETTER
[address], [place]  Authorization for Invasive Procedures  1.  I hereby authorize Dr. Jihan Arriola , my physician and whomever may be designated as the doctor's assistant, to perform the following operation and/or procedure:    CARDIAC C 5. I consent to the photographing of the operations or procedures to be performed for the purposes of advancing medicine, science, and/or education, provided my identity is not revealed.  If the procedure has been videotaped, the physician/surgeon will obta __________ Time: ___________    Statement of Physician  My signature below affirms that prior to the time of the procedure, I have explained to the patient and/or her legal representative, the risks and benefits involved in the proposed treatment and any r

## (undated) NOTE — ED AVS SNAPSHOT
[facility] in [place] [address]    Phone:  [phone]    Fax:  [phone]           Renette Opitz   MRN: [id number]    Department:  [facility] in [facility]   Date of Visit: benefit level being available to you or other limited reimbursement. The physician may seek payment directly from you for amounts other than your deductible, co-payment, or co-insurance and for other services not covered under your health insurance plan. If you believe that any of the medications or instructions on this list is different from what your Primary Care doctor has instructed you - please continue to take your medications as instructed by your Primary Care doctor until you can check with your do [address] to help you get signed up for insurance coverage. Patient [address] is a Federal Navigator program that can help with your Affordable Care Act coverage, as well as all types of Medicaid plans.   To get signed up and covere

## (undated) NOTE — LETTER
[place] [address]  Authorization for Invasive Procedures  Date: 4/8/25           Time: 11:30    I hereby authorize Dr. Bray, my physician and his/her assistants (if applicable), which may include medical students, residents, and/or fellows, to perform the following surgical operation/ procedure and administer such anesthesia as may be determined necessary by my physician:  Operation/Procedure name (s)  Cardiac Catheterization, Left Ventricular Cineangiography, Bilateral Selective Coronary Angiography and/or Right Heart Catheterization; possible Percutaneous Transluminal Coronary Angioplasty, Coronary Atherectomy, Coronary Stent, Intracoronary Thrombolytic therapy, Antiplatelet therapy and/or Intravascular Ultrasound on Bijal Perez   2.   I recognize that during the surgical operation/procedure, unforeseen conditions may necessitate additional or different procedures than those listed above.  I, therefore, further authorize and request that the above-named surgeon, assistants, or designees perform such procedures as are, in their judgment, necessary and desirable.    3.   My surgeon/physician has discussed prior to my surgery the potential benefits, risks and side effects of this procedure; the likelihood of achieving goals; and potential problems that might occur during recuperation.  They also discussed reasonable alternatives to the procedure, including risks, benefits, and side effects related to the alternatives and risks related to not receiving this procedure.  I have had all my questions answered and I acknowledge that no guarantee has been made as to the result that may be obtained.    4.   Should the need arise during my operation/procedure, which includes change of level of care prior to discharge, I also consent to the administration of blood and/or blood products.  Further, I understand that despite careful testing and screening of blood or blood products by collecting  agencies, I may still be subject to ill effects as a result of receiving a blood transfusion and/or blood products.  The following are some, but not all, of the potential risks that can occur: fever and allergic reactions, hemolytic reactions, transmission of diseases such as Hepatitis, AIDS and Cytomegalovirus (CMV) and fluid overload.  In the event that I wish to have an autologous transfusion of my own blood, or a directed donor transfusion, I will discuss this with my physician.  Check only if Refusing Blood or Blood Products  I understand refusal of blood or blood products as deemed necessary by my physician may have serious consequences to my condition to include possible death. I hereby assume responsibility for my refusal and release the hospital, its personnel, and my physicians from any responsibility for the consequences of my refusal.          o  Refuse      5.   I authorize the use of any specimen, organs, tissues, body parts or foreign objects that may be removed from my body during the operation/procedure for diagnosis, research or teaching purposes and their subsequent disposal by hospital authorities.  I also authorize the release of specimen test results and/or written reports to my treating physician on the hospital medical staff or other referring or consulting physicians involved in my care, at the discretion of the Pathologist or my treating physician.    6.   I consent to the photographing or videotaping of the operations or procedures to be performed, including appropriate portions of my body for medical, scientific, or educational purposes, provided my identity is not revealed by the pictures or by descriptive texts accompanying them.  If the procedure has been photographed/videotaped, the surgeon will obtain the original picture, image, videotape or CD.  The hospital will not be responsible for storage, release or maintenance of the picture, image, tape or CD.    7.   I consent to the  presence of a  or observers in the operating room as deemed necessary by my physician or their designees.    8.   I recognize that in the event my procedure results in extended X-Ray/fluoroscopy time, I may develop a skin reaction.    9. If I have a Do Not Attempt Resuscitation (DNAR) order in place, that status will be suspended while in the operating room, procedural suite, and during the recovery period unless otherwise explicitly stated by me (or a person authorized to consent on my behalf). The surgeon or my attending physician will determine when the applicable recovery period ends for purposes of reinstating the DNAR order.  10. Patients having a sterilization procedure: I understand that if the procedure is successful the results will be permanent and it will therefore be impossible for me to inseminate, conceive, or bear children.  I also understand that the procedure is intended to result in sterility, although the result has not been guaranteed.   11. I acknowledge that my physician has explained sedation/analgesia administration to me including the risk and benefits I consent to the administration of sedation/analgesia as may be necessary or desirable in the judgment of my physician.    I CERTIFY THAT I HAVE READ AND FULLY UNDERSTAND THE ABOVE CONSENT TO OPERATION and/or OTHER PROCEDURE.        ____________________________________       _________________________________      ______________________________  Signature of Patient         Signature of Responsible Person        Printed Name of Responsible Person    ____________________________________      _________________________________      ______________________________       Signature of Witness          Relationship to Patient                       Date                                       Time  Patient Name: Bijal LUIS Perez  : 1940    Reviewed: 2024   Printed: 2025  Medical Record #: [id number] Page 1 of 2            STATEMENT OF PHYSICIAN My signature below affirms that prior to the time of the procedure; I have explained to the patient and/or his/her legal representative, the risks and benefits involved in the proposed treatment and any reasonable alternative to the proposed treatment. I have also explained the risks and benefits involved in refusal of the proposed treatment and alternatives to the proposed treatment and have answered the patient's questions. If I have a significant financial interest in a co-management agreement or a significant financial interest in any product or implant, or other significant relationship used in this procedure/surgery, I have disclosed this and had a discussion with my patient.     _______________________________________________________________ _____________________________  (Signature of Physician)                                                                                         (Date)                                   (Time)  Patient Name: Bijal SMITH Chris  : 1940    Reviewed: 2024   Printed: 2025  Medical Record #: [id number] Page 2 of 2

## (undated) NOTE — LETTER
[address], [place]  Authorization for Invasive Procedures  1.  I hereby authorize  Dr Don Weldon , my physician and whomever may be designated as the doctor's assistant, to perform the following operation and/or procedure: Cardiac Ca 5. I consent to the photographing of the operations or procedures to be performed for the purposes of advancing medicine, science, and/or education, provided my identity is not revealed.  If the procedure has been videotaped, the physician/surgeon will obta __________ Time: ___________    Statement of Physician  My signature below affirms that prior to the time of the procedure, I have explained to the patient and/or her legal representative, the risks and benefits involved in the proposed treatment and any r

## (undated) NOTE — LETTER
3/14/2024              Bijal Perez        [address] [place] [address]         To Whom it may concern:    Bijal Chris ( 1940) has been cleared from an endocrine perspective to undergo orthopedic surgery.        Sincerely,    Justine Jones MD  [facility] [place]  [address] [facility], [facility] [address]  [facility] [id number] [phone]        Document electronically generated by:  Justine Jones MD

## (undated) NOTE — LETTER
[address] [place] [address]  Authorization for Surgical Operation or Procedure  Date: ______________       Time: _______________  1.  I hereby authorize Dr. Mal Lopes , my physician and the assistant, to perform the following operation an 5. I consent to the photographing of the operations or procedures to be performed for the purposes of advancing medicine, science, and/or education, provided my identity is not revealed.  If the procedure has been videotaped, the physician/surgeon will obta Statement of Physician: My signature below affirms that prior to the time of the procedure, I have explained to the patient and/or her guardian, the risks and benefits involved in the proposed treatment and any reasonable alternative to the proposed treatm

## (undated) NOTE — LETTER
Patient Name: Lisa Vargas  : 1940  MRN: [id number]  Patient Address: [address] [place] [address] [place] [place] [address]      Coronavirus Disease 2019 (COVID-19)     [facility] is committed to the safety and well-being of our patients, me 2. Monitor your symptoms carefully. If your symptoms get worse, call your healthcare provider immediately. 3. Get rest and stay hydrated.    4. If you have a medical appointment, call the healthcare provider ahead of time and tell them that you have or may ? At least 24 hours have passed since recovery defined as resolution of fever without the use of fever-reducing medications; and  · Improvement in respiratory symptoms (e.g., cough, shortness of breath); and  · At least 10 days have passed since symptoms f If you would be interested in donating your plasma to help treat others diagnosed with the virus, please contact [facility] directly on their website: [email or web address]    Who is eligible to donate convalescent plasma?

## (undated) NOTE — LETTER
Patient Name: Khang Martinez  : 1940  MRN: [id number]  Patient Address: [address] [place], [place] [address] [place] [address]      Coronavirus Disease 2019 (COVID-19)     [place] [address] is committed to the safety and well-being of our patients, me carefully. If your symptoms get worse, call your healthcare provider immediately. 3. Get rest and stay hydrated.    4. If you have a medical appointment, call the healthcare provider ahead of time and tell them that you have or may have COVID-19.  5. For m of fever-reducing medications; and  · Improvement in respiratory symptoms (e.g., cough, shortness of breath); and  · At least 10 days have passed since symptoms first appeared OR if asymptomatic patient or date of symptom onset is unclear then use 10 days donors must:    · Have had a confirmed diagnosis of COVID-19  · Be symptom-free for at least 14 days*    *Some people will be required to have a repeat COVID-19 test in order to be eligible to donate.  If you’re instructed by [facility] that a repeat test is r random. Researchers are trying to identify similarities between people with a Post-COVID condition to better understand if there are risk factors. How do I prevent a Post-COVID condition?   The best way to prevent the long-term symptoms of COVID-19 is

## (undated) NOTE — [place] [address]    Consent for Diagnostic Services    Your physician has ordered one of the following tests to determine the function of your heart: {PROCEDURERS; IMAGING NUCLEAR HEART SCAN:5775}. The information obtained will aid your physician in detecting the possible presence of heart disease, to determine why you may have such symptoms such as chest pain or shortness of breath and to determine an appropriate plan of medical management.    The risks associated with any exercise test or pharmacological stress test are similar to the risks associated with exercise, including an abnormal blood pressure, dizziness, fainting, abnormal heart rate and in very rear instances heart attach, stroke, or death. During the test you must report any unusual feeling or symptoms you experience during the test. Every effort will be made to minimize such risks by careful observation during the test. Emergency equipment and trained personnel are available to deal with unusual situations, which may arise and these personnel have permission to treat you.     During the treadmill or nuclear stress test, the exercise intensity begins at a low level and advances in stages depending on your fitness level. We may stop the test at any time because of signs of fatigue or changes in your heart rate, electrocardiogram, or blood pressure, or other symptoms you may experience.     If your doctor has ordered a pharmacological stress test, you may experience a headache, shortness of breath, flushing, warmth, rapid heart rate, or a bitter taste in your mouth. Sometimes you may not experience any symptoms. Your prompt reporting of any symptoms is very important.     During a Regadenoson stress test, you are given an injection of Regadenoson. Immediately after the Regadenoson is given, you will be injected with a radioactive isotope. During a Dobutamine stress test, Dobutamine infuses over approximately fifteen minutes.  A radioactive isotope is injected during the infusion. After either pharmacological stress test, you will have either a nuclear scan or an echocardiogram.    The information that is obtained during your testing will be treated as privileged and confidential. The information obtained will be given to your doctor and may be used for insurance purposes or to track and trend data as part of  with your privacy retained.    I have read and understand the above information. I voluntarily agree and consent to the above ordered test. Furthermore, no guarantees or assurances have been given by anyone as to the results that may be obtained from this procedure. Any questions that may have occurred to me have been answered to my satisfaction.     Signature of Patient:   ____________________________________________________________    Signature of Witness: _______________________________Date: ___________Time: ________

## (undated) NOTE — LETTER
[address] [place] [address]  Authorization for Surgical Operation or Procedure  Date: ______________       Time: _______________  1.  I hereby authorize Dr. Edgar Stearns , my physician and the assistant, to perform the following operation an 5. I consent to the photographing of the operations or procedures to be performed for the purposes of advancing medicine, science, and/or education, provided my identity is not revealed.  If the procedure has been videotaped, the physician/surgeon will obta Statement of Physician: My signature below affirms that prior to the time of the procedure, I have explained to the patient and/or her guardian, the risks and benefits involved in the proposed treatment and any reasonable alternative to the proposed treatm

## (undated) NOTE — Clinical Note
Already consulted with you. Ozempic 1 mg and glipizide 5 mg sent to pharmacy. She'll adjust meds as recommended and f/u with you in September.  Metformin 1000 mg twice daily Glipizide 10 mg twice daily --> 5 mg daily before breakfast Jardiance 25 mg daily in the morning Ozempic 0.5 --> 1 mg weekly

## (undated) NOTE — LETTER
Patient Name: Kelvin Cherry  : 1940  MRN: [id number]  Patient Address: [address] [place] [address] [address] [address]      Coronavirus Disease 2019 (COVID-19)     [facility] is committed to the safety and well-being of our patients, me 2. Monitor your symptoms carefully. If your symptoms get worse, call your healthcare provider immediately. 3. Get rest and stay hydrated.    4. If you have a medical appointment, call the healthcare provider ahead of time and tell them that you have or may ? At least 24 hours have passed since recovery defined as resolution of fever without the use of fever-reducing medications; and  · Improvement in respiratory symptoms (e.g., cough, shortness of breath); and  · At least 10 days have passed since symptoms f If you would be interested in donating your plasma to help treat others diagnosed with the virus, please contact [facility] directly on their website: [email or web address]    Who is eligible to donate convalescent plasma?

## (undated) NOTE — LETTER
5/20/2019              [facility]        [place] [address]         Dear Ulysses Malay,    [facility] records indicate that the annual blood and urine tests ordered for you by ANGELITA Zepeda have not been done.   If you have

## (undated) NOTE — MR AVS SNAPSHOT
After Visit Summary   8/25/2021    Davian Soler   MRN: [id number]           Visit Information     Date & Time  8/25/2021  4:00 PM Provider  [facility] Celeste [facility]  Phone  [phone] needed. Pimecrolimus 1 % External Cream as needed. APPLY TO AFFECTED AREA OF THE FOLDS OF THE ABDOMEN TWICE DAILY     aspirin 81 MG Oral Tab Take 81 mg by mouth daily. Glucose Blood (ONETOUCH VERIO) In Vitro Strip Check BG 2 times per day. UW:G19. 8 mobile device wherever you are. Video Visits cost $50 and can be paid hassle-free using a credit, debit, or health savings card. Not active on [employer]? Ask us how to get signed up today!         If you receive a survey from [employer], please take a few m

## (undated) NOTE — LETTER
October 12, 2021         [address] [id number]      Patient: Davian Soler   YOB: 1940   Date of Visit: 10/12/2021       Dear Dr. Shreya Ott MD,    I saw your patient, Davian Soler, on 10/12/2

## (undated) NOTE — Clinical Note
Initial assessment completed with patient.  Message sent to office to assist in scheduling.  However, patient declines additional follow-up calls from nurse care manager.  Thank you!

## (undated) NOTE — LETTER
Hospital Discharge Documentation  Please phone to schedule a hospital follow up appointment. Started on Plavix this admission and will also have to f/u w [facility].      From: [address] Hospitalist's Office  Phone: [phone]    Patient discharged time/emilio Procedures/Imaging during hospitalization:    Right and left heart cath; LV and coronary angiogram.  PCI of diagonal with TRUONG. FFR of LAD.       Things to follow up on:  · DM        Discharge Medication List:     Discharge Medications      START taking the Take 1 tablet by mouth  every day in the evening   Quantity:  90 tablet  Refills:  3        STOP taking these medications    ALEVE 220 MG Tabs  Generic drug:  Naproxen Sodium              Where to Get Your Medications      These medications were sent to [facility] Risk of readmission: Brendalyn Orlando has Moderate Risk of readmission after discharge from the hospital.        Thomas Infante MD 11/30/2017    Time spent:  > 30 minutes[BP.1]        Electronically signed by Philip Kruger MD on 11/30/2017  4: